# Patient Record
Sex: FEMALE | Race: BLACK OR AFRICAN AMERICAN | NOT HISPANIC OR LATINO
[De-identification: names, ages, dates, MRNs, and addresses within clinical notes are randomized per-mention and may not be internally consistent; named-entity substitution may affect disease eponyms.]

---

## 2016-09-01 RX ORDER — PANTOPRAZOLE SODIUM 20 MG/1
1 TABLET, DELAYED RELEASE ORAL
Qty: 0 | Refills: 0 | COMMUNITY
Start: 2016-09-01 | End: 2016-09-30

## 2017-01-27 ENCOUNTER — TRANSCRIPTION ENCOUNTER (OUTPATIENT)
Age: 22
End: 2017-01-27

## 2017-01-27 ENCOUNTER — INPATIENT (INPATIENT)
Facility: HOSPITAL | Age: 22
LOS: 3 days | Discharge: HOME | End: 2017-01-31
Attending: INTERNAL MEDICINE | Admitting: INTERNAL MEDICINE
Payer: COMMERCIAL

## 2017-01-27 VITALS
DIASTOLIC BLOOD PRESSURE: 89 MMHG | SYSTOLIC BLOOD PRESSURE: 137 MMHG | HEIGHT: 64 IN | RESPIRATION RATE: 17 BRPM | TEMPERATURE: 98 F | OXYGEN SATURATION: 100 % | WEIGHT: 110.01 LBS | HEART RATE: 60 BPM

## 2017-01-27 DIAGNOSIS — K29.70 GASTRITIS, UNSPECIFIED, WITHOUT BLEEDING: ICD-10-CM

## 2017-01-27 DIAGNOSIS — K29.00 ACUTE GASTRITIS WITHOUT BLEEDING: ICD-10-CM

## 2017-01-27 DIAGNOSIS — N39.0 URINARY TRACT INFECTION, SITE NOT SPECIFIED: ICD-10-CM

## 2017-01-27 LAB
ALBUMIN SERPL ELPH-MCNC: 4.3 G/DL — SIGNIFICANT CHANGE UP (ref 3.3–5)
ALP SERPL-CCNC: 70 U/L — SIGNIFICANT CHANGE UP (ref 40–120)
ALT FLD-CCNC: 19 U/L — SIGNIFICANT CHANGE UP (ref 4–33)
AMYLASE P1 CFR SERPL: 99 U/L — SIGNIFICANT CHANGE UP (ref 25–125)
APPEARANCE UR: CLEAR — SIGNIFICANT CHANGE UP
AST SERPL-CCNC: 38 U/L — HIGH (ref 4–32)
BACTERIA # UR AUTO: SIGNIFICANT CHANGE UP
BASE EXCESS BLDV CALC-SCNC: -1.8 MMOL/L — SIGNIFICANT CHANGE UP
BASOPHILS # BLD AUTO: 0.01 K/UL — SIGNIFICANT CHANGE UP (ref 0–0.2)
BASOPHILS NFR BLD AUTO: 0.1 % — SIGNIFICANT CHANGE UP (ref 0–2)
BILIRUB SERPL-MCNC: 1.4 MG/DL — HIGH (ref 0.2–1.2)
BILIRUB UR-MCNC: NEGATIVE — SIGNIFICANT CHANGE UP
BLOOD GAS VENOUS - CREATININE: SIGNIFICANT CHANGE UP MG/DL (ref 0.5–1.3)
BLOOD UR QL VISUAL: HIGH
BUN SERPL-MCNC: 14 MG/DL — SIGNIFICANT CHANGE UP (ref 7–23)
CALCIUM SERPL-MCNC: 9.4 MG/DL — SIGNIFICANT CHANGE UP (ref 8.4–10.5)
CHLORIDE BLDV-SCNC: 109 MMOL/L — HIGH (ref 96–108)
CHLORIDE SERPL-SCNC: 102 MMOL/L — SIGNIFICANT CHANGE UP (ref 98–107)
CO2 SERPL-SCNC: 20 MMOL/L — LOW (ref 22–31)
COLOR SPEC: YELLOW — SIGNIFICANT CHANGE UP
CREAT SERPL-MCNC: 0.91 MG/DL — SIGNIFICANT CHANGE UP (ref 0.5–1.3)
EOSINOPHIL # BLD AUTO: 0.01 K/UL — SIGNIFICANT CHANGE UP (ref 0–0.5)
EOSINOPHIL NFR BLD AUTO: 0.1 % — SIGNIFICANT CHANGE UP (ref 0–6)
GAS PNL BLDV: 139 MMOL/L — SIGNIFICANT CHANGE UP (ref 136–146)
GLUCOSE BLDV-MCNC: 154 — HIGH (ref 70–99)
GLUCOSE SERPL-MCNC: 154 MG/DL — HIGH (ref 70–99)
GLUCOSE UR-MCNC: NEGATIVE — SIGNIFICANT CHANGE UP
HCO3 BLDV-SCNC: 22 MMOL/L — SIGNIFICANT CHANGE UP (ref 20–27)
HCT VFR BLD CALC: 33.6 % — LOW (ref 34.5–45)
HCT VFR BLDV CALC: 31 % — LOW (ref 34.5–45)
HGB BLD-MCNC: 11.7 G/DL — SIGNIFICANT CHANGE UP (ref 11.5–15.5)
HGB BLDV-MCNC: 10 G/DL — LOW (ref 11.5–15.5)
IMM GRANULOCYTES NFR BLD AUTO: 0.2 % — SIGNIFICANT CHANGE UP (ref 0–1.5)
KETONES UR-MCNC: SIGNIFICANT CHANGE UP
LACTATE BLDV-MCNC: 3.3 MMOL/L — HIGH (ref 0.5–2)
LEUKOCYTE ESTERASE UR-ACNC: HIGH
LIDOCAIN IGE QN: 36.2 U/L — SIGNIFICANT CHANGE UP (ref 7–60)
LYMPHOCYTES # BLD AUTO: 1.69 K/UL — SIGNIFICANT CHANGE UP (ref 1–3.3)
LYMPHOCYTES # BLD AUTO: 13.5 % — SIGNIFICANT CHANGE UP (ref 13–44)
MCHC RBC-ENTMCNC: 27.9 PG — SIGNIFICANT CHANGE UP (ref 27–34)
MCHC RBC-ENTMCNC: 34.8 % — SIGNIFICANT CHANGE UP (ref 32–36)
MCV RBC AUTO: 80.2 FL — SIGNIFICANT CHANGE UP (ref 80–100)
MONOCYTES # BLD AUTO: 0.93 K/UL — HIGH (ref 0–0.9)
MONOCYTES NFR BLD AUTO: 7.4 % — SIGNIFICANT CHANGE UP (ref 2–14)
MUCOUS THREADS # UR AUTO: SIGNIFICANT CHANGE UP
NEUTROPHILS # BLD AUTO: 9.85 K/UL — HIGH (ref 1.8–7.4)
NEUTROPHILS NFR BLD AUTO: 78.7 % — HIGH (ref 43–77)
NITRITE UR-MCNC: NEGATIVE — SIGNIFICANT CHANGE UP
PCO2 BLDV: 35 MMHG — LOW (ref 41–51)
PH BLDV: 7.42 PH — SIGNIFICANT CHANGE UP (ref 7.32–7.43)
PH UR: 8.5 — HIGH (ref 4.6–8)
PLATELET # BLD AUTO: 220 K/UL — SIGNIFICANT CHANGE UP (ref 150–400)
PMV BLD: 9.7 FL — SIGNIFICANT CHANGE UP (ref 7–13)
PO2 BLDV: 28 MMHG — LOW (ref 35–40)
POTASSIUM BLDV-SCNC: 3.9 MMOL/L — SIGNIFICANT CHANGE UP (ref 3.4–4.5)
POTASSIUM SERPL-MCNC: 4.8 MMOL/L — SIGNIFICANT CHANGE UP (ref 3.5–5.3)
POTASSIUM SERPL-SCNC: 4.8 MMOL/L — SIGNIFICANT CHANGE UP (ref 3.5–5.3)
PROT SERPL-MCNC: 7.5 G/DL — SIGNIFICANT CHANGE UP (ref 6–8.3)
PROT UR-MCNC: 100 — HIGH
RBC # BLD: 4.19 M/UL — SIGNIFICANT CHANGE UP (ref 3.8–5.2)
RBC # FLD: 16.6 % — HIGH (ref 10.3–14.5)
RBC CASTS # UR COMP ASSIST: SIGNIFICANT CHANGE UP (ref 0–?)
SAO2 % BLDV: 48.7 % — LOW (ref 60–85)
SODIUM SERPL-SCNC: 141 MMOL/L — SIGNIFICANT CHANGE UP (ref 135–145)
SP GR SPEC: 1.04 — HIGH (ref 1–1.03)
SQUAMOUS # UR AUTO: SIGNIFICANT CHANGE UP
UROBILINOGEN FLD QL: NORMAL E.U. — SIGNIFICANT CHANGE UP (ref 0.1–0.2)
WBC # BLD: 12.52 K/UL — HIGH (ref 3.8–10.5)
WBC # FLD AUTO: 12.52 K/UL — HIGH (ref 3.8–10.5)
WBC UR QL: SIGNIFICANT CHANGE UP (ref 0–?)

## 2017-01-27 PROCEDURE — 74177 CT ABD & PELVIS W/CONTRAST: CPT | Mod: 26

## 2017-01-27 PROCEDURE — 99223 1ST HOSP IP/OBS HIGH 75: CPT

## 2017-01-27 RX ORDER — ONDANSETRON 8 MG/1
4 TABLET, FILM COATED ORAL ONCE
Qty: 0 | Refills: 0 | Status: COMPLETED | OUTPATIENT
Start: 2017-01-27 | End: 2017-01-27

## 2017-01-27 RX ORDER — ACETAMINOPHEN 500 MG
650 TABLET ORAL EVERY 6 HOURS
Qty: 0 | Refills: 0 | Status: DISCONTINUED | OUTPATIENT
Start: 2017-01-27 | End: 2017-01-31

## 2017-01-27 RX ORDER — MORPHINE SULFATE 50 MG/1
4 CAPSULE, EXTENDED RELEASE ORAL ONCE
Qty: 0 | Refills: 0 | Status: DISCONTINUED | OUTPATIENT
Start: 2017-01-27 | End: 2017-01-27

## 2017-01-27 RX ORDER — ONDANSETRON 8 MG/1
4 TABLET, FILM COATED ORAL EVERY 6 HOURS
Qty: 0 | Refills: 0 | Status: DISCONTINUED | OUTPATIENT
Start: 2017-01-27 | End: 2017-01-31

## 2017-01-27 RX ORDER — INFLUENZA VIRUS VACCINE 15; 15; 15; 15 UG/.5ML; UG/.5ML; UG/.5ML; UG/.5ML
0.5 SUSPENSION INTRAMUSCULAR ONCE
Qty: 0 | Refills: 0 | Status: COMPLETED | OUTPATIENT
Start: 2017-01-27 | End: 2017-01-27

## 2017-01-27 RX ORDER — FAMOTIDINE 10 MG/ML
20 INJECTION INTRAVENOUS ONCE
Qty: 0 | Refills: 0 | Status: COMPLETED | OUTPATIENT
Start: 2017-01-27 | End: 2017-01-27

## 2017-01-27 RX ORDER — MORPHINE SULFATE 50 MG/1
0.5 CAPSULE, EXTENDED RELEASE ORAL ONCE
Qty: 0 | Refills: 0 | Status: DISCONTINUED | OUTPATIENT
Start: 2017-01-27 | End: 2017-01-27

## 2017-01-27 RX ORDER — SODIUM CHLORIDE 9 MG/ML
1000 INJECTION INTRAMUSCULAR; INTRAVENOUS; SUBCUTANEOUS
Qty: 0 | Refills: 0 | Status: DISCONTINUED | OUTPATIENT
Start: 2017-01-27 | End: 2017-01-31

## 2017-01-27 RX ORDER — PANTOPRAZOLE SODIUM 20 MG/1
40 TABLET, DELAYED RELEASE ORAL
Qty: 0 | Refills: 0 | Status: DISCONTINUED | OUTPATIENT
Start: 2017-01-27 | End: 2017-01-29

## 2017-01-27 RX ORDER — CALCIUM CARBONATE 500(1250)
3 TABLET ORAL
Qty: 0 | Refills: 0 | COMMUNITY
Start: 2017-01-27

## 2017-01-27 RX ORDER — MORPHINE SULFATE 50 MG/1
0.5 CAPSULE, EXTENDED RELEASE ORAL EVERY 6 HOURS
Qty: 0 | Refills: 0 | Status: DISCONTINUED | OUTPATIENT
Start: 2017-01-27 | End: 2017-01-31

## 2017-01-27 RX ORDER — CALCIUM CARBONATE 500(1250)
3 TABLET ORAL EVERY 6 HOURS
Qty: 0 | Refills: 0 | Status: DISCONTINUED | OUTPATIENT
Start: 2017-01-27 | End: 2017-01-31

## 2017-01-27 RX ORDER — SODIUM CHLORIDE 9 MG/ML
1000 INJECTION INTRAMUSCULAR; INTRAVENOUS; SUBCUTANEOUS ONCE
Qty: 0 | Refills: 0 | Status: COMPLETED | OUTPATIENT
Start: 2017-01-27 | End: 2017-01-27

## 2017-01-27 RX ADMIN — MORPHINE SULFATE 4 MILLIGRAM(S): 50 CAPSULE, EXTENDED RELEASE ORAL at 05:55

## 2017-01-27 RX ADMIN — Medication 650 MILLIGRAM(S): at 17:50

## 2017-01-27 RX ADMIN — MORPHINE SULFATE 4 MILLIGRAM(S): 50 CAPSULE, EXTENDED RELEASE ORAL at 08:57

## 2017-01-27 RX ADMIN — ONDANSETRON 4 MILLIGRAM(S): 8 TABLET, FILM COATED ORAL at 08:57

## 2017-01-27 RX ADMIN — SODIUM CHLORIDE 75 MILLILITER(S): 9 INJECTION INTRAMUSCULAR; INTRAVENOUS; SUBCUTANEOUS at 18:56

## 2017-01-27 RX ADMIN — FAMOTIDINE 20 MILLIGRAM(S): 10 INJECTION INTRAVENOUS at 05:33

## 2017-01-27 RX ADMIN — Medication 650 MILLIGRAM(S): at 16:51

## 2017-01-27 RX ADMIN — MORPHINE SULFATE 0.5 MILLIGRAM(S): 50 CAPSULE, EXTENDED RELEASE ORAL at 18:55

## 2017-01-27 RX ADMIN — ONDANSETRON 4 MILLIGRAM(S): 8 TABLET, FILM COATED ORAL at 17:55

## 2017-01-27 RX ADMIN — MORPHINE SULFATE 4 MILLIGRAM(S): 50 CAPSULE, EXTENDED RELEASE ORAL at 07:00

## 2017-01-27 RX ADMIN — MORPHINE SULFATE 4 MILLIGRAM(S): 50 CAPSULE, EXTENDED RELEASE ORAL at 09:30

## 2017-01-27 RX ADMIN — MORPHINE SULFATE 0.5 MILLIGRAM(S): 50 CAPSULE, EXTENDED RELEASE ORAL at 23:14

## 2017-01-27 RX ADMIN — PANTOPRAZOLE SODIUM 40 MILLIGRAM(S): 20 TABLET, DELAYED RELEASE ORAL at 16:51

## 2017-01-27 RX ADMIN — ONDANSETRON 4 MILLIGRAM(S): 8 TABLET, FILM COATED ORAL at 05:33

## 2017-01-27 RX ADMIN — SODIUM CHLORIDE 1000 MILLILITER(S): 9 INJECTION INTRAMUSCULAR; INTRAVENOUS; SUBCUTANEOUS at 05:33

## 2017-01-27 RX ADMIN — SODIUM CHLORIDE 75 MILLILITER(S): 9 INJECTION INTRAMUSCULAR; INTRAVENOUS; SUBCUTANEOUS at 16:52

## 2017-01-27 RX ADMIN — Medication 30 MILLILITER(S): at 08:57

## 2017-01-27 RX ADMIN — MORPHINE SULFATE 0.5 MILLIGRAM(S): 50 CAPSULE, EXTENDED RELEASE ORAL at 23:30

## 2017-01-27 NOTE — DISCHARGE NOTE ADULT - MEDICATION SUMMARY - MEDICATIONS TO TAKE
I will START or STAY ON the medications listed below when I get home from the hospital:    calcium carbonate 500 mg (200 mg elemental calcium) oral tablet, chewable  -- 3 tab(s) by mouth every 6 hours, As needed, Dyspepsia  -- Indication: For Gastritis    pantoprazole 40 mg oral delayed release tablet  -- 1 tab(s) by mouth once a day  -- Indication: For Gastritis I will START or STAY ON the medications listed below when I get home from the hospital:    calcium carbonate 500 mg (200 mg elemental calcium) oral tablet, chewable  -- 3 tab(s) by mouth every 6 hours, As needed, Dyspepsia  -- Indication: For Gastritis    sucralfate 1 g/10 mL oral suspension  -- 10 milliliter(s) by mouth 4 times a day  -- Indication: For Gastritis    pantoprazole 40 mg oral delayed release tablet  -- 1 tab(s) by mouth 2 times a day  -- Indication: For Gastritis

## 2017-01-27 NOTE — DISCHARGE NOTE ADULT - CARE PROVIDERS DIRECT ADDRESSES
[email or web address]

## 2017-01-27 NOTE — ED ADULT TRIAGE NOTE - CHIEF COMPLAINT QUOTE
Pt arrives to ED c/o N/V/D with Abd pain starting last week but worsening yesterday into tonight.  Starting tonight, pt unable to "keep anything down."  Pt Mother states this has happened a few times in the past where bouts of N/V start.  Pt appears uncomfortable in triage.  Pt is currently on her menstrual period which may account for some of the cramping.  Pt has had endoscopy in past and follow-up with GI.  Pt unable to keep PO meds down.  Hot pack given to pt

## 2017-01-27 NOTE — H&P ADULT. - GASTROINTESTINAL DETAILS
nontender/no rebound tenderness/no guarding/soft/no distention/bowel sounds normal/no masses palpable

## 2017-01-27 NOTE — ED ADULT NURSE REASSESSMENT NOTE - NS ED NURSE REASSESS COMMENT FT1
pt resting, VSS, pt pain level decreased, pt states she does not want to eat. Will ctm, plan to admit

## 2017-01-27 NOTE — ED PROVIDER NOTE - OBJECTIVE STATEMENT
21F no PMH p/w NV x 1 day, diffuse abdominal pain, cramping in nature as well as watery diarrhea. Pt reports hx of multiple episodes of the same in the past, usually with a few days of abdominal soreness preceding NVD. Saw GI in October 2016, had endoscopy that showed gastritis without perforations or ulcerations. No fever/chills. No recent travel or family members with similar type symptoms. States unable to tolerate PO 2/2 vomiting.     LMP: now 21F no PMH p/w NV x 1 day, diffuse abdominal pain, cramping in nature as well as watery diarrhea. Pt reports hx of multiple episodes of the same in the past, usually with a few days of abdominal soreness preceding NVD. Saw GI in October 2016, had endoscopy that showed gastritis without perforations or ulcerations. No fever/chills. No recent travel or family members with similar type symptoms. States unable to tolerate PO 2/2 vomiting.   Klepfish: Diffuse abd pain, constant, cant describe quality, last episode 1yr ago. Multiple NBNB NV. 1-2 episodes of diarrhea. No f/c, SOB/CP, urinary complaints, vaginal discharge. Does not f/u w/ any GI.   LMP: now

## 2017-01-27 NOTE — ED ADULT NURSE NOTE - OBJECTIVE STATEMENT
Pt received into room 1 co ABD pain 10/10, nausea and vomiting over the last 24 hours. Pt states pain is generlaized in abdomen and has been getting worse over the last day and has had numerous episodes of vomiting. Pt A&Ox4, VS as noted. Pt arrives with 20 G IV inserted in L AC placed by BARBIE Avilez in triage, and labs sent. MD evaluated, medicated as ordered. Family at bedside, call bell within reach, will continue to monitor for safety and comfort

## 2017-01-27 NOTE — ED ADULT NURSE REASSESSMENT NOTE - NS ED NURSE REASSESS COMMENT FT1
Pt co diffuse ABD pain, pt denies pregnancy: states shes not sexually active. Medicated as ordered. Will continue to monitor for safety and comfort.

## 2017-01-27 NOTE — DISCHARGE NOTE ADULT - PATIENT PORTAL LINK FT
“You can access the [facility] Patient Portal, offered by [facility], by registering with the following website: [email or web address]”

## 2017-01-27 NOTE — DISCHARGE NOTE ADULT - HOSPITAL COURSE
Patient admitted for acute gastritis - unable to tolerate PO intake.  After IV Prilosec and Zofran for symptom relief, markedly improved.  Patient able to tolerate PO intake.  Advised patient to avoid spicy and fatty food.  Given Prilosec for 7 days.  Recommended to follow up with her PMD/GI doctor to see if Prilosec needs to be extended farther. Patient admitted for acute gastritis - unable to tolerate PO intake.  After IV Prilosec and Zofran for symptom relief, markedly improved.  Patient able to tolerate PO intake.  Advised patient to avoid spicy and fatty food.  GI said to give protonix 40 mg BID, follow up with GI in 1-2 weeks to see if protonix needs to be continued further. Patient medically stable for discharge home.

## 2017-01-27 NOTE — H&P ADULT. - HISTORY OF PRESENT ILLNESS
21F with hx of GERD, presents with 1 day history of acute onset diffuse abdominal pain (burning and crampy pain) 10/10 in severity, associated with nausea and non-bilious non-bloody vomiting, diarrhea (watery, non-bloody.)  Similar to episode last year which prompted endoscopy which diagnosed her with gastritis.  Has tried TUMS with no significant improvement and had been unable to tolerate any PO intake.  In ED, found to be dehydrated and diagnosed with non-infectious gastritis, but unable to go home due to significant difficulty with tolerating PO intakes.  Currently feels better and able to tolerate apple sauce after taking prilosec and zofran.

## 2017-01-27 NOTE — H&P ADULT. - PROBLEM SELECTOR PLAN 1
Continue PPI for 7 days.  Monitor to see if patient can tolerate PO intake.  Once able to tolerate PO intake, discharge to home.

## 2017-01-27 NOTE — DISCHARGE NOTE ADULT - CARE PROVIDER_API CALL
Belkis Crooks), Gastroenterology; Internal Medicine  [address] [place] [address]  Phone: [phone]  Fax: [phone]

## 2017-01-27 NOTE — ED PROVIDER NOTE - ATTENDING CONTRIBUTION TO CARE
21F hx gastritis p/w diffuse abd pain, multiple NV, scant diarrhea, no other systemic complaints, similar to prior, last episode 1yr ago. Vitals wnl, exam appears in pain, diffusely tender abd, not distractable.  ddx: Likely gastritis/gerd/PUD however given lower abd ttp and very tender possible appy vs. perf.  CBC, cmp, lipase. UA, ucg. Ct a/p. Symptom control, reassess.

## 2017-01-27 NOTE — DISCHARGE NOTE ADULT - PLAN OF CARE
Continue PPI for 7 days Avoid spicy, fatty foods.  Continue Protonix for 7 days.  Use Maalox/TUMS as needed for symptom control.  Follow up with GI doctor to see if Protonix needs to be extended. Avoid spicy, fatty foods.  Continue Protonix as prescribed. Use Maalox/TUMS as needed for symptom control.  Follow up with GI doctor Valeriy in 1-2 weeks to see if Protonix needs to be extended.

## 2017-01-27 NOTE — DISCHARGE NOTE ADULT - CARE PLAN
Principal Discharge DX:	Acute gastritis without hemorrhage, unspecified gastritis type  Goal:	Continue PPI for 7 days  Instructions for follow-up, activity and diet:	Avoid spicy, fatty foods.  Continue Protonix for 7 days.  Use Maalox/TUMS as needed for symptom control.  Follow up with GI doctor to see if Protonix needs to be extended. Principal Discharge DX:	Acute gastritis without hemorrhage, unspecified gastritis type  Goal:	Continue PPI for 7 days  Instructions for follow-up, activity and diet:	Avoid spicy, fatty foods.  Continue Protonix as prescribed. Use Maalox/TUMS as needed for symptom control.  Follow up with GI doctor Valeriy in 1-2 weeks to see if Protonix needs to be extended.

## 2017-01-27 NOTE — ED PROVIDER NOTE - PROGRESS NOTE DETAILS
Klepfish: Pain improved and now only b/l upper quadrant. Abd exam: soft, b/l upper quadrant ttp but improved, no lower abd ttp. WBC 12.5, lactate slightly elevated, ucg neg, ct a/p wnl. UA w/ leuk estersa and wc but pt has no urinary complaints. Likely not source of pt's symptoms, will send urine cx and no abx for now. Given additional morphine, zofran, pepcid. Reassess. If still unable to tolerate po or sever pain, likely admission for supportive care and further GI involvement for what is likely gastritis or similar. Patient still unable to tolerate PO despite morphine, pepcid, maalox, zofran. Will admit for PO intolerance and gastritis.

## 2017-01-28 LAB
ALBUMIN SERPL ELPH-MCNC: 4 G/DL — SIGNIFICANT CHANGE UP (ref 3.3–5)
ALP SERPL-CCNC: 60 U/L — SIGNIFICANT CHANGE UP (ref 40–120)
ALT FLD-CCNC: 17 U/L — SIGNIFICANT CHANGE UP (ref 4–33)
AST SERPL-CCNC: 21 U/L — SIGNIFICANT CHANGE UP (ref 4–32)
BASOPHILS # BLD AUTO: 0.01 K/UL — SIGNIFICANT CHANGE UP (ref 0–0.2)
BASOPHILS NFR BLD AUTO: 0.1 % — SIGNIFICANT CHANGE UP (ref 0–2)
BILIRUB SERPL-MCNC: 0.6 MG/DL — SIGNIFICANT CHANGE UP (ref 0.2–1.2)
BUN SERPL-MCNC: 14 MG/DL — SIGNIFICANT CHANGE UP (ref 7–23)
CALCIUM SERPL-MCNC: 8.7 MG/DL — SIGNIFICANT CHANGE UP (ref 8.4–10.5)
CHLORIDE SERPL-SCNC: 107 MMOL/L — SIGNIFICANT CHANGE UP (ref 98–107)
CO2 SERPL-SCNC: 19 MMOL/L — LOW (ref 22–31)
CREAT SERPL-MCNC: 0.79 MG/DL — SIGNIFICANT CHANGE UP (ref 0.5–1.3)
EOSINOPHIL # BLD AUTO: 0 K/UL — SIGNIFICANT CHANGE UP (ref 0–0.5)
EOSINOPHIL NFR BLD AUTO: 0 % — SIGNIFICANT CHANGE UP (ref 0–6)
GLUCOSE SERPL-MCNC: 115 MG/DL — HIGH (ref 70–99)
HCG SERPL-ACNC: < 5 MIU/ML — SIGNIFICANT CHANGE UP
HCT VFR BLD CALC: 28.7 % — LOW (ref 34.5–45)
HGB BLD-MCNC: 9.3 G/DL — LOW (ref 11.5–15.5)
IMM GRANULOCYTES NFR BLD AUTO: 0.4 % — SIGNIFICANT CHANGE UP (ref 0–1.5)
LYMPHOCYTES # BLD AUTO: 19.7 % — SIGNIFICANT CHANGE UP (ref 13–44)
LYMPHOCYTES # BLD AUTO: 2.18 K/UL — SIGNIFICANT CHANGE UP (ref 1–3.3)
MAGNESIUM SERPL-MCNC: 2 MG/DL — SIGNIFICANT CHANGE UP (ref 1.6–2.6)
MCHC RBC-ENTMCNC: 25.1 PG — LOW (ref 27–34)
MCHC RBC-ENTMCNC: 32.4 % — SIGNIFICANT CHANGE UP (ref 32–36)
MCV RBC AUTO: 77.6 FL — LOW (ref 80–100)
MONOCYTES # BLD AUTO: 1.06 K/UL — HIGH (ref 0–0.9)
MONOCYTES NFR BLD AUTO: 9.6 % — SIGNIFICANT CHANGE UP (ref 2–14)
NEUTROPHILS # BLD AUTO: 7.79 K/UL — HIGH (ref 1.8–7.4)
NEUTROPHILS NFR BLD AUTO: 70.2 % — SIGNIFICANT CHANGE UP (ref 43–77)
PHOSPHATE SERPL-MCNC: 3.2 MG/DL — SIGNIFICANT CHANGE UP (ref 2.5–4.5)
PLATELET # BLD AUTO: 222 K/UL — SIGNIFICANT CHANGE UP (ref 150–400)
PMV BLD: 9.5 FL — SIGNIFICANT CHANGE UP (ref 7–13)
POTASSIUM SERPL-MCNC: 3.4 MMOL/L — LOW (ref 3.5–5.3)
POTASSIUM SERPL-SCNC: 3.4 MMOL/L — LOW (ref 3.5–5.3)
PROT SERPL-MCNC: 6.5 G/DL — SIGNIFICANT CHANGE UP (ref 6–8.3)
RBC # BLD: 3.7 M/UL — LOW (ref 3.8–5.2)
RBC # FLD: 16.8 % — HIGH (ref 10.3–14.5)
SODIUM SERPL-SCNC: 142 MMOL/L — SIGNIFICANT CHANGE UP (ref 135–145)
WBC # BLD: 11.08 K/UL — HIGH (ref 3.8–10.5)
WBC # FLD AUTO: 11.08 K/UL — HIGH (ref 3.8–10.5)

## 2017-01-28 RX ORDER — POTASSIUM CHLORIDE 20 MEQ
10 PACKET (EA) ORAL
Qty: 0 | Refills: 0 | Status: COMPLETED | OUTPATIENT
Start: 2017-01-28 | End: 2017-01-28

## 2017-01-28 RX ORDER — CEFTRIAXONE 500 MG/1
1 INJECTION, POWDER, FOR SOLUTION INTRAMUSCULAR; INTRAVENOUS EVERY 24 HOURS
Qty: 0 | Refills: 0 | Status: DISCONTINUED | OUTPATIENT
Start: 2017-01-29 | End: 2017-01-31

## 2017-01-28 RX ORDER — POTASSIUM CHLORIDE 20 MEQ
20 PACKET (EA) ORAL
Qty: 0 | Refills: 0 | Status: COMPLETED | OUTPATIENT
Start: 2017-01-28 | End: 2017-01-28

## 2017-01-28 RX ORDER — SUCRALFATE 1 G
1 TABLET ORAL
Qty: 0 | Refills: 0 | Status: DISCONTINUED | OUTPATIENT
Start: 2017-01-28 | End: 2017-01-29

## 2017-01-28 RX ORDER — CEFTRIAXONE 500 MG/1
1 INJECTION, POWDER, FOR SOLUTION INTRAMUSCULAR; INTRAVENOUS ONCE
Qty: 0 | Refills: 0 | Status: COMPLETED | OUTPATIENT
Start: 2017-01-28 | End: 2017-01-28

## 2017-01-28 RX ORDER — CEFTRIAXONE 500 MG/1
INJECTION, POWDER, FOR SOLUTION INTRAMUSCULAR; INTRAVENOUS
Qty: 0 | Refills: 0 | Status: DISCONTINUED | OUTPATIENT
Start: 2017-01-28 | End: 2017-01-31

## 2017-01-28 RX ADMIN — MORPHINE SULFATE 0.5 MILLIGRAM(S): 50 CAPSULE, EXTENDED RELEASE ORAL at 02:52

## 2017-01-28 RX ADMIN — Medication 100 MILLIEQUIVALENT(S): at 19:49

## 2017-01-28 RX ADMIN — MORPHINE SULFATE 0.5 MILLIGRAM(S): 50 CAPSULE, EXTENDED RELEASE ORAL at 15:29

## 2017-01-28 RX ADMIN — MORPHINE SULFATE 0.5 MILLIGRAM(S): 50 CAPSULE, EXTENDED RELEASE ORAL at 08:37

## 2017-01-28 RX ADMIN — Medication 100 MILLIEQUIVALENT(S): at 18:03

## 2017-01-28 RX ADMIN — MORPHINE SULFATE 0.5 MILLIGRAM(S): 50 CAPSULE, EXTENDED RELEASE ORAL at 02:36

## 2017-01-28 RX ADMIN — MORPHINE SULFATE 0.5 MILLIGRAM(S): 50 CAPSULE, EXTENDED RELEASE ORAL at 08:53

## 2017-01-28 RX ADMIN — ONDANSETRON 4 MILLIGRAM(S): 8 TABLET, FILM COATED ORAL at 02:45

## 2017-01-28 RX ADMIN — ONDANSETRON 4 MILLIGRAM(S): 8 TABLET, FILM COATED ORAL at 10:23

## 2017-01-28 RX ADMIN — ONDANSETRON 4 MILLIGRAM(S): 8 TABLET, FILM COATED ORAL at 20:24

## 2017-01-28 RX ADMIN — CEFTRIAXONE 100 GRAM(S): 500 INJECTION, POWDER, FOR SOLUTION INTRAMUSCULAR; INTRAVENOUS at 19:49

## 2017-01-28 RX ADMIN — Medication 100 MILLIEQUIVALENT(S): at 16:43

## 2017-01-28 RX ADMIN — Medication 3 TABLET(S): at 19:42

## 2017-01-28 RX ADMIN — MORPHINE SULFATE 0.5 MILLIGRAM(S): 50 CAPSULE, EXTENDED RELEASE ORAL at 15:49

## 2017-01-29 LAB
AMPHET UR-MCNC: NEGATIVE — SIGNIFICANT CHANGE UP
AMPHET UR-MCNC: NEGATIVE — SIGNIFICANT CHANGE UP
BARBITURATES UR SCN-MCNC: NEGATIVE — SIGNIFICANT CHANGE UP
BARBITURATES UR SCN-MCNC: NEGATIVE — SIGNIFICANT CHANGE UP
BENZODIAZ UR-MCNC: NEGATIVE — SIGNIFICANT CHANGE UP
BENZODIAZ UR-MCNC: NEGATIVE — SIGNIFICANT CHANGE UP
CANNABINOIDS UR-MCNC: POSITIVE — SIGNIFICANT CHANGE UP
CANNABINOIDS UR-MCNC: POSITIVE — SIGNIFICANT CHANGE UP
COCAINE METAB.OTHER UR-MCNC: NEGATIVE — SIGNIFICANT CHANGE UP
COCAINE METAB.OTHER UR-MCNC: NEGATIVE — SIGNIFICANT CHANGE UP
METHADONE UR-MCNC: NEGATIVE — SIGNIFICANT CHANGE UP
METHADONE UR-MCNC: NEGATIVE — SIGNIFICANT CHANGE UP
OPIATES UR-MCNC: POSITIVE — SIGNIFICANT CHANGE UP
OPIATES UR-MCNC: POSITIVE — SIGNIFICANT CHANGE UP
OXYCODONE UR-MCNC: NEGATIVE — SIGNIFICANT CHANGE UP
OXYCODONE UR-MCNC: NEGATIVE — SIGNIFICANT CHANGE UP
PCP UR-MCNC: NEGATIVE — SIGNIFICANT CHANGE UP
PCP UR-MCNC: NEGATIVE — SIGNIFICANT CHANGE UP

## 2017-01-29 RX ORDER — METOCLOPRAMIDE HCL 10 MG
10 TABLET ORAL ONCE
Qty: 0 | Refills: 0 | Status: COMPLETED | OUTPATIENT
Start: 2017-01-29 | End: 2017-01-29

## 2017-01-29 RX ORDER — SUCRALFATE 1 G
1 TABLET ORAL
Qty: 0 | Refills: 0 | Status: DISCONTINUED | OUTPATIENT
Start: 2017-01-29 | End: 2017-01-31

## 2017-01-29 RX ORDER — PANTOPRAZOLE SODIUM 20 MG/1
40 TABLET, DELAYED RELEASE ORAL DAILY
Qty: 0 | Refills: 0 | Status: DISCONTINUED | OUTPATIENT
Start: 2017-01-29 | End: 2017-01-31

## 2017-01-29 RX ADMIN — MORPHINE SULFATE 0.5 MILLIGRAM(S): 50 CAPSULE, EXTENDED RELEASE ORAL at 01:08

## 2017-01-29 RX ADMIN — MORPHINE SULFATE 0.5 MILLIGRAM(S): 50 CAPSULE, EXTENDED RELEASE ORAL at 00:53

## 2017-01-29 RX ADMIN — CEFTRIAXONE 100 GRAM(S): 500 INJECTION, POWDER, FOR SOLUTION INTRAMUSCULAR; INTRAVENOUS at 17:41

## 2017-01-29 RX ADMIN — Medication 10 MILLIGRAM(S): at 08:53

## 2017-01-29 RX ADMIN — ONDANSETRON 4 MILLIGRAM(S): 8 TABLET, FILM COATED ORAL at 13:10

## 2017-01-29 RX ADMIN — ONDANSETRON 4 MILLIGRAM(S): 8 TABLET, FILM COATED ORAL at 20:06

## 2017-01-29 RX ADMIN — Medication 1 GRAM(S): at 17:41

## 2017-01-29 RX ADMIN — PANTOPRAZOLE SODIUM 40 MILLIGRAM(S): 20 TABLET, DELAYED RELEASE ORAL at 08:53

## 2017-01-29 RX ADMIN — Medication 3 TABLET(S): at 04:04

## 2017-01-29 RX ADMIN — ONDANSETRON 4 MILLIGRAM(S): 8 TABLET, FILM COATED ORAL at 04:58

## 2017-01-29 RX ADMIN — Medication 1 GRAM(S): at 23:06

## 2017-01-30 LAB — SPECIMEN SOURCE: SIGNIFICANT CHANGE UP

## 2017-01-30 RX ADMIN — CEFTRIAXONE 100 GRAM(S): 500 INJECTION, POWDER, FOR SOLUTION INTRAMUSCULAR; INTRAVENOUS at 18:01

## 2017-01-30 RX ADMIN — ONDANSETRON 4 MILLIGRAM(S): 8 TABLET, FILM COATED ORAL at 02:54

## 2017-01-30 RX ADMIN — SODIUM CHLORIDE 75 MILLILITER(S): 9 INJECTION INTRAMUSCULAR; INTRAVENOUS; SUBCUTANEOUS at 19:30

## 2017-01-30 RX ADMIN — PANTOPRAZOLE SODIUM 40 MILLIGRAM(S): 20 TABLET, DELAYED RELEASE ORAL at 11:08

## 2017-01-30 RX ADMIN — Medication 1 GRAM(S): at 11:08

## 2017-01-30 RX ADMIN — Medication 1 GRAM(S): at 05:53

## 2017-01-31 VITALS
SYSTOLIC BLOOD PRESSURE: 130 MMHG | HEART RATE: 70 BPM | DIASTOLIC BLOOD PRESSURE: 80 MMHG | OXYGEN SATURATION: 100 % | TEMPERATURE: 98 F | RESPIRATION RATE: 18 BRPM

## 2017-01-31 RX ORDER — SUCRALFATE 1 G
10 TABLET ORAL
Qty: 1200 | Refills: 0 | OUTPATIENT
Start: 2017-01-31 | End: 2017-03-02

## 2017-01-31 RX ORDER — PANTOPRAZOLE SODIUM 20 MG/1
1 TABLET, DELAYED RELEASE ORAL
Qty: 60 | Refills: 0 | OUTPATIENT
Start: 2017-01-31 | End: 2017-03-02

## 2017-01-31 RX ADMIN — SODIUM CHLORIDE 75 MILLILITER(S): 9 INJECTION INTRAMUSCULAR; INTRAVENOUS; SUBCUTANEOUS at 11:45

## 2017-01-31 RX ADMIN — Medication 3 TABLET(S): at 04:02

## 2017-01-31 RX ADMIN — PANTOPRAZOLE SODIUM 40 MILLIGRAM(S): 20 TABLET, DELAYED RELEASE ORAL at 11:44

## 2017-04-25 ENCOUNTER — RESULT REVIEW (OUTPATIENT)
Age: 22
End: 2017-04-25

## 2017-06-30 ENCOUNTER — INPATIENT (INPATIENT)
Facility: HOSPITAL | Age: 22
LOS: 4 days | Discharge: HOME | End: 2017-07-05
Attending: HOSPITALIST | Admitting: HOSPITALIST
Payer: COMMERCIAL

## 2017-06-30 VITALS
SYSTOLIC BLOOD PRESSURE: 121 MMHG | OXYGEN SATURATION: 100 % | DIASTOLIC BLOOD PRESSURE: 84 MMHG | HEART RATE: 102 BPM | TEMPERATURE: 99 F | RESPIRATION RATE: 18 BRPM

## 2017-06-30 LAB
ALBUMIN SERPL ELPH-MCNC: 4.4 G/DL — SIGNIFICANT CHANGE UP (ref 3.3–5)
ALP SERPL-CCNC: 66 U/L — SIGNIFICANT CHANGE UP (ref 40–120)
ALT FLD-CCNC: 14 U/L — SIGNIFICANT CHANGE UP (ref 4–33)
APPEARANCE UR: CLEAR — SIGNIFICANT CHANGE UP
APPEARANCE UR: SIGNIFICANT CHANGE UP
AST SERPL-CCNC: 20 U/L — SIGNIFICANT CHANGE UP (ref 4–32)
BACTERIA # UR AUTO: SIGNIFICANT CHANGE UP
BACTERIA # UR AUTO: SIGNIFICANT CHANGE UP
BASE EXCESS BLDV CALC-SCNC: -2.8 MMOL/L — SIGNIFICANT CHANGE UP
BASE EXCESS BLDV CALC-SCNC: -4 MMOL/L — SIGNIFICANT CHANGE UP
BASOPHILS # BLD AUTO: 0.02 K/UL — SIGNIFICANT CHANGE UP (ref 0–0.2)
BASOPHILS NFR BLD AUTO: 0.1 % — SIGNIFICANT CHANGE UP (ref 0–2)
BILIRUB SERPL-MCNC: 0.6 MG/DL — SIGNIFICANT CHANGE UP (ref 0.2–1.2)
BILIRUB UR-MCNC: NEGATIVE — SIGNIFICANT CHANGE UP
BILIRUB UR-MCNC: NEGATIVE — SIGNIFICANT CHANGE UP
BLOOD GAS VENOUS - CREATININE: 0.72 MG/DL — SIGNIFICANT CHANGE UP (ref 0.5–1.3)
BLOOD GAS VENOUS - CREATININE: 0.73 MG/DL — SIGNIFICANT CHANGE UP (ref 0.5–1.3)
BLOOD UR QL VISUAL: NEGATIVE — SIGNIFICANT CHANGE UP
BLOOD UR QL VISUAL: NEGATIVE — SIGNIFICANT CHANGE UP
BUN SERPL-MCNC: 13 MG/DL — SIGNIFICANT CHANGE UP (ref 7–23)
CALCIUM SERPL-MCNC: 9.7 MG/DL — SIGNIFICANT CHANGE UP (ref 8.4–10.5)
CHLORIDE BLDV-SCNC: 106 MMOL/L — SIGNIFICANT CHANGE UP (ref 96–108)
CHLORIDE BLDV-SCNC: 107 MMOL/L — SIGNIFICANT CHANGE UP (ref 96–108)
CHLORIDE SERPL-SCNC: 98 MMOL/L — SIGNIFICANT CHANGE UP (ref 98–107)
CO2 SERPL-SCNC: 19 MMOL/L — LOW (ref 22–31)
COLOR SPEC: YELLOW — SIGNIFICANT CHANGE UP
COLOR SPEC: YELLOW — SIGNIFICANT CHANGE UP
CREAT SERPL-MCNC: 0.81 MG/DL — SIGNIFICANT CHANGE UP (ref 0.5–1.3)
EOSINOPHIL # BLD AUTO: 0.02 K/UL — SIGNIFICANT CHANGE UP (ref 0–0.5)
EOSINOPHIL NFR BLD AUTO: 0.1 % — SIGNIFICANT CHANGE UP (ref 0–6)
GAS PNL BLDV: 138 MMOL/L — SIGNIFICANT CHANGE UP (ref 136–146)
GAS PNL BLDV: 138 MMOL/L — SIGNIFICANT CHANGE UP (ref 136–146)
GLUCOSE BLDV-MCNC: 128 — HIGH (ref 70–99)
GLUCOSE BLDV-MCNC: 133 — HIGH (ref 70–99)
GLUCOSE SERPL-MCNC: 127 MG/DL — HIGH (ref 70–99)
GLUCOSE UR-MCNC: 1000 — HIGH
GLUCOSE UR-MCNC: NEGATIVE — SIGNIFICANT CHANGE UP
HCG SERPL-ACNC: SIGNIFICANT CHANGE UP MIU/ML
HCO3 BLDV-SCNC: 21 MMOL/L — SIGNIFICANT CHANGE UP (ref 20–27)
HCO3 BLDV-SCNC: 21 MMOL/L — SIGNIFICANT CHANGE UP (ref 20–27)
HCT VFR BLD CALC: 33 % — LOW (ref 34.5–45)
HCT VFR BLDV CALC: 34.7 % — SIGNIFICANT CHANGE UP (ref 34.5–45)
HCT VFR BLDV CALC: 37.9 % — SIGNIFICANT CHANGE UP (ref 34.5–45)
HGB BLD-MCNC: 10.9 G/DL — LOW (ref 11.5–15.5)
HGB BLDV-MCNC: 11.3 G/DL — LOW (ref 11.5–15.5)
HGB BLDV-MCNC: 12.3 G/DL — SIGNIFICANT CHANGE UP (ref 11.5–15.5)
IMM GRANULOCYTES # BLD AUTO: 0.05 # — SIGNIFICANT CHANGE UP
IMM GRANULOCYTES NFR BLD AUTO: 0.4 % — SIGNIFICANT CHANGE UP (ref 0–1.5)
KETONES UR-MCNC: SIGNIFICANT CHANGE UP
KETONES UR-MCNC: SIGNIFICANT CHANGE UP
LACTATE BLDV-MCNC: 3.3 MMOL/L — HIGH (ref 0.5–2)
LACTATE BLDV-MCNC: 5.7 MMOL/L — CRITICAL HIGH (ref 0.5–2)
LEUKOCYTE ESTERASE UR-ACNC: HIGH
LEUKOCYTE ESTERASE UR-ACNC: NEGATIVE — SIGNIFICANT CHANGE UP
LIDOCAIN IGE QN: 30.8 U/L — SIGNIFICANT CHANGE UP (ref 7–60)
LYMPHOCYTES # BLD AUTO: 2.96 K/UL — SIGNIFICANT CHANGE UP (ref 1–3.3)
LYMPHOCYTES # BLD AUTO: 21.9 % — SIGNIFICANT CHANGE UP (ref 13–44)
MCHC RBC-ENTMCNC: 25.7 PG — LOW (ref 27–34)
MCHC RBC-ENTMCNC: 33 % — SIGNIFICANT CHANGE UP (ref 32–36)
MCV RBC AUTO: 77.8 FL — LOW (ref 80–100)
MONOCYTES # BLD AUTO: 1.12 K/UL — HIGH (ref 0–0.9)
MONOCYTES NFR BLD AUTO: 8.3 % — SIGNIFICANT CHANGE UP (ref 2–14)
MUCOUS THREADS # UR AUTO: SIGNIFICANT CHANGE UP
MUCOUS THREADS # UR AUTO: SIGNIFICANT CHANGE UP
NEUTROPHILS # BLD AUTO: 9.36 K/UL — HIGH (ref 1.8–7.4)
NEUTROPHILS NFR BLD AUTO: 69.2 % — SIGNIFICANT CHANGE UP (ref 43–77)
NITRITE UR-MCNC: NEGATIVE — SIGNIFICANT CHANGE UP
NITRITE UR-MCNC: NEGATIVE — SIGNIFICANT CHANGE UP
NON-SQ EPI CELLS # UR AUTO: <1 — SIGNIFICANT CHANGE UP
NRBC # FLD: 0 — SIGNIFICANT CHANGE UP
PCO2 BLDV: 26 MMHG — LOW (ref 41–51)
PCO2 BLDV: 39 MMHG — LOW (ref 41–51)
PH BLDV: 7.36 PH — SIGNIFICANT CHANGE UP (ref 7.32–7.43)
PH BLDV: 7.48 PH — HIGH (ref 7.32–7.43)
PH UR: 7 — SIGNIFICANT CHANGE UP (ref 4.6–8)
PH UR: 7.5 — SIGNIFICANT CHANGE UP (ref 4.6–8)
PLATELET # BLD AUTO: 315 K/UL — SIGNIFICANT CHANGE UP (ref 150–400)
PMV BLD: 9.9 FL — SIGNIFICANT CHANGE UP (ref 7–13)
PO2 BLDV: < 24 MMHG — LOW (ref 35–40)
PO2 BLDV: < 24 MMHG — LOW (ref 35–40)
POTASSIUM BLDV-SCNC: 3.5 MMOL/L — SIGNIFICANT CHANGE UP (ref 3.4–4.5)
POTASSIUM BLDV-SCNC: 4.8 MMOL/L — HIGH (ref 3.4–4.5)
POTASSIUM SERPL-MCNC: 3.7 MMOL/L — SIGNIFICANT CHANGE UP (ref 3.5–5.3)
POTASSIUM SERPL-SCNC: 3.7 MMOL/L — SIGNIFICANT CHANGE UP (ref 3.5–5.3)
PROT SERPL-MCNC: 7.5 G/DL — SIGNIFICANT CHANGE UP (ref 6–8.3)
PROT UR-MCNC: 100 — HIGH
PROT UR-MCNC: 30 — HIGH
RBC # BLD: 4.24 M/UL — SIGNIFICANT CHANGE UP (ref 3.8–5.2)
RBC # FLD: 16.1 % — HIGH (ref 10.3–14.5)
RBC CASTS # UR COMP ASSIST: SIGNIFICANT CHANGE UP (ref 0–?)
RBC CASTS # UR COMP ASSIST: SIGNIFICANT CHANGE UP (ref 0–?)
SAO2 % BLDV: 12.3 % — LOW (ref 60–85)
SAO2 % BLDV: 31.4 % — LOW (ref 60–85)
SODIUM SERPL-SCNC: 139 MMOL/L — SIGNIFICANT CHANGE UP (ref 135–145)
SP GR SPEC: 1.03 — HIGH (ref 1–1.03)
SP GR SPEC: 1.03 — SIGNIFICANT CHANGE UP (ref 1–1.03)
SQUAMOUS # UR AUTO: SIGNIFICANT CHANGE UP
SQUAMOUS # UR AUTO: SIGNIFICANT CHANGE UP
UROBILINOGEN FLD QL: NORMAL E.U. — SIGNIFICANT CHANGE UP (ref 0.1–0.2)
UROBILINOGEN FLD QL: NORMAL E.U. — SIGNIFICANT CHANGE UP (ref 0.1–0.2)
WBC # BLD: 13.53 K/UL — HIGH (ref 3.8–10.5)
WBC # FLD AUTO: 13.53 K/UL — HIGH (ref 3.8–10.5)
WBC UR QL: HIGH (ref 0–?)
WBC UR QL: SIGNIFICANT CHANGE UP (ref 0–?)

## 2017-06-30 PROCEDURE — 76801 OB US < 14 WKS SINGLE FETUS: CPT | Mod: 26

## 2017-06-30 PROCEDURE — 76705 ECHO EXAM OF ABDOMEN: CPT | Mod: 26

## 2017-06-30 RX ORDER — MORPHINE SULFATE 50 MG/1
4 CAPSULE, EXTENDED RELEASE ORAL ONCE
Qty: 0 | Refills: 0 | Status: DISCONTINUED | OUTPATIENT
Start: 2017-06-30 | End: 2017-06-30

## 2017-06-30 RX ORDER — SODIUM CHLORIDE 9 MG/ML
1000 INJECTION, SOLUTION INTRAVENOUS
Qty: 0 | Refills: 0 | Status: DISCONTINUED | OUTPATIENT
Start: 2017-06-30 | End: 2017-07-01

## 2017-06-30 RX ORDER — ONDANSETRON 8 MG/1
4 TABLET, FILM COATED ORAL ONCE
Qty: 0 | Refills: 0 | Status: COMPLETED | OUTPATIENT
Start: 2017-06-30 | End: 2017-06-30

## 2017-06-30 RX ORDER — MORPHINE SULFATE 50 MG/1
2 CAPSULE, EXTENDED RELEASE ORAL ONCE
Qty: 0 | Refills: 0 | Status: DISCONTINUED | OUTPATIENT
Start: 2017-06-30 | End: 2017-06-30

## 2017-06-30 RX ORDER — ONDANSETRON 8 MG/1
4 TABLET, FILM COATED ORAL EVERY 4 HOURS
Qty: 0 | Refills: 0 | Status: DISCONTINUED | OUTPATIENT
Start: 2017-06-30 | End: 2017-07-01

## 2017-06-30 RX ORDER — PANTOPRAZOLE SODIUM 20 MG/1
80 TABLET, DELAYED RELEASE ORAL ONCE
Qty: 0 | Refills: 0 | Status: DISCONTINUED | OUTPATIENT
Start: 2017-06-30 | End: 2017-06-30

## 2017-06-30 RX ORDER — PANTOPRAZOLE SODIUM 20 MG/1
40 TABLET, DELAYED RELEASE ORAL ONCE
Qty: 0 | Refills: 0 | Status: COMPLETED | OUTPATIENT
Start: 2017-06-30 | End: 2017-06-30

## 2017-06-30 RX ORDER — SODIUM CHLORIDE 9 MG/ML
1000 INJECTION INTRAMUSCULAR; INTRAVENOUS; SUBCUTANEOUS ONCE
Qty: 0 | Refills: 0 | Status: COMPLETED | OUTPATIENT
Start: 2017-06-30 | End: 2017-06-30

## 2017-06-30 RX ORDER — FAMOTIDINE 10 MG/ML
20 INJECTION INTRAVENOUS ONCE
Qty: 0 | Refills: 0 | Status: COMPLETED | OUTPATIENT
Start: 2017-06-30 | End: 2017-06-30

## 2017-06-30 RX ORDER — METOCLOPRAMIDE HCL 10 MG
10 TABLET ORAL ONCE
Qty: 0 | Refills: 0 | Status: COMPLETED | OUTPATIENT
Start: 2017-06-30 | End: 2017-06-30

## 2017-06-30 RX ADMIN — FAMOTIDINE 20 MILLIGRAM(S): 10 INJECTION INTRAVENOUS at 08:14

## 2017-06-30 RX ADMIN — MORPHINE SULFATE 4 MILLIGRAM(S): 50 CAPSULE, EXTENDED RELEASE ORAL at 11:41

## 2017-06-30 RX ADMIN — SODIUM CHLORIDE 125 MILLILITER(S): 9 INJECTION, SOLUTION INTRAVENOUS at 13:20

## 2017-06-30 RX ADMIN — MORPHINE SULFATE 4 MILLIGRAM(S): 50 CAPSULE, EXTENDED RELEASE ORAL at 12:35

## 2017-06-30 RX ADMIN — MORPHINE SULFATE 4 MILLIGRAM(S): 50 CAPSULE, EXTENDED RELEASE ORAL at 12:17

## 2017-06-30 RX ADMIN — MORPHINE SULFATE 4 MILLIGRAM(S): 50 CAPSULE, EXTENDED RELEASE ORAL at 11:40

## 2017-06-30 RX ADMIN — ONDANSETRON 4 MILLIGRAM(S): 8 TABLET, FILM COATED ORAL at 11:41

## 2017-06-30 RX ADMIN — SODIUM CHLORIDE 1000 MILLILITER(S): 9 INJECTION INTRAMUSCULAR; INTRAVENOUS; SUBCUTANEOUS at 08:14

## 2017-06-30 RX ADMIN — PANTOPRAZOLE SODIUM 40 MILLIGRAM(S): 20 TABLET, DELAYED RELEASE ORAL at 14:45

## 2017-06-30 RX ADMIN — ONDANSETRON 4 MILLIGRAM(S): 8 TABLET, FILM COATED ORAL at 17:57

## 2017-06-30 RX ADMIN — Medication 10 MILLIGRAM(S): at 12:17

## 2017-06-30 RX ADMIN — MORPHINE SULFATE 2 MILLIGRAM(S): 50 CAPSULE, EXTENDED RELEASE ORAL at 18:40

## 2017-06-30 RX ADMIN — ONDANSETRON 4 MILLIGRAM(S): 8 TABLET, FILM COATED ORAL at 21:32

## 2017-06-30 RX ADMIN — MORPHINE SULFATE 2 MILLIGRAM(S): 50 CAPSULE, EXTENDED RELEASE ORAL at 18:55

## 2017-06-30 RX ADMIN — MORPHINE SULFATE 4 MILLIGRAM(S): 50 CAPSULE, EXTENDED RELEASE ORAL at 12:00

## 2017-06-30 RX ADMIN — ONDANSETRON 4 MILLIGRAM(S): 8 TABLET, FILM COATED ORAL at 08:14

## 2017-06-30 RX ADMIN — MORPHINE SULFATE 4 MILLIGRAM(S): 50 CAPSULE, EXTENDED RELEASE ORAL at 08:14

## 2017-06-30 NOTE — ED ADULT NURSE NOTE - CHIEF COMPLAINT QUOTE
abd pain    pt mid abd pain for 2 days with nausea, vomited 4-5 times since this am. appears uncomfortable...actively vomiting in triage

## 2017-06-30 NOTE — ED CDU PROVIDER NOTE - OBJECTIVE STATEMENT
21 year old female  with a PMHx of gastritis, six weeks pregnant pw diffuse abdominal pain and nausea, nb/nb vomiting for 2 days. The patient endorses that she has had similar pain before that she was diagnosed with gastritis and better with protonix. Pt states that she vomited numerous times between yesterday and today. Denies f/c, CP, SOB, dysuria, hematuria, diarrhea, constipation, abdominal surgery. 21 year old female  with a PMHx of gastritis, six weeks pregnant (pt unaware of pregnancy) pw diffuse abdominal pain and nausea, nb/nb vomiting for 2 days. The patient endorses that she has had similar pain before that she was diagnosed with gastritis and better with protonix. Pt states that she vomited numerous times between yesterday and today. Denies f/c, CP, SOB, dysuria, hematuria, diarrhea, constipation, abdominal surgery.  In ED: given IVF, lactate 5.7 ->3.3, confirmed IUP 21 year old female  with a PMHx of gastritis, six weeks pregnant (pt unaware of pregnancy) pw diffuse abdominal pain and nausea, nb/nb vomiting for 2 days. The patient endorses that she has had similar pain before that she was diagnosed with gastritis and better with protonix. Pt states that she vomited numerous times between yesterday and today. Denies f/c, CP, SOB, dysuria, hematuria, diarrhea, constipation, abdominal surgery, vaginal discharge/odor/bleeding.  In ED: given IVF, lactate 5.7 ->3.3, confirmed IUP 21 year old female  with a PMHx of gastritis, six weeks pregnant (pt unaware of pregnancy) pw diffuse abdominal pain and nausea, nb/nb vomiting for 2 days. The patient endorses that she has had similar pain before that she was diagnosed with gastritis and better with protonix. Pt states that she vomited numerous times between yesterday and today. Admits that she was treated for an STD in May - unsure of what STD or what medications were given. Denies f/c, CP, SOB, dysuria, hematuria, diarrhea, constipation, abdominal surgery, vaginal discharge/odor/bleeding.  In ED: given IVF, lactate 5.7 ->3.3, confirmed IUP

## 2017-06-30 NOTE — ED PROVIDER NOTE - PROGRESS NOTE DETAILS
UCG pos, TVUS ordered repeat lactate 3.3 and patient still with nausea, will send to CDU for further hydration for hyperemesis

## 2017-06-30 NOTE — ED PROVIDER NOTE - MEDICAL DECISION MAKING DETAILS
21 F with chronic gastritis, p/w epigastric pain and multiple episodes of vomiting x 2 days with tenderness on exam. Will get basic labs, pain control and hydration, and RUQ sono

## 2017-06-30 NOTE — ED ADULT NURSE NOTE - OBJECTIVE STATEMENT
22 yo female received in spot 3.  Pt co 1 day of acute onset diffuse abdominal pain with nausea and non-bilious non-bloody vomiting, diarrhea.  Pt has a hx of gastritis.  unable to tolerate any PO intake.  20 L ac.

## 2017-06-30 NOTE — ED PROVIDER NOTE - OBJECTIVE STATEMENT
21 F h/o gastritis (EGD 2 years ago), takes protonix intermittently when feels pain, p/w diffuse abd pain worse in epigastric area, x 2 days a/w multiple episodes of emesis, and nausea. Has started taking protonix with no relief. No fevers/chills. No urinary symptoms. No back pain. No HA. Does not have GI doc. not able to tolerate anything PO. Says this feels like prior episodes of gastritis.

## 2017-06-30 NOTE — ED CDU PROVIDER NOTE - PROGRESS NOTE DETAILS
JENAE Collisn: Paged OBGYN - awaiting call back JENAE ALLEN - pt c/o nausea and abdominal pain, zofran given,. tylenol offered but pt declined - states she only wanted morphine since it helped her earlier. CDU JENAE Clifton Addendum------  This patient was signed out to me by CDU JENAE Dwyer and ED attending Dr. Henriquez on 06/30/17 at 1900 hrs; test results reviewed.  In interim, pt has been resting comfortably, with intermittent nausea, alleviated with Zofran.  Pt. is refusing any further antacids at present; states morphine helps her pain, states pain is generalized to abdomen; same as prior; no hx/o worsening pain.  On exam, abdominal exam is objectively benign and objectively NTTP.  CDU plan discussed with pt who verbalizes agreement with plan.  Pt stable at present; will continue to monitor / reassess. CDU PA Etienne Addendum----  Pt. resting comfortably in interim; no issues to date apart from infrequent nausea / isolated episode of vomiting, alleviated with Zofran or Reglan.  Pt. agreed to dose of Maalox this am and has been resting comfortably since.  Pt. has not tolerated PO thus far apart from ice chips and tiny sips of water.  Pt. stable at present. will continue to monitor / reassess.  Pt. will be signed out to CDU day PA and attending at 0700 hrs. Dr. Henriquez: I performed a face to face bedside interview with patient regarding history of present illness, review of symptoms and past medical history. I completed an independent physical exam.  I have discussed patient's plan of care with PA.   I agree with note as stated above, having amended the EMR as needed to reflect my findings.   This includes HISTORY OF PRESENT ILLNESS, HIV, PAST MEDICAL/SURGICAL/FAMILY/SOCIAL HISTORY, ALLERGIES AND HOME MEDICATIONS, REVIEW OF SYSTEMS, PHYSICAL EXAM, and any PROGRESS NOTES during the time I functioned as the attending physician for this patient.  Dr. Henriquez: Pt had 2 emesis of emesis overnight and unable to tolerate po. Abdo soft, nt, nd. Lactate improved. Will continue IVF, reassess, obgyn consult. Dr. Henriquez: Pt continues to be symptomatic and unable to tolerate po. Will admit to obgyn service, seen by obgyn

## 2017-06-30 NOTE — ED CDU PROVIDER NOTE - ATTENDING CONTRIBUTION TO CARE
Dr. Henriquez: I performed a face to face bedside interview with patient regarding history of present illness, review of symptoms and past medical history. I completed an independent physical exam.  I have discussed patient's plan of care with PA.   I agree with note as stated above, having amended the EMR as needed to reflect my findings.   This includes HISTORY OF PRESENT ILLNESS, HIV, PAST MEDICAL/SURGICAL/FAMILY/SOCIAL HISTORY, ALLERGIES AND HOME MEDICATIONS, REVIEW OF SYSTEMS, PHYSICAL EXAM, and any PROGRESS NOTES during the time I functioned as the attending physician for this patient.  21F  with h/o gastritis, no abdo surgeries, found to be pregnant in the ED today, p/w diffuse abdo pain and multiple episodes of NBNB emesis x 2 days. Normal BMs. No hematuria, dysuria, fevers or chills. No vag bleeding or discharge.   In the ED pt had a lactate of 5.7, down to 3.3 after IVF.  On exam pt appears in mild discomfort, dry mm, rrr, ctab, abdo soft with diffuse ttp.   Plan - IV hydration, repeat labs, reassess

## 2017-06-30 NOTE — ED PROVIDER NOTE - ATTENDING CONTRIBUTION TO CARE
Dr. Gutierrez:  I have personally performed a face to face bedside history and physical examination of this patient. I have discussed the history, examination, review of systems, assessment and plan of management with the resident. I have reviewed the electronic medical record and amended it to reflect my history, review of systems, physical exam, assessment and plan.    21F Dr. Gutierrez:  I have personally performed a face to face bedside history and physical examination of this patient. I have discussed the history, examination, review of systems, assessment and plan of management with the resident. I have reviewed the electronic medical record and amended it to reflect my history, review of systems, physical exam, assessment and plan.    21F h/o gastritis (EGD 2yrs prior), intermittently on Protonix, presents with diffuse abdominal pain, most severe in epigastric area, with N/V x 2 days.  Unable to tolerate PO.  Denies fever/chills, cp, sob, urinary symptoms.    Exam:  - nad  - mildly tachy  - ctab  - abd soft, diffusely uncomfortable to palpation  - pelvic exam as per resident    A/P  - abdominal pain with N/V, possibly gastritis exacerbated by pregnancy (UCG+ in ED), r/o ectopic  - cbc, cmp, hcg, vbg, lipase  - US  - ua, urine culture  - IVF, zofran, reassess

## 2017-06-30 NOTE — ED ADULT TRIAGE NOTE - CHIEF COMPLAINT QUOTE
abd pain    pt mid abd pain for 2 days with nausea, vomited 4-5 times since this am. appears very uncomfortable abd pain    pt mid abd pain for 2 days with nausea, vomited 4-5 times since this am. appears uncomfortable...actively vomiting in triage

## 2017-06-30 NOTE — ED CDU PROVIDER NOTE - MEDICAL DECISION MAKING DETAILS
21 year old female  with a PMHx of gastritis, six weeks pregnant pw diffuse abdominal pain and nausea, nb/nb vomiting for 2 days.  Plan: IVF, rpt labs

## 2017-06-30 NOTE — ED CDU PROVIDER NOTE - ABDOMINAL TENDER
left upper quadrant/epigastric/right upper quadrant/periumbilical/left lower quadrant/right lower quadrant/suprapubic/umbilical

## 2017-07-01 DIAGNOSIS — O21.0 MILD HYPEREMESIS GRAVIDARUM: ICD-10-CM

## 2017-07-01 LAB
ALBUMIN SERPL ELPH-MCNC: 4 G/DL — SIGNIFICANT CHANGE UP (ref 3.3–5)
ALP SERPL-CCNC: 60 U/L — SIGNIFICANT CHANGE UP (ref 40–120)
ALT FLD-CCNC: 12 U/L — SIGNIFICANT CHANGE UP (ref 4–33)
AST SERPL-CCNC: 19 U/L — SIGNIFICANT CHANGE UP (ref 4–32)
BASE EXCESS BLDV CALC-SCNC: -4 MMOL/L — SIGNIFICANT CHANGE UP
BASOPHILS # BLD AUTO: 0.02 K/UL — SIGNIFICANT CHANGE UP (ref 0–0.2)
BASOPHILS NFR BLD AUTO: 0.1 % — SIGNIFICANT CHANGE UP (ref 0–2)
BILIRUB SERPL-MCNC: 0.4 MG/DL — SIGNIFICANT CHANGE UP (ref 0.2–1.2)
BLD GP AB SCN SERPL QL: NEGATIVE — SIGNIFICANT CHANGE UP
BLOOD GAS VENOUS - CREATININE: 0.6 MG/DL — SIGNIFICANT CHANGE UP (ref 0.5–1.3)
BUN SERPL-MCNC: 8 MG/DL — SIGNIFICANT CHANGE UP (ref 7–23)
CALCIUM SERPL-MCNC: 8.6 MG/DL — SIGNIFICANT CHANGE UP (ref 8.4–10.5)
CHLORIDE BLDV-SCNC: 107 MMOL/L — SIGNIFICANT CHANGE UP (ref 96–108)
CHLORIDE SERPL-SCNC: 100 MMOL/L — SIGNIFICANT CHANGE UP (ref 98–107)
CO2 SERPL-SCNC: 18 MMOL/L — LOW (ref 22–31)
CREAT SERPL-MCNC: 0.69 MG/DL — SIGNIFICANT CHANGE UP (ref 0.5–1.3)
EOSINOPHIL # BLD AUTO: 0 K/UL — SIGNIFICANT CHANGE UP (ref 0–0.5)
EOSINOPHIL NFR BLD AUTO: 0 % — SIGNIFICANT CHANGE UP (ref 0–6)
GAS PNL BLDV: 132 MMOL/L — LOW (ref 136–146)
GLUCOSE BLDV-MCNC: 112 — HIGH (ref 70–99)
GLUCOSE SERPL-MCNC: 114 MG/DL — HIGH (ref 70–99)
HBA1C BLD-MCNC: 5.5 % — SIGNIFICANT CHANGE UP (ref 4–5.6)
HBV SURFACE AG SER-ACNC: NEGATIVE — SIGNIFICANT CHANGE UP
HCO3 BLDV-SCNC: 21 MMOL/L — SIGNIFICANT CHANGE UP (ref 20–27)
HCT VFR BLD CALC: 31 % — LOW (ref 34.5–45)
HCT VFR BLDV CALC: 33 % — LOW (ref 34.5–45)
HGB BLD-MCNC: 10.1 G/DL — LOW (ref 11.5–15.5)
HGB BLDV-MCNC: 10.7 G/DL — LOW (ref 11.5–15.5)
HIV1 AG SER QL: SIGNIFICANT CHANGE UP
HIV1+2 AB SPEC QL: SIGNIFICANT CHANGE UP
IMM GRANULOCYTES # BLD AUTO: 0.07 # — SIGNIFICANT CHANGE UP
IMM GRANULOCYTES NFR BLD AUTO: 0.5 % — SIGNIFICANT CHANGE UP (ref 0–1.5)
LACTATE BLDV-MCNC: 2.6 MMOL/L — HIGH (ref 0.5–2)
LYMPHOCYTES # BLD AUTO: 1.87 K/UL — SIGNIFICANT CHANGE UP (ref 1–3.3)
LYMPHOCYTES # BLD AUTO: 13.1 % — SIGNIFICANT CHANGE UP (ref 13–44)
MCHC RBC-ENTMCNC: 25.6 PG — LOW (ref 27–34)
MCHC RBC-ENTMCNC: 32.6 % — SIGNIFICANT CHANGE UP (ref 32–36)
MCV RBC AUTO: 78.5 FL — LOW (ref 80–100)
MONOCYTES # BLD AUTO: 1.33 K/UL — HIGH (ref 0–0.9)
MONOCYTES NFR BLD AUTO: 9.3 % — SIGNIFICANT CHANGE UP (ref 2–14)
NEUTROPHILS # BLD AUTO: 11 K/UL — HIGH (ref 1.8–7.4)
NEUTROPHILS NFR BLD AUTO: 77 % — SIGNIFICANT CHANGE UP (ref 43–77)
NRBC # FLD: 0 — SIGNIFICANT CHANGE UP
PCO2 BLDV: 29 MMHG — LOW (ref 41–51)
PH BLDV: 7.44 PH — HIGH (ref 7.32–7.43)
PLATELET # BLD AUTO: 272 K/UL — SIGNIFICANT CHANGE UP (ref 150–400)
PMV BLD: 9.9 FL — SIGNIFICANT CHANGE UP (ref 7–13)
PO2 BLDV: 25 MMHG — LOW (ref 35–40)
POTASSIUM BLDV-SCNC: 3.3 MMOL/L — LOW (ref 3.4–4.5)
POTASSIUM SERPL-MCNC: 3.4 MMOL/L — LOW (ref 3.5–5.3)
POTASSIUM SERPL-SCNC: 3.4 MMOL/L — LOW (ref 3.5–5.3)
PROT SERPL-MCNC: 7 G/DL — SIGNIFICANT CHANGE UP (ref 6–8.3)
RBC # BLD: 3.95 M/UL — SIGNIFICANT CHANGE UP (ref 3.8–5.2)
RBC # FLD: 16.1 % — HIGH (ref 10.3–14.5)
RH IG SCN BLD-IMP: POSITIVE — SIGNIFICANT CHANGE UP
SAO2 % BLDV: 40.9 % — LOW (ref 60–85)
SODIUM SERPL-SCNC: 135 MMOL/L — SIGNIFICANT CHANGE UP (ref 135–145)
T4 AB SER-ACNC: 10.61 UG/DL — SIGNIFICANT CHANGE UP (ref 5.1–13)
TSH SERPL-MCNC: 0.1 UIU/ML — LOW (ref 0.27–4.2)
WBC # BLD: 14.29 K/UL — HIGH (ref 3.8–10.5)
WBC # FLD AUTO: 14.29 K/UL — HIGH (ref 3.8–10.5)

## 2017-07-01 RX ORDER — ACETAMINOPHEN 500 MG
1000 TABLET ORAL ONCE
Qty: 0 | Refills: 0 | Status: COMPLETED | OUTPATIENT
Start: 2017-07-01 | End: 2017-07-02

## 2017-07-01 RX ORDER — METOCLOPRAMIDE HCL 10 MG
10 TABLET ORAL
Qty: 0 | Refills: 0 | Status: DISCONTINUED | OUTPATIENT
Start: 2017-07-01 | End: 2017-07-01

## 2017-07-01 RX ORDER — DIPHENHYDRAMINE HCL 50 MG
25 CAPSULE ORAL ONCE
Qty: 0 | Refills: 0 | Status: COMPLETED | OUTPATIENT
Start: 2017-07-01 | End: 2017-07-01

## 2017-07-01 RX ORDER — METOCLOPRAMIDE HCL 10 MG
10 TABLET ORAL EVERY 8 HOURS
Qty: 0 | Refills: 0 | Status: DISCONTINUED | OUTPATIENT
Start: 2017-07-01 | End: 2017-07-01

## 2017-07-01 RX ORDER — CITRIC ACID/SODIUM CITRATE 300-500 MG
30 SOLUTION, ORAL ORAL EVERY 12 HOURS
Qty: 0 | Refills: 0 | Status: DISCONTINUED | OUTPATIENT
Start: 2017-07-01 | End: 2017-07-01

## 2017-07-01 RX ORDER — PYRIDOXINE HCL (VITAMIN B6) 100 MG
25 TABLET ORAL EVERY 8 HOURS
Qty: 0 | Refills: 0 | Status: DISCONTINUED | OUTPATIENT
Start: 2017-07-01 | End: 2017-07-05

## 2017-07-01 RX ORDER — POTASSIUM CHLORIDE 20 MEQ
40 PACKET (EA) ORAL ONCE
Qty: 0 | Refills: 0 | Status: COMPLETED | OUTPATIENT
Start: 2017-07-01 | End: 2017-07-01

## 2017-07-01 RX ORDER — PANTOPRAZOLE SODIUM 20 MG/1
40 TABLET, DELAYED RELEASE ORAL DAILY
Qty: 0 | Refills: 0 | Status: DISCONTINUED | OUTPATIENT
Start: 2017-07-01 | End: 2017-07-05

## 2017-07-01 RX ORDER — ONDANSETRON 8 MG/1
4 TABLET, FILM COATED ORAL EVERY 6 HOURS
Qty: 0 | Refills: 0 | Status: DISCONTINUED | OUTPATIENT
Start: 2017-07-01 | End: 2017-07-01

## 2017-07-01 RX ORDER — POTASSIUM CHLORIDE 20 MEQ
10 PACKET (EA) ORAL ONCE
Qty: 0 | Refills: 0 | Status: COMPLETED | OUTPATIENT
Start: 2017-07-01 | End: 2017-07-01

## 2017-07-01 RX ORDER — FAMOTIDINE 10 MG/ML
20 INJECTION INTRAVENOUS EVERY 12 HOURS
Qty: 0 | Refills: 0 | Status: DISCONTINUED | OUTPATIENT
Start: 2017-07-01 | End: 2017-07-05

## 2017-07-01 RX ORDER — PYRIDOXINE HCL (VITAMIN B6) 100 MG
25 TABLET ORAL EVERY 8 HOURS
Qty: 0 | Refills: 0 | Status: DISCONTINUED | OUTPATIENT
Start: 2017-07-01 | End: 2017-07-01

## 2017-07-01 RX ORDER — SODIUM CHLORIDE 9 MG/ML
1000 INJECTION, SOLUTION INTRAVENOUS
Qty: 0 | Refills: 0 | Status: DISCONTINUED | OUTPATIENT
Start: 2017-07-01 | End: 2017-07-05

## 2017-07-01 RX ORDER — CITRIC ACID/SODIUM CITRATE 300-500 MG
30 SOLUTION, ORAL ORAL EVERY 12 HOURS
Qty: 0 | Refills: 0 | Status: DISCONTINUED | OUTPATIENT
Start: 2017-07-01 | End: 2017-07-05

## 2017-07-01 RX ORDER — METOCLOPRAMIDE HCL 10 MG
10 TABLET ORAL EVERY 8 HOURS
Qty: 0 | Refills: 0 | Status: DISCONTINUED | OUTPATIENT
Start: 2017-07-01 | End: 2017-07-05

## 2017-07-01 RX ORDER — PANTOPRAZOLE SODIUM 20 MG/1
40 TABLET, DELAYED RELEASE ORAL DAILY
Qty: 0 | Refills: 0 | Status: DISCONTINUED | OUTPATIENT
Start: 2017-07-01 | End: 2017-07-01

## 2017-07-01 RX ORDER — DEXTROSE MONOHYDRATE, SODIUM CHLORIDE, AND POTASSIUM CHLORIDE 50; .745; 4.5 G/1000ML; G/1000ML; G/1000ML
1000 INJECTION, SOLUTION INTRAVENOUS
Qty: 0 | Refills: 0 | Status: DISCONTINUED | OUTPATIENT
Start: 2017-07-01 | End: 2017-07-02

## 2017-07-01 RX ORDER — METOCLOPRAMIDE HCL 10 MG
10 TABLET ORAL ONCE
Qty: 0 | Refills: 0 | Status: COMPLETED | OUTPATIENT
Start: 2017-07-01 | End: 2017-07-01

## 2017-07-01 RX ORDER — MORPHINE SULFATE 50 MG/1
2 CAPSULE, EXTENDED RELEASE ORAL ONCE
Qty: 0 | Refills: 0 | Status: DISCONTINUED | OUTPATIENT
Start: 2017-07-01 | End: 2017-07-01

## 2017-07-01 RX ORDER — ONDANSETRON 8 MG/1
4 TABLET, FILM COATED ORAL EVERY 4 HOURS
Qty: 0 | Refills: 0 | Status: COMPLETED | OUTPATIENT
Start: 2017-07-01 | End: 2017-07-01

## 2017-07-01 RX ORDER — ACETAMINOPHEN 500 MG
1000 TABLET ORAL ONCE
Qty: 0 | Refills: 0 | Status: COMPLETED | OUTPATIENT
Start: 2017-07-01 | End: 2017-07-01

## 2017-07-01 RX ORDER — PYRIDOXINE HCL (VITAMIN B6) 100 MG
50 TABLET ORAL ONCE
Qty: 0 | Refills: 0 | Status: COMPLETED | OUTPATIENT
Start: 2017-07-01 | End: 2017-07-01

## 2017-07-01 RX ADMIN — FAMOTIDINE 20 MILLIGRAM(S): 10 INJECTION INTRAVENOUS at 22:35

## 2017-07-01 RX ADMIN — PANTOPRAZOLE SODIUM 40 MILLIGRAM(S): 20 TABLET, DELAYED RELEASE ORAL at 08:46

## 2017-07-01 RX ADMIN — Medication 204 MILLIGRAM(S): at 06:33

## 2017-07-01 RX ADMIN — Medication 204 MILLIGRAM(S): at 10:45

## 2017-07-01 RX ADMIN — ONDANSETRON 4 MILLIGRAM(S): 8 TABLET, FILM COATED ORAL at 00:28

## 2017-07-01 RX ADMIN — MORPHINE SULFATE 2 MILLIGRAM(S): 50 CAPSULE, EXTENDED RELEASE ORAL at 20:10

## 2017-07-01 RX ADMIN — Medication 10 MILLIGRAM(S): at 23:31

## 2017-07-01 RX ADMIN — MORPHINE SULFATE 2 MILLIGRAM(S): 50 CAPSULE, EXTENDED RELEASE ORAL at 20:25

## 2017-07-01 RX ADMIN — Medication 400 MILLIGRAM(S): at 11:15

## 2017-07-01 RX ADMIN — DEXTROSE MONOHYDRATE, SODIUM CHLORIDE, AND POTASSIUM CHLORIDE 150 MILLILITER(S): 50; .745; 4.5 INJECTION, SOLUTION INTRAVENOUS at 17:02

## 2017-07-01 RX ADMIN — Medication 25 MILLIGRAM(S): at 11:15

## 2017-07-01 RX ADMIN — Medication 1000 MILLIGRAM(S): at 11:45

## 2017-07-01 RX ADMIN — Medication 30 MILLILITER(S): at 02:54

## 2017-07-01 RX ADMIN — Medication 100 MILLIEQUIVALENT(S): at 12:51

## 2017-07-01 RX ADMIN — SODIUM CHLORIDE 200 MILLILITER(S): 9 INJECTION, SOLUTION INTRAVENOUS at 09:39

## 2017-07-01 RX ADMIN — DEXTROSE MONOHYDRATE, SODIUM CHLORIDE, AND POTASSIUM CHLORIDE 150 MILLILITER(S): 50; .745; 4.5 INJECTION, SOLUTION INTRAVENOUS at 15:34

## 2017-07-01 RX ADMIN — MORPHINE SULFATE 2 MILLIGRAM(S): 50 CAPSULE, EXTENDED RELEASE ORAL at 00:57

## 2017-07-01 RX ADMIN — MORPHINE SULFATE 2 MILLIGRAM(S): 50 CAPSULE, EXTENDED RELEASE ORAL at 00:42

## 2017-07-01 RX ADMIN — Medication 10 MILLIGRAM(S): at 04:01

## 2017-07-01 RX ADMIN — ONDANSETRON 4 MILLIGRAM(S): 8 TABLET, FILM COATED ORAL at 17:01

## 2017-07-02 LAB
HCT VFR BLD CALC: 28.7 % — LOW (ref 34.5–45)
HGB BLD-MCNC: 9.4 G/DL — LOW (ref 11.5–15.5)
MCHC RBC-ENTMCNC: 26 PG — LOW (ref 27–34)
MCHC RBC-ENTMCNC: 32.8 % — SIGNIFICANT CHANGE UP (ref 32–36)
MCV RBC AUTO: 79.5 FL — LOW (ref 80–100)
NRBC # FLD: 0 — SIGNIFICANT CHANGE UP
PLATELET # BLD AUTO: 232 K/UL — SIGNIFICANT CHANGE UP (ref 150–400)
PMV BLD: 9.9 FL — SIGNIFICANT CHANGE UP (ref 7–13)
RBC # BLD: 3.61 M/UL — LOW (ref 3.8–5.2)
RBC # FLD: 15.9 % — HIGH (ref 10.3–14.5)
T PALLIDUM AB TITR SER: NEGATIVE — SIGNIFICANT CHANGE UP
T3FREE SERPL-MCNC: 2.71 PG/ML — SIGNIFICANT CHANGE UP (ref 1.8–4.6)
WBC # BLD: 11.11 K/UL — HIGH (ref 3.8–10.5)
WBC # FLD AUTO: 11.11 K/UL — HIGH (ref 3.8–10.5)

## 2017-07-02 PROCEDURE — 99222 1ST HOSP IP/OBS MODERATE 55: CPT | Mod: GC

## 2017-07-02 RX ORDER — MORPHINE SULFATE 50 MG/1
2 CAPSULE, EXTENDED RELEASE ORAL ONCE
Qty: 0 | Refills: 0 | Status: DISCONTINUED | OUTPATIENT
Start: 2017-07-02 | End: 2017-07-02

## 2017-07-02 RX ORDER — SUCRALFATE 1 G
1 TABLET ORAL DAILY
Qty: 0 | Refills: 0 | Status: DISCONTINUED | OUTPATIENT
Start: 2017-07-02 | End: 2017-07-05

## 2017-07-02 RX ORDER — SODIUM CHLORIDE 9 MG/ML
1000 INJECTION, SOLUTION INTRAVENOUS
Qty: 0 | Refills: 0 | Status: DISCONTINUED | OUTPATIENT
Start: 2017-07-02 | End: 2017-07-03

## 2017-07-02 RX ADMIN — MORPHINE SULFATE 2 MILLIGRAM(S): 50 CAPSULE, EXTENDED RELEASE ORAL at 03:11

## 2017-07-02 RX ADMIN — MORPHINE SULFATE 2 MILLIGRAM(S): 50 CAPSULE, EXTENDED RELEASE ORAL at 02:56

## 2017-07-02 RX ADMIN — MORPHINE SULFATE 2 MILLIGRAM(S): 50 CAPSULE, EXTENDED RELEASE ORAL at 11:10

## 2017-07-02 RX ADMIN — Medication 10 MILLIGRAM(S): at 23:06

## 2017-07-02 RX ADMIN — SODIUM CHLORIDE 200 MILLILITER(S): 9 INJECTION, SOLUTION INTRAVENOUS at 10:48

## 2017-07-02 RX ADMIN — Medication 10 MILLIGRAM(S): at 10:42

## 2017-07-02 RX ADMIN — SODIUM CHLORIDE 200 MILLILITER(S): 9 INJECTION, SOLUTION INTRAVENOUS at 06:42

## 2017-07-02 RX ADMIN — MORPHINE SULFATE 2 MILLIGRAM(S): 50 CAPSULE, EXTENDED RELEASE ORAL at 10:42

## 2017-07-02 RX ADMIN — PANTOPRAZOLE SODIUM 40 MILLIGRAM(S): 20 TABLET, DELAYED RELEASE ORAL at 14:30

## 2017-07-02 RX ADMIN — Medication 400 MILLIGRAM(S): at 23:07

## 2017-07-02 RX ADMIN — DEXTROSE MONOHYDRATE, SODIUM CHLORIDE, AND POTASSIUM CHLORIDE 150 MILLILITER(S): 50; .745; 4.5 INJECTION, SOLUTION INTRAVENOUS at 14:15

## 2017-07-02 RX ADMIN — FAMOTIDINE 20 MILLIGRAM(S): 10 INJECTION INTRAVENOUS at 10:42

## 2017-07-02 RX ADMIN — Medication 1 GRAM(S): at 14:51

## 2017-07-02 RX ADMIN — FAMOTIDINE 20 MILLIGRAM(S): 10 INJECTION INTRAVENOUS at 21:32

## 2017-07-02 RX ADMIN — SODIUM CHLORIDE 150 MILLILITER(S): 9 INJECTION, SOLUTION INTRAVENOUS at 16:07

## 2017-07-02 NOTE — CONSULT NOTE ADULT - ASSESSMENT
Impression:  1) Nausea/vomiting/abdominal pain; given recurrent attacks of sterotypical symptoms (nausea/vomiting and abdominal pain) that occur 1-2x/year with healthy intervals between, suspect cyclic vomiting syndrome. Previous EGD without findings to explain these symptoms. RUQ ultrasound unrevealing.     Plan:   [] supportive care per GYN - anti-emetics, IVF  [] diet as tolerated  [] no role for repeat EGD  [] numerous medications can be used for prophylaxis, namely amitriptyline however given pregnancy would not recommend starting this at this time. If safe during pregnancy, can give trial of coenzyme Q10 and l-carnitine Impression:  1) Nausea/vomiting/abdominal pain; given recurrent attacks of sterotypical symptoms (nausea/vomiting and abdominal pain) that occur 1-2x/year with healthy intervals between, suspect cyclic vomiting syndrome. Previous EGD without findings to explain these symptoms. RUQ ultrasound unrevealing.     Plan:   [] supportive care per GYN - anti-emetics, IVF  [] diet as tolerated  [] no role for repeat EGD  [] numerous medications can be used for prophylaxis, namely amitriptyline however given pregnancy would not recommend starting this at this time. If safe during pregnancy, can give trial of coenzyme Q10 and l-carnitine (see attending note below for further clarification, particularly if termination contemplated. )

## 2017-07-02 NOTE — CONSULT NOTE ADULT - ATTENDING COMMENTS
Pt seen and examined. Agree with fellow's note. Plan discussed with patient and primary team. Data reviewed at 4pm.    Does use marijuana. Suggested marijuana cessation as trial to see if it affects her symptoms. He is considering termination of pregnancy. In that case her symptoms may resolve if there is a pregnancy associated component to this. Patient can follow up with us as inpatient or outpatient after termination for further discussion of medications if symptomatic.   Check ekg. Will sign off. Thank you.  She has our card. [phone]. Outpatient follow-up in one month Pt seen and examined. Agree with fellow's note. Plan discussed with patient and primary team. Data reviewed at 4pm.    Suggested marijuana cessation as trial to see if it affects her symptoms.   Radha is considering termination of pregnancy. In that case her symptoms may resolve if there is a pregnancy associated component to this.   Patient can follow up with us as inpatient or outpatient after termination for further discussion of medications for [facility] if symptomatic.   Check ekg. Will sign off. Thank you.  She has our card. [phone]. Outpatient follow-up in one month

## 2017-07-03 DIAGNOSIS — O21.0 MILD HYPEREMESIS GRAVIDARUM: ICD-10-CM

## 2017-07-03 DIAGNOSIS — Z29.9 ENCOUNTER FOR PROPHYLACTIC MEASURES, UNSPECIFIED: ICD-10-CM

## 2017-07-03 DIAGNOSIS — K21.9 GASTRO-ESOPHAGEAL REFLUX DISEASE WITHOUT ESOPHAGITIS: ICD-10-CM

## 2017-07-03 LAB
ALBUMIN SERPL ELPH-MCNC: 3.9 G/DL — SIGNIFICANT CHANGE UP (ref 3.3–5)
ALP SERPL-CCNC: 57 U/L — SIGNIFICANT CHANGE UP (ref 40–120)
ALT FLD-CCNC: 22 U/L — SIGNIFICANT CHANGE UP (ref 4–33)
AST SERPL-CCNC: 21 U/L — SIGNIFICANT CHANGE UP (ref 4–32)
BASOPHILS # BLD AUTO: 0.02 K/UL — SIGNIFICANT CHANGE UP (ref 0–0.2)
BASOPHILS NFR BLD AUTO: 0.2 % — SIGNIFICANT CHANGE UP (ref 0–2)
BILIRUB SERPL-MCNC: 0.5 MG/DL — SIGNIFICANT CHANGE UP (ref 0.2–1.2)
BUN SERPL-MCNC: 4 MG/DL — LOW (ref 7–23)
BUN SERPL-MCNC: 4 MG/DL — LOW (ref 7–23)
C TRACH RRNA SPEC QL NAA+PROBE: SIGNIFICANT CHANGE UP
CALCIUM SERPL-MCNC: 8 MG/DL — LOW (ref 8.4–10.5)
CALCIUM SERPL-MCNC: 8.1 MG/DL — LOW (ref 8.4–10.5)
CHLORIDE SERPL-SCNC: 100 MMOL/L — SIGNIFICANT CHANGE UP (ref 98–107)
CHLORIDE SERPL-SCNC: 103 MMOL/L — SIGNIFICANT CHANGE UP (ref 98–107)
CO2 SERPL-SCNC: 19 MMOL/L — LOW (ref 22–31)
CO2 SERPL-SCNC: 20 MMOL/L — LOW (ref 22–31)
CREAT SERPL-MCNC: 0.56 MG/DL — SIGNIFICANT CHANGE UP (ref 0.5–1.3)
CREAT SERPL-MCNC: 0.64 MG/DL — SIGNIFICANT CHANGE UP (ref 0.5–1.3)
EOSINOPHIL # BLD AUTO: 0.02 K/UL — SIGNIFICANT CHANGE UP (ref 0–0.5)
EOSINOPHIL NFR BLD AUTO: 0.2 % — SIGNIFICANT CHANGE UP (ref 0–6)
GLUCOSE SERPL-MCNC: 117 MG/DL — HIGH (ref 70–99)
GLUCOSE SERPL-MCNC: 85 MG/DL — SIGNIFICANT CHANGE UP (ref 70–99)
HCT VFR BLD CALC: 29.2 % — LOW (ref 34.5–45)
HGB BLD-MCNC: 9.8 G/DL — LOW (ref 11.5–15.5)
IMM GRANULOCYTES # BLD AUTO: 0.05 # — SIGNIFICANT CHANGE UP
IMM GRANULOCYTES NFR BLD AUTO: 0.4 % — SIGNIFICANT CHANGE UP (ref 0–1.5)
LACTATE SERPL-SCNC: 0.9 MMOL/L — SIGNIFICANT CHANGE UP (ref 0.5–2)
LYMPHOCYTES # BLD AUTO: 2.71 K/UL — SIGNIFICANT CHANGE UP (ref 1–3.3)
LYMPHOCYTES # BLD AUTO: 23.2 % — SIGNIFICANT CHANGE UP (ref 13–44)
MAGNESIUM SERPL-MCNC: 1.9 MG/DL — SIGNIFICANT CHANGE UP (ref 1.6–2.6)
MCHC RBC-ENTMCNC: 25.5 PG — LOW (ref 27–34)
MCHC RBC-ENTMCNC: 33.6 % — SIGNIFICANT CHANGE UP (ref 32–36)
MCV RBC AUTO: 76 FL — LOW (ref 80–100)
MONOCYTES # BLD AUTO: 1.04 K/UL — HIGH (ref 0–0.9)
MONOCYTES NFR BLD AUTO: 8.9 % — SIGNIFICANT CHANGE UP (ref 2–14)
N GONORRHOEA RRNA SPEC QL NAA+PROBE: SIGNIFICANT CHANGE UP
NEUTROPHILS # BLD AUTO: 7.82 K/UL — HIGH (ref 1.8–7.4)
NEUTROPHILS NFR BLD AUTO: 67.1 % — SIGNIFICANT CHANGE UP (ref 43–77)
NRBC # FLD: 0 — SIGNIFICANT CHANGE UP
PHOSPHATE SERPL-MCNC: 2.7 MG/DL — SIGNIFICANT CHANGE UP (ref 2.5–4.5)
PLATELET # BLD AUTO: 232 K/UL — SIGNIFICANT CHANGE UP (ref 150–400)
PMV BLD: 9.2 FL — SIGNIFICANT CHANGE UP (ref 7–13)
POTASSIUM SERPL-MCNC: 2.8 MMOL/L — CRITICAL LOW (ref 3.5–5.3)
POTASSIUM SERPL-MCNC: 3.2 MMOL/L — LOW (ref 3.5–5.3)
POTASSIUM SERPL-SCNC: 2.8 MMOL/L — CRITICAL LOW (ref 3.5–5.3)
POTASSIUM SERPL-SCNC: 3.2 MMOL/L — LOW (ref 3.5–5.3)
PROT SERPL-MCNC: 6.3 G/DL — SIGNIFICANT CHANGE UP (ref 6–8.3)
RBC # BLD: 3.84 M/UL — SIGNIFICANT CHANGE UP (ref 3.8–5.2)
RBC # FLD: 15.6 % — HIGH (ref 10.3–14.5)
SODIUM SERPL-SCNC: 136 MMOL/L — SIGNIFICANT CHANGE UP (ref 135–145)
SODIUM SERPL-SCNC: 137 MMOL/L — SIGNIFICANT CHANGE UP (ref 135–145)
SPECIMEN SOURCE: SIGNIFICANT CHANGE UP
WBC # BLD: 11.66 K/UL — HIGH (ref 3.8–10.5)
WBC # FLD AUTO: 11.66 K/UL — HIGH (ref 3.8–10.5)

## 2017-07-03 PROCEDURE — 99223 1ST HOSP IP/OBS HIGH 75: CPT | Mod: GC

## 2017-07-03 PROCEDURE — 93010 ELECTROCARDIOGRAM REPORT: CPT

## 2017-07-03 RX ORDER — ACETAMINOPHEN 500 MG
1000 TABLET ORAL ONCE
Qty: 0 | Refills: 0 | Status: COMPLETED | OUTPATIENT
Start: 2017-07-03 | End: 2017-07-03

## 2017-07-03 RX ORDER — ONDANSETRON 8 MG/1
4 TABLET, FILM COATED ORAL ONCE
Qty: 0 | Refills: 0 | Status: COMPLETED | OUTPATIENT
Start: 2017-07-03 | End: 2017-07-03

## 2017-07-03 RX ORDER — MAGNESIUM SULFATE 500 MG/ML
1 VIAL (ML) INJECTION ONCE
Qty: 0 | Refills: 0 | Status: COMPLETED | OUTPATIENT
Start: 2017-07-03 | End: 2017-07-03

## 2017-07-03 RX ORDER — ONDANSETRON 8 MG/1
4 TABLET, FILM COATED ORAL EVERY 4 HOURS
Qty: 0 | Refills: 0 | Status: COMPLETED | OUTPATIENT
Start: 2017-07-03 | End: 2017-07-03

## 2017-07-03 RX ORDER — SODIUM CHLORIDE 9 MG/ML
1000 INJECTION, SOLUTION INTRAVENOUS
Qty: 0 | Refills: 0 | Status: DISCONTINUED | OUTPATIENT
Start: 2017-07-03 | End: 2017-07-05

## 2017-07-03 RX ORDER — POTASSIUM CHLORIDE 20 MEQ
10 PACKET (EA) ORAL
Qty: 0 | Refills: 0 | Status: COMPLETED | OUTPATIENT
Start: 2017-07-03 | End: 2017-07-03

## 2017-07-03 RX ADMIN — Medication 400 MILLIGRAM(S): at 08:17

## 2017-07-03 RX ADMIN — PANTOPRAZOLE SODIUM 40 MILLIGRAM(S): 20 TABLET, DELAYED RELEASE ORAL at 12:16

## 2017-07-03 RX ADMIN — Medication 204 MILLIGRAM(S): at 12:32

## 2017-07-03 RX ADMIN — Medication 100 GRAM(S): at 20:07

## 2017-07-03 RX ADMIN — Medication 100 MILLIEQUIVALENT(S): at 15:21

## 2017-07-03 RX ADMIN — Medication 100 MILLIEQUIVALENT(S): at 16:34

## 2017-07-03 RX ADMIN — ONDANSETRON 4 MILLIGRAM(S): 8 TABLET, FILM COATED ORAL at 14:35

## 2017-07-03 RX ADMIN — Medication 204 MILLIGRAM(S): at 17:29

## 2017-07-03 RX ADMIN — FAMOTIDINE 20 MILLIGRAM(S): 10 INJECTION INTRAVENOUS at 09:30

## 2017-07-03 RX ADMIN — FAMOTIDINE 20 MILLIGRAM(S): 10 INJECTION INTRAVENOUS at 22:11

## 2017-07-03 RX ADMIN — SODIUM CHLORIDE 150 MILLILITER(S): 9 INJECTION, SOLUTION INTRAVENOUS at 01:53

## 2017-07-03 RX ADMIN — SODIUM CHLORIDE 200 MILLILITER(S): 9 INJECTION, SOLUTION INTRAVENOUS at 09:27

## 2017-07-03 RX ADMIN — Medication 1000 MILLIGRAM(S): at 00:00

## 2017-07-03 RX ADMIN — Medication 100 MILLIEQUIVALENT(S): at 17:31

## 2017-07-03 RX ADMIN — ONDANSETRON 4 MILLIGRAM(S): 8 TABLET, FILM COATED ORAL at 06:42

## 2017-07-03 NOTE — PROVIDER CONTACT NOTE (OTHER) - ASSESSMENT
pt vss, pt vomited clear fluid with mucus aprox 50ml.  pt c/o pain to upper middle quadrant of abdomen. pt requesting pain medication specifically morphine

## 2017-07-03 NOTE — CONSULT NOTE ADULT - SUBJECTIVE AND OBJECTIVE BOX
GASTROENTEROLOGY INITIAL CONSULT NOTE    Chief Complaint:  Patient is a 21y old  Female who presents with a chief complaint of     HPI: 21y Female currently 6 weeks pregnant presenting with nausea/vomiting. Patient reports that for the last 4-5 days, she has experienced constant nausea with frequent vomiting. Emesis is yellow-green liquid. She is unable to tolerate PO diet and vomits shortly after eating or even drinking water. Nausea and vomiting is assocated with sharp epigastric abdominal pain. She has not had diarrhea, constipation, melena, or hematochezia. Patient states that she has been having episodes similar to this (with nausea, vomiting, abdominal pain) since age 5. The episodes occur about once to twice every year. She cannot pinpoint any inciting event that brings on the episodes. They tend to resolve on their own after about one week. She reports occasional marijuana use but does not notice any connection between marijuana use and these symptoms. She previously had an EGD in July, 2015 during an episode that found esophagitis, gastritis, and duodenitis.     Allergies:  No Known Allergies      Hospital Medications:  pantoprazole  Injectable 40 milliGRAM(s) IV Push daily  multivitamin/thiamine/folic acid in sodium chloride 0.9% 1000 milliLiter(s) IV Continuous <Continuous>  dextrose 5% + sodium chloride 0.9% with potassium chloride 20 mEq/L 1000 milliLiter(s) IV Continuous <Continuous>  promethazine Suppository 25 milliGRAM(s) Rectal every 6 hours  pyridoxine 25 milliGRAM(s) Oral every 8 hours  acetaminophen  IVPB. 1000 milliGRAM(s) IV Intermittent once  famotidine Injectable 20 milliGRAM(s) IV Push every 12 hours  citric acid/sodium citrate Solution 30 milliLiter(s) Oral every 12 hours  metoclopramide Injectable 10 milliGRAM(s) IV Push every 8 hours PRN      PMHX/PSHX:  GERD (gastroesophageal reflux disease)  Ulcer of gastric cardia  No pertinent past medical history  No significant past surgical history      Family history:  Family history of hypertension  No pertinent family history in first degree relatives      Social History:     ROS:     General:  No wt loss, fevers, chills, night sweats, fatigue,   Eyes:  Good vision, no reported pain  ENT:  No sore throat, pain, runny nose, dysphagia  CV:  No pain, palpitations, hypo/hypertension  Resp:  No dyspnea, cough, tachypnea, wheezing  GI:  see HPI  :  No pain, bleeding, incontinence, nocturia  Muscle:  No pain, weakness  Neuro:  No weakness, tingling, memory problems  Psych:  No fatigue, insomnia, mood problems, depression  Endocrine:  No polyuria, polydipsia, cold/heat intolerance  Heme:  No petechiae, ecchymosis, easy bruisability  Skin:  No rash, tattoos, scars, edema      PHYSICAL EXAM:   Vital Signs:  Vital Signs Last 24 Hrs  T(C): 36.8 (02 Jul 2017 09:57), Max: 37.4 (01 Jul 2017 16:36)  T(F): 98.2 (02 Jul 2017 09:57), Max: 99.4 (01 Jul 2017 16:36)  HR: 73 (02 Jul 2017 09:57) (69 - 83)  BP: 129/83 (02 Jul 2017 09:57) (128/75 - 144/89)  BP(mean): --  RR: 20 (02 Jul 2017 09:57) (18 - 20)  SpO2: 99% (02 Jul 2017 09:57) (99% - 100%)  Daily     Daily     GENERAL:  no acute distress  HEENT:  -icterus   CHEST:  clear bilaterally, no wheezes or rales  HEART:  RRR, S1S2  ABDOMEN:  soft, epigastric TTP, non-distended, normoactive bowel sounds,  no hepato-splenomegaly  EXTEREMITIES:  no  edema  SKIN:  No rash/erythema/ecchymoses/petechiae/wounds/abscess/warm/dry  NEURO:  alert, oriented, conversant     LABS:                        9.4    11.11 )-----------( 232      ( 02 Jul 2017 05:10 )             28.7     07-01    135  |  100  |  8   ----------------------------<  114<H>  3.4<L>   |  18<L>  |  0.69    Ca    8.6      01 Jul 2017 06:45    TPro  7.0  /  Alb  4.0  /  TBili  0.4  /  DBili  x   /  AST  19  /  ALT  12  /  AlkPhos  60  07-01    LIVER FUNCTIONS - ( 01 Jul 2017 06:45 )  Alb: 4.0 g/dL / Pro: 7.0 g/dL / ALK PHOS: 60 u/L / ALT: 12 u/L / AST: 19 u/L / GGT: x                 Imaging:    < from: US Abdomen Limited (06.30.17 @ 09:17) >  Liver: Within normal limits.  Bile ducts: Normal caliber.   Gallbladder: Within normal limits.      Pancreas: Visualized portions are within normal limits.  Right kidney: 10.3 cm. No hydronephrosis.  Ascites: None.  IVC: Visualized portions are within normal limits.    No focal site of pain was elicited during the examination.    < end of copied text >        < from: Upper Endoscopy (07.10.15 @ 14:24) >  Findings:       The Z-line was irregular and was found 36 cm from the incisors.       Esophagogastric landmarks were identified: the Z-line was found at 36        cm, the gastroesophageal junction was found at 36 cm, the site of hiatal        narrowing was found at 38 cm and the upper extent of the gastric folds        wasfound at 36 cm from the incisors.       A 2 cm hiatus hernia was present.       LA Grade A (one or more mucosal breaks less than 5 mm, not extending        between tops of 2 mucosal folds) esophagitis with no bleeding was found        (distal linear esophageal erythematous erosion noted and erosion noted        at the Z line).       There is no endoscopic evidence of Munguia's esophagus or stricture in        the entire esophagus.       A few non-bleeding dispersed erosions were found in the gastric antrum.        There were no stigmata of recent bleeding. Biopsies were taken with a        cold forceps for histology. Estimated blood loss was minimal.       There is no endoscopic evidence of bleeding, ulceration or mass in the        entire examined stomach.       Localized mildly erythematous mucosa without active bleeding and with no        stigmata of bleeding was found in the duodenal bulb.       The 2nd part of the duodenum was normal. Biopsies were taken with a cold        forceps for evaluation of celiac disease. Estimated blood loss was        minimal.       There is no endoscopic evidence of ulceration, deformity or areas of        scalloped mucosa in the entire examined duodenum.                                     Impression:          - Z-line irregular, 36 cm from the incisors.                       - Esophagogastric landmarks identified.                       - Hiatus hernia.                       - LA Grade A reflux esophagitis.                       - Erosive gastropathy. Biopsied.                       - Erythematous duodenopathy.                       - Normal 2nd part of the duodenum. Biopsied.    < end of copied text >
HPI: 21y F with PMH of GERD, currently 6 weeks pregnant presenting with nausea/vomiting admitted on 7/1 to ob/gyn. Patient reports that for the last 7 days she has had repeated N/V, ever 30min-1hr. Emesis is yellow-green liquid, no hematemesis. She is unable to tolerate PO diet and vomits shortly after eating or even drinking water. Vomits without food as well. Associated with diffuse abdominal pain. She has not had diarrhea, constipation, melena, or hematochezia. Last BM 3 days ago. Patient states that she has had  episodes of N/V since age 5. The episodes occur about once to twice every year. No specific triggers. Usually self limiting. Now reports N/V is different then usual episodes in that now more frequent and associated with the abdomibal pain. She reports occasional marijuana use but does not notice any connection between marijuana use and these symptoms. Uses once every 3-4 weeks. No smoking, drinking. She previously had an EGD in July, 2015 during an episode that found esophagitis, gastritis, and duodenitis.         PAST MEDICAL & SURGICAL HISTORY:  GERD (gastroesophageal reflux disease)      Review of Systems:   CONSTITUTIONAL: No fever, weight loss, or fatigue  EYES: No eye pain, visual disturbances, or discharge  RESPIRATORY: No cough, wheezing, chills or hemoptysis; No shortness of breath  CARDIOVASCULAR: No chest pain, palpitations, dizziness, or leg swelling  GASTROINTESTINAL: Diffuse abdominal pain. N/V. No D/C.  GENITOURINARY: No dysuria, frequency, hematuria, or incontinence  NEUROLOGICAL: No headaches, memory loss, loss of strength, numbness, or tremors  SKIN: No itching, burning, rashes, or lesions   LYMPH NODES: No enlarged glands  MUSCULOSKELETAL: No joint pain or swelling; No muscle, back, or extremity pain    Allergies    No Known Allergies    Social History: No smoking cigarettes, no alcohol. occasional marijuana    FAMILY HISTORY:  Family history of hypertension      MEDICATIONS  (STANDING):  pantoprazole  Injectable 40 milliGRAM(s) IV Push daily  multivitamin/thiamine/folic acid in sodium chloride 0.9% 1000 milliLiter(s) (200 mL/Hr) IV Continuous <Continuous>  promethazine Suppository 25 milliGRAM(s) Rectal every 6 hours  pyridoxine 25 milliGRAM(s) Oral every 8 hours  famotidine Injectable 20 milliGRAM(s) IV Push every 12 hours  citric acid/sodium citrate Solution 30 milliLiter(s) Oral every 12 hours  sucralfate suspension 1 Gram(s) Oral daily  dextrose 10% + sodium chloride 0.45%. 1000 milliLiter(s) (150 mL/Hr) IV Continuous <Continuous>    MEDICATIONS  (PRN):  metoclopramide Injectable 10 milliGRAM(s) IV Push every 8 hours PRN nausea      Vital Signs Last 24 Hrs  T(C): 37.3 (07-03-17 @ 10:14), Max: 98.7 (07-03-17 @ 04:36)  HR: 69 (07-03-17 @ 10:14) (68 - 80)  BP: 124/68 (07-03-17 @ 10:14) (118/70 - 159/93)  RR: 17 (07-03-17 @ 10:14) (17 - 20)  SpO2: 100% (07-03-17 @ 10:14) (98% - 100%)  CAPILLARY BLOOD GLUCOSE        I&O's Summary    02 Jul 2017 07:01  -  03 Jul 2017 07:00  --------------------------------------------------------  IN: 1800 mL / OUT: 3225 mL / NET: -1425 mL        PHYSICAL EXAM:  GENERAL: NAD, well-developed  HEAD:  Atraumatic, Normocephalic  EYES: EOMI, PERRLA, conjunctiva and sclera clear  NECK: Supple, No JVD  CHEST/LUNG: Clear to auscultation bilaterally; No wheeze  HEART: Regular rate and rhythm; No murmurs, rubs, or gallops  ABDOMEN: BS+, diffuse tenderness on palpation. No rebound/gaurding.   EXTREMITIES:  2+ Peripheral Pulses, No clubbing, cyanosis, or edema  PSYCH: AAOx3  NEUROLOGY: non-focal  SKIN: No rashes or lesions, moist mucus membranes    LABS:                        9.4    11.11 )-----------( 232      ( 02 Jul 2017 05:10 )             28.7       RADIOLOGY & ADDITIONAL TESTS:    Imaging Personally Reviewed:    Consultant(s) Notes Reviewed:  GI

## 2017-07-03 NOTE — PROVIDER CONTACT NOTE (OTHER) - RECOMMENDATIONS
As per Dr. Montero, no morphine ordered at this time.  pt may have tylenol 1000mg IVPB.  pt due for pepcid ivp to be given

## 2017-07-03 NOTE — PROVIDER CONTACT NOTE (OTHER) - SITUATION
called to pt room and pt c/o pain to her upper abdomen.  pt states pain is the same as it has been throughout this admission and in the past

## 2017-07-03 NOTE — CONSULT NOTE ADULT - PROBLEM SELECTOR RECOMMENDATION 9
-Pt currently with continued N/V, diffuse abdominal pain, and decreased ability to tolerate PO  -likely hyperemesis gravidarum that may be superimposed on component of pt's hx of cyclical vomiting.  -pt clinically does not appear dehydrated but would check orthostatics to further assess and re-eval IVF  -would recheck labs- CBC, CMP. VBG w/ lactate to ensure normalization of previously elevated lactate.  -pt currently on reglan only for nausea. Would add zofran and consider interchange with promethazine.  -consider de-escalate diet to clear liquid for now  -will continue to follow -Pt currently with continued N/V, diffuse abdominal pain, and decreased ability to tolerate PO  -likely hyperemesis gravidarum that may be superimposed on component of pt's hx of cyclical vomiting.  -pt clinically does not appear dehydrated but would check orthostatics to further assess and re-eval IVF  -would recheck labs- CBC, CMP. VBG w/ lactate to ensure normalization of previously elevated lactate.  -pt currently on reglan only for nausea. Would add zofran and consider interchange with promethazine.  -consider de-escalate diet to clear liquid for now and if tolerating trial off IVF.   -Try to avoid opiods for pain control  -will continue to follow

## 2017-07-03 NOTE — CONSULT NOTE ADULT - ASSESSMENT
21y F with PMH of GERD, currently 6 weeks pregnant presenting with nausea/vomiting admitted on 7/1 to ob/gyn. Now with continued N/V, abdominal pain, decreased ability to tolerate PO. May be a combination of pt's history of cyclical vomiting with superimposed hyperemesis gravidarum.

## 2017-07-03 NOTE — CONSULT NOTE ADULT - ATTENDING COMMENTS
-pt seen and examined by me  -22 yo f with 7 weeks pregnant with with nausea, vomiting, diffuse abd pain for last 3 days. Pt also with possible h/o cyclic vomiting syndrome. Current presentation thus  likely multifactorial, although pt notes that diffuse nature of abd pain currently  does not resemble year round bouts of abd pain she experiences when nauseous  and vomiting (more epigastric). No recent BM, but pt with markedly limited po intake.  Pt currently  with no rebound or guarding , normal bowel sounds  -continue supportive care with IVF, anti-emetics and pain meds.   -correct hypokalemia with K/Mg supplements. EKG NSR, Qtc not  prolonged. Follow BMP, and lactate. Pt reports intention  to terminate pregnancy, should need for CT abd be deemed advisable   -will accept transfer to medicine effective tomorrow morning 8am July 4th  -d/w MAR

## 2017-07-04 ENCOUNTER — TRANSCRIPTION ENCOUNTER (OUTPATIENT)
Age: 22
End: 2017-07-04

## 2017-07-04 DIAGNOSIS — Z3A.01 LESS THAN 8 WEEKS GESTATION OF PREGNANCY: ICD-10-CM

## 2017-07-04 DIAGNOSIS — E87.6 HYPOKALEMIA: ICD-10-CM

## 2017-07-04 LAB
ALBUMIN SERPL ELPH-MCNC: 3.7 G/DL — SIGNIFICANT CHANGE UP (ref 3.3–5)
ALP SERPL-CCNC: 57 U/L — SIGNIFICANT CHANGE UP (ref 40–120)
ALT FLD-CCNC: 33 U/L — SIGNIFICANT CHANGE UP (ref 4–33)
AST SERPL-CCNC: 25 U/L — SIGNIFICANT CHANGE UP (ref 4–32)
BASOPHILS # BLD AUTO: 0.02 K/UL — SIGNIFICANT CHANGE UP (ref 0–0.2)
BASOPHILS NFR BLD AUTO: 0.2 % — SIGNIFICANT CHANGE UP (ref 0–2)
BILIRUB SERPL-MCNC: 0.5 MG/DL — SIGNIFICANT CHANGE UP (ref 0.2–1.2)
BUN SERPL-MCNC: 4 MG/DL — LOW (ref 7–23)
CALCIUM SERPL-MCNC: 8.2 MG/DL — LOW (ref 8.4–10.5)
CHLORIDE SERPL-SCNC: 103 MMOL/L — SIGNIFICANT CHANGE UP (ref 98–107)
CO2 SERPL-SCNC: 20 MMOL/L — LOW (ref 22–31)
CREAT SERPL-MCNC: 0.64 MG/DL — SIGNIFICANT CHANGE UP (ref 0.5–1.3)
EOSINOPHIL # BLD AUTO: 0.04 K/UL — SIGNIFICANT CHANGE UP (ref 0–0.5)
EOSINOPHIL NFR BLD AUTO: 0.4 % — SIGNIFICANT CHANGE UP (ref 0–6)
GLUCOSE SERPL-MCNC: 82 MG/DL — SIGNIFICANT CHANGE UP (ref 70–99)
HCT VFR BLD CALC: 30.9 % — LOW (ref 34.5–45)
HGB BLD-MCNC: 10.1 G/DL — LOW (ref 11.5–15.5)
IMM GRANULOCYTES # BLD AUTO: 0.05 # — SIGNIFICANT CHANGE UP
IMM GRANULOCYTES NFR BLD AUTO: 0.5 % — SIGNIFICANT CHANGE UP (ref 0–1.5)
LYMPHOCYTES # BLD AUTO: 2.55 K/UL — SIGNIFICANT CHANGE UP (ref 1–3.3)
LYMPHOCYTES # BLD AUTO: 23.7 % — SIGNIFICANT CHANGE UP (ref 13–44)
MAGNESIUM SERPL-MCNC: 2.3 MG/DL — SIGNIFICANT CHANGE UP (ref 1.6–2.6)
MCHC RBC-ENTMCNC: 25.4 PG — LOW (ref 27–34)
MCHC RBC-ENTMCNC: 32.7 % — SIGNIFICANT CHANGE UP (ref 32–36)
MCV RBC AUTO: 77.6 FL — LOW (ref 80–100)
MONOCYTES # BLD AUTO: 1.14 K/UL — HIGH (ref 0–0.9)
MONOCYTES NFR BLD AUTO: 10.6 % — SIGNIFICANT CHANGE UP (ref 2–14)
NEUTROPHILS # BLD AUTO: 6.98 K/UL — SIGNIFICANT CHANGE UP (ref 1.8–7.4)
NEUTROPHILS NFR BLD AUTO: 64.6 % — SIGNIFICANT CHANGE UP (ref 43–77)
NRBC # FLD: 0 — SIGNIFICANT CHANGE UP
PHOSPHATE SERPL-MCNC: 4.7 MG/DL — HIGH (ref 2.5–4.5)
PLATELET # BLD AUTO: 260 K/UL — SIGNIFICANT CHANGE UP (ref 150–400)
PMV BLD: 9.9 FL — SIGNIFICANT CHANGE UP (ref 7–13)
POTASSIUM SERPL-MCNC: 3 MMOL/L — LOW (ref 3.5–5.3)
POTASSIUM SERPL-SCNC: 3 MMOL/L — LOW (ref 3.5–5.3)
PROT SERPL-MCNC: 6.4 G/DL — SIGNIFICANT CHANGE UP (ref 6–8.3)
RBC # BLD: 3.98 M/UL — SIGNIFICANT CHANGE UP (ref 3.8–5.2)
RBC # FLD: 15.8 % — HIGH (ref 10.3–14.5)
SODIUM SERPL-SCNC: 137 MMOL/L — SIGNIFICANT CHANGE UP (ref 135–145)
WBC # BLD: 10.78 K/UL — HIGH (ref 3.8–10.5)
WBC # FLD AUTO: 10.78 K/UL — HIGH (ref 3.8–10.5)

## 2017-07-04 PROCEDURE — 99233 SBSQ HOSP IP/OBS HIGH 50: CPT

## 2017-07-04 RX ORDER — CALCIUM CARBONATE 500(1250)
1 TABLET ORAL
Qty: 0 | Refills: 0 | Status: DISCONTINUED | OUTPATIENT
Start: 2017-07-04 | End: 2017-07-05

## 2017-07-04 RX ORDER — POTASSIUM CHLORIDE 20 MEQ
10 PACKET (EA) ORAL
Qty: 0 | Refills: 0 | Status: COMPLETED | OUTPATIENT
Start: 2017-07-04 | End: 2017-07-04

## 2017-07-04 RX ADMIN — Medication 1 TABLET(S): at 11:15

## 2017-07-04 RX ADMIN — Medication 204 MILLIGRAM(S): at 00:00

## 2017-07-04 RX ADMIN — SODIUM CHLORIDE 125 MILLILITER(S): 9 INJECTION, SOLUTION INTRAVENOUS at 18:27

## 2017-07-04 RX ADMIN — SODIUM CHLORIDE 200 MILLILITER(S): 9 INJECTION, SOLUTION INTRAVENOUS at 05:43

## 2017-07-04 RX ADMIN — PANTOPRAZOLE SODIUM 40 MILLIGRAM(S): 20 TABLET, DELAYED RELEASE ORAL at 11:20

## 2017-07-04 RX ADMIN — Medication 100 MILLIEQUIVALENT(S): at 14:40

## 2017-07-04 RX ADMIN — Medication 204 MILLIGRAM(S): at 05:54

## 2017-07-04 RX ADMIN — Medication 100 MILLIEQUIVALENT(S): at 12:24

## 2017-07-04 RX ADMIN — Medication 204 MILLIGRAM(S): at 17:10

## 2017-07-04 RX ADMIN — Medication 1 TABLET(S): at 20:58

## 2017-07-04 RX ADMIN — Medication 204 MILLIGRAM(S): at 23:35

## 2017-07-04 RX ADMIN — Medication 1 TABLET(S): at 16:37

## 2017-07-04 RX ADMIN — Medication 204 MILLIGRAM(S): at 11:16

## 2017-07-04 RX ADMIN — FAMOTIDINE 20 MILLIGRAM(S): 10 INJECTION INTRAVENOUS at 21:36

## 2017-07-04 RX ADMIN — FAMOTIDINE 20 MILLIGRAM(S): 10 INJECTION INTRAVENOUS at 09:54

## 2017-07-04 RX ADMIN — SODIUM CHLORIDE 125 MILLILITER(S): 9 INJECTION, SOLUTION INTRAVENOUS at 11:09

## 2017-07-04 RX ADMIN — Medication 100 MILLIEQUIVALENT(S): at 13:33

## 2017-07-04 NOTE — DISCHARGE NOTE ADULT - MEDICATION SUMMARY - MEDICATIONS TO CHANGE
I will SWITCH the dose or number of times a day I take the medications listed below when I get home from the hospital:    pantoprazole 40 mg oral delayed release tablet  -- 1 tab(s) by mouth 2 times a day

## 2017-07-04 NOTE — DISCHARGE NOTE ADULT - HOSPITAL COURSE
21y F with PMH of GERD, Cyclic vomiting syndrome, 7 weeks pregnant on arrival, presented with intractable  nausea/vomiting. Admitted for hydration and electrolyte repletion. Symptoms likely 2/2 exacerbation of cyclic vomiting due to pregnancy.   Followed by GYN. Treated with IVF, antiemetics, electrolyte repletion.   Diet advanced as tolerated.  Continued on PPI, H2 blocker and Carafate for GERD 21y F with PMH of GERD, Cyclic vomiting syndrome, 7 weeks pregnant on arrival, presented with intractable  nausea/vomiting. Admitted for hydration and electrolyte repletion. Symptoms likely 2/2 exacerbation of cyclic vomiting due to pregnancy.   Followed by GYN. Treated with IVF, antiemetics, electrolyte repletion.   Diet advanced as tolerated.  Continued on PPI, H2 blocker and Carafate for GERD  Vomiting resolved. Discharged in stable condition.

## 2017-07-04 NOTE — DISCHARGE NOTE ADULT - PROVIDER TOKENS
FREE:[LAST:[Follow up with medical doctor within one week],PHONE:[(   )    -],FAX:[(   )    -]] FREE:[LAST:[[facility] Medical and gastroenterology clinic],PHONE:[phone]],FAX:[(   )    -]] FREE:[LAST:[[facility] Medical and gastroenterology clinic],PHONE:[phone]],FAX:[(   )    -]],TOKEN:'[email or web address]'

## 2017-07-04 NOTE — DISCHARGE NOTE ADULT - CARE PROVIDER_API CALL
Follow up with medical doctor within one week,   Phone: (   )    -  Fax: (   )    - [facility] Medical and gastroenterology clinic,   Phone: [phone]  Fax: (   )    - [facility] Medical and gastroenterology clinic,   Phone: [phone]  Fax: (   )    -    Dhruv Robles), Gastroenterology; Internal Medicine  [address] [address] [place] [id number]  Phone: [phone]  Fax: [phone]

## 2017-07-04 NOTE — DISCHARGE NOTE ADULT - PATIENT PORTAL LINK FT
“You can access the [employer] Patient Portal, offered by [facility], by registering with the following website: [email or web address]”

## 2017-07-04 NOTE — DISCHARGE NOTE ADULT - ADDITIONAL INSTRUCTIONS
You can make an appointment with Gastroenterology clinic, medical clinic, and obstetric and gynecology clinic at  You can make an appointment with medical clinic, and obstetric and gynecology clinic at

## 2017-07-04 NOTE — PROGRESS NOTE ADULT - SUBJECTIVE AND OBJECTIVE BOX
Patient is a 21y old  Female who presents with a chief complaint of     SUBJECTIVE / OVERNIGHT EVENTS: patient seen and examined by bedside, pt c/o abdominal pain and persistent nausea and vomiting         MEDICATIONS  (STANDING):  pantoprazole  Injectable 40 milliGRAM(s) IV Push daily  multivitamin/thiamine/folic acid in sodium chloride 0.9% 1000 milliLiter(s) (200 mL/Hr) IV Continuous <Continuous>  pyridoxine 25 milliGRAM(s) Oral every 8 hours  famotidine Injectable 20 milliGRAM(s) IV Push every 12 hours  citric acid/sodium citrate Solution 30 milliLiter(s) Oral every 12 hours  sucralfate suspension 1 Gram(s) Oral daily  promethazine IVPB 25 milliGRAM(s) IV Intermittent every 6 hours  dextrose 10% + sodium chloride 0.9% 1000 milliLiter(s) (125 mL/Hr) IV Continuous <Continuous>    MEDICATIONS  (PRN):  metoclopramide Injectable 10 milliGRAM(s) IV Push every 8 hours PRN nausea  calcium carbonate 500 mG (Tums) Chewable 1 Tablet(s) Chew four times a day PRN Heartburn      Vital Signs Last 24 Hrs  T(C): 37.1 (07-04-17 @ 14:15), Max: 37.2 (07-03-17 @ 22:17)  HR: 78 (07-04-17 @ 14:15) (72 - 93)  BP: 118/63 (07-04-17 @ 14:15) (118/63 - 137/85)  RR: 18 (07-04-17 @ 14:15) (17 - 18)  SpO2: 98% (07-04-17 @ 14:15) (98% - 100%)  CAPILLARY BLOOD GLUCOSE        I&O's Summary    03 Jul 2017 07:01  -  04 Jul 2017 07:00  --------------------------------------------------------  IN: 700 mL / OUT: 0 mL / NET: 700 mL    04 Jul 2017 07:01  -  04 Jul 2017 16:33  --------------------------------------------------------  IN: 1850 mL / OUT: 0 mL / NET: 1850 mL        PHYSICAL EXAM:  GENERAL: NAD, well-developed  HEAD:  Atraumatic, Normocephalic  EYES: EOMI, PERRLA, conjunctiva and sclera clear  NECK: Supple, No JVD  CHEST/LUNG: Clear to auscultation bilaterally; No wheeze  HEART: Regular rate and rhythm; No murmurs, rubs, or gallops  ABDOMEN: Soft ;diffuse tenderness on palpation. No rebound/gaurding.  Bowel sounds present  EXTREMITIES:  2+ Peripheral Pulses, No clubbing, cyanosis, or edema  PSYCH: AAOx3  NEUROLOGY: non-focal  SKIN: No rashes or lesions    LABS:                        10.1   10.78 )-----------( 260      ( 04 Jul 2017 07:33 )             30.9     07-04    137  |  103  |  4<L>  ----------------------------<  82  3.0<L>   |  20<L>  |  0.64    Ca    8.2<L>      04 Jul 2017 07:33  Phos  4.7     07-04  Mg     2.3     07-04    TPro  6.4  /  Alb  3.7  /  TBili  0.5  /  DBili  x   /  AST  25  /  ALT  33  /  AlkPhos  57  07-04              RADIOLOGY & ADDITIONAL TESTS:    Imaging Personally Reviewed:    Consultant(s) Notes Reviewed:      Care Discussed with Consultants/Other Providers:

## 2017-07-04 NOTE — DISCHARGE NOTE ADULT - PLAN OF CARE
ability to tolerate diet normal electrolytes Comfort Eat foods that agree with you  Return to your medical doctor or emergency room if you are unable to keep food down  Schedule an appointment with your gynecologist within one week. Resolved Continue to use protonix and carafate and follow up with your medical doctor Continue to use protonix and carafate and follow up with Dr. Robles within 1-2 weeks. Call to make an appointment at the number listed below

## 2017-07-04 NOTE — DISCHARGE NOTE ADULT - MEDICATION SUMMARY - MEDICATIONS TO TAKE
I will START or STAY ON the medications listed below when I get home from the hospital:    calcium carbonate 500 mg (200 mg elemental calcium) oral tablet, chewable  -- 3 tab(s) by mouth every 6 hours, As needed, Dyspepsia  -- Indication: For indigestion    sucralfate 1 g/10 mL oral suspension  -- 10 milliliter(s) by mouth 4 times a day  -- Indication: For Gastritis    pantoprazole 40 mg oral delayed release tablet  -- 1 tab(s) by mouth once a day  -- Indication: For acid reflux

## 2017-07-04 NOTE — DISCHARGE NOTE ADULT - CARE PROVIDERS DIRECT ADDRESSES
,DirectAddress_Unknown ,DirectAddress_Unknown,[email or web address]

## 2017-07-04 NOTE — DISCHARGE NOTE ADULT - CARE PLAN
Principal Discharge DX:	Hyperemesis gravidarum  Goal:	ability to tolerate diet  Secondary Diagnosis:	Hypokalemia  Goal:	normal electrolytes  Secondary Diagnosis:	GERD (gastroesophageal reflux disease)  Goal:	Comfort Principal Discharge DX:	Hyperemesis gravidarum  Goal:	ability to tolerate diet  Instructions for follow-up, activity and diet:	Eat foods that agree with you  Return to your medical doctor or emergency room if you are unable to keep food down  Schedule an appointment with your gynecologist within one week.  Secondary Diagnosis:	Hypokalemia  Goal:	normal electrolytes  Instructions for follow-up, activity and diet:	Resolved  Secondary Diagnosis:	GERD (gastroesophageal reflux disease)  Goal:	Comfort  Instructions for follow-up, activity and diet:	Continue to use protonix and carafate and follow up with your medical doctor Principal Discharge DX:	Hyperemesis gravidarum  Goal:	ability to tolerate diet  Instructions for follow-up, activity and diet:	Eat foods that agree with you  Return to your medical doctor or emergency room if you are unable to keep food down  Schedule an appointment with your gynecologist within one week.  Secondary Diagnosis:	Hypokalemia  Goal:	normal electrolytes  Instructions for follow-up, activity and diet:	Resolved  Secondary Diagnosis:	GERD (gastroesophageal reflux disease)  Goal:	Comfort  Instructions for follow-up, activity and diet:	Continue to use protonix and carafate and follow up with Dr. Robles within 1-2 weeks. Call to make an appointment at the number listed below

## 2017-07-05 VITALS
SYSTOLIC BLOOD PRESSURE: 126 MMHG | OXYGEN SATURATION: 100 % | TEMPERATURE: 99 F | RESPIRATION RATE: 18 BRPM | HEART RATE: 87 BPM | DIASTOLIC BLOOD PRESSURE: 58 MMHG

## 2017-07-05 LAB
ALBUMIN SERPL ELPH-MCNC: 3.6 G/DL — SIGNIFICANT CHANGE UP (ref 3.3–5)
ALP SERPL-CCNC: 56 U/L — SIGNIFICANT CHANGE UP (ref 40–120)
ALT FLD-CCNC: 27 U/L — SIGNIFICANT CHANGE UP (ref 4–33)
AST SERPL-CCNC: 17 U/L — SIGNIFICANT CHANGE UP (ref 4–32)
BILIRUB SERPL-MCNC: 0.6 MG/DL — SIGNIFICANT CHANGE UP (ref 0.2–1.2)
BUN SERPL-MCNC: 2 MG/DL — LOW (ref 7–23)
CALCIUM SERPL-MCNC: 8.2 MG/DL — LOW (ref 8.4–10.5)
CHLORIDE SERPL-SCNC: 101 MMOL/L — SIGNIFICANT CHANGE UP (ref 98–107)
CO2 SERPL-SCNC: 19 MMOL/L — LOW (ref 22–31)
CREAT SERPL-MCNC: 0.63 MG/DL — SIGNIFICANT CHANGE UP (ref 0.5–1.3)
GLUCOSE SERPL-MCNC: 92 MG/DL — SIGNIFICANT CHANGE UP (ref 70–99)
HCT VFR BLD CALC: 30.2 % — LOW (ref 34.5–45)
HGB BLD-MCNC: 10.2 G/DL — LOW (ref 11.5–15.5)
MAGNESIUM SERPL-MCNC: 1.9 MG/DL — SIGNIFICANT CHANGE UP (ref 1.6–2.6)
MCHC RBC-ENTMCNC: 26.2 PG — LOW (ref 27–34)
MCHC RBC-ENTMCNC: 33.8 % — SIGNIFICANT CHANGE UP (ref 32–36)
MCV RBC AUTO: 77.6 FL — LOW (ref 80–100)
NRBC # FLD: 0 — SIGNIFICANT CHANGE UP
PHOSPHATE SERPL-MCNC: 3.3 MG/DL — SIGNIFICANT CHANGE UP (ref 2.5–4.5)
PLATELET # BLD AUTO: 253 K/UL — SIGNIFICANT CHANGE UP (ref 150–400)
PMV BLD: 10 FL — SIGNIFICANT CHANGE UP (ref 7–13)
POTASSIUM SERPL-MCNC: 3.2 MMOL/L — LOW (ref 3.5–5.3)
POTASSIUM SERPL-SCNC: 3.2 MMOL/L — LOW (ref 3.5–5.3)
PROT SERPL-MCNC: 6.1 G/DL — SIGNIFICANT CHANGE UP (ref 6–8.3)
RBC # BLD: 3.89 M/UL — SIGNIFICANT CHANGE UP (ref 3.8–5.2)
RBC # FLD: 15.9 % — HIGH (ref 10.3–14.5)
SODIUM SERPL-SCNC: 137 MMOL/L — SIGNIFICANT CHANGE UP (ref 135–145)
WBC # BLD: 11.21 K/UL — HIGH (ref 3.8–10.5)
WBC # FLD AUTO: 11.21 K/UL — HIGH (ref 3.8–10.5)

## 2017-07-05 PROCEDURE — 99239 HOSP IP/OBS DSCHRG MGMT >30: CPT

## 2017-07-05 RX ORDER — PANTOPRAZOLE SODIUM 20 MG/1
1 TABLET, DELAYED RELEASE ORAL
Qty: 30 | Refills: 0 | OUTPATIENT
Start: 2017-07-05 | End: 2017-08-04

## 2017-07-05 RX ORDER — POTASSIUM CHLORIDE 20 MEQ
10 PACKET (EA) ORAL
Qty: 0 | Refills: 0 | Status: COMPLETED | OUTPATIENT
Start: 2017-07-05 | End: 2017-07-05

## 2017-07-05 RX ADMIN — PANTOPRAZOLE SODIUM 40 MILLIGRAM(S): 20 TABLET, DELAYED RELEASE ORAL at 11:58

## 2017-07-05 RX ADMIN — Medication 100 MILLIEQUIVALENT(S): at 10:25

## 2017-07-05 RX ADMIN — Medication 25 MILLIGRAM(S): at 06:16

## 2017-07-05 RX ADMIN — Medication 204 MILLIGRAM(S): at 11:57

## 2017-07-05 RX ADMIN — SODIUM CHLORIDE 125 MILLILITER(S): 9 INJECTION, SOLUTION INTRAVENOUS at 11:58

## 2017-07-05 RX ADMIN — Medication 100 MILLIEQUIVALENT(S): at 08:16

## 2017-07-05 RX ADMIN — SODIUM CHLORIDE 200 MILLILITER(S): 9 INJECTION, SOLUTION INTRAVENOUS at 06:16

## 2017-07-05 RX ADMIN — FAMOTIDINE 20 MILLIGRAM(S): 10 INJECTION INTRAVENOUS at 10:51

## 2017-07-05 RX ADMIN — Medication 204 MILLIGRAM(S): at 17:03

## 2017-07-05 RX ADMIN — Medication 100 MILLIEQUIVALENT(S): at 09:27

## 2017-07-05 RX ADMIN — Medication 204 MILLIGRAM(S): at 06:16

## 2017-07-05 NOTE — PROGRESS NOTE ADULT - ATTENDING COMMENTS
I have seen and evaluated the patient with the GI Fellow and GI Team.  I agree with the findings, formulation and plan of care as documented in the resident / fellows note, except as noted.
Gi eval noted, pt with no diarrhea at this time , will recommend f/u with GI clinic, Pt  advised to refrain from Marijuana use

## 2017-07-05 NOTE — PROGRESS NOTE ADULT - SUBJECTIVE AND OBJECTIVE BOX
Chief Complaint:  Patient is a 21y old  Female who presents with a chief complaint of nausea/vomiting.    Interval Events: Reconsulted for nausea/vomiting. Patient is eating chicken as I entered the room. She reports complete resolution of her nausea/vomiting since yesterday. She has not had any abdominal pain. She confirms that she has had multiple episodes 1-2x per year of nausea/vomiting that comes and goes without any warning or inciting factors. She recently had an EGD with Dr. Robles that did not show any reason for her nausea/vomiting. She reports taking marijuana but has recently quit. She reports diarrhea that is new since she has been admitted, 2x yesterday.    Allergies:  No Known Allergies      Hospital Medications:  pantoprazole  Injectable 40 milliGRAM(s) IV Push daily  multivitamin/thiamine/folic acid in sodium chloride 0.9% 1000 milliLiter(s) IV Continuous <Continuous>  pyridoxine 25 milliGRAM(s) Oral every 8 hours  famotidine Injectable 20 milliGRAM(s) IV Push every 12 hours  citric acid/sodium citrate Solution 30 milliLiter(s) Oral every 12 hours  metoclopramide Injectable 10 milliGRAM(s) IV Push every 8 hours PRN  sucralfate suspension 1 Gram(s) Oral daily  promethazine IVPB 25 milliGRAM(s) IV Intermittent every 6 hours  dextrose 10% + sodium chloride 0.9% 1000 milliLiter(s) IV Continuous <Continuous>  calcium carbonate 500 mG (Tums) Chewable 1 Tablet(s) Chew four times a day PRN      PMHX/PSHX:  GERD (gastroesophageal reflux disease)  Ulcer of gastric cardia  No pertinent past medical history  No significant past surgical history      Family history:  Family history of hypertension  No pertinent family history in first degree relatives      ROS:     General:  No wt loss, fevers, chills, night sweats, fatigue,   Eyes:  Good vision, no reported pain  ENT:  No sore throat, pain, runny nose, dysphagia  CV:  No pain, palpitations, hypo/hypertension  Resp:  No dyspnea, cough, tachypnea, wheezing  GI:  See HPI  :  No pain, bleeding, incontinence, nocturia  Muscle:  No pain, weakness  Neuro:  No weakness, tingling, memory problems  Psych:  No fatigue, insomnia, mood problems, depression        PHYSICAL EXAM:   Vital Signs:  Vital Signs Last 24 Hrs  T(C): 37.3 (05 Jul 2017 09:52), Max: 37.3 (05 Jul 2017 09:52)  T(F): 99.2 (05 Jul 2017 09:52), Max: 99.2 (05 Jul 2017 09:52)  HR: 76 (05 Jul 2017 09:52) (72 - 84)  BP: 121/78 (05 Jul 2017 09:52) (118/63 - 135/92)  BP(mean): --  RR: 17 (05 Jul 2017 09:52) (17 - 19)  SpO2: 100% (05 Jul 2017 09:52) (98% - 100%)  Daily     Daily     GENERAL:  Appears stated age, well-groomed, well-nourished, no distress, eating chicken in bed  HEENT:  NC/AT,  conjunctivae clear, sclera -anicteric  CHEST:  Full & symmetric excursion, no increased effort, breath sounds clear  HEART:  Regular rhythm, S1, S2, no murmur/rub/S3/S4,  no edema  ABDOMEN:  Soft, non-tender, non-distended, mildly gravid  EXTREMITIES:  no cyanosis,clubbing or edema  SKIN:  No rash/erythema/ecchymoses/petechiae/wounds/abscess/warm/dry  NEURO:  Alert, oriented    LABS:                        10.2   11.21 )-----------( 253      ( 05 Jul 2017 07:03 )             30.2     07-05    137  |  101  |  2<L>  ----------------------------<  92  3.2<L>   |  19<L>  |  0.63    Ca    8.2<L>      05 Jul 2017 07:03  Phos  3.3     07-05  Mg     1.9     07-05    TPro  6.1  /  Alb  3.6  /  TBili  0.6  /  DBili  x   /  AST  17  /  ALT  27  /  AlkPhos  56  07-05    LIVER FUNCTIONS - ( 05 Jul 2017 07:03 )  Alb: 3.6 g/dL / Pro: 6.1 g/dL / ALK PHOS: 56 u/L / ALT: 27 u/L / AST: 17 u/L / GGT: x

## 2017-07-05 NOTE — PROGRESS NOTE ADULT - PROBLEM SELECTOR PLAN 4
Pt intend to terminate pregnancy  continue MVI and folate
Pt intend to terminate pregnancy  continue MVI and folate

## 2017-07-05 NOTE — PROGRESS NOTE ADULT - SUBJECTIVE AND OBJECTIVE BOX
Patient is a 21y old  Female who presents with a chief complaint of nausea and vomiting (04 Jul 2017 16:59)      SUBJECTIVE / OVERNIGHT EVENTS: patient seen and examined by bedside, nausea and vomiting improved , abdominal pain also better, pt had diarrhea yesterday, but no Bm today. Pt overall feels better, wants to see if tolerating PO, will go home today       MEDICATIONS  (STANDING):  pantoprazole  Injectable 40 milliGRAM(s) IV Push daily  multivitamin/thiamine/folic acid in sodium chloride 0.9% 1000 milliLiter(s) (200 mL/Hr) IV Continuous <Continuous>  pyridoxine 25 milliGRAM(s) Oral every 8 hours  famotidine Injectable 20 milliGRAM(s) IV Push every 12 hours  citric acid/sodium citrate Solution 30 milliLiter(s) Oral every 12 hours  sucralfate suspension 1 Gram(s) Oral daily  promethazine IVPB 25 milliGRAM(s) IV Intermittent every 6 hours  dextrose 10% + sodium chloride 0.9% 1000 milliLiter(s) (125 mL/Hr) IV Continuous <Continuous>    MEDICATIONS  (PRN):  metoclopramide Injectable 10 milliGRAM(s) IV Push every 8 hours PRN nausea  calcium carbonate 500 mG (Tums) Chewable 1 Tablet(s) Chew four times a day PRN Heartburn      Vital Signs Last 24 Hrs  T(C): 37.2 (07-05-17 @ 14:10), Max: 37.3 (07-05-17 @ 09:52)  HR: 87 (07-05-17 @ 14:10) (76 - 87)  BP: 126/58 (07-05-17 @ 14:10) (121/78 - 130/85)  RR: 18 (07-05-17 @ 14:10) (17 - 18)  SpO2: 100% (07-05-17 @ 14:10) (100% - 100%)  CAPILLARY BLOOD GLUCOSE        I&O's Summary    04 Jul 2017 07:01  -  05 Jul 2017 07:00  --------------------------------------------------------  IN: 2200 mL / OUT: 100 mL / NET: 2100 mL    05 Jul 2017 07:01  -  05 Jul 2017 18:29  --------------------------------------------------------  IN: 1750 mL / OUT: 1250 mL / NET: 500 mL        PHYSICAL EXAM:  GENERAL: NAD, well-developed  HEAD:  Atraumatic, Normocephalic  EYES: EOMI, PERRLA, conjunctiva and sclera clear  NECK: Supple, No JVD  CHEST/LUNG: Clear to auscultation bilaterally; No wheeze  HEART: Regular rate and rhythm; No murmurs, rubs, or gallops  ABDOMEN: Soft, Nontender, Nondistended; Bowel sounds present  EXTREMITIES:  2+ Peripheral Pulses, No clubbing, cyanosis, or edema  PSYCH: AAOx3  NEUROLOGY: non-focal  SKIN: No rashes or lesions    LABS:                        10.2   11.21 )-----------( 253      ( 05 Jul 2017 07:03 )             30.2     07-05    137  |  101  |  2<L>  ----------------------------<  92  3.2<L>   |  19<L>  |  0.63    Ca    8.2<L>      05 Jul 2017 07:03  Phos  3.3     07-05  Mg     1.9     07-05    TPro  6.1  /  Alb  3.6  /  TBili  0.6  /  DBili  x   /  AST  17  /  ALT  27  /  AlkPhos  56  07-05              RADIOLOGY & ADDITIONAL TESTS:    Imaging Personally Reviewed:    Consultant(s) Notes Reviewed:  Gi    Care Discussed with Consultants/Other Providers:

## 2017-07-05 NOTE — PROGRESS NOTE ADULT - ASSESSMENT
Impression:  1. Nausea/vomiting - given the history of intermittent recurrence, likely cyclic vomiting syndrome. Differential also includes hyperemesis cannabis, gastroparesis, less likely pregnancy related given she is so early in her pregnancy  2. Diarrhea - new per patient - r/o C diff as patient is inpatient, bacterial / parasitic infections    Recommend:  -Patient's nausea/vomiting is completely resolved  -Diet as tolerated  -Would check C diff, stool culture, O&P x 3 given new diarrhea  -Outpatient follow-up with Dr. Robles upon discharge Impression:  1. Nausea/vomiting - given the history of intermittent recurrence, likely cyclic vomiting syndrome. Differential also includes hyperemesis cannabis, gastroparesis, less likely pregnancy related given she is so early in her pregnancy  2. Diarrhea - new per patient - r/o C diff as patient is inpatient, bacterial / parasitic infections    Recommend:  -Patient's nausea/vomiting is completely resolved, would strongly recommend that patient refrain from marijuana use (she claims that she has)  -Diet as tolerated  -Would check C diff, stool culture, O&P x 3 given new diarrhea  -Outpatient follow-up with Dr. Robles upon discharge

## 2017-07-05 NOTE — PROGRESS NOTE ADULT - PROBLEM SELECTOR PLAN 1
likely hyperemesis gravidarum that may be superimposed on component of pt's hx of cyclical vomiting.  pt with improved  N/V  c/w promethazine and PRN reglan  c/w Pepcid, Tums and sucralfate   continue IVF  monitor electrolytes  if tolerating  diet will dc home
likely hyperemesis gravidarum that may be superimposed on component of pt's hx of cyclical vomiting.  pt with persistent N/V  c/w promethazine and PRN reglan  c/w Pepcid, Tums and sucralfate   continue IVF  monitor electrolytes

## 2017-07-12 ENCOUNTER — INPATIENT (INPATIENT)
Facility: HOSPITAL | Age: 22
LOS: 3 days | Discharge: HOME | End: 2017-07-16
Attending: HOSPITALIST | Admitting: HOSPITALIST
Payer: COMMERCIAL

## 2017-07-12 VITALS
TEMPERATURE: 98 F | SYSTOLIC BLOOD PRESSURE: 137 MMHG | DIASTOLIC BLOOD PRESSURE: 86 MMHG | RESPIRATION RATE: 18 BRPM | HEART RATE: 86 BPM | OXYGEN SATURATION: 100 %

## 2017-07-12 LAB
ALBUMIN SERPL ELPH-MCNC: 4.3 G/DL — SIGNIFICANT CHANGE UP (ref 3.3–5)
ALP SERPL-CCNC: 64 U/L — SIGNIFICANT CHANGE UP (ref 40–120)
ALT FLD-CCNC: 19 U/L — SIGNIFICANT CHANGE UP (ref 4–33)
AMORPH CRY # UR COMP ASSIST: SIGNIFICANT CHANGE UP (ref 0–0)
APPEARANCE UR: SIGNIFICANT CHANGE UP
AST SERPL-CCNC: 18 U/L — SIGNIFICANT CHANGE UP (ref 4–32)
BACTERIA # UR AUTO: SIGNIFICANT CHANGE UP
BASOPHILS # BLD AUTO: 0.02 K/UL — SIGNIFICANT CHANGE UP (ref 0–0.2)
BASOPHILS NFR BLD AUTO: 0.1 % — SIGNIFICANT CHANGE UP (ref 0–2)
BILIRUB SERPL-MCNC: 0.3 MG/DL — SIGNIFICANT CHANGE UP (ref 0.2–1.2)
BILIRUB UR-MCNC: NEGATIVE — SIGNIFICANT CHANGE UP
BLOOD UR QL VISUAL: HIGH
BUN SERPL-MCNC: 9 MG/DL — SIGNIFICANT CHANGE UP (ref 7–23)
CALCIUM SERPL-MCNC: 9.1 MG/DL — SIGNIFICANT CHANGE UP (ref 8.4–10.5)
CHLORIDE SERPL-SCNC: 100 MMOL/L — SIGNIFICANT CHANGE UP (ref 98–107)
CO2 SERPL-SCNC: 15 MMOL/L — LOW (ref 22–31)
COLOR SPEC: YELLOW — SIGNIFICANT CHANGE UP
CREAT SERPL-MCNC: 0.62 MG/DL — SIGNIFICANT CHANGE UP (ref 0.5–1.3)
EOSINOPHIL # BLD AUTO: 0 K/UL — SIGNIFICANT CHANGE UP (ref 0–0.5)
EOSINOPHIL NFR BLD AUTO: 0 % — SIGNIFICANT CHANGE UP (ref 0–6)
GLUCOSE SERPL-MCNC: 132 MG/DL — HIGH (ref 70–99)
GLUCOSE UR-MCNC: 50 — SIGNIFICANT CHANGE UP
HCG SERPL-ACNC: SIGNIFICANT CHANGE UP MIU/ML
HCT VFR BLD CALC: 31.9 % — LOW (ref 34.5–45)
HGB BLD-MCNC: 10.3 G/DL — LOW (ref 11.5–15.5)
IMM GRANULOCYTES # BLD AUTO: 0.07 # — SIGNIFICANT CHANGE UP
IMM GRANULOCYTES NFR BLD AUTO: 0.5 % — SIGNIFICANT CHANGE UP (ref 0–1.5)
KETONES UR-MCNC: SIGNIFICANT CHANGE UP
LEUKOCYTE ESTERASE UR-ACNC: NEGATIVE — SIGNIFICANT CHANGE UP
LYMPHOCYTES # BLD AUTO: 1.07 K/UL — SIGNIFICANT CHANGE UP (ref 1–3.3)
LYMPHOCYTES # BLD AUTO: 7.9 % — LOW (ref 13–44)
MCHC RBC-ENTMCNC: 26 PG — LOW (ref 27–34)
MCHC RBC-ENTMCNC: 32.3 % — SIGNIFICANT CHANGE UP (ref 32–36)
MCV RBC AUTO: 80.6 FL — SIGNIFICANT CHANGE UP (ref 80–100)
MONOCYTES # BLD AUTO: 0.28 K/UL — SIGNIFICANT CHANGE UP (ref 0–0.9)
MONOCYTES NFR BLD AUTO: 2.1 % — SIGNIFICANT CHANGE UP (ref 2–14)
MUCOUS THREADS # UR AUTO: SIGNIFICANT CHANGE UP
NEUTROPHILS # BLD AUTO: 12.13 K/UL — HIGH (ref 1.8–7.4)
NEUTROPHILS NFR BLD AUTO: 89.4 % — HIGH (ref 43–77)
NITRITE UR-MCNC: NEGATIVE — SIGNIFICANT CHANGE UP
NON-SQ EPI CELLS # UR AUTO: 1 — SIGNIFICANT CHANGE UP
NRBC # FLD: 0 — SIGNIFICANT CHANGE UP
PH UR: 8 — SIGNIFICANT CHANGE UP (ref 4.6–8)
PLATELET # BLD AUTO: 280 K/UL — SIGNIFICANT CHANGE UP (ref 150–400)
PMV BLD: 9.8 FL — SIGNIFICANT CHANGE UP (ref 7–13)
POTASSIUM SERPL-MCNC: 4.1 MMOL/L — SIGNIFICANT CHANGE UP (ref 3.5–5.3)
POTASSIUM SERPL-SCNC: 4.1 MMOL/L — SIGNIFICANT CHANGE UP (ref 3.5–5.3)
PROT SERPL-MCNC: 7.1 G/DL — SIGNIFICANT CHANGE UP (ref 6–8.3)
PROT UR-MCNC: 100 — HIGH
RBC # BLD: 3.96 M/UL — SIGNIFICANT CHANGE UP (ref 3.8–5.2)
RBC # FLD: 16.3 % — HIGH (ref 10.3–14.5)
RBC CASTS # UR COMP ASSIST: SIGNIFICANT CHANGE UP (ref 0–?)
SODIUM SERPL-SCNC: 137 MMOL/L — SIGNIFICANT CHANGE UP (ref 135–145)
SP GR SPEC: 1.02 — SIGNIFICANT CHANGE UP (ref 1–1.03)
SQUAMOUS # UR AUTO: SIGNIFICANT CHANGE UP
UROBILINOGEN FLD QL: NORMAL E.U. — SIGNIFICANT CHANGE UP (ref 0.1–0.2)
WBC # BLD: 13.57 K/UL — HIGH (ref 3.8–10.5)
WBC # FLD AUTO: 13.57 K/UL — HIGH (ref 3.8–10.5)
WBC UR QL: HIGH (ref 0–?)

## 2017-07-12 RX ORDER — MORPHINE SULFATE 50 MG/1
4 CAPSULE, EXTENDED RELEASE ORAL ONCE
Qty: 0 | Refills: 0 | Status: DISCONTINUED | OUTPATIENT
Start: 2017-07-12 | End: 2017-07-12

## 2017-07-12 RX ORDER — SODIUM CHLORIDE 9 MG/ML
1000 INJECTION, SOLUTION INTRAVENOUS
Qty: 0 | Refills: 0 | Status: DISCONTINUED | OUTPATIENT
Start: 2017-07-12 | End: 2017-07-13

## 2017-07-12 RX ORDER — ACETAMINOPHEN 500 MG
1000 TABLET ORAL ONCE
Qty: 0 | Refills: 0 | Status: COMPLETED | OUTPATIENT
Start: 2017-07-12 | End: 2017-07-12

## 2017-07-12 RX ORDER — ONDANSETRON 8 MG/1
8 TABLET, FILM COATED ORAL ONCE
Qty: 0 | Refills: 0 | Status: COMPLETED | OUTPATIENT
Start: 2017-07-12 | End: 2017-07-12

## 2017-07-12 RX ORDER — SODIUM CHLORIDE 9 MG/ML
1000 INJECTION INTRAMUSCULAR; INTRAVENOUS; SUBCUTANEOUS ONCE
Qty: 0 | Refills: 0 | Status: COMPLETED | OUTPATIENT
Start: 2017-07-12 | End: 2017-07-12

## 2017-07-12 RX ORDER — FAMOTIDINE 10 MG/ML
20 INJECTION INTRAVENOUS ONCE
Qty: 0 | Refills: 0 | Status: COMPLETED | OUTPATIENT
Start: 2017-07-12 | End: 2017-07-12

## 2017-07-12 RX ADMIN — FAMOTIDINE 20 MILLIGRAM(S): 10 INJECTION INTRAVENOUS at 20:56

## 2017-07-12 RX ADMIN — SODIUM CHLORIDE 4000 MILLILITER(S): 9 INJECTION INTRAMUSCULAR; INTRAVENOUS; SUBCUTANEOUS at 20:56

## 2017-07-12 RX ADMIN — SODIUM CHLORIDE 150 MILLILITER(S): 9 INJECTION, SOLUTION INTRAVENOUS at 22:50

## 2017-07-12 RX ADMIN — MORPHINE SULFATE 4 MILLIGRAM(S): 50 CAPSULE, EXTENDED RELEASE ORAL at 21:25

## 2017-07-12 RX ADMIN — Medication 400 MILLIGRAM(S): at 20:56

## 2017-07-12 RX ADMIN — MORPHINE SULFATE 4 MILLIGRAM(S): 50 CAPSULE, EXTENDED RELEASE ORAL at 21:40

## 2017-07-12 RX ADMIN — ONDANSETRON 8 MILLIGRAM(S): 8 TABLET, FILM COATED ORAL at 20:56

## 2017-07-12 RX ADMIN — Medication 1000 MILLIGRAM(S): at 21:11

## 2017-07-12 NOTE — ED PROVIDER NOTE - OBJECTIVE STATEMENT
22 yo female  approx 7 weeks gestation PMHx GERD presents to the ED c/o nausea, vomiting, diffuse abdominal pain. pt was recent hospitalized (admitted to OBGYN for hyperemesis in pregnancy), because she found out she was pregnant. Pt was discharge after 5 days, but returning to the ED with the same symptoms. Pt has had these intermittent symptoms for years, has seen GI specialists and was told there was nothing wrong. Pt admits to being a former Marijuana smoker, denies using after being released from the hospital. 20 yo female  approx 7 weeks gestation PMHx GERD presents to the ED c/o nausea, vomiting, diffuse abdominal pain. pt was recent hospitalized (admitted to OBGYN for hyperemesis in pregnancy), because she found out she was pregnant. Pt was discharge after 5 days, but returning to the ED with the same symptoms. Pt has had these intermittent symptoms for years, has seen GI specialists and was told there was nothing wrong. Pt admits to being a former Marijuana smoker, denies using after being released from the hospital. Denies cp ,sob vaginal discharge, vaginal bleeding, urinary sx.

## 2017-07-12 NOTE — ED ADULT NURSE NOTE - OBJECTIVE STATEMENT
21 years old pregnant female presents with complaints of abdominal pain, nausea, vomiting that started about two days ago. Patient was previously here one week ago with same symptoms. On arrival, patient appears uncomfortable, alert and oriented x4, abdomen soft and nontender. Family is at bedside. Will follow up and monitor.

## 2017-07-12 NOTE — ED PROVIDER NOTE - PHYSICAL EXAMINATION
diffuse abdominal tenderness, patient is guarding diffuse abdominal tenderness, patient is guarding   bpm

## 2017-07-12 NOTE — ED PROVIDER NOTE - ATTENDING CONTRIBUTION TO CARE
Dr. Gutierrez:  I performed a face to face bedside interview with patient regarding history of present illness, review of symptoms and past medical history. I completed an independent physical exam.  I have discussed patient's plan of care with PA.   I agree with note as stated above, having amended the EMR as needed to reflect my findings.   This includes HISTORY OF PRESENT ILLNESS, HIV, PAST MEDICAL/SURGICAL/FAMILY/SOCIAL HISTORY, ALLERGIES AND HOME MEDICATIONS, REVIEW OF SYSTEMS, PHYSICAL EXAM, and any PROGRESS NOTES during the time I functioned as the attending physician for this patient.    21F with marijuana use  at 7wks presents with abdominal pain, N/V, unable to tolerate PO.  Had recent admission to OB service for similar symptoms, discharged 17.  Denies further marijuana use since hospital discharge.    Exam:  - vomiting  - rrr  - ctab  - patient not allowing for abdominal exam currently    A/P  - Dr. Gutierrez:  I performed a face to face bedside interview with patient regarding history of present illness, review of symptoms and past medical history. I completed an independent physical exam.  I have discussed patient's plan of care with PA.   I agree with note as stated above, having amended the EMR as needed to reflect my findings.   This includes HISTORY OF PRESENT ILLNESS, HIV, PAST MEDICAL/SURGICAL/FAMILY/SOCIAL HISTORY, ALLERGIES AND HOME MEDICATIONS, REVIEW OF SYSTEMS, PHYSICAL EXAM, and any PROGRESS NOTES during the time I functioned as the attending physician for this patient.    21F with marijuana use  at 7wks presents with abdominal pain, N/V, unable to tolerate PO.  Had recent admission to OB service for similar symptoms, discharged 17.  Denies further marijuana use since hospital discharge.    Exam:  - appears uncomfortable  - rrr  - ctab  - abd soft, diffusely uncomfortable to palpation    A/P  - likely hyperemesis vs exacerbation of gastritis  - cbc, cmp, hcg, ua, urine culture  - IVF, zofran, reassess

## 2017-07-12 NOTE — CONSULT NOTE ADULT - SUBJECTIVE AND OBJECTIVE BOX
HPI: 21y @8wks4d, LMP 17 presents with n/v x 1 day duration. Pt notes several episodes of vomiting today, one episode on arrival to ED. Pt has longstanding h/o gastritis s/p GI work-up that revealed to obvious pathology. Pt placed on "bland food" diet but does not adhere to it. Pt denies any SOB, CP, fever/chills. She noted +abd pain in epigastric area, non-radiating, worse with palpation.    OB/GYN HISTORY:   Last Menstrual Period 17    Name of GYN Physician: Dr. Tavares     OBGYN:G1, denies other history  PMH:gastritis w/o obvious pathology  PSH:denies  Meds:denies  Allergies:denies  Social:former marijuana smoker (quit since found out she was pregnant)  FHx:denies    ROS wnl, except as per HPI    Vital Signs Last 24 Hrs  T(C): 36.7 (2017 19:22), Max: 36.7 (2017 19:22)  T(F): 98.1 (2017 19:22), Max: 98.1 (2017 19:22)  HR: 86 (2017 19:22) (86 - 86)  BP: 137/86 (2017 19:22) (137/86 - 137/86)  BP(mean): --  RR: 18 (2017 19:22) (18 - 18)  SpO2: 100% (2017 19:22) (100% - 100%)    Physical Exam:  Gen:AAOx3, NAD  CV: RRR  Pulm:CTAB/L  Abd: soft, NT, ND  Gyn:  Ext: NTB/L    LABS:                        10.3   13.57 )-----------( 280      ( 2017 20:56 )             31.9     07-12    137  |  100  |  9   ----------------------------<  132<H>  4.1   |  15<L>  |  0.62    Ca    9.1      2017 21:08    TPro  7.1  /  Alb  4.3  /  TBili  0.3  /  DBili  x   /  AST  18  /  ALT  19  /  AlkPhos  64  07-12      Urinalysis Basic - ( 2017 20:56 )    Color: YELLOW / Appearance: TURBID / S.022 / pH: 8.0  Gluc: 50 / Ketone: TRACE  / Bili: NEGATIVE / Urobili: NORMAL E.U.   Blood: SMALL / Protein: 100 / Nitrite: NEGATIVE   Leuk Esterase: NEGATIVE / RBC: 2-5 / WBC 5-10   Sq Epi: FEW / Non Sq Epi: x / Bacteria: MOD      RADIOLOGY & ADDITIONAL STUDIES:    HPI: 21y G_P_ presents with     OB/GYN HISTORY:   Last Menstrual Period    Name of GYN Physician:     OBGYN:  PMH:  PSH:  Meds:  Allergies:  Social:  FHx:    ROS wnl, except as per HPI    Vital Signs Last 24 Hrs  T(C): 36.7 (2017 19:22), Max: 36.7 (2017 19:22)  T(F): 98.1 (2017 19:22), Max: 98.1 (2017 19:22)  HR: 86 (2017 19:22) (86 - 86)  BP: 137/86 (2017 19:22) (137/86 - 137/86)  BP(mean): --  RR: 18 (2017 19:22) (18 - 18)  SpO2: 100% (2017 19:22) (100% - 100%)    Physical Exam:  Gen:AAOx3, NAD  CV: RRR  Pulm:CTAB/L  Abd: soft, NT, ND  Gyn:  Ext: NTB/L    LABS:                        10.3   13.57 )-----------( 280      ( 2017 20:56 )             31.9     07-12    137  |  100  |  9   ----------------------------<  132<H>  4.1   |  15<L>  |  0.62    Ca    9.1      2017 21:08    TPro  7.1  /  Alb  4.3  /  TBili  0.3  /  DBili  x   /  AST  18  /  ALT  19  /  AlkPhos  64  07-12      Urinalysis Basic - ( 2017 20:56 )    Color: YELLOW / Appearance: TURBID / S.022 / pH: 8.0  Gluc: 50 / Ketone: TRACE  / Bili: NEGATIVE / Urobili: NORMAL E.U.   Blood: SMALL / Protein: 100 / Nitrite: NEGATIVE   Leuk Esterase: NEGATIVE / RBC: 2-5 / WBC 5-10   Sq Epi: FEW / Non Sq Epi: x / Bacteria: MOD        RADIOLOGY & ADDITIONAL STUDIES:      A/P: 22 y/o G1@8.4wks, w/h/o gastritis, presents with n/v x 1 day duration.  -IVF hydration  -amylase/lipase to r/o pancreatic pathology  -PO challenge    d/w Dr. Breezy Muro, pgy2        A/P: HPI: 21y @8wks4d, LMP 17 presents with n/v x 1 day duration. Pt notes several episodes of vomiting today, one episode on arrival to ED. Pt has longstanding h/o gastritis s/p GI work-up that revealed to obvious pathology. Pt placed on "bland food" diet but does not adhere to it. Pt denies any SOB, CP, fever/chills. She noted +abd pain in epigastric area, non-radiating, worse with palpation.    OB/GYN HISTORY:   Last Menstrual Period 17    Name of GYN Physician: Dr. Tavares     OBGYN:G1, denies other history  PMH:gastritis w/o obvious pathology  PSH:denies  Meds:denies  Allergies:denies  Social:former marijuana smoker (quit since found out she was pregnant)  FHx:denies    ROS wnl, except as per HPI    Vital Signs Last 24 Hrs  T(C): 36.7 (2017 19:22), Max: 36.7 (2017 19:22)  T(F): 98.1 (2017 19:22), Max: 98.1 (2017 19:22)  HR: 86 (2017 19:22) (86 - 86)  BP: 137/86 (2017 19:22) (137/86 - 137/86)  BP(mean): --  RR: 18 (2017 19:22) (18 - 18)  SpO2: 100% (2017 19:22) (100% - 100%)    Physical Exam:  Gen:AAOx3, NAD  CV: RRR  Pulm:CTAB/L  Abd: soft, NT, ND  Gyn:  Ext: NTB/L    LABS:                        10.3   13.57 )-----------( 280      ( 2017 20:56 )             31.9     07-12    137  |  100  |  9   ----------------------------<  132<H>  4.1   |  15<L>  |  0.62    Ca    9.1      2017 21:08    TPro  7.1  /  Alb  4.3  /  TBili  0.3  /  DBili  x   /  AST  18  /  ALT  19  /  AlkPhos  64  07-12      Urinalysis Basic - ( 2017 20:56 )    Color: YELLOW / Appearance: TURBID / S.022 / pH: 8.0  Gluc: 50 / Ketone: TRACE  / Bili: NEGATIVE / Urobili: NORMAL E.U.   Blood: SMALL / Protein: 100 / Nitrite: NEGATIVE   Leuk Esterase: NEGATIVE / RBC: 2-5 / WBC 5-10   Sq Epi: FEW / Non Sq Epi: x / Bacteria: MOD      RADIOLOGY & ADDITIONAL STUDIES:    HPI: 21y G_P_ presents with     OB/GYN HISTORY:   Last Menstrual Period    Name of GYN Physician:     OBGYN:  PMH:  PSH:  Meds:  Allergies:  Social:  FHx:    ROS wnl, except as per HPI    Vital Signs Last 24 Hrs  T(C): 36.7 (2017 19:22), Max: 36.7 (2017 19:22)  T(F): 98.1 (2017 19:22), Max: 98.1 (2017 19:22)  HR: 86 (2017 19:22) (86 - 86)  BP: 137/86 (2017 19:22) (137/86 - 137/86)  BP(mean): --  RR: 18 (2017 19:22) (18 - 18)  SpO2: 100% (2017 19:22) (100% - 100%)    Physical Exam:  Gen:AAOx3, NAD  CV: RRR  Pulm:CTAB/L  Abd: soft, NT, ND  Gyn:  Ext: NTB/L    LABS:                        10.3   13.57 )-----------( 280      ( 2017 20:56 )             31.9     07-12    137  |  100  |  9   ----------------------------<  132<H>  4.1   |  15<L>  |  0.62    Ca    9.1      2017 21:08    TPro  7.1  /  Alb  4.3  /  TBili  0.3  /  DBili  x   /  AST  18  /  ALT  19  /  AlkPhos  64  07-12      Urinalysis Basic - ( 2017 20:56 )    Color: YELLOW / Appearance: TURBID / S.022 / pH: 8.0  Gluc: 50 / Ketone: TRACE  / Bili: NEGATIVE / Urobili: NORMAL E.U.   Blood: SMALL / Protein: 100 / Nitrite: NEGATIVE   Leuk Esterase: NEGATIVE / RBC: 2-5 / WBC 5-10   Sq Epi: FEW / Non Sq Epi: x / Bacteria: MOD        RADIOLOGY & ADDITIONAL STUDIES:      A/P: 22 y/o G1@8.4wks, w/h/o gastritis, presents with n/v x 1 day duration.  -IVF hydration  -amylase/lipase to r/o pancreatic pathology  -PO challenge    d/w Dr. Breezy Muro, pgy2 HPI: 21y @8wks4d, LMP 17 presents with n/v x 1 day duration. Pt notes several episodes of vomiting today, one episode on arrival to ED. Pt has longstanding h/o gastritis s/p GI work-up that revealed to obvious pathology. Pt placed on "bland food" diet but does not adhere to it. Pt denies any SOB, CP, fever/chills. She noted +abd pain in epigastric area, non-radiating, worse with palpation.    OB/GYN HISTORY:   Last Menstrual Period 17    Name of GYN Physician: Dr. Tavares     OBGYN:G1, denies other history  PMH:gastritis w/o obvious pathology  PSH:denies  Meds:denies  Allergies:denies  Social:former marijuana smoker (quit since found out she was pregnant)  FHx:denies    ROS wnl, except as per HPI    Vital Signs Last 24 Hrs  T(C): 36.7 (2017 19:22), Max: 36.7 (2017 19:22)  T(F): 98.1 (2017 19:22), Max: 98.1 (2017 19:22)  HR: 86 (2017 19:22) (86 - 86)  BP: 137/86 (2017 19:22) (137/86 - 137/86)  BP(mean): --  RR: 18 (2017 19:22) (18 - 18)  SpO2: 100% (2017 19:22) (100% - 100%)    Physical Exam:  Gen:AAOx3, NAD  CV: RRR  Pulm:CTAB/L  Abd: soft, NT, ND  Gyn:  Ext: NTB/L    LABS:                        10.3   13.57 )-----------( 280      ( 2017 20:56 )             31.9     07-12    137  |  100  |  9   ----------------------------<  132<H>  4.1   |  15<L>  |  0.62    Ca    9.1      2017 21:08    TPro  7.1  /  Alb  4.3  /  TBili  0.3  /  DBili  x   /  AST  18  /  ALT  19  /  AlkPhos  64  07-12      Urinalysis Basic - ( 2017 20:56 )    Color: YELLOW / Appearance: TURBID / S.022 / pH: 8.0  Gluc: 50 / Ketone: TRACE  / Bili: NEGATIVE / Urobili: NORMAL E.U.   Blood: SMALL / Protein: 100 / Nitrite: NEGATIVE   Leuk Esterase: NEGATIVE / RBC: 2-5 / WBC 5-10   Sq Epi: FEW / Non Sq Epi: x / Bacteria: MOD      RADIOLOGY & ADDITIONAL STUDIES:    ROS wnl, except as per HPI    Vital Signs Last 24 Hrs  T(C): 36.7 (2017 19:22), Max: 36.7 (2017 19:22)  T(F): 98.1 (2017 19:22), Max: 98.1 (2017 19:22)  HR: 86 (2017 19:22) (86 - 86)  BP: 137/86 (2017 19:22) (137/86 - 137/86)  BP(mean): --  RR: 18 (2017 19:22) (18 - 18)  SpO2: 100% (2017 19:22) (100% - 100%)    Physical Exam:  Gen:AAOx3, NAD  CV: RRR  Pulm:CTAB/L  Abd: soft, NT, ND  Gyn:  Ext: NTB/L    LABS:                        10.3   13.57 )-----------( 280      ( 2017 20:56 )             31.9     07-12    137  |  100  |  9   ----------------------------<  132<H>  4.1   |  15<L>  |  0.62    Ca    9.1      2017 21:08    TPro  7.1  /  Alb  4.3  /  TBili  0.3  /  DBili  x   /  AST  18  /  ALT  19  /  AlkPhos  64  07-12      Urinalysis Basic - ( 2017 20:56 )    Color: YELLOW / Appearance: TURBID / S.022 / pH: 8.0  Gluc: 50 / Ketone: TRACE  / Bili: NEGATIVE / Urobili: NORMAL E.U.   Blood: SMALL / Protein: 100 / Nitrite: NEGATIVE   Leuk Esterase: NEGATIVE / RBC: 2-5 / WBC 5-10   Sq Epi: FEW / Non Sq Epi: x / Bacteria: MOD      RADIOLOGY & ADDITIONAL STUDIES:      A/P: 22 y/o G1@8.4wks, w/h/o gastritis, presents with n/v x 1 day duration.  -IVF hydration  -amylase/lipase to r/o pancreatic pathology  -PO challenge    d/w Dr. Breezy Muro, pgy2

## 2017-07-12 NOTE — ED PROVIDER NOTE - PROGRESS NOTE DETAILS
Bedside sono shows  JENAE James: Pt unable to tolerate PO, will admit to medicine Dr. Villa. text paged MAR.

## 2017-07-12 NOTE — ED ADULT NURSE NOTE - CHPI ED SYMPTOMS NEG
no chills/no blood in stool/no hematuria/no abdominal distension/no diarrhea/no fever/no dysuria/no burning urination

## 2017-07-12 NOTE — ED PROVIDER NOTE - MEDICAL DECISION MAKING DETAILS
20 yo female 7 weeks gestation just previous discharge from OBGYN 1 weeks ago, admitted for same symptoms. n/v/abdominal pain.   plan: labs, pain control, antiemetics, obgyn

## 2017-07-12 NOTE — CONSULT NOTE ADULT - ATTENDING COMMENTS
Consult note reviewed. Agree with residents evaluation as above.    Full attending note to follow    A/P: 22 yo  at 8+4/7 weeks IUP, Hyperemesis, Abdominal pain    IV hydration   Check amylase/lipase  Consider Pepcid / Nexium  Consider GI consult

## 2017-07-12 NOTE — ED ADULT TRIAGE NOTE - CHIEF COMPLAINT QUOTE
Pt 7 weeks preg c/o epigastric pain, N/V x mult episodes today and chills. Denies fever, vag bleeding, discharge, diarrhea. As per mother, this pain has been intermittent for years.

## 2017-07-13 DIAGNOSIS — R11.2 NAUSEA WITH VOMITING, UNSPECIFIED: ICD-10-CM

## 2017-07-13 DIAGNOSIS — R11.10 VOMITING, UNSPECIFIED: ICD-10-CM

## 2017-07-13 DIAGNOSIS — Z33.1 PREGNANT STATE, INCIDENTAL: ICD-10-CM

## 2017-07-13 DIAGNOSIS — Z29.9 ENCOUNTER FOR PROPHYLACTIC MEASURES, UNSPECIFIED: ICD-10-CM

## 2017-07-13 LAB
AMYLASE P1 CFR SERPL: 128 U/L — HIGH (ref 25–125)
LIDOCAIN IGE QN: 28.9 U/L — SIGNIFICANT CHANGE UP (ref 7–60)

## 2017-07-13 PROCEDURE — 99223 1ST HOSP IP/OBS HIGH 75: CPT | Mod: GC

## 2017-07-13 RX ORDER — MORPHINE SULFATE 50 MG/1
4 CAPSULE, EXTENDED RELEASE ORAL ONCE
Qty: 0 | Refills: 0 | Status: DISCONTINUED | OUTPATIENT
Start: 2017-07-13 | End: 2017-07-13

## 2017-07-13 RX ORDER — MORPHINE SULFATE 50 MG/1
4 CAPSULE, EXTENDED RELEASE ORAL EVERY 6 HOURS
Qty: 0 | Refills: 0 | Status: DISCONTINUED | OUTPATIENT
Start: 2017-07-13 | End: 2017-07-13

## 2017-07-13 RX ORDER — MORPHINE SULFATE 50 MG/1
4 CAPSULE, EXTENDED RELEASE ORAL EVERY 6 HOURS
Qty: 0 | Refills: 0 | Status: DISCONTINUED | OUTPATIENT
Start: 2017-07-13 | End: 2017-07-14

## 2017-07-13 RX ORDER — FAMOTIDINE 10 MG/ML
20 INJECTION INTRAVENOUS ONCE
Qty: 0 | Refills: 0 | Status: COMPLETED | OUTPATIENT
Start: 2017-07-13 | End: 2017-07-13

## 2017-07-13 RX ORDER — ONDANSETRON 8 MG/1
4 TABLET, FILM COATED ORAL EVERY 6 HOURS
Qty: 0 | Refills: 0 | Status: DISCONTINUED | OUTPATIENT
Start: 2017-07-13 | End: 2017-07-15

## 2017-07-13 RX ORDER — ONDANSETRON 8 MG/1
4 TABLET, FILM COATED ORAL ONCE
Qty: 0 | Refills: 0 | Status: COMPLETED | OUTPATIENT
Start: 2017-07-13 | End: 2017-07-13

## 2017-07-13 RX ORDER — CEFTRIAXONE 500 MG/1
1 INJECTION, POWDER, FOR SOLUTION INTRAMUSCULAR; INTRAVENOUS ONCE
Qty: 0 | Refills: 0 | Status: COMPLETED | OUTPATIENT
Start: 2017-07-13 | End: 2017-07-13

## 2017-07-13 RX ORDER — SODIUM CHLORIDE 9 MG/ML
1000 INJECTION, SOLUTION INTRAVENOUS
Qty: 0 | Refills: 0 | Status: DISCONTINUED | OUTPATIENT
Start: 2017-07-13 | End: 2017-07-13

## 2017-07-13 RX ORDER — METOCLOPRAMIDE HCL 10 MG
10 TABLET ORAL ONCE
Qty: 0 | Refills: 0 | Status: COMPLETED | OUTPATIENT
Start: 2017-07-13 | End: 2017-07-13

## 2017-07-13 RX ORDER — PANTOPRAZOLE SODIUM 20 MG/1
40 TABLET, DELAYED RELEASE ORAL DAILY
Qty: 0 | Refills: 0 | Status: DISCONTINUED | OUTPATIENT
Start: 2017-07-14 | End: 2017-07-15

## 2017-07-13 RX ORDER — SODIUM CHLORIDE 9 MG/ML
1000 INJECTION INTRAMUSCULAR; INTRAVENOUS; SUBCUTANEOUS
Qty: 0 | Refills: 0 | Status: DISCONTINUED | OUTPATIENT
Start: 2017-07-13 | End: 2017-07-16

## 2017-07-13 RX ORDER — MORPHINE SULFATE 50 MG/1
2 CAPSULE, EXTENDED RELEASE ORAL EVERY 6 HOURS
Qty: 0 | Refills: 0 | Status: DISCONTINUED | OUTPATIENT
Start: 2017-07-13 | End: 2017-07-14

## 2017-07-13 RX ORDER — ACETAMINOPHEN 500 MG
650 TABLET ORAL EVERY 6 HOURS
Qty: 0 | Refills: 0 | Status: DISCONTINUED | OUTPATIENT
Start: 2017-07-13 | End: 2017-07-15

## 2017-07-13 RX ADMIN — ONDANSETRON 4 MILLIGRAM(S): 8 TABLET, FILM COATED ORAL at 20:25

## 2017-07-13 RX ADMIN — SODIUM CHLORIDE 100 MILLILITER(S): 9 INJECTION INTRAMUSCULAR; INTRAVENOUS; SUBCUTANEOUS at 16:27

## 2017-07-13 RX ADMIN — CEFTRIAXONE 100 GRAM(S): 500 INJECTION, POWDER, FOR SOLUTION INTRAMUSCULAR; INTRAVENOUS at 02:40

## 2017-07-13 RX ADMIN — MORPHINE SULFATE 4 MILLIGRAM(S): 50 CAPSULE, EXTENDED RELEASE ORAL at 20:40

## 2017-07-13 RX ADMIN — MORPHINE SULFATE 4 MILLIGRAM(S): 50 CAPSULE, EXTENDED RELEASE ORAL at 20:25

## 2017-07-13 RX ADMIN — Medication 10 MILLIGRAM(S): at 00:46

## 2017-07-13 RX ADMIN — ONDANSETRON 4 MILLIGRAM(S): 8 TABLET, FILM COATED ORAL at 07:05

## 2017-07-13 RX ADMIN — SODIUM CHLORIDE 125 MILLILITER(S): 9 INJECTION, SOLUTION INTRAVENOUS at 11:18

## 2017-07-13 RX ADMIN — SODIUM CHLORIDE 125 MILLILITER(S): 9 INJECTION, SOLUTION INTRAVENOUS at 04:45

## 2017-07-13 RX ADMIN — ONDANSETRON 4 MILLIGRAM(S): 8 TABLET, FILM COATED ORAL at 11:35

## 2017-07-13 RX ADMIN — SODIUM CHLORIDE 100 MILLILITER(S): 9 INJECTION INTRAMUSCULAR; INTRAVENOUS; SUBCUTANEOUS at 20:25

## 2017-07-13 RX ADMIN — MORPHINE SULFATE 4 MILLIGRAM(S): 50 CAPSULE, EXTENDED RELEASE ORAL at 16:29

## 2017-07-13 RX ADMIN — MORPHINE SULFATE 4 MILLIGRAM(S): 50 CAPSULE, EXTENDED RELEASE ORAL at 15:37

## 2017-07-13 NOTE — H&P ADULT - NSHPPHYSICALEXAM_GEN_ALL_CORE
Gen: NAD, seen lying in bed Gen: NAD, seen lying in bed  HEENT: EOMI, PERRL  Neck: Supple  CV: rrr, no m/g/r  Resp: CTAB  Abd: +BS, soft, mildly tender to palpation over R and L LQ, guarding over epigastric area with mild tenderness to palpation  Ext: 2+ radial and DP pulses, no LE edema

## 2017-07-13 NOTE — H&P ADULT - PROBLEM SELECTOR PLAN 2
Pt told she was pregnant last week on last visit to hospital. Unwanted pregnancy, supposed to be terminated today but unable to make her appointment. Pregnancy likely contributing to underlying N/V syndrome.  - Consult gyn re possibility of inpatient termination to alleviate symptoms

## 2017-07-13 NOTE — H&P ADULT - ASSESSMENT
20 yo F  8+1 weeks w/ PMH GERD and possibly cyclical vomiting syndrome p/w N/V x2 days. Pt has had multiple episodes of this in the past, the first at 6 yo. Pt's pregnancy may be contributing to symptoms.

## 2017-07-13 NOTE — H&P ADULT - HISTORY OF PRESENT ILLNESS
20 yo F w/ no PMH, p/w nausea and vomiting x2 days. Pt was recently in the hospital last week for similar presentation and has a h/o recurrent episodes of N/V with admission to the hospital since she was 6 yo. Pt states that it started with abdominal pain then she began to feel nauseous and vomiting resulted. She vomited about 6 times/day and was unable to take anything PO because of this N/V. She states that she even feels 22 yo F +1 with multiple hospitalizations for severe intermittent N/V w/ possible past dx of cyclical vomiting syndrome and GERD p/w N/V x2 days duration. Pt has longstanding (since 4 yo) h/o gastritis s/p outpatient GI work-up with EGD that revealed no obvious path. Pt's most recent episode was last Wednesday with similar sxs which tam her to the ED. she was also found to be pregnant on that visit. Pt's sxs started as usual this time with abdominal pain followed by nausea and then vomiting. Multiple episodes of emesis per day. She denied any alleviating factors but stated that anything PO makes her vomit. Currently not vomiting. Pt states that when she comes to the hospital, they usually give her pepcid, zofran and morphine which helps with the symptoms. At home, she does not usually take anything for the sxs though sometimes takes pepcid which occasionally helps. Pt has no regular follow-up outpatient with GI or PCP.    ED:  VS - T 98.1, HR 86, /86, RR 18, spO2 100% on RA    In the ED pt was given morphine, zofran and pepcid which alleviated some of her symptoms.

## 2017-07-13 NOTE — H&P ADULT - PROBLEM SELECTOR PLAN 1
Pt has extensive h/o N/V episodes bringing her to the hospital. Early pregnancy may be contributing. Still unable to take anything PO without N/V.  - C/w morphine 2mg IV moderate pain and 4mg for severe pain  - C/w zofran and protonix  - C/w IVF NS 100cc/hour until can tolerate PO  - Diet: full liquid diet until can advance to solids  - Will consider GI consult if still unable to tolerate PO tomorrow

## 2017-07-13 NOTE — H&P ADULT - NSHPREVIEWOFSYSTEMS_GEN_ALL_CORE
REVIEW OF SYSTEMS  General: Denies fever, chills, dizziness  ENMT: +rhinorea; denies cough  Respiratory and Thorax: Denies SOB  Cardiovascular: Denies palpitations, CP  Gastrointestinal: +N/V, abd pain; denies C/D, hematochezia  Genitourinary: Denies dysuria, increased frequency  Musculoskeletal: Denies joint pain or swelling  Neurological: Denies REVIEW OF SYSTEMS  General: Denies fever, chills, dizziness  ENMT: +rhinorea; denies cough  Respiratory and Thorax: Denies SOB  Cardiovascular: Denies palpitations, CP  Gastrointestinal: +N/V, abd pain; denies C/D, hematochezia  Genitourinary: Denies dysuria, increased frequency  Musculoskeletal: Denies joint pain or swelling  Neurological: Denies any numbness

## 2017-07-13 NOTE — H&P ADULT - NSHPLABSRESULTS_GEN_ALL_CORE
Complete Blood Count + Automated Diff (07.12.17 @ 20:56)    Nucleated RBC #: 0    WBC Count: 13.57 K/uL    RBC Count: 3.96 M/uL    Hemoglobin: 10.3 g/dL    Hematocrit: 31.9 %    Mean Cell Volume: 80.6 fL    Mean Cell Hemoglobin: 26.0 pg    Mean Cell Hemoglobin Conc: 32.3 %    Red Cell Distrib Width: 16.3 %    Platelet Count - Automated: 280 K/uL    MPV: 9.8 fl    Auto Neutrophil #: 12.13 K/uL    Auto Lymphocyte #: 1.07 K/uL    Auto Monocyte #: 0.28 K/uL    Auto Eosinophil #: 0.00 K/uL    Auto Basophil #: 0.02 K/uL    Auto Immature Granulocyte #: 0.07: (Includes meta, myelo and promyelocytes) #    Auto Neutrophil %: 89.4 %    Auto Lymphocyte %: 7.9 %    Auto Monocyte %: 2.1 %    Auto Eosinophil %: 0.0 %    Auto Basophil %: 0.1 %    Auto Immature Granulocyte %: 0.5: (Includes meta, myelo and promyelocytes) %    Comprehensive Metabolic Panel (07.12.17 @ 21:08)    Sodium, Serum: 137 mmol/L    Potassium, Serum: 4.1 mmol/L    Chloride, Serum: 100 mmol/L    Carbon Dioxide, Serum: 15 mmol/L    Blood Urea Nitrogen, Serum: 9 mg/dL    Creatinine, Serum: 0.62 mg/dL    Glucose, Serum: 132 mg/dL    Calcium, Total Serum: 9.1 mg/dL    Protein Total, Serum: 7.1 g/dL    Albumin, Serum: 4.3 g/dL    Bilirubin Total, Serum: 0.3 mg/dL    Alkaline Phosphatase, Serum: 64: Please note new reference ranges are adjusted for age and  gender. u/L    Aspartate Aminotransferase (AST/SGOT): 18 u/L    Alanine Aminotransferase (ALT/SGPT): 19 u/L     HCG Quantitative, Serum (07.12.17 @ 21:08)    HCG Quantitative, Serum: [id number]: REFERENCE RANGE  GROUP                   HCG-QUANT mIU/mL  ------------------      ----------------  Male:                   Less than 5  Non-pregnant females:   Less than 5  Indeterminant females:            5 - 25    Weeks of pregnancy:           [id number]: U/mL  ------------------            -------  3                     6 -      71  4                    10 -     750  5                   220 -   7,100  6                   160 -  32,000  7                 3,700 - 164,000  8                32,000 - [id number]: 00  9                64,000 - 151,000  10                47,000 - 187,000  12                28,000 - 211,000  14                14,000 -  63,000  15                12,000 -  71,000  16 - 18            8,000 -  58,000 mIU/mL    Amylase/Lipase (07.12.17 @ 21:08)    Lipase, Serum: 28.9 U/L    Amylase, Serum Total: 128 u/L

## 2017-07-13 NOTE — H&P ADULT - NSHPSOCIALHISTORY_GEN_ALL_CORE
Lives with mother. Occasional marijuana use, most recently in May. Lives with mother and brother.  Occasional ETOH use, social drinker.  Denies smoking.  Occasional marijuana use, most recently in May.

## 2017-07-13 NOTE — H&P ADULT - ATTENDING COMMENTS
20 yo F with h/o cyclic vomiting syndrome since the age of 5.  now presents for the second time in 3 days with an exacerbation- in the setting of a new pregnancy (~8weeks).  while some degree of n/v is normal and expected at this stage of pregnancy, this may be exacerbating her underlying CVS.    will treat with analgesia, antiemetics, ivf's and see if we can assist in termination of pregnancy while here.  if abd pain worsens will need to consider ectopic.

## 2017-07-14 ENCOUNTER — TRANSCRIPTION ENCOUNTER (OUTPATIENT)
Age: 22
End: 2017-07-14

## 2017-07-14 DIAGNOSIS — K21.9 GASTRO-ESOPHAGEAL REFLUX DISEASE WITHOUT ESOPHAGITIS: ICD-10-CM

## 2017-07-14 DIAGNOSIS — Z3A.09 9 WEEKS GESTATION OF PREGNANCY: ICD-10-CM

## 2017-07-14 DIAGNOSIS — R11.2 NAUSEA WITH VOMITING, UNSPECIFIED: ICD-10-CM

## 2017-07-14 LAB
AMPHET UR-MCNC: NEGATIVE — SIGNIFICANT CHANGE UP
BACTERIA UR CULT: SIGNIFICANT CHANGE UP
BARBITURATES UR SCN-MCNC: NEGATIVE — SIGNIFICANT CHANGE UP
BENZODIAZ UR-MCNC: NEGATIVE — SIGNIFICANT CHANGE UP
BUN SERPL-MCNC: 5 MG/DL — LOW (ref 7–23)
CALCIUM SERPL-MCNC: 7.8 MG/DL — LOW (ref 8.4–10.5)
CANNABINOIDS UR-MCNC: POSITIVE — SIGNIFICANT CHANGE UP
CHLORIDE SERPL-SCNC: 106 MMOL/L — SIGNIFICANT CHANGE UP (ref 98–107)
CO2 SERPL-SCNC: 21 MMOL/L — LOW (ref 22–31)
COCAINE METAB.OTHER UR-MCNC: NEGATIVE — SIGNIFICANT CHANGE UP
CREAT SERPL-MCNC: 0.53 MG/DL — SIGNIFICANT CHANGE UP (ref 0.5–1.3)
GLUCOSE SERPL-MCNC: 78 MG/DL — SIGNIFICANT CHANGE UP (ref 70–99)
HCT VFR BLD CALC: 27 % — LOW (ref 34.5–45)
HGB BLD-MCNC: 8.7 G/DL — LOW (ref 11.5–15.5)
MCHC RBC-ENTMCNC: 25.2 PG — LOW (ref 27–34)
MCHC RBC-ENTMCNC: 32.2 % — SIGNIFICANT CHANGE UP (ref 32–36)
MCV RBC AUTO: 78.3 FL — LOW (ref 80–100)
METHADONE UR-MCNC: NEGATIVE — SIGNIFICANT CHANGE UP
NRBC # FLD: 0 — SIGNIFICANT CHANGE UP
OPIATES UR-MCNC: POSITIVE — SIGNIFICANT CHANGE UP
OXYCODONE UR-MCNC: NEGATIVE — SIGNIFICANT CHANGE UP
PCP UR-MCNC: NEGATIVE — SIGNIFICANT CHANGE UP
PLATELET # BLD AUTO: 231 K/UL — SIGNIFICANT CHANGE UP (ref 150–400)
PMV BLD: 9.4 FL — SIGNIFICANT CHANGE UP (ref 7–13)
POTASSIUM SERPL-MCNC: 3.5 MMOL/L — SIGNIFICANT CHANGE UP (ref 3.5–5.3)
POTASSIUM SERPL-SCNC: 3.5 MMOL/L — SIGNIFICANT CHANGE UP (ref 3.5–5.3)
RBC # BLD: 3.45 M/UL — LOW (ref 3.8–5.2)
RBC # FLD: 16.4 % — HIGH (ref 10.3–14.5)
SODIUM SERPL-SCNC: 139 MMOL/L — SIGNIFICANT CHANGE UP (ref 135–145)
SPECIMEN SOURCE: SIGNIFICANT CHANGE UP
WBC # BLD: 11.52 K/UL — HIGH (ref 3.8–10.5)
WBC # FLD AUTO: 11.52 K/UL — HIGH (ref 3.8–10.5)

## 2017-07-14 PROCEDURE — 99232 SBSQ HOSP IP/OBS MODERATE 35: CPT | Mod: GC

## 2017-07-14 RX ORDER — MORPHINE SULFATE 50 MG/1
1 CAPSULE, EXTENDED RELEASE ORAL EVERY 6 HOURS
Qty: 0 | Refills: 0 | Status: DISCONTINUED | OUTPATIENT
Start: 2017-07-14 | End: 2017-07-15

## 2017-07-14 RX ORDER — MORPHINE SULFATE 50 MG/1
2 CAPSULE, EXTENDED RELEASE ORAL EVERY 6 HOURS
Qty: 0 | Refills: 0 | Status: DISCONTINUED | OUTPATIENT
Start: 2017-07-14 | End: 2017-07-15

## 2017-07-14 RX ADMIN — ONDANSETRON 4 MILLIGRAM(S): 8 TABLET, FILM COATED ORAL at 09:22

## 2017-07-14 RX ADMIN — SODIUM CHLORIDE 100 MILLILITER(S): 9 INJECTION INTRAMUSCULAR; INTRAVENOUS; SUBCUTANEOUS at 21:20

## 2017-07-14 RX ADMIN — PANTOPRAZOLE SODIUM 40 MILLIGRAM(S): 20 TABLET, DELAYED RELEASE ORAL at 12:30

## 2017-07-14 RX ADMIN — MORPHINE SULFATE 4 MILLIGRAM(S): 50 CAPSULE, EXTENDED RELEASE ORAL at 02:48

## 2017-07-14 RX ADMIN — MORPHINE SULFATE 2 MILLIGRAM(S): 50 CAPSULE, EXTENDED RELEASE ORAL at 16:00

## 2017-07-14 RX ADMIN — MORPHINE SULFATE 4 MILLIGRAM(S): 50 CAPSULE, EXTENDED RELEASE ORAL at 10:30

## 2017-07-14 RX ADMIN — MORPHINE SULFATE 4 MILLIGRAM(S): 50 CAPSULE, EXTENDED RELEASE ORAL at 03:03

## 2017-07-14 RX ADMIN — Medication 650 MILLIGRAM(S): at 22:00

## 2017-07-14 RX ADMIN — ONDANSETRON 4 MILLIGRAM(S): 8 TABLET, FILM COATED ORAL at 02:48

## 2017-07-14 RX ADMIN — SODIUM CHLORIDE 100 MILLILITER(S): 9 INJECTION INTRAMUSCULAR; INTRAVENOUS; SUBCUTANEOUS at 09:21

## 2017-07-14 RX ADMIN — Medication 650 MILLIGRAM(S): at 21:20

## 2017-07-14 RX ADMIN — FAMOTIDINE 20 MILLIGRAM(S): 10 INJECTION INTRAVENOUS at 07:04

## 2017-07-14 RX ADMIN — MORPHINE SULFATE 2 MILLIGRAM(S): 50 CAPSULE, EXTENDED RELEASE ORAL at 15:55

## 2017-07-14 RX ADMIN — MORPHINE SULFATE 4 MILLIGRAM(S): 50 CAPSULE, EXTENDED RELEASE ORAL at 09:16

## 2017-07-14 NOTE — DISCHARGE NOTE ADULT - HOSPITAL COURSE
History of Present Illness: 	  20 yo F +1 with multiple hospitalizations for severe intermittent N/V w/ possible past dx of cyclical vomiting syndrome and GERD p/w N/V x2 days duration. Pt has longstanding (since 4 yo) h/o gastritis s/p outpatient GI work-up with EGD that revealed no obvious path. Pt's most recent episode was last Wednesday with similar sxs which tam her to the ED. she was also found to be pregnant on that visit. Pt's sxs started as usual this time with abdominal pain followed by nausea and then vomiting. Multiple episodes of emesis per day. She denied any alleviating factors but stated that anything PO makes her vomit. Currently not vomiting. Pt states that when she comes to the hospital, they usually give her pepcid, zofran and morphine which helps with the symptoms. At home, she does not usually take anything for the sxs though sometimes takes pepcid which occasionally helps. Pt has no regular follow-up outpatient with GI or PCP.    ED:  VS - T 98.1, HR 86, /86, RR 18, spO2 100% on RA  Pt was given morphine, zofran and pepcid which alleviated some of her symptoms.    Hospital Course:  Pt was started on IVF, IV zofran, morphine and protonix. Symptoms began to improve and patient was able to start tolerating some PO intake. Urine tox positive for cannabinoids. Pt stated that she wanted to terminate and had missed her appointment because of hospitalization. As pregnancy likely contributing to N/V syndrome and readmission to the hospital, gyn was consulted. They stated that they would not be able to terminate pregnancy as inpatient. On day 3 of hospitalization, patient was converted to PO zofran, protonix and acetaminophen for pain. __________________ History of Present Illness: 	  20 yo F +1 with multiple hospitalizations for severe intermittent N/V w/ possible past dx of cyclical vomiting syndrome and GERD p/w N/V x2 days duration. Pt has longstanding (since 4 yo) h/o gastritis s/p outpatient GI work-up with EGD that revealed no obvious path. Pt's most recent episode was last Wednesday with similar sxs which tam her to the ED. she was also found to be pregnant on that visit. Pt's sxs started as usual this time with abdominal pain followed by nausea and then vomiting. Multiple episodes of emesis per day. She denied any alleviating factors but stated that anything PO makes her vomit. Currently not vomiting. Pt states that when she comes to the hospital, they usually give her pepcid, zofran and morphine which helps with the symptoms. At home, she does not usually take anything for the sxs though sometimes takes pepcid which occasionally helps. Pt has no regular follow-up outpatient with GI or PCP.    ED:  VS - T 98.1, HR 86, /86, RR 18, spO2 100% on RA  Pt was given morphine, zofran and pepcid which alleviated some of her symptoms.    Hospital Course:  Pt was started on IVF, IV zofran, morphine and protonix. Symptoms began to improve and patient was able to start tolerating some PO intake. Urine tox positive for cannabinoids. Pt stated that she wanted to terminate and had missed her appointment because of hospitalization. As pregnancy likely contributing to N/V syndrome and readmission to the hospital, gyn was consulted. They stated that they would not be able to terminate pregnancy as inpatient. On day 3 of hospitalization, patient was converted to PO zofran, protonix and acetaminophen for pain. Pt was able to tolerate her diet without significant nausea, vomiting or abdominal pain. She will be following up with Dr. Gale as an outpatient for ETOP because she states that [facility] only does ETOPs on Thursday-Saturday and she does not want to wait that long. Prescriptions for Protonix and Zofran were sent to her [facility] pharmacy. History of Present Illness: 	  22 yo F +1 with multiple hospitalizations for severe intermittent N/V w/ possible past dx of cyclical vomiting syndrome and GERD p/w N/V x2 days duration. Pt has longstanding (since 6 yo) h/o gastritis s/p outpatient GI work-up with EGD that revealed no obvious path. Pt's most recent episode was last Wednesday with similar sxs which tam her to the ED. she was also found to be pregnant on that visit. Pt's sxs started as usual this time with abdominal pain followed by nausea and then vomiting. Multiple episodes of emesis per day. She denied any alleviating factors but stated that anything PO makes her vomit. Currently not vomiting. Pt states that when she comes to the hospital, they usually give her pepcid, zofran and morphine which helps with the symptoms. At home, she does not usually take anything for the sxs though sometimes takes pepcid which occasionally helps. Pt has no regular follow-up outpatient with GI or PCP.    ED:  VS - T 98.1, HR 86, /86, RR 18, spO2 100% on RA  Pt was given morphine, zofran and pepcid which alleviated some of her symptoms.    Hospital Course:  Pt was started on IVF, IV zofran, morphine and protonix. Symptoms began to improve and patient was able to start tolerating some PO intake. Urine tox positive for cannabinoids. Pt stated that she wanted to terminate and had missed her appointment because of hospitalization. As pregnancy likely contributing to N/V syndrome and readmission to the hospital, gyn was consulted. They stated that they would not be able to terminate pregnancy as inpatient. On day 3 of hospitalization, patient was converted to PO zofran, protonix and acetaminophen for pain. Pt was able to tolerate her diet without significant nausea, vomiting or abdominal pain. She will be following up with Dr. Gale as an outpatient for ETOP because she states that [facility] only does ETOPs on Thursday-Saturday and she does not want to wait that long. Prescriptions for Protonix and Zofran were sent to her [facility] pharmacy. Pt's symptoms improved, stable for d/c home, d/c planning time spent in coordination 40 mts, History of Present Illness: 	  22 yo F +1 with multiple hospitalizations for severe intermittent N/V w/ possible past dx of cyclical vomiting syndrome and GERD p/w N/V x2 days duration. Pt has longstanding (since 4 yo) h/o gastritis s/p outpatient GI work-up with EGD that revealed no obvious path. Pt's most recent episode was last Wednesday with similar sxs which tam her to the ED. she was also found to be pregnant on that visit. Pt's sxs started as usual this time with abdominal pain followed by nausea and then vomiting. Multiple episodes of emesis per day. She denied any alleviating factors but stated that anything PO makes her vomit. Currently not vomiting. Pt states that when she comes to the hospital, they usually give her pepcid, zofran and morphine which helps with the symptoms. At home, she does not usually take anything for the sxs though sometimes takes pepcid which occasionally helps. Pt has no regular follow-up outpatient with GI or PCP.    ED:  VS - T 98.1, HR 86, /86, RR 18, spO2 100% on RA  Pt was given morphine, zofran and pepcid which alleviated some of her symptoms.    Hospital Course:  Pt was started on IVF, IV zofran, morphine and protonix. Symptoms began to improve and patient was able to start tolerating some PO intake. Urine tox positive for cannabinoids. Pt stated that she wanted to terminate and had missed her appointment because of hospitalization. As pregnancy likely contributing to N/V syndrome and readmission to the hospital, gyn was consulted. They stated that they would not be able to terminate pregnancy as inpatient. On day 3 of hospitalization, patient was converted to PO zofran, protonix and acetaminophen for pain. Pt was able to tolerate her diet without significant nausea, vomiting or abdominal pain. She will be following up with Dr. Gale as an outpatient for ETOP because she states that [facility] only does ETOPs on Thursday-Saturday and she does not want to wait that long. Prescriptions for Protonix and Zofran were sent to her [facility] pharmacy. Pt's symptoms improved, stable for d/c home, d/c planning time spent in coordination [facility],    medicine attending addendum- this young women has a h/o cyclic vomiting syndrome since age 5. she experienced an exacerbation due to an unwanted pregnancy. she required parenteral antiemetics, analgesics and hydration. finally improved enough to be dc'd. referred for termination of pregnancy as we could not complete it here.

## 2017-07-14 NOTE — DISCHARGE NOTE ADULT - CARE PROVIDER_API CALL
Amalia Gale), [address] [address]  Phone: [phone]  Fax: [phone] Amalia Gale), OBSN  [address] [address]  Phone: [phone]  Fax: [phone]    Sri Tavares), Obstetrics and Gynecology  [address] [place] [address]  Phone: [phone]  Fax: [phone]

## 2017-07-14 NOTE — CONSULT NOTE ADULT - SUBJECTIVE AND OBJECTIVE BOX
20 yo  at 8+6/7 weeks by LMP and 9+5/7 weeks by U/S done on 17 admitted in medicine unit for Hyperemesis gravidarum and abdominal pain.  This is an unwanted pregnancy. This is her second admission since 17. She was not able to tolerate any diet. Given IV hydration and was put on Zofran and IV Protonix due to abdominal pain and suspected GERD.   Pt. was seen at bedside. Pleasant, not in acute distress. Tolerating regular diet today. N/V is much better on Zofran. She did not vomit since admission.  Still has mild abdominal pain around her umbilicus/upper abdomen. Positive flatus, denies any Fever/chills, or any F/U/D.     PMSHx: GERD  GynHx: Regular menses, denies STDs  OBHx:   Allergy: NKDA  Meds:  Zofran, Protonix, Morphine, Reglan, NS IVF, Tylenol    PE: VS 98.4, 78, 115/78, 18, 100%  Gen: NAD, AAOx3  CVS: Positive S1S2, RRR  Lungs: CTA B/L  Abdomen: Soft, mild upper abdominal tenderness, ND, No HSM  Ext: No C/C/E  Pelvic : Deferred    Labs: CBC: 11.52, 8.7/27.0, 231, bHCG 120,174  Normal CMP and amylase lipase    U/S: from 17: Positive Single IUP at 7+5/7 weeks, Pos FH, No ectopic, normal adnexa

## 2017-07-14 NOTE — DISCHARGE NOTE ADULT - MEDICATION SUMMARY - MEDICATIONS TO TAKE
I will START or STAY ON the medications listed below when I get home from the hospital:    ondansetron 8 mg oral tablet  -- 1 tab(s) by mouth every 8 hours, As needed, Nausea and/or Vomiting  -- Indication: For Nausea & vomiting    pantoprazole 40 mg oral delayed release tablet  -- 1 tab(s) by mouth once a day (before a meal)  -- Indication: For Gastroesophageal reflux disease, esophagitis presence not specified I will START or STAY ON the medications listed below when I get home from the hospital:    ondansetron 4 mg oral tablet  -- 1 tab(s) by mouth every 6 hours, As Needed -for nausea  -- Indication: For Nausea & vomiting    pantoprazole 40 mg oral delayed release tablet  -- 1 tab(s) by mouth once a day before breakfast  -- Indication: For Gastroesophageal reflux disease, esophagitis presence not specified

## 2017-07-14 NOTE — PROGRESS NOTE ADULT - ATTENDING COMMENTS
still requiring parenteral analgesia and antiemetics.  abd exam is  albeit less than yesterday.    imp- exacerbation of CVS due to pregnancy. ddx includes hyperemesis gravidarum (doubt) and ectopic (doubt)  will ask gyn if this pregnancy can be terminated as inpatient per pt request

## 2017-07-14 NOTE — PROGRESS NOTE ADULT - PROBLEM SELECTOR PLAN 1
Pt has extensive h/o N/V episodes bringing her to the hospital. Early pregnancy may be contributing. Pt's symptoms improved on meds this AM. Denies N/V currently.  - PO trial today  - C/w morphine 2mg IV moderate pain and 4mg for severe pain  - C/w zofran and protonix  - C/w IVF NS 100cc/hour until can tolerate PO  - Will consider GI consult if still unable to tolerate PO tomorrow

## 2017-07-14 NOTE — DISCHARGE NOTE ADULT - ADDITIONAL INSTRUCTIONS
Follow-up with your primary care physician within one week. Follow-up with your primary care physician within one week. Follow-up with an [facility]N at [facility] or go see Dr. Amalia Gale at [facility] [phone]. Follow-up with your primary care physician within one week. Follow-up with an [facility]N at [facility] or go see Dr. Amalia Gale at [facility] [phone].    Follow up with Dr. Tavares 2 weeks after termination of your pregnancy. You will need weekly blood tests to ensure that your beta-hCG levels are decreasing.

## 2017-07-14 NOTE — DISCHARGE NOTE ADULT - MEDICATION SUMMARY - MEDICATIONS TO STOP TAKING
I will STOP taking the medications listed below when I get home from the hospital:  None I will STOP taking the medications listed below when I get home from the hospital:    ondansetron 8 mg oral tablet  -- 1 tab(s) by mouth every 8 hours, As needed, Nausea and/or Vomiting

## 2017-07-14 NOTE — DISCHARGE NOTE ADULT - CARE PROVIDERS DIRECT ADDRESSES
,[email or web address] [email or web address]

## 2017-07-14 NOTE — PROGRESS NOTE ADULT - ASSESSMENT
22 yo F  8+1 weeks w/ PMH GERD and possibly cyclical vomiting syndrome p/w N/V x2 days. Pt has had multiple episodes of this in the past, the first at 4 yo. Pt's pregnancy may be contributing to symptoms.

## 2017-07-14 NOTE — DISCHARGE NOTE ADULT - MEDICATION SUMMARY - MEDICATIONS TO CHANGE
I will SWITCH the dose or number of times a day I take the medications listed below when I get home from the hospital:  None I will SWITCH the dose or number of times a day I take the medications listed below when I get home from the hospital:    ondansetron 8 mg oral tablet  -- 1 tab(s) by mouth every 8 hours, As needed, Nausea and/or Vomiting

## 2017-07-14 NOTE — CONSULT NOTE ADULT - PROBLEM SELECTOR RECOMMENDATION 9
Pt. desires ETOP  Pt. had appointment at [facility] on 07/13/17, but she was in the hospital  Advised patient that, ETOP can not be done while she is in the hospital and our team or the service gyn do not do ETOP in house  Pt. to see Dr. Amalia Gale ([phone]) if she wants ETOP by [facility] as out patient or she can go to [facility] at [place] where Dr. Gale performs ETOP  Verbalized understanding  Patient to F/U with Dr. Bai after 2 weeks S/P ETOP  She needs bHCG QW until Neg (<5.0)  Repeat bHCG and U/S today  Thank you for the consult, and Gyn signed out

## 2017-07-14 NOTE — DISCHARGE NOTE ADULT - PATIENT PORTAL LINK FT
“You can access the [facility] Patient Portal, offered by [facility], by registering with the following website: [email or web address]”

## 2017-07-14 NOTE — DISCHARGE NOTE ADULT - CONDITIONS AT DISCHARGE
patient alert and oriented. stable to discharge home. zofran given for nausea; tolerating diet. no complains of pain while in my care. stool softener given for constipation. Iv removed, discharge instruction provided with copy.

## 2017-07-14 NOTE — CONSULT NOTE ADULT - ASSESSMENT
1. IUP at 9+6/7 weeks  2. Hyperemesis Gravidarum  3. Abdominal pain
A/P: 20 yo  at 8+4/7 weeks IUP, Hyperemesis, Abdominal pain

## 2017-07-14 NOTE — DISCHARGE NOTE ADULT - PLAN OF CARE
Decrease symptoms Continue to take your protonix daily and zofran as needed for nausea and vomiting. Please follow-up outpatient with an OB-GYN. If you are still considering termination, please reschedule your appointment at [facility] within the next week. If you would prefer, you may see one of [facility]'s outpatient gynecologists, Dr. Amaila Gale ([phone]). Continue to take your protonix daily. Please take zofran as needed for nausea and vomiting. You should follow-up with your primary care doctor within 1-2 weeks. Resolution Please follow-up outpatient with an OB-GYN. If you are still considering termination, please reschedule your appointment at [facility] within the next week. If you would prefer, you may see one of [facility]'s outpatient gynecologists, Dr. Amalia Gale ([phone]). You will need to follow up with Dr. Tavares 2 weeks after termination of your pregnancy. You will also need weekly blood tests to ensure that your b-HCG levels are going down. Continue to take your protonix daily. Please take zofran as needed for nausea and vomiting. You should follow-up with your primary care doctor within 1-2 weeks. Your prescriptions were sent to your [facility] pharmacy.

## 2017-07-14 NOTE — PROGRESS NOTE ADULT - SUBJECTIVE AND OBJECTIVE BOX
Patient is a 21y old  Female who presents with a chief complaint of Nausea/Vomiting (13 Jul 2017 13:27)     INTERVAL HPI/OVERNIGHT EVENTS:  No acute events overnight. Pt denies N/V this AM. Says hasn't had vomiting since yesterday afternoon.    MEDICATIONS  (STANDING):  sodium chloride 0.9%. 1000 milliLiter(s) (100 mL/Hr) IV Continuous <Continuous>  pantoprazole  Injectable 40 milliGRAM(s) IV Push daily    MEDICATIONS  (PRN):  ondansetron Injectable 4 milliGRAM(s) IV Push every 6 hours PRN Nausea and/or Vomiting  acetaminophen   Tablet. 650 milliGRAM(s) Oral every 6 hours PRN Mild Pain (1 - 3)  morphine  - Injectable 4 milliGRAM(s) IV Push every 6 hours PRN Severe Pain (7 - 10)  morphine  - Injectable 2 milliGRAM(s) IV Push every 6 hours PRN Moderate Pain (4 - 6)      Allergies:  No Known Allergies    REVIEW OF SYSTEMS:  Constitutional: Denies fever, fatigue  Resp: Denies SOB, cough  CV: Denies chest pain, palpitations, dizziness  GI: +abdominal pain; denies N/V/D/C  : Denies dysuria  Neuro: Denies HA, weakness  MSK: Denies joint pain, swelling    Vital Signs Last 24 Hrs  T(C): 36.7 (14 Jul 2017 07:04), Max: 37 (13 Jul 2017 14:51)  T(F): 98.1 (14 Jul 2017 07:04), Max: 98.6 (13 Jul 2017 14:51)  HR: 72 (14 Jul 2017 07:04) (72 - 91)  BP: 110/70 (14 Jul 2017 07:04) (107/72 - 149/82)  BP(mean): --  RR: 18 (14 Jul 2017 07:04) (16 - 18)  SpO2: 100% (14 Jul 2017 07:04) (100% - 100%)    PHYSICAL EXAM:  General: NAD, well-groomed, well-developed  Nervous System: AAOX3  Psych: Appropriate affect and mood  Chest: Clear to auscultation bilaterally; no rales, rhonchi, or wheezing  Heart: Regular rate and rhythm; no murmurs, rubs, or gallops  Abd: +BS, soft, moderately tender to deep palpation over the suprapubic and center of the abdomen, nondistended  Ext:  no LE edema    LABS:                      8.7    11.52 )-----------( 231      ( 14 Jul 2017 05:57 )             27.0     14 Jul 2017 05:57    139    |  106    |  5      ----------------------------<  78     3.5     |  21     |  0.53     Ca    7.8        14 Jul 2017 05:57 Patient is a 21y old  Female who presents with a chief complaint of Nausea/Vomiting (13 Jul 2017 13:27)     INTERVAL HPI/OVERNIGHT EVENTS:  No acute events overnight. Pt denies N/V this AM. Says hasn't had vomiting since yesterday afternoon. Pt continues to complain of abdominal pain, 10/10 in middle of abdomen but in NAD on exam.    MEDICATIONS  (STANDING):  sodium chloride 0.9%. 1000 milliLiter(s) (100 mL/Hr) IV Continuous <Continuous>  pantoprazole  Injectable 40 milliGRAM(s) IV Push daily    MEDICATIONS  (PRN):  ondansetron Injectable 4 milliGRAM(s) IV Push every 6 hours PRN Nausea and/or Vomiting  acetaminophen   Tablet. 650 milliGRAM(s) Oral every 6 hours PRN Mild Pain (1 - 3)  morphine  - Injectable 4 milliGRAM(s) IV Push every 6 hours PRN Severe Pain (7 - 10)  morphine  - Injectable 2 milliGRAM(s) IV Push every 6 hours PRN Moderate Pain (4 - 6)      Allergies:  No Known Allergies    REVIEW OF SYSTEMS:  Constitutional: Denies fever, fatigue  Resp: Denies SOB, cough  CV: Denies chest pain, palpitations, dizziness  GI: +abdominal pain; denies N/V/D/C  : Denies dysuria  Neuro: Denies HA, weakness  MSK: Denies joint pain, swelling    Vital Signs Last 24 Hrs  T(C): 36.7 (14 Jul 2017 07:04), Max: 37 (13 Jul 2017 14:51)  T(F): 98.1 (14 Jul 2017 07:04), Max: 98.6 (13 Jul 2017 14:51)  HR: 72 (14 Jul 2017 07:04) (72 - 91)  BP: 110/70 (14 Jul 2017 07:04) (107/72 - 149/82)  BP(mean): --  RR: 18 (14 Jul 2017 07:04) (16 - 18)  SpO2: 100% (14 Jul 2017 07:04) (100% - 100%)    PHYSICAL EXAM:  General: NAD, well-groomed, well-developed  Nervous System: AAOX3  Psych: Appropriate affect and mood  Chest: Clear to auscultation bilaterally; no rales, rhonchi, or wheezing  Heart: Regular rate and rhythm; no murmurs, rubs, or gallops  Abd: +BS, soft, moderately tender to deep palpation over the suprapubic and center of the abdomen, nondistended  Ext:  no LE edema    LABS:                      8.7    11.52 )-----------( 231      ( 14 Jul 2017 05:57 )             27.0     14 Jul 2017 05:57    139    |  106    |  5      ----------------------------<  78     3.5     |  21     |  0.53     Ca    7.8        14 Jul 2017 05:57

## 2017-07-14 NOTE — DISCHARGE NOTE ADULT - CARE PLAN
Principal Discharge DX:	Nausea & vomiting  Goal:	Decrease symptoms  Instructions for follow-up, activity and diet:	Continue to take your protonix daily and zofran as needed for nausea and vomiting. Principal Discharge DX:	Nausea & vomiting  Goal:	Decrease symptoms  Instructions for follow-up, activity and diet:	Continue to take your protonix daily. Please take zofran as needed for nausea and vomiting. You should follow-up with your primary care doctor within 1-2 weeks.  Secondary Diagnosis:	Pregnancy  Instructions for follow-up, activity and diet:	Please follow-up outpatient with an OB-GYN. If you are still considering termination, please reschedule your appointment at [facility] within the next week. If you would prefer, you may see one of [facility]'s outpatient gynecologists, Dr. Amalia Gale ([phone]). Principal Discharge DX:	Nausea & vomiting  Goal:	Resolution  Instructions for follow-up, activity and diet:	Continue to take your protonix daily. Please take zofran as needed for nausea and vomiting. You should follow-up with your primary care doctor within 1-2 weeks. [facility]  Secondary Diagnosis:	Pregnancy  Instructions for follow-up, activity and diet:	Please follow-up outpatient with an OB-GYN. If you are still considering termination, please reschedule your appointment at [facility] within the next week. If you would prefer, you may see one of [facility]'s outpatient gynecologists, Dr. Amalia Gale ([phone]). You will need to follow up with Dr. Tavares 2 weeks after termination of your pregnancy. You will also need weekly blood tests to ensure that your b-HCG levels are going down. Principal Discharge DX:	Nausea & vomiting  Goal:	Resolution  Instructions for follow-up, activity and diet:	Continue to take your protonix daily. Please take zofran as needed for nausea and vomiting. You should follow-up with your primary care doctor within 1-2 weeks. [facility]  Secondary Diagnosis:	Pregnancy  Instructions for follow-up, activity and diet:	Please follow-up outpatient with an OB-GYN. If you are still considering termination, please reschedule your appointment at [facility] within the next week. If you would prefer, you may see one of [facility]'s outpatient gynecologists, Dr. Amalia Gale ([phone]). You will need to follow up with Dr. Tavares 2 weeks after termination of your pregnancy. You will also need weekly blood tests to ensure that your b-HCG levels are going down. Principal Discharge DX:	Nausea & vomiting  Goal:	Resolution  Instructions for follow-up, activity and diet:	Continue to take your protonix daily. Please take zofran as needed for nausea and vomiting. You should follow-up with your primary care doctor within 1-2 weeks. [facility]  Secondary Diagnosis:	Pregnancy  Instructions for follow-up, activity and diet:	Please follow-up outpatient with an OB-GYN. If you are still considering termination, please reschedule your appointment at [facility] within the next week. If you would prefer, you may see one of [facility]'s outpatient gynecologists, Dr. Amalia Gale ([phone]). You will need to follow up with Dr. Tavares 2 weeks after termination of your pregnancy. You will also need weekly blood tests to ensure that your b-HCG levels are going down. Principal Discharge DX:	Nausea & vomiting  Goal:	Resolution  Instructions for follow-up, activity and diet:	Continue to take your protonix daily. Please take zofran as needed for nausea and vomiting. You should follow-up with your primary care doctor within 1-2 weeks. [facility]  Secondary Diagnosis:	Pregnancy  Instructions for follow-up, activity and diet:	Please follow-up outpatient with an OB-GYN. If you are still considering termination, please reschedule your appointment at [facility] within the next week. If you would prefer, you may see one of [facility]'s outpatient gynecologists, Dr. Amalia Gale ([phone]). You will need to follow up with Dr. Tavares 2 weeks after termination of your pregnancy. You will also need weekly blood tests to ensure that your b-HCG levels are going down. Principal Discharge DX:	Nausea & vomiting  Goal:	Resolution  Instructions for follow-up, activity and diet:	Continue to take your protonix daily. Please take zofran as needed for nausea and vomiting. You should follow-up with your primary care doctor within 1-2 weeks. [facility]  Secondary Diagnosis:	Pregnancy  Instructions for follow-up, activity and diet:	Please follow-up outpatient with an OB-GYN. If you are still considering termination, please reschedule your appointment at [facility] within the next week. If you would prefer, you may see one of [facility]'s outpatient gynecologists, Dr. Amalia Gale ([phone]). You will need to follow up with Dr. Tavares 2 weeks after termination of your pregnancy. You will also need weekly blood tests to ensure that your b-HCG levels are going down.

## 2017-07-15 LAB
HCG SERPL-ACNC: SIGNIFICANT CHANGE UP MIU/ML
HCT VFR BLD CALC: 29.3 % — LOW (ref 34.5–45)
HGB BLD-MCNC: 9.7 G/DL — LOW (ref 11.5–15.5)
MCHC RBC-ENTMCNC: 26.3 PG — LOW (ref 27–34)
MCHC RBC-ENTMCNC: 33.1 % — SIGNIFICANT CHANGE UP (ref 32–36)
MCV RBC AUTO: 79.4 FL — LOW (ref 80–100)
NRBC # FLD: 0 — SIGNIFICANT CHANGE UP
PLATELET # BLD AUTO: 223 K/UL — SIGNIFICANT CHANGE UP (ref 150–400)
PMV BLD: 9.3 FL — SIGNIFICANT CHANGE UP (ref 7–13)
RBC # BLD: 3.69 M/UL — LOW (ref 3.8–5.2)
RBC # FLD: 16.1 % — HIGH (ref 10.3–14.5)
WBC # BLD: 9.42 K/UL — SIGNIFICANT CHANGE UP (ref 3.8–10.5)
WBC # FLD AUTO: 9.42 K/UL — SIGNIFICANT CHANGE UP (ref 3.8–10.5)

## 2017-07-15 PROCEDURE — 99233 SBSQ HOSP IP/OBS HIGH 50: CPT

## 2017-07-15 RX ORDER — PANTOPRAZOLE SODIUM 20 MG/1
40 TABLET, DELAYED RELEASE ORAL
Qty: 0 | Refills: 0 | Status: DISCONTINUED | OUTPATIENT
Start: 2017-07-15 | End: 2017-07-16

## 2017-07-15 RX ORDER — ACETAMINOPHEN 500 MG
2 TABLET ORAL
Qty: 0 | Refills: 0 | COMMUNITY
Start: 2017-07-15

## 2017-07-15 RX ORDER — ONDANSETRON 8 MG/1
1 TABLET, FILM COATED ORAL
Qty: 0 | Refills: 0 | COMMUNITY
Start: 2017-07-15

## 2017-07-15 RX ORDER — ONDANSETRON 8 MG/1
8 TABLET, FILM COATED ORAL EVERY 8 HOURS
Qty: 0 | Refills: 0 | Status: DISCONTINUED | OUTPATIENT
Start: 2017-07-15 | End: 2017-07-16

## 2017-07-15 RX ORDER — ACETAMINOPHEN 500 MG
650 TABLET ORAL EVERY 6 HOURS
Qty: 0 | Refills: 0 | Status: DISCONTINUED | OUTPATIENT
Start: 2017-07-15 | End: 2017-07-16

## 2017-07-15 RX ORDER — PANTOPRAZOLE SODIUM 20 MG/1
1 TABLET, DELAYED RELEASE ORAL
Qty: 0 | Refills: 0 | COMMUNITY
Start: 2017-07-15

## 2017-07-15 RX ADMIN — MORPHINE SULFATE 2 MILLIGRAM(S): 50 CAPSULE, EXTENDED RELEASE ORAL at 09:47

## 2017-07-15 RX ADMIN — MORPHINE SULFATE 2 MILLIGRAM(S): 50 CAPSULE, EXTENDED RELEASE ORAL at 02:10

## 2017-07-15 RX ADMIN — MORPHINE SULFATE 1 MILLIGRAM(S): 50 CAPSULE, EXTENDED RELEASE ORAL at 15:10

## 2017-07-15 RX ADMIN — Medication 650 MILLIGRAM(S): at 19:33

## 2017-07-15 RX ADMIN — Medication 650 MILLIGRAM(S): at 20:33

## 2017-07-15 RX ADMIN — PANTOPRAZOLE SODIUM 40 MILLIGRAM(S): 20 TABLET, DELAYED RELEASE ORAL at 12:04

## 2017-07-15 RX ADMIN — SODIUM CHLORIDE 100 MILLILITER(S): 9 INJECTION INTRAMUSCULAR; INTRAVENOUS; SUBCUTANEOUS at 06:22

## 2017-07-15 RX ADMIN — MORPHINE SULFATE 1 MILLIGRAM(S): 50 CAPSULE, EXTENDED RELEASE ORAL at 06:23

## 2017-07-15 RX ADMIN — ONDANSETRON 4 MILLIGRAM(S): 8 TABLET, FILM COATED ORAL at 09:29

## 2017-07-15 RX ADMIN — ONDANSETRON 4 MILLIGRAM(S): 8 TABLET, FILM COATED ORAL at 01:54

## 2017-07-15 RX ADMIN — MORPHINE SULFATE 1 MILLIGRAM(S): 50 CAPSULE, EXTENDED RELEASE ORAL at 14:52

## 2017-07-15 RX ADMIN — MORPHINE SULFATE 2 MILLIGRAM(S): 50 CAPSULE, EXTENDED RELEASE ORAL at 01:54

## 2017-07-15 RX ADMIN — SODIUM CHLORIDE 100 MILLILITER(S): 9 INJECTION INTRAMUSCULAR; INTRAVENOUS; SUBCUTANEOUS at 09:17

## 2017-07-15 RX ADMIN — MORPHINE SULFATE 1 MILLIGRAM(S): 50 CAPSULE, EXTENDED RELEASE ORAL at 06:40

## 2017-07-15 RX ADMIN — MORPHINE SULFATE 2 MILLIGRAM(S): 50 CAPSULE, EXTENDED RELEASE ORAL at 09:29

## 2017-07-15 NOTE — PROGRESS NOTE ADULT - ATTENDING COMMENTS
exacerbation of CVS due to pregnancy. ddx includes hyperemesis gravidarum (doubt) and ectopic (doubt)  will ask gyn if this pregnancy can be terminated as inpatient per pt request Pt seen and examined at bedside, agree with HS's note, edited as appropriate, this is a 22yo pregnant female a/w exacerbation of CVS due to pregnancy. ddx includes hyperemesis gravidarum, pt now improved, d/c morphine, tylenol as needed for pain, pt opts for ETOP, advised to f/u with gyn for termination as outpt, d/c planning for tomorrow if remains stable, plan discussed with HS.

## 2017-07-15 NOTE — PROGRESS NOTE ADULT - PROBLEM SELECTOR PLAN 2
Pt told she was pregnant last week on last visit to hospital. Unwanted pregnancy, supposed to be terminated outpatient but unable to make her appointment 2/2 hospitalization. Pregnancy likely contributing to underlying N/V syndrome. Gyn consulted but unable to do inpatient elective termination.

## 2017-07-15 NOTE — PROGRESS NOTE ADULT - ASSESSMENT
20 yo F  9 weeks pregnant w/ PMH GERD and possibly cyclical vomiting syndrome p/w N/V x2 days. Pt has had multiple episodes of this in the past, the first at 6 yo. Pt's pregnancy may be contributing to symptoms.

## 2017-07-15 NOTE — PROGRESS NOTE ADULT - SUBJECTIVE AND OBJECTIVE BOX
Patient is a 21y old  Female who presents with a chief complaint of Nausea/Vomiting (14 Jul 2017 17:25)     INTERVAL HPI/OVERNIGHT EVENTS:  Pt denies any N/V overnight but continues to complain of abdominal pain.    MEDICATIONS  (STANDING):  sodium chloride 0.9%. 1000 milliLiter(s) (100 mL/Hr) IV Continuous <Continuous>  pantoprazole  Injectable 40 milliGRAM(s) IV Push daily    MEDICATIONS  (PRN):  ondansetron Injectable 4 milliGRAM(s) IV Push every 6 hours PRN Nausea and/or Vomiting  acetaminophen   Tablet. 650 milliGRAM(s) Oral every 6 hours PRN Mild Pain (1 - 3)  morphine  - Injectable 2 milliGRAM(s) IV Push every 6 hours PRN Severe Pain (7 - 10)  morphine  - Injectable 1 milliGRAM(s) IV Push every 6 hours PRN Moderate Pain (4 - 6)    Allergies:  No Known Allergies    REVIEW OF SYSTEMS:  Constitutional: Denies fever, fatigue  Resp: Denies SOB, cough  CV: Denies chest pain, palpitations, dizziness  GI: +abdominal pain; denies N/V/D/C  : Denies dysuria  Neuro: Denies HA, weakness  MSK: Denies joint pain, swelling    Vital Signs Last 24 Hrs  T(C): 36.8 (15 Jul 2017 06:18), Max: 37.2 (14 Jul 2017 22:06)  T(F): 98.2 (15 Jul 2017 06:18), Max: 99 (14 Jul 2017 22:06)  HR: 67 (15 Jul 2017 06:18) (67 - 85)  BP: 114/78 (15 Jul 2017 06:18) (113/73 - 122/81)  BP(mean): --  RR: 17 (15 Jul 2017 06:18) (17 - 18)  SpO2: 100% (15 Jul 2017 06:18) (100% - 100%)    PHYSICAL EXAM:  General: NAD, well-groomed, well-developed  Nervous System: AAOX3  Psych: Appropriate affect and mood  Chest: Clear to auscultation bilaterally; no rales, rhonchi, or wheezing  Heart: Regular rate and rhythm; no murmurs, rubs, or gallops  Abd: +BS, soft, moderately tender to deep palpation over the suprapubic middle upper abdomen, nondistended  Ext:  no LE edema    LABS:                        9.7    9.42  )-----------( 223      ( 15 Jul 2017 06:00 )             29.3   Ca    7.8        14 Jul 2017 05:57 Patient is a 21y old  Female who presents with a chief complaint of Nausea/Vomiting (14 Jul 2017 17:25)     INTERVAL HPI/OVERNIGHT EVENTS:  Pt denies any N/V overnight but continues to complain of abdominal pain. pt now states that her abdominal pain is much better, tolerating diet, no more nausea or vomiting    MEDICATIONS  (STANDING):  sodium chloride 0.9%. 1000 milliLiter(s) (100 mL/Hr) IV Continuous <Continuous>  pantoprazole  Injectable 40 milliGRAM(s) IV Push daily    MEDICATIONS  (PRN):  ondansetron Injectable 4 milliGRAM(s) IV Push every 6 hours PRN Nausea and/or Vomiting  acetaminophen   Tablet. 650 milliGRAM(s) Oral every 6 hours PRN Mild Pain (1 - 3)  morphine  - Injectable 2 milliGRAM(s) IV Push every 6 hours PRN Severe Pain (7 - 10)  morphine  - Injectable 1 milliGRAM(s) IV Push every 6 hours PRN Moderate Pain (4 - 6)    Allergies:  No Known Allergies    REVIEW OF SYSTEMS:  Constitutional: Denies fever, fatigue  Resp: Denies SOB, cough  CV: Denies chest pain, palpitations, dizziness  GI: +abdominal pain; denies N/V/D/C  : Denies dysuria  Neuro: Denies HA, weakness  MSK: Denies joint pain, swelling    Vital Signs Last 24 Hrs  T(C): 36.8 (15 Jul 2017 06:18), Max: 37.2 (14 Jul 2017 22:06)  T(F): 98.2 (15 Jul 2017 06:18), Max: 99 (14 Jul 2017 22:06)  HR: 67 (15 Jul 2017 06:18) (67 - 85)  BP: 114/78 (15 Jul 2017 06:18) (113/73 - 122/81)  BP(mean): --  RR: 17 (15 Jul 2017 06:18) (17 - 18)  SpO2: 100% (15 Jul 2017 06:18) (100% - 100%)    PHYSICAL EXAM:  General: NAD, well-groomed, well-developed  Nervous System: AAOX3  Psych: Appropriate affect and mood  Chest: Clear to auscultation bilaterally; no rales, rhonchi, or wheezing  Heart: Regular rate and rhythm; no murmurs, rubs, or gallops  Abd: +BS, soft, nontender, nondistended  Ext:  no LE edema    LABS:                        9.7    9.42  )-----------( 223      ( 15 Jul 2017 06:00 )             29.3   Ca    7.8        14 Jul 2017 05:57

## 2017-07-15 NOTE — PROGRESS NOTE ADULT - PROBLEM SELECTOR PLAN 1
Pt has extensive h/o N/V episodes bringing her to the hospital. Early pregnancy may be contributing. Pt's symptoms improved on meds this AM. Denies N/V currently. Able to tolerate food yesterday but in pain this AM.  - PO trial today  - Decrease morphine to 1mg IV moderate pain and 2mg for severe pain  - C/w zofran and protonix

## 2017-07-16 VITALS
DIASTOLIC BLOOD PRESSURE: 77 MMHG | HEART RATE: 69 BPM | OXYGEN SATURATION: 100 % | SYSTOLIC BLOOD PRESSURE: 121 MMHG | RESPIRATION RATE: 18 BRPM | TEMPERATURE: 99 F

## 2017-07-16 LAB
BUN SERPL-MCNC: 5 MG/DL — LOW (ref 7–23)
CALCIUM SERPL-MCNC: 8.5 MG/DL — SIGNIFICANT CHANGE UP (ref 8.4–10.5)
CHLORIDE SERPL-SCNC: 104 MMOL/L — SIGNIFICANT CHANGE UP (ref 98–107)
CO2 SERPL-SCNC: 20 MMOL/L — LOW (ref 22–31)
CREAT SERPL-MCNC: 0.54 MG/DL — SIGNIFICANT CHANGE UP (ref 0.5–1.3)
GLUCOSE SERPL-MCNC: 76 MG/DL — SIGNIFICANT CHANGE UP (ref 70–99)
HCT VFR BLD CALC: 29.6 % — LOW (ref 34.5–45)
HGB BLD-MCNC: 9.8 G/DL — LOW (ref 11.5–15.5)
MCHC RBC-ENTMCNC: 26.3 PG — LOW (ref 27–34)
MCHC RBC-ENTMCNC: 33.1 % — SIGNIFICANT CHANGE UP (ref 32–36)
MCV RBC AUTO: 79.4 FL — LOW (ref 80–100)
NRBC # FLD: 0 — SIGNIFICANT CHANGE UP
PLATELET # BLD AUTO: 246 K/UL — SIGNIFICANT CHANGE UP (ref 150–400)
PMV BLD: 9.7 FL — SIGNIFICANT CHANGE UP (ref 7–13)
POTASSIUM SERPL-MCNC: 3.6 MMOL/L — SIGNIFICANT CHANGE UP (ref 3.5–5.3)
POTASSIUM SERPL-SCNC: 3.6 MMOL/L — SIGNIFICANT CHANGE UP (ref 3.5–5.3)
RBC # BLD: 3.73 M/UL — LOW (ref 3.8–5.2)
RBC # FLD: 15.9 % — HIGH (ref 10.3–14.5)
SODIUM SERPL-SCNC: 137 MMOL/L — SIGNIFICANT CHANGE UP (ref 135–145)
WBC # BLD: 8.47 K/UL — SIGNIFICANT CHANGE UP (ref 3.8–10.5)
WBC # FLD AUTO: 8.47 K/UL — SIGNIFICANT CHANGE UP (ref 3.8–10.5)

## 2017-07-16 PROCEDURE — 99239 HOSP IP/OBS DSCHRG MGMT >30: CPT

## 2017-07-16 RX ORDER — PANTOPRAZOLE SODIUM 20 MG/1
1 TABLET, DELAYED RELEASE ORAL
Qty: 30 | Refills: 0 | OUTPATIENT
Start: 2017-07-16 | End: 2017-08-15

## 2017-07-16 RX ORDER — ONDANSETRON 8 MG/1
1 TABLET, FILM COATED ORAL
Qty: 28 | Refills: 0 | OUTPATIENT
Start: 2017-07-16 | End: 2017-07-23

## 2017-07-16 RX ORDER — POLYETHYLENE GLYCOL 3350 17 G/17G
17 POWDER, FOR SOLUTION ORAL DAILY
Qty: 0 | Refills: 0 | Status: DISCONTINUED | OUTPATIENT
Start: 2017-07-16 | End: 2017-07-16

## 2017-07-16 RX ORDER — DOCUSATE SODIUM 100 MG
100 CAPSULE ORAL THREE TIMES A DAY
Qty: 0 | Refills: 0 | Status: DISCONTINUED | OUTPATIENT
Start: 2017-07-16 | End: 2017-07-16

## 2017-07-16 RX ADMIN — Medication 100 MILLIGRAM(S): at 09:23

## 2017-07-16 RX ADMIN — ONDANSETRON 8 MILLIGRAM(S): 8 TABLET, FILM COATED ORAL at 09:23

## 2017-07-16 RX ADMIN — POLYETHYLENE GLYCOL 3350 17 GRAM(S): 17 POWDER, FOR SOLUTION ORAL at 09:24

## 2017-07-16 RX ADMIN — Medication 650 MILLIGRAM(S): at 00:47

## 2017-07-16 RX ADMIN — SODIUM CHLORIDE 100 MILLILITER(S): 9 INJECTION INTRAMUSCULAR; INTRAVENOUS; SUBCUTANEOUS at 09:23

## 2017-07-16 RX ADMIN — Medication 650 MILLIGRAM(S): at 01:15

## 2017-07-16 RX ADMIN — PANTOPRAZOLE SODIUM 40 MILLIGRAM(S): 20 TABLET, DELAYED RELEASE ORAL at 06:42

## 2017-07-16 NOTE — PROGRESS NOTE ADULT - PROBLEM SELECTOR PLAN 2
Pt told she was pregnant last week on last visit to hospital. Unwanted pregnancy, supposed to be terminated outpatient but unable to make her appointment 2/2 hospitalization. Pregnancy likely contributing to underlying N/V syndrome. Gyn consulted but unable to do inpatient elective termination.  - Pt still would like to proceed with ETOP, will f/u with either Dr. Gale as outpatient or [facility]

## 2017-07-16 NOTE — PROGRESS NOTE ADULT - ASSESSMENT
20 yo F  9 weeks pregnant w/ PMH GERD p/w nausea, vomiting and abdominal pain likely 2/2 hyperemesis gravidum.

## 2017-07-16 NOTE — PROGRESS NOTE ADULT - SUBJECTIVE AND OBJECTIVE BOX
Patient is a 21y old  Female who presents with a chief complaint of Nausea/Vomiting (14 Jul 2017 17:25)      INTERVAL HPI/OVERNIGHT EVENTS: Pt feeling well with no nausea or vomiting but mild lower abdominal pain.    MEDICATIONS (STANDING):  sodium chloride 0.9%. 1000 milliLiter(s) IV Continuous <Continuous>  pantoprazole    Tablet 40 milliGRAM(s) Oral before breakfast  polyethylene glycol 3350 17 Gram(s) Oral daily  docusate sodium 100 milliGRAM(s) Oral three times a day    MEDICATIONS  (PRN):  acetaminophen   Tablet. 650 milliGRAM(s) Oral every 6 hours PRN  ondansetron    Tablet 8 milliGRAM(s) Oral every 8 hours PRN      REVIEW OF SYSTEMS:  CONSTITUTIONAL: No fever  RESPIRATORY: No cough, No shortness of breath  CARDIOVASCULAR: No chest pain  GASTROINTESTINAL: Lower abdominal pain, no nausea or vomiting    T(F): 99 (07-16-17 @ 05:55), Max: 99.1 (07-15-17 @ 22:13)  HR: 69 (07-16-17 @ 05:55) (69 - 84)  BP: 121/77 (07-16-17 @ 05:55) (107/63 - 121/77)  RR: 18 (07-16-17 @ 05:55) (17 - 18)  SpO2: 100% (07-16-17 @ 05:55) (100% - 100%)  Wt(kg): --  CAPILLARY BLOOD GLUCOSE        I&O's Summary    15 Jul 2017 07:01  -  16 Jul 2017 07:00  --------------------------------------------------------  IN: 1200 mL / OUT: 0 mL / NET: 1200 mL        PHYSICAL EXAM:  GENERAL: NAD, well-groomed, well-developed  HEAD:  Atraumatic, Normocephalic  EYES: EOMI, PERRLA, conjunctiva and sclera clear  ENMT: Moist mucous membranes, Good dentition  NECK: Supple, No JVD  NERVOUS SYSTEM:  Alert & Oriented X3, Good concentration; Motor Strength 5/5 B/L upper and lower extremities  CHEST/LUNG: Clear to percussion bilaterally; No rales, rhonchi, wheezing, or rubs  HEART: Regular rate and rhythm; No murmurs, rubs, or gallops  ABDOMEN: Soft, Nontender, Nondistended; Bowel sounds present  EXTREMITIES:  2+ Peripheral Pulses, No clubbing, cyanosis, or edema  SKIN: No rashes or lesions    LABS:                        9.8    8.47  )-----------( 246      ( 16 Jul 2017 06:30 )             29.6     07-16    137  |  104  |  5<L>  ----------------------------<  76  3.6   |  20<L>  |  0.54    Ca    8.5      16 Jul 2017 06:30              RADIOLOGY & ADDITIONAL TESTS:    XRay:    CTScan:    MRI:     Imaging Personally Reviewed:  [ ] YES  [ ] NO    Consultant(s) Notes Reviewed:  [X] YES  [ ] NO    Care Discussed with Consultants/Other Providers [ ] YES  [ ] NO Patient is a 21y old  Female who presents with a chief complaint of Nausea/Vomiting (14 Jul 2017 17:25)      INTERVAL HPI/OVERNIGHT EVENTS: No overnight events, Pt feeling well with no nausea or vomiting or abdominal pain.     MEDICATIONS (STANDING):  sodium chloride 0.9%. 1000 milliLiter(s) IV Continuous <Continuous>  pantoprazole    Tablet 40 milliGRAM(s) Oral before breakfast  polyethylene glycol 3350 17 Gram(s) Oral daily  docusate sodium 100 milliGRAM(s) Oral three times a day    MEDICATIONS  (PRN):  acetaminophen   Tablet. 650 milliGRAM(s) Oral every 6 hours PRN  ondansetron    Tablet 8 milliGRAM(s) Oral every 8 hours PRN      REVIEW OF SYSTEMS:  CONSTITUTIONAL: No fever  RESPIRATORY: No cough, No shortness of breath  CARDIOVASCULAR: No chest pain  GASTROINTESTINAL: Lower abdominal pain, no nausea or vomiting    T(F): 99 (07-16-17 @ 05:55), Max: 99.1 (07-15-17 @ 22:13)  HR: 69 (07-16-17 @ 05:55) (69 - 84)  BP: 121/77 (07-16-17 @ 05:55) (107/63 - 121/77)  RR: 18 (07-16-17 @ 05:55) (17 - 18)  SpO2: 100% (07-16-17 @ 05:55) (100% - 100%)  Wt(kg): --  CAPILLARY BLOOD GLUCOSE        I&O's Summary    15 Jul 2017 07:01  -  16 Jul 2017 07:00  --------------------------------------------------------  IN: 1200 mL / OUT: 0 mL / NET: 1200 mL        PHYSICAL EXAM:  GENERAL: NAD, well-groomed, well-developed  HEAD:  Atraumatic, Normocephalic  EYES: EOMI, PERRLA, conjunctiva and sclera clear  ENMT: Moist mucous membranes, Good dentition  NECK: Supple, No JVD  NERVOUS SYSTEM:  Alert & Oriented X3, Good concentration; Motor Strength 5/5 B/L upper and lower extremities  CHEST/LUNG: Clear to percussion bilaterally; No rales, rhonchi, wheezing, or rubs  HEART: Regular rate and rhythm; No murmurs, rubs, or gallops  ABDOMEN: Soft, Nontender, Nondistended; Bowel sounds present  EXTREMITIES:  2+ Peripheral Pulses, No clubbing, cyanosis, or edema  SKIN: No rashes or lesions    LABS:                        9.8    8.47  )-----------( 246      ( 16 Jul 2017 06:30 )             29.6     07-16    137  |  104  |  5<L>  ----------------------------<  76  3.6   |  20<L>  |  0.54    Ca    8.5      16 Jul 2017 06:30              RADIOLOGY & ADDITIONAL TESTS:    XRay:    CTScan:    MRI:     Imaging Personally Reviewed:  [ ] YES  [ ] NO    Consultant(s) Notes Reviewed:  [X] YES  [ ] NO    Care Discussed with Consultants/Other Providers [ ] YES  [ ] NO

## 2017-07-16 NOTE — PROGRESS NOTE ADULT - PROBLEM SELECTOR PLAN 1
Pt has extensive h/o N/V episodes bringing her to the hospital. Early pregnancy may be contributing. Pt's symptoms improved. Denies N/V currently. Able to tolerate diet.  - Has not required any breakthrough pain medications  - C/w zofran and protonix

## 2017-07-16 NOTE — PROGRESS NOTE ADULT - ATTENDING COMMENTS
Pt seen and examined at bedside, agree with HS's note, edited as appropriate, this is a 22yo pregnant female a/w exacerbation of CVS due to pregnancy. ddx includes hyperemesis gravidarum, pt now improved, pt opts for ETOP, advised to f/u with gyn for termination as outpt, d/c today, d/c planning time spent in coordination 40mts, plan discussed with HS.

## 2017-07-21 ENCOUNTER — APPOINTMENT (OUTPATIENT)
Dept: OBGYN | Facility: CLINIC | Age: 22
End: 2017-07-21

## 2017-07-25 ENCOUNTER — APPOINTMENT (OUTPATIENT)
Dept: OBGYN | Facility: CLINIC | Age: 22
End: 2017-07-25

## 2017-09-30 ENCOUNTER — INPATIENT (INPATIENT)
Facility: HOSPITAL | Age: 22
LOS: 4 days | Discharge: HOME | End: 2017-10-05
Attending: HOSPITALIST | Admitting: HOSPITALIST
Payer: COMMERCIAL

## 2017-09-30 VITALS
HEART RATE: 97 BPM | TEMPERATURE: 98 F | OXYGEN SATURATION: 100 % | SYSTOLIC BLOOD PRESSURE: 137 MMHG | DIASTOLIC BLOOD PRESSURE: 102 MMHG | RESPIRATION RATE: 18 BRPM

## 2017-09-30 DIAGNOSIS — G43.A0 CYCLICAL VOMITING, IN MIGRAINE, NOT INTRACTABLE: ICD-10-CM

## 2017-09-30 DIAGNOSIS — K21.9 GASTRO-ESOPHAGEAL REFLUX DISEASE WITHOUT ESOPHAGITIS: ICD-10-CM

## 2017-09-30 LAB
ALBUMIN SERPL ELPH-MCNC: 4.7 G/DL — SIGNIFICANT CHANGE UP (ref 3.3–5)
ALP SERPL-CCNC: 64 U/L — SIGNIFICANT CHANGE UP (ref 40–120)
ALT FLD-CCNC: 19 U/L — SIGNIFICANT CHANGE UP (ref 4–33)
AMPHET UR-MCNC: NEGATIVE — SIGNIFICANT CHANGE UP
APPEARANCE UR: SIGNIFICANT CHANGE UP
AST SERPL-CCNC: 26 U/L — SIGNIFICANT CHANGE UP (ref 4–32)
BARBITURATES UR SCN-MCNC: NEGATIVE — SIGNIFICANT CHANGE UP
BASOPHILS # BLD AUTO: 0.02 K/UL — SIGNIFICANT CHANGE UP (ref 0–0.2)
BASOPHILS NFR BLD AUTO: 0.2 % — SIGNIFICANT CHANGE UP (ref 0–2)
BENZODIAZ UR-MCNC: NEGATIVE — SIGNIFICANT CHANGE UP
BILIRUB SERPL-MCNC: 0.6 MG/DL — SIGNIFICANT CHANGE UP (ref 0.2–1.2)
BILIRUB UR-MCNC: NEGATIVE — SIGNIFICANT CHANGE UP
BLOOD UR QL VISUAL: HIGH
BUN SERPL-MCNC: 19 MG/DL — SIGNIFICANT CHANGE UP (ref 7–23)
CALCIUM SERPL-MCNC: 10.1 MG/DL — SIGNIFICANT CHANGE UP (ref 8.4–10.5)
CANNABINOIDS UR-MCNC: POSITIVE — SIGNIFICANT CHANGE UP
CHLORIDE SERPL-SCNC: 105 MMOL/L — SIGNIFICANT CHANGE UP (ref 98–107)
CO2 SERPL-SCNC: 18 MMOL/L — LOW (ref 22–31)
COCAINE METAB.OTHER UR-MCNC: NEGATIVE — SIGNIFICANT CHANGE UP
COLOR SPEC: YELLOW — SIGNIFICANT CHANGE UP
CREAT SERPL-MCNC: 1.09 MG/DL — SIGNIFICANT CHANGE UP (ref 0.5–1.3)
EOSINOPHIL # BLD AUTO: 0.06 K/UL — SIGNIFICANT CHANGE UP (ref 0–0.5)
EOSINOPHIL NFR BLD AUTO: 0.5 % — SIGNIFICANT CHANGE UP (ref 0–6)
GLUCOSE SERPL-MCNC: 125 MG/DL — HIGH (ref 70–99)
GLUCOSE UR-MCNC: NEGATIVE — SIGNIFICANT CHANGE UP
HCG SERPL-ACNC: < 5 MIU/ML — SIGNIFICANT CHANGE UP
HCG UR-SCNC: NEGATIVE — SIGNIFICANT CHANGE UP
HCT VFR BLD CALC: 34.3 % — LOW (ref 34.5–45)
HGB BLD-MCNC: 11 G/DL — LOW (ref 11.5–15.5)
IMM GRANULOCYTES # BLD AUTO: 0.06 # — SIGNIFICANT CHANGE UP
IMM GRANULOCYTES NFR BLD AUTO: 0.5 % — SIGNIFICANT CHANGE UP (ref 0–1.5)
KETONES UR-MCNC: SIGNIFICANT CHANGE UP
LEUKOCYTE ESTERASE UR-ACNC: SIGNIFICANT CHANGE UP
LIDOCAIN IGE QN: 40.5 U/L — SIGNIFICANT CHANGE UP (ref 7–60)
LYMPHOCYTES # BLD AUTO: 2.57 K/UL — SIGNIFICANT CHANGE UP (ref 1–3.3)
LYMPHOCYTES # BLD AUTO: 20.2 % — SIGNIFICANT CHANGE UP (ref 13–44)
MCHC RBC-ENTMCNC: 25.8 PG — LOW (ref 27–34)
MCHC RBC-ENTMCNC: 32.1 % — SIGNIFICANT CHANGE UP (ref 32–36)
MCV RBC AUTO: 80.5 FL — SIGNIFICANT CHANGE UP (ref 80–100)
METHADONE UR-MCNC: NEGATIVE — SIGNIFICANT CHANGE UP
MONOCYTES # BLD AUTO: 0.83 K/UL — SIGNIFICANT CHANGE UP (ref 0–0.9)
MONOCYTES NFR BLD AUTO: 6.5 % — SIGNIFICANT CHANGE UP (ref 2–14)
MUCOUS THREADS # UR AUTO: SIGNIFICANT CHANGE UP
NEUTROPHILS # BLD AUTO: 9.16 K/UL — HIGH (ref 1.8–7.4)
NEUTROPHILS NFR BLD AUTO: 72.1 % — SIGNIFICANT CHANGE UP (ref 43–77)
NITRITE UR-MCNC: NEGATIVE — SIGNIFICANT CHANGE UP
NRBC # FLD: 0 — SIGNIFICANT CHANGE UP
OPIATES UR-MCNC: POSITIVE — SIGNIFICANT CHANGE UP
OXYCODONE UR-MCNC: NEGATIVE — SIGNIFICANT CHANGE UP
PCP UR-MCNC: NEGATIVE — SIGNIFICANT CHANGE UP
PH UR: 7 — SIGNIFICANT CHANGE UP (ref 4.6–8)
PLATELET # BLD AUTO: 279 K/UL — SIGNIFICANT CHANGE UP (ref 150–400)
PMV BLD: 10.3 FL — SIGNIFICANT CHANGE UP (ref 7–13)
POTASSIUM SERPL-MCNC: 4.1 MMOL/L — SIGNIFICANT CHANGE UP (ref 3.5–5.3)
POTASSIUM SERPL-SCNC: 4.1 MMOL/L — SIGNIFICANT CHANGE UP (ref 3.5–5.3)
PROT SERPL-MCNC: 7.7 G/DL — SIGNIFICANT CHANGE UP (ref 6–8.3)
PROT UR-MCNC: 30 — HIGH
RBC # BLD: 4.26 M/UL — SIGNIFICANT CHANGE UP (ref 3.8–5.2)
RBC # FLD: 16 % — HIGH (ref 10.3–14.5)
RBC CASTS # UR COMP ASSIST: SIGNIFICANT CHANGE UP (ref 0–?)
SODIUM SERPL-SCNC: 144 MMOL/L — SIGNIFICANT CHANGE UP (ref 135–145)
SP GR SPEC: 1.03 — SIGNIFICANT CHANGE UP (ref 1–1.03)
SP GR UR: 1.02 — SIGNIFICANT CHANGE UP (ref 1–1.03)
SQUAMOUS # UR AUTO: SIGNIFICANT CHANGE UP
UROBILINOGEN FLD QL: NORMAL E.U. — SIGNIFICANT CHANGE UP (ref 0.1–0.2)
WBC # BLD: 12.7 K/UL — HIGH (ref 3.8–10.5)
WBC # FLD AUTO: 12.7 K/UL — HIGH (ref 3.8–10.5)
WBC UR QL: HIGH (ref 0–?)

## 2017-09-30 PROCEDURE — 99223 1ST HOSP IP/OBS HIGH 75: CPT

## 2017-09-30 PROCEDURE — 76705 ECHO EXAM OF ABDOMEN: CPT | Mod: 26

## 2017-09-30 RX ORDER — METOCLOPRAMIDE HCL 10 MG
10 TABLET ORAL ONCE
Qty: 0 | Refills: 0 | Status: COMPLETED | OUTPATIENT
Start: 2017-09-30 | End: 2017-09-30

## 2017-09-30 RX ORDER — INFLUENZA VIRUS VACCINE 15; 15; 15; 15 UG/.5ML; UG/.5ML; UG/.5ML; UG/.5ML
0.5 SUSPENSION INTRAMUSCULAR ONCE
Qty: 0 | Refills: 0 | Status: DISCONTINUED | OUTPATIENT
Start: 2017-09-30 | End: 2017-10-05

## 2017-09-30 RX ORDER — MORPHINE SULFATE 50 MG/1
4 CAPSULE, EXTENDED RELEASE ORAL ONCE
Qty: 0 | Refills: 0 | Status: DISCONTINUED | OUTPATIENT
Start: 2017-09-30 | End: 2017-09-30

## 2017-09-30 RX ORDER — ONDANSETRON 8 MG/1
8 TABLET, FILM COATED ORAL EVERY 8 HOURS
Qty: 0 | Refills: 0 | Status: DISCONTINUED | OUTPATIENT
Start: 2017-09-30 | End: 2017-10-02

## 2017-09-30 RX ORDER — SODIUM CHLORIDE 9 MG/ML
1000 INJECTION, SOLUTION INTRAVENOUS
Qty: 0 | Refills: 0 | Status: DISCONTINUED | OUTPATIENT
Start: 2017-09-30 | End: 2017-10-05

## 2017-09-30 RX ORDER — PANTOPRAZOLE SODIUM 20 MG/1
40 TABLET, DELAYED RELEASE ORAL DAILY
Qty: 0 | Refills: 0 | Status: DISCONTINUED | OUTPATIENT
Start: 2017-09-30 | End: 2017-10-04

## 2017-09-30 RX ORDER — SODIUM CHLORIDE 9 MG/ML
1000 INJECTION INTRAMUSCULAR; INTRAVENOUS; SUBCUTANEOUS ONCE
Qty: 0 | Refills: 0 | Status: COMPLETED | OUTPATIENT
Start: 2017-09-30 | End: 2017-09-30

## 2017-09-30 RX ORDER — HALOPERIDOL DECANOATE 100 MG/ML
2.5 INJECTION INTRAMUSCULAR ONCE
Qty: 0 | Refills: 0 | Status: COMPLETED | OUTPATIENT
Start: 2017-09-30 | End: 2017-09-30

## 2017-09-30 RX ORDER — MORPHINE SULFATE 50 MG/1
4 CAPSULE, EXTENDED RELEASE ORAL EVERY 6 HOURS
Qty: 0 | Refills: 0 | Status: DISCONTINUED | OUTPATIENT
Start: 2017-09-30 | End: 2017-10-02

## 2017-09-30 RX ORDER — FAMOTIDINE 10 MG/ML
20 INJECTION INTRAVENOUS ONCE
Qty: 0 | Refills: 0 | Status: COMPLETED | OUTPATIENT
Start: 2017-09-30 | End: 2017-09-30

## 2017-09-30 RX ORDER — ONDANSETRON 8 MG/1
4 TABLET, FILM COATED ORAL ONCE
Qty: 0 | Refills: 0 | Status: COMPLETED | OUTPATIENT
Start: 2017-09-30 | End: 2017-09-30

## 2017-09-30 RX ORDER — METOCLOPRAMIDE HCL 10 MG
10 TABLET ORAL ONCE
Qty: 0 | Refills: 0 | Status: DISCONTINUED | OUTPATIENT
Start: 2017-09-30 | End: 2017-09-30

## 2017-09-30 RX ADMIN — MORPHINE SULFATE 4 MILLIGRAM(S): 50 CAPSULE, EXTENDED RELEASE ORAL at 19:19

## 2017-09-30 RX ADMIN — Medication 10 MILLIGRAM(S): at 14:12

## 2017-09-30 RX ADMIN — FAMOTIDINE 20 MILLIGRAM(S): 10 INJECTION INTRAVENOUS at 05:14

## 2017-09-30 RX ADMIN — SODIUM CHLORIDE 1000 MILLILITER(S): 9 INJECTION INTRAMUSCULAR; INTRAVENOUS; SUBCUTANEOUS at 05:14

## 2017-09-30 RX ADMIN — ONDANSETRON 8 MILLIGRAM(S): 8 TABLET, FILM COATED ORAL at 16:05

## 2017-09-30 RX ADMIN — MORPHINE SULFATE 4 MILLIGRAM(S): 50 CAPSULE, EXTENDED RELEASE ORAL at 05:14

## 2017-09-30 RX ADMIN — MORPHINE SULFATE 4 MILLIGRAM(S): 50 CAPSULE, EXTENDED RELEASE ORAL at 06:45

## 2017-09-30 RX ADMIN — ONDANSETRON 4 MILLIGRAM(S): 8 TABLET, FILM COATED ORAL at 06:31

## 2017-09-30 RX ADMIN — MORPHINE SULFATE 4 MILLIGRAM(S): 50 CAPSULE, EXTENDED RELEASE ORAL at 14:22

## 2017-09-30 RX ADMIN — PANTOPRAZOLE SODIUM 40 MILLIGRAM(S): 20 TABLET, DELAYED RELEASE ORAL at 17:03

## 2017-09-30 RX ADMIN — SODIUM CHLORIDE 1000 MILLILITER(S): 9 INJECTION, SOLUTION INTRAVENOUS at 14:13

## 2017-09-30 RX ADMIN — MORPHINE SULFATE 4 MILLIGRAM(S): 50 CAPSULE, EXTENDED RELEASE ORAL at 05:35

## 2017-09-30 RX ADMIN — MORPHINE SULFATE 4 MILLIGRAM(S): 50 CAPSULE, EXTENDED RELEASE ORAL at 06:30

## 2017-09-30 RX ADMIN — HALOPERIDOL DECANOATE 2.5 MILLIGRAM(S): 100 INJECTION INTRAMUSCULAR at 09:11

## 2017-09-30 RX ADMIN — MORPHINE SULFATE 4 MILLIGRAM(S): 50 CAPSULE, EXTENDED RELEASE ORAL at 14:12

## 2017-09-30 RX ADMIN — SODIUM CHLORIDE 1000 MILLILITER(S): 9 INJECTION, SOLUTION INTRAVENOUS at 06:31

## 2017-09-30 RX ADMIN — Medication 10 MILLIGRAM(S): at 05:14

## 2017-09-30 RX ADMIN — MORPHINE SULFATE 4 MILLIGRAM(S): 50 CAPSULE, EXTENDED RELEASE ORAL at 19:30

## 2017-09-30 NOTE — H&P ADULT - PSYCHIATRIC COMMENTS
pt was lying comfortably when I went to the room but started to complain of nausea, vomiting, retching and severe abdominal pain and repeatedly asking for morphine

## 2017-09-30 NOTE — ED PROVIDER NOTE - MEDICAL DECISION MAKING DETAILS
22F with inability to tolerate PO and severe abdominal pain for the past 4 days. diffuse abdominal pain. low concern for appy. concern for gallstones. plan for cbc, cmp, pain control, zofran, reglan. will reassess, serial abdominal exams.

## 2017-09-30 NOTE — H&P ADULT - ASSESSMENT
22yof with h/o cyclic vomiting syndrome since age 5, Gerd, hospitalization in July for nausea, vomiting and abdominal pain found to be pregnant about 8-9 weeks at that time, underwent elective termination of pregnancy, ( unwanted pregnancy) came in with nausea, vomiting, abdominal pain and vaginal bleeding, to r/o pregnancy vs cyclic vomiting syndrome

## 2017-09-30 NOTE — H&P ADULT - PROBLEM SELECTOR PLAN 1
cont antiemetics, iv hydration, pain control, gyn consult, beta hcg, tox screen, monitor abdominal exam

## 2017-09-30 NOTE — CONSULT NOTE ADULT - PROBLEM SELECTOR RECOMMENDATION 9
- irregular vaginal bleeding is a normal side effect from Depo Provera  - Patient is s/p TOP 2 months ago [facility], unlikely to be related to current GI complaints  - F/U TVUS  - no further GYN interventions at this time    D/W Gyn attending Dr. Williams Alatorre PGY-2  [id number] - irregular vaginal bleeding is a normal side effect of Depo Provera  - Patient is s/p TOP 2 months ago with [facility], unlikely to be related to current GI complaints  - F/U TVUS  - U tox for r/u marijuana use as contributing to cyclic vomiting, positive U tox for marijuana and opiates in July  - no further GYN interventions at this time    D/W Gyn attending Dr. Williams Alatorre PGY-2  [id number] - irregular vaginal bleeding is a normal side effect of Depo Provera  - Patient is s/p TOP 2 months ago [facility], unlikely to be related to current GI complaints  - F/U TVUS  - U tox to r/o marijuana use as contributing to cyclic vomiting, positive U tox for marijuana and opiates in July  - no further GYN interventions at this time    D/W Gyn attending Dr. Williams Alatorre PGY-2  [id number]

## 2017-09-30 NOTE — CONSULT NOTE ADULT - SUBJECTIVE AND OBJECTIVE BOX
HPI:    22y , LMP unknown s/p top in early august and Depo post- admitted with nausea, vomiting, abdominal pain, and intermittent vaginal bleeding. History obtained from boyfriend and mother. Patient has a long history of vomiting and abdominal pain since the age of 10 that has been worked up by GI in the past with no definitive diagnosis. Patient was last admitted for nausea and vomiting in July when she was found to be pregnant and diagnosed with hyperemesis gravidarum. Patient was referred to [facility] and had a TOP at the beginning of august. Patient was given Depo at postabortion visit. Patient's mother reports that she had no bleeding for three weeks and then had intermittent vaginal bleeding since then. Patient had abdominal pain and intractable vomiting which prompted her to come in.      OBHx: topx1   GynHx: regular menses before top, s/p depo with intermittent vaginal bleeding.   PMH: cyclic vomiting  PSH: D&C  All:none  Meds: probiotics, zofran  SocialHx: positive cannabinoids and opiates on U tox in July    Vital Signs Last 24 Hrs  T(C): 36.2 (30 Sep 2017 13:05), Max: 37 (30 Sep 2017 10:57)  T(F): 97.2 (30 Sep 2017 13:05), Max: 98.6 (30 Sep 2017 10:57)  HR: 73 (30 Sep 2017 14:21) (64 - 97)  BP: 124/73 (30 Sep 2017 14:21) (101/64 - 147/91)  BP(mean): --  RR: 18 (30 Sep 2017 13:05) (17 - 18)  SpO2: 100% (30 Sep 2017 13:05) (100% - 100%)    PE:  Gen: Uncomfortable, mild distress. Patient started dry heaving at time of consult  Pelvic: patient refused pelvic exam.    LABS:                        11.0   12.70 )-----------( 279      ( 30 Sep 2017 05:10 )             34.3     -    144  |  105  |  19  ----------------------------<  125<H>  4.1   |  18<L>  |  1.09    Ca    10.1      30 Sep 2017 05:10    TPro  7.7  /  Alb  4.7  /  TBili  0.6  /  DBili  x   /  AST  26  /  ALT  19  /  AlkPhos  64  -      Urinalysis Basic - ( 30 Sep 2017 05:23 )    Color: YELLOW / Appearance: HAZY / S.026 / pH: 7.0  Gluc: NEGATIVE / Ketone: TRACE  / Bili: NEGATIVE / Urobili: NORMAL E.U.   Blood: MODERATE / Protein: 30 / Nitrite: NEGATIVE   Leuk Esterase: TRACE / RBC: 10-25 / WBC 5-10   Sq Epi: FEW / Non Sq Epi: x / Bacteria: x        RADIOLOGY & ADDITIONAL STUDIES:    EXAM:  US ABDOMEN LIMITED        PROCEDURE DATE:  Sep 30 2017         INTERPRETATION:  CLINICAL INFORMATION: Right upper quadrant pain.    COMPARISON: CT abdomen/pelvis 2017    TECHNIQUE: Sonography of the right upper quadrant.     FINDINGS:    Liver: Within normal limits.  Bile ducts: Normal caliber. Common hepatic duct measures 3 mm.   Gallbladder: Within normal limits. Sonographic Moreno's sign could not be   assessed secondary to recent administration of pain medication.     Pancreas: Visualized portions are within normal limits.  Right kidney: 10.2 cm. No hydronephrosis.  Ascites: None.  IVC: Visualized portions are within normal limits.    IMPRESSION:     Normal right upper quadrant abdominal ultrasound.      TVUS: pending

## 2017-09-30 NOTE — CHART NOTE - NSCHARTNOTEFT_GEN_A_CORE
Pt was hospitalized for similar symptoms of abd pain, N, V in 7/2017 & found to be pregnant. Per GYN consult note, estimated gestation was about 8-9 weeks.  Pt reports that she had termination in mid-late 7/2017 at [facility] (unable to recall exact date).  She is unable to specify LMP, denies sexual activity since termiation & reports persistent vaginal bleeding since beginning of September. She has not followed up w/ her outpt OB/GYN.  Will obtain urine/serum hCG for now & defer GYN eval to primary admitting team. [facility], Dr. Gordon notified.       DAVID C[place] Pt was hospitalized for similar symptoms of abd pain, N, V in 7/2017 & found to be pregnant. Per GYN consult note, estimated gestation was about 8-9 weeks.  Pt reports that she had termination in mid-late 7/2017 at [facility] (unable to recall exact date).  She is unable to specify LMP, denies sexual activity since termiation & reports persistent vaginal bleeding since beginning of September. She has not followed up w/ her outpt OB/GYN.  Will obtain urine/serum hCG for now & defer GYN eval to primary admitting team. [facility], Dr. Gordon notified.       [place]  [id number]

## 2017-09-30 NOTE — ED ADULT NURSE REASSESSMENT NOTE - NS ED NURSE REASSESS COMMENT FT1
pt. asleep but arousable, appears in NAD, states pain is better. awaits US. will continue to monitor

## 2017-09-30 NOTE — ED PROVIDER NOTE - ATTENDING CONTRIBUTION TO CARE
22F p/w intractable vomiting and abdominal pain x 4 days, unable to ruthie PO today.  Pt having difficulty speaking or providing hx due to pain and constant vomiting, hx obtained from MO, who reports she's had this since age 5, most recent attack coincided with a recent pregnancy which was terminated due to ?ectopic.  VS:  unremarkable    GEN - mod distress abd pain and vomiting loudly; A+O x3   HEAD - NC/AT     ENT - PEERL, EOMI, mucous membranes dry , no discharge      NECK: Neck supple, non-tender without lymphadenopathy, no masses, no JVD  PULM - CTA b/l,  symmetric breath sounds  COR -  normal heart sounds    ABD - , ND, mild diffuse ttp, soft, no guarding, no rebound, no masses    BACK - no CVA tenderness, nontender spine     EXTREMS - no edema, no deformity, warm and well perfused    SKIN - no rash or bruising      NEUROLOGIC - alert, CN 2-12 intact, sensation nl, motor 5/5 RUE/LUE/RLE/LLE.      IMP:  22F p/w cyclic vomiting.  Rx pain meds and nausea meds, fluids.  Check labs, UCG, would recheck RUQ US eval for kristy.  Reassess - likely admission.

## 2017-09-30 NOTE — ED ADULT NURSE NOTE - OBJECTIVE STATEMENT
pt. a&ox3, came in c/o diffuse abd'l pain and n/v x 2 days but has gotten worse today. pt. with hx of Gastritis. pt. denies any blood in vomit, denies any constipation nor diarrhea nor fever. pt. appears uncomfortable, meds given as ordered. pt. seen by MD. labs sent. awaits further eval.

## 2017-09-30 NOTE — H&P ADULT - HISTORY OF PRESENT ILLNESS
22yof with h/o cyclic vomiting syndrome since age 5, Gerd, hospitalization in July for nausea, vomiting and abdominal pain found to be pregnant about 8-9 weeks at that time, underwent elective termination of pregnancy, ( unwanted pregnancy) although she reports to me "that she was told to terminate because the baby was outside the uterus" at the end of July in [place] [facility] comes in with nausea, vomiting and abdominal pain since Thursday, she reports that she vomits multiple times, unable to tolerate any po, and the abdominal pain is intermittent and diffuse, 8/10 on a pain scale without any aggravating or relieving factors, only took probiotics and zofran for it which did not [place] her symptoms and hence came here.  She is unable to specify LMP, reports that she did not have menstrual period after the termination in August however started to have persistent vaginal bleeding since beginning of September, did not seek medical attention for it, denies sexual activity since termination. She has not followed up w/ her outpt OB/GYN. Pt denies any fever, chills, sob, cough, dysuria or any other complaints. During the interview pt repeatedly asks for morphine.    In the ED received multiple doses of morphine for pain and also received  reglan, zofran, haldol, pepcid, had abdominal sono which was neg, now admitted to medicine for further mangement.

## 2017-09-30 NOTE — ED PROVIDER NOTE - PHYSICAL EXAMINATION
General: uncomfortable appearing female  Respiratory: normal work of breathing, lungs clear to auscultation bilaterally   Cardiac: regular rate and rhythm   Abdomen: diffuse tenderness to mild palpation, voluntary guarding, no rebound   MSK: no swelling or tenderness of lower extremities   Neuro: A&Ox3

## 2017-09-30 NOTE — H&P ADULT - NSHPSOCIALHISTORY_GEN_ALL_CORE
single works as a , lives with mom, denies any tobacco or drug use however in the recent H&P done in July pt reported occasional marijuana use, drinks whiskey occasionally.

## 2017-09-30 NOTE — ED PROVIDER NOTE - CARE PLAN
Principal Discharge DX:	Cyclic vomiting syndrome, nausea presence unspecified, unspecified intactability

## 2017-09-30 NOTE — ED ADULT TRIAGE NOTE - CHIEF COMPLAINT QUOTE
Pt. c/o abdominal pain, nausea and vomiting x 2hrs. As per mom pain started on Thursday but got worst tonight.

## 2017-09-30 NOTE — ED PROVIDER NOTE - OBJECTIVE STATEMENT
22F, PMH of gerd presenting with increasing abdominal pain, nausea and vomiting for the past 4 days. Patient has been unable to tolerate PO since this afternoon. Reports diffuse abdominal pain, no chest pain, difficulty breathing, fever, dysuria.

## 2017-10-01 DIAGNOSIS — Z29.9 ENCOUNTER FOR PROPHYLACTIC MEASURES, UNSPECIFIED: ICD-10-CM

## 2017-10-01 LAB
BACTERIA UR CULT: SIGNIFICANT CHANGE UP
BUN SERPL-MCNC: 12 MG/DL — SIGNIFICANT CHANGE UP (ref 7–23)
CALCIUM SERPL-MCNC: 8.6 MG/DL — SIGNIFICANT CHANGE UP (ref 8.4–10.5)
CHLORIDE SERPL-SCNC: 107 MMOL/L — SIGNIFICANT CHANGE UP (ref 98–107)
CO2 SERPL-SCNC: 22 MMOL/L — SIGNIFICANT CHANGE UP (ref 22–31)
CREAT SERPL-MCNC: 0.89 MG/DL — SIGNIFICANT CHANGE UP (ref 0.5–1.3)
GLUCOSE SERPL-MCNC: 125 MG/DL — HIGH (ref 70–99)
HCT VFR BLD CALC: 33.6 % — LOW (ref 34.5–45)
HGB BLD-MCNC: 10.8 G/DL — LOW (ref 11.5–15.5)
MCHC RBC-ENTMCNC: 25.8 PG — LOW (ref 27–34)
MCHC RBC-ENTMCNC: 32.1 % — SIGNIFICANT CHANGE UP (ref 32–36)
MCV RBC AUTO: 80.2 FL — SIGNIFICANT CHANGE UP (ref 80–100)
NRBC # FLD: 0 — SIGNIFICANT CHANGE UP
PLATELET # BLD AUTO: 276 K/UL — SIGNIFICANT CHANGE UP (ref 150–400)
PMV BLD: 10.1 FL — SIGNIFICANT CHANGE UP (ref 7–13)
POTASSIUM SERPL-MCNC: 4.1 MMOL/L — SIGNIFICANT CHANGE UP (ref 3.5–5.3)
POTASSIUM SERPL-SCNC: 4.1 MMOL/L — SIGNIFICANT CHANGE UP (ref 3.5–5.3)
RBC # BLD: 4.19 M/UL — SIGNIFICANT CHANGE UP (ref 3.8–5.2)
RBC # FLD: 15.9 % — HIGH (ref 10.3–14.5)
SODIUM SERPL-SCNC: 144 MMOL/L — SIGNIFICANT CHANGE UP (ref 135–145)
SPECIMEN SOURCE: SIGNIFICANT CHANGE UP
WBC # BLD: 8.79 K/UL — SIGNIFICANT CHANGE UP (ref 3.8–10.5)
WBC # FLD AUTO: 8.79 K/UL — SIGNIFICANT CHANGE UP (ref 3.8–10.5)

## 2017-10-01 PROCEDURE — 99233 SBSQ HOSP IP/OBS HIGH 50: CPT

## 2017-10-01 RX ORDER — METOCLOPRAMIDE HCL 10 MG
5 TABLET ORAL ONCE
Qty: 0 | Refills: 0 | Status: COMPLETED | OUTPATIENT
Start: 2017-10-01 | End: 2017-10-01

## 2017-10-01 RX ORDER — MORPHINE SULFATE 50 MG/1
2 CAPSULE, EXTENDED RELEASE ORAL ONCE
Qty: 0 | Refills: 0 | Status: DISCONTINUED | OUTPATIENT
Start: 2017-10-01 | End: 2017-10-01

## 2017-10-01 RX ADMIN — MORPHINE SULFATE 4 MILLIGRAM(S): 50 CAPSULE, EXTENDED RELEASE ORAL at 16:22

## 2017-10-01 RX ADMIN — ONDANSETRON 8 MILLIGRAM(S): 8 TABLET, FILM COATED ORAL at 04:27

## 2017-10-01 RX ADMIN — Medication 5 MILLIGRAM(S): at 06:05

## 2017-10-01 RX ADMIN — PANTOPRAZOLE SODIUM 40 MILLIGRAM(S): 20 TABLET, DELAYED RELEASE ORAL at 13:51

## 2017-10-01 RX ADMIN — MORPHINE SULFATE 2 MILLIGRAM(S): 50 CAPSULE, EXTENDED RELEASE ORAL at 06:06

## 2017-10-01 RX ADMIN — MORPHINE SULFATE 4 MILLIGRAM(S): 50 CAPSULE, EXTENDED RELEASE ORAL at 01:36

## 2017-10-01 RX ADMIN — MORPHINE SULFATE 4 MILLIGRAM(S): 50 CAPSULE, EXTENDED RELEASE ORAL at 01:20

## 2017-10-01 RX ADMIN — ONDANSETRON 8 MILLIGRAM(S): 8 TABLET, FILM COATED ORAL at 22:17

## 2017-10-01 RX ADMIN — MORPHINE SULFATE 4 MILLIGRAM(S): 50 CAPSULE, EXTENDED RELEASE ORAL at 22:37

## 2017-10-01 RX ADMIN — ONDANSETRON 8 MILLIGRAM(S): 8 TABLET, FILM COATED ORAL at 13:51

## 2017-10-01 RX ADMIN — MORPHINE SULFATE 2 MILLIGRAM(S): 50 CAPSULE, EXTENDED RELEASE ORAL at 06:21

## 2017-10-01 RX ADMIN — MORPHINE SULFATE 4 MILLIGRAM(S): 50 CAPSULE, EXTENDED RELEASE ORAL at 22:22

## 2017-10-01 NOTE — PROGRESS NOTE ADULT - PROBLEM SELECTOR PLAN 1
- c/w IV antiemetics and IV analgesia for now  - urine pregnancy negative, appreciate gyn evaluation, recent TOP unlikely to be related to current complaints, TVUS pending, no further gyn interventions  - advance diet as tolerated  - Utox positive for cannabinoids (patient denies marijuana use...), discussed how marijuana use could contribute to symptoms of nausea/vomiting

## 2017-10-02 LAB
BUN SERPL-MCNC: 11 MG/DL — SIGNIFICANT CHANGE UP (ref 7–23)
CALCIUM SERPL-MCNC: 8.4 MG/DL — SIGNIFICANT CHANGE UP (ref 8.4–10.5)
CHLORIDE SERPL-SCNC: 108 MMOL/L — HIGH (ref 98–107)
CO2 SERPL-SCNC: 22 MMOL/L — SIGNIFICANT CHANGE UP (ref 22–31)
CREAT SERPL-MCNC: 0.83 MG/DL — SIGNIFICANT CHANGE UP (ref 0.5–1.3)
GLUCOSE SERPL-MCNC: 122 MG/DL — HIGH (ref 70–99)
HCT VFR BLD CALC: 33 % — LOW (ref 34.5–45)
HGB BLD-MCNC: 10.5 G/DL — LOW (ref 11.5–15.5)
MCHC RBC-ENTMCNC: 25.5 PG — LOW (ref 27–34)
MCHC RBC-ENTMCNC: 31.8 % — LOW (ref 32–36)
MCV RBC AUTO: 80.1 FL — SIGNIFICANT CHANGE UP (ref 80–100)
NRBC # FLD: 0 — SIGNIFICANT CHANGE UP
PLATELET # BLD AUTO: 252 K/UL — SIGNIFICANT CHANGE UP (ref 150–400)
PMV BLD: 9.6 FL — SIGNIFICANT CHANGE UP (ref 7–13)
POTASSIUM SERPL-MCNC: 3.7 MMOL/L — SIGNIFICANT CHANGE UP (ref 3.5–5.3)
POTASSIUM SERPL-SCNC: 3.7 MMOL/L — SIGNIFICANT CHANGE UP (ref 3.5–5.3)
RBC # BLD: 4.12 M/UL — SIGNIFICANT CHANGE UP (ref 3.8–5.2)
RBC # FLD: 15.7 % — HIGH (ref 10.3–14.5)
SODIUM SERPL-SCNC: 143 MMOL/L — SIGNIFICANT CHANGE UP (ref 135–145)
WBC # BLD: 7.06 K/UL — SIGNIFICANT CHANGE UP (ref 3.8–10.5)
WBC # FLD AUTO: 7.06 K/UL — SIGNIFICANT CHANGE UP (ref 3.8–10.5)

## 2017-10-02 PROCEDURE — 99233 SBSQ HOSP IP/OBS HIGH 50: CPT

## 2017-10-02 RX ORDER — MORPHINE SULFATE 50 MG/1
4 CAPSULE, EXTENDED RELEASE ORAL EVERY 4 HOURS
Qty: 0 | Refills: 0 | Status: DISCONTINUED | OUTPATIENT
Start: 2017-10-02 | End: 2017-10-03

## 2017-10-02 RX ORDER — MORPHINE SULFATE 50 MG/1
2 CAPSULE, EXTENDED RELEASE ORAL ONCE
Qty: 0 | Refills: 0 | Status: DISCONTINUED | OUTPATIENT
Start: 2017-10-02 | End: 2017-10-02

## 2017-10-02 RX ORDER — CALCIUM CARBONATE 500(1250)
1 TABLET ORAL EVERY 6 HOURS
Qty: 0 | Refills: 0 | Status: DISCONTINUED | OUTPATIENT
Start: 2017-10-02 | End: 2017-10-02

## 2017-10-02 RX ORDER — CALCIUM CARBONATE 500(1250)
1 TABLET ORAL THREE TIMES A DAY
Qty: 0 | Refills: 0 | Status: DISCONTINUED | OUTPATIENT
Start: 2017-10-02 | End: 2017-10-05

## 2017-10-02 RX ORDER — ONDANSETRON 8 MG/1
4 TABLET, FILM COATED ORAL EVERY 6 HOURS
Qty: 0 | Refills: 0 | Status: DISCONTINUED | OUTPATIENT
Start: 2017-10-02 | End: 2017-10-05

## 2017-10-02 RX ADMIN — MORPHINE SULFATE 4 MILLIGRAM(S): 50 CAPSULE, EXTENDED RELEASE ORAL at 21:31

## 2017-10-02 RX ADMIN — Medication 1 TABLET(S): at 00:46

## 2017-10-02 RX ADMIN — SODIUM CHLORIDE 75 MILLILITER(S): 9 INJECTION, SOLUTION INTRAVENOUS at 11:24

## 2017-10-02 RX ADMIN — Medication 1 TABLET(S): at 15:20

## 2017-10-02 RX ADMIN — MORPHINE SULFATE 4 MILLIGRAM(S): 50 CAPSULE, EXTENDED RELEASE ORAL at 17:50

## 2017-10-02 RX ADMIN — MORPHINE SULFATE 2 MILLIGRAM(S): 50 CAPSULE, EXTENDED RELEASE ORAL at 09:55

## 2017-10-02 RX ADMIN — MORPHINE SULFATE 4 MILLIGRAM(S): 50 CAPSULE, EXTENDED RELEASE ORAL at 11:23

## 2017-10-02 RX ADMIN — MORPHINE SULFATE 4 MILLIGRAM(S): 50 CAPSULE, EXTENDED RELEASE ORAL at 11:40

## 2017-10-02 RX ADMIN — MORPHINE SULFATE 2 MILLIGRAM(S): 50 CAPSULE, EXTENDED RELEASE ORAL at 09:51

## 2017-10-02 RX ADMIN — PANTOPRAZOLE SODIUM 40 MILLIGRAM(S): 20 TABLET, DELAYED RELEASE ORAL at 11:24

## 2017-10-02 RX ADMIN — MORPHINE SULFATE 4 MILLIGRAM(S): 50 CAPSULE, EXTENDED RELEASE ORAL at 21:16

## 2017-10-02 RX ADMIN — MORPHINE SULFATE 4 MILLIGRAM(S): 50 CAPSULE, EXTENDED RELEASE ORAL at 17:21

## 2017-10-02 RX ADMIN — MORPHINE SULFATE 4 MILLIGRAM(S): 50 CAPSULE, EXTENDED RELEASE ORAL at 04:26

## 2017-10-02 RX ADMIN — Medication 1 TABLET(S): at 21:02

## 2017-10-02 RX ADMIN — ONDANSETRON 4 MILLIGRAM(S): 8 TABLET, FILM COATED ORAL at 17:21

## 2017-10-02 RX ADMIN — ONDANSETRON 4 MILLIGRAM(S): 8 TABLET, FILM COATED ORAL at 23:33

## 2017-10-02 NOTE — PROGRESS NOTE ADULT - PROBLEM SELECTOR PLAN 1
- c/w IV antiemetics and IV analgesia for now- pt insisting on morphine IV- will switch to PO in a day or so.    - urine pregnancy negative, appreciate gyn evaluation, recent TOP unlikely to be related to current complaints, TVUS pending, no further gyn interventions  - advance diet as tolerated  - Utox positive for cannabinoids (patient denies marijuana use...), discussed how marijuana use could contribute to symptoms of nausea/vomiting  GI called

## 2017-10-02 NOTE — MEDICAL STUDENT ADULT H&P (EDUCATION) - NS MD HP STUD ASPLAN PLAN FT
(1) Vomiting, nausea  - zofran 8mg IV push q8; patient sates nausea is not well controlled  - consider addition of TCA for vomiting   - add high dose glucose to help with vomiting  - monitor abdominal exam.   - advance diet as tolerated  - dextrose 5% + NaCl 0.9% 1000mL infuse at 75 mL/hr  - concern about PUD because of reddish-brown vomit and epigastric pain, consider EGD to r/o and urea breath test/biopsy, NPO after midnight   - check TSH to r/o thyroid dysfunction as cause        (2) Pain control  - morphine 4 mg IV Push q6 hours PRN for moderate and severe pain  - advise patient of need to wean off the morphine because morphine can increase nausea  - patient not well controlled on morphine - consider additional pain control medication    (3) GERD (gastroesophageal reflux disease)  - continue pantoprazole (Protonix) 40 mg IV push daily  - calcium carbonate (tums) 500 mg as needed  - discharge with PO PPI after discharge    (4) Anemia  - likely secondary to iron deficiency from hematemesis  - order iron studies to confirm    (5) DVT ppx   - Patient is low risk and ambulating, so no need for DVT ppx (1) Vomiting, nausea  - zofran standing; patient sates nausea is not well controlled  - consider addition of TCA for vomiting   - add high dose glucose to help with vomiting  - monitor abdominal exam.   - advance diet as tolerated  - dextrose 5% + NaCl 0.9% 1000mL infuse at 75 mL/hr  - given chronicity of symptoms and past negative imaging (CTAP 2017) and EGD (2015), doubt other etiologies of symptoms and do not feel strongly that these procedures need to be repeated at this time.   - check TSH to r/o thyroid dysfunction as cause        (2) Pain control  - advise patient of need to wean off the morphine because morphine can increase nausea    (3) GERD (gastroesophageal reflux disease)  - continue pantoprazole (Protonix) 40 mg IV push daily  - calcium carbonate (tums) 500 mg as needed  - discharge with PO PPI after discharge    (4) Anemia  - order iron studies to confirm  - if iron deficient, consider outpt EGD/colo    (5) DVT ppx   - Patient is low risk and ambulating, so no need for DVT ppx

## 2017-10-02 NOTE — MEDICAL STUDENT ADULT H&P (EDUCATION) - NS MD HP STUD SOCIAL HX FT
No alcohol or cigarette use  Reports last use of marijuana in May  Has not been sexually active for 2 months  Alternates between living with boyfriend and parents  Works as a  in a [employer]

## 2017-10-02 NOTE — MEDICAL STUDENT ADULT H&P (EDUCATION) - NS MD HP STUD ASPLAN ASSES FT
This is a 21 yo women with pmhx of chronic vomiting since age 5 and GERD, presenting here for nausea, vomiting, abdominal pain since Saturday, likely secondary to acute gastroenteritis vs cyclic vomiting syndrome vs Cannabinoid hyperemesis syndrome vs PUD. This is a 21 yo women with pmhx of chronic vomiting since age 5 and GERD, presenting here for nausea, vomiting, abdominal pain since Saturday, likely secondary to acute gastroenteritis vs cyclic vomiting syndrome vs Cannabinoid hyperemesis syndrome vs PUD. EKG 2017 with normal QTC

## 2017-10-03 LAB
BASOPHILS # BLD AUTO: 0.01 K/UL — SIGNIFICANT CHANGE UP (ref 0–0.2)
BASOPHILS NFR BLD AUTO: 0.1 % — SIGNIFICANT CHANGE UP (ref 0–2)
BUN SERPL-MCNC: 7 MG/DL — SIGNIFICANT CHANGE UP (ref 7–23)
CALCIUM SERPL-MCNC: 8.6 MG/DL — SIGNIFICANT CHANGE UP (ref 8.4–10.5)
CHLORIDE SERPL-SCNC: 101 MMOL/L — SIGNIFICANT CHANGE UP (ref 98–107)
CO2 SERPL-SCNC: 23 MMOL/L — SIGNIFICANT CHANGE UP (ref 22–31)
CREAT SERPL-MCNC: 0.95 MG/DL — SIGNIFICANT CHANGE UP (ref 0.5–1.3)
EOSINOPHIL # BLD AUTO: 0.01 K/UL — SIGNIFICANT CHANGE UP (ref 0–0.5)
EOSINOPHIL NFR BLD AUTO: 0.1 % — SIGNIFICANT CHANGE UP (ref 0–6)
GLUCOSE SERPL-MCNC: 110 MG/DL — HIGH (ref 70–99)
HCT VFR BLD CALC: 35.7 % — SIGNIFICANT CHANGE UP (ref 34.5–45)
HGB BLD-MCNC: 11.5 G/DL — SIGNIFICANT CHANGE UP (ref 11.5–15.5)
IMM GRANULOCYTES # BLD AUTO: 0.02 # — SIGNIFICANT CHANGE UP
IMM GRANULOCYTES NFR BLD AUTO: 0.3 % — SIGNIFICANT CHANGE UP (ref 0–1.5)
LYMPHOCYTES # BLD AUTO: 2.07 K/UL — SIGNIFICANT CHANGE UP (ref 1–3.3)
LYMPHOCYTES # BLD AUTO: 30.4 % — SIGNIFICANT CHANGE UP (ref 13–44)
MAGNESIUM SERPL-MCNC: 2 MG/DL — SIGNIFICANT CHANGE UP (ref 1.6–2.6)
MCHC RBC-ENTMCNC: 25.8 PG — LOW (ref 27–34)
MCHC RBC-ENTMCNC: 32.2 % — SIGNIFICANT CHANGE UP (ref 32–36)
MCV RBC AUTO: 80 FL — SIGNIFICANT CHANGE UP (ref 80–100)
MONOCYTES # BLD AUTO: 0.67 K/UL — SIGNIFICANT CHANGE UP (ref 0–0.9)
MONOCYTES NFR BLD AUTO: 9.9 % — SIGNIFICANT CHANGE UP (ref 2–14)
NEUTROPHILS # BLD AUTO: 4.02 K/UL — SIGNIFICANT CHANGE UP (ref 1.8–7.4)
NEUTROPHILS NFR BLD AUTO: 59.2 % — SIGNIFICANT CHANGE UP (ref 43–77)
NRBC # FLD: 0 — SIGNIFICANT CHANGE UP
PHOSPHATE SERPL-MCNC: 2.7 MG/DL — SIGNIFICANT CHANGE UP (ref 2.5–4.5)
PLATELET # BLD AUTO: 297 K/UL — SIGNIFICANT CHANGE UP (ref 150–400)
PMV BLD: 10.1 FL — SIGNIFICANT CHANGE UP (ref 7–13)
POTASSIUM SERPL-MCNC: 3.5 MMOL/L — SIGNIFICANT CHANGE UP (ref 3.5–5.3)
POTASSIUM SERPL-SCNC: 3.5 MMOL/L — SIGNIFICANT CHANGE UP (ref 3.5–5.3)
RBC # BLD: 4.46 M/UL — SIGNIFICANT CHANGE UP (ref 3.8–5.2)
RBC # FLD: 15.1 % — HIGH (ref 10.3–14.5)
SODIUM SERPL-SCNC: 139 MMOL/L — SIGNIFICANT CHANGE UP (ref 135–145)
WBC # BLD: 6.8 K/UL — SIGNIFICANT CHANGE UP (ref 3.8–10.5)
WBC # FLD AUTO: 6.8 K/UL — SIGNIFICANT CHANGE UP (ref 3.8–10.5)

## 2017-10-03 PROCEDURE — 99233 SBSQ HOSP IP/OBS HIGH 50: CPT

## 2017-10-03 PROCEDURE — 76830 TRANSVAGINAL US NON-OB: CPT | Mod: 26

## 2017-10-03 RX ORDER — OXYCODONE AND ACETAMINOPHEN 5; 325 MG/1; MG/1
2 TABLET ORAL EVERY 4 HOURS
Qty: 0 | Refills: 0 | Status: DISCONTINUED | OUTPATIENT
Start: 2017-10-03 | End: 2017-10-04

## 2017-10-03 RX ORDER — MORPHINE SULFATE 50 MG/1
4 CAPSULE, EXTENDED RELEASE ORAL ONCE
Qty: 0 | Refills: 0 | Status: DISCONTINUED | OUTPATIENT
Start: 2017-10-03 | End: 2017-10-03

## 2017-10-03 RX ORDER — AMITRIPTYLINE HCL 25 MG
25 TABLET ORAL AT BEDTIME
Qty: 0 | Refills: 0 | Status: DISCONTINUED | OUTPATIENT
Start: 2017-10-03 | End: 2017-10-05

## 2017-10-03 RX ADMIN — SODIUM CHLORIDE 75 MILLILITER(S): 9 INJECTION, SOLUTION INTRAVENOUS at 22:01

## 2017-10-03 RX ADMIN — ONDANSETRON 4 MILLIGRAM(S): 8 TABLET, FILM COATED ORAL at 17:44

## 2017-10-03 RX ADMIN — MORPHINE SULFATE 4 MILLIGRAM(S): 50 CAPSULE, EXTENDED RELEASE ORAL at 02:45

## 2017-10-03 RX ADMIN — ONDANSETRON 4 MILLIGRAM(S): 8 TABLET, FILM COATED ORAL at 22:00

## 2017-10-03 RX ADMIN — MORPHINE SULFATE 4 MILLIGRAM(S): 50 CAPSULE, EXTENDED RELEASE ORAL at 02:22

## 2017-10-03 RX ADMIN — MORPHINE SULFATE 4 MILLIGRAM(S): 50 CAPSULE, EXTENDED RELEASE ORAL at 10:01

## 2017-10-03 RX ADMIN — MORPHINE SULFATE 4 MILLIGRAM(S): 50 CAPSULE, EXTENDED RELEASE ORAL at 11:00

## 2017-10-03 RX ADMIN — MORPHINE SULFATE 4 MILLIGRAM(S): 50 CAPSULE, EXTENDED RELEASE ORAL at 13:52

## 2017-10-03 RX ADMIN — ONDANSETRON 4 MILLIGRAM(S): 8 TABLET, FILM COATED ORAL at 10:02

## 2017-10-03 RX ADMIN — Medication 25 MILLIGRAM(S): at 22:00

## 2017-10-03 RX ADMIN — PANTOPRAZOLE SODIUM 40 MILLIGRAM(S): 20 TABLET, DELAYED RELEASE ORAL at 13:51

## 2017-10-03 RX ADMIN — MORPHINE SULFATE 4 MILLIGRAM(S): 50 CAPSULE, EXTENDED RELEASE ORAL at 14:45

## 2017-10-03 RX ADMIN — OXYCODONE AND ACETAMINOPHEN 2 TABLET(S): 5; 325 TABLET ORAL at 18:09

## 2017-10-03 RX ADMIN — Medication 30 MILLILITER(S): at 02:22

## 2017-10-03 RX ADMIN — Medication 1 TABLET(S): at 10:01

## 2017-10-03 RX ADMIN — SODIUM CHLORIDE 75 MILLILITER(S): 9 INJECTION, SOLUTION INTRAVENOUS at 15:27

## 2017-10-03 RX ADMIN — OXYCODONE AND ACETAMINOPHEN 2 TABLET(S): 5; 325 TABLET ORAL at 19:09

## 2017-10-03 NOTE — MEDICAL STUDENT ADULT H&P (EDUCATION) - NS MD HP STUD ASPLAN ASSES FT
this is a 21 yo woman with pmhx of chronic vomiting since age 5 and GERD, presenting with 4 days of abdominal pain, nausea, vomiting likely secondary to gastroenteritis vs cyclic vomiting syndrome, with little progress since yesterday except for prior red-brown vomitus now clear.

## 2017-10-03 NOTE — MEDICAL STUDENT ADULT H&P (EDUCATION) - NS MD HP STUD ASPLAN PLAN FT
(1) Vomiting, nausea  - zofran 8mg IV push q8; patient sates nausea is not well controlled  - consider addition of TCA for vomiting   - add high dose glucose to help with vomiting  - monitor abdominal exam.   - advance diet as tolerated  - dextrose 5% + NaCl 0.9% 1000mL infuse at 75 mL/hr  - concern about PUD because of reddish-brown vomit and epigastric pain, consider EGD to r/o and urea breath test/biopsy, NPO after midnight   - check TSH to r/o thyroid dysfunction as cause    (2) Pain control  - morphine 4 mg IV Push q6 hours PRN for moderate and severe pain  - advise patient of need to wean off the morphine because morphine can increase nausea  - patient not well controlled on morphine - consider additional pain control medication    (3) GERD (gastroesophageal reflux disease)  - continue pantoprazole (Protonix) 40 mg IV push daily  - calcium carbonate (tums) 500 mg as needed  - discharge with PO PPI after discharge    (4) Anemia  - likely secondary to iron deficiency from hematemesis  - order iron studies to confirm    (5) DVT ppx   - Patient is low risk and ambulating, so no need for DVT ppx (1) Vomiting, nausea  - zofran 8mg IV push q8; patient sates nausea is not well controlled  - consider addition of TCA for vomiting   - add high dose glucose to help with vomiting  - monitor abdominal exam.   - advance diet as tolerated  - dextrose 5% + NaCl 0.9% 1000mL infuse at 75 mL/hr  - concern about PUD because of reddish-brown vomit and epigastric pain, consider EGD to r/o and urea breath test/biopsy, NPO after midnight   - check TSH to r/o thyroid dysfunction as cause  - if no clinical improvement, will consider endoscopic evaluation     (2) Pain control  - morphine 4 mg IV Push q6 hours PRN for moderate and severe pain  - advise patient of need to wean off the morphine because morphine can increase nausea  - patient not well controlled on morphine - consider additional pain control medication    (3) GERD (gastroesophageal reflux disease)  - continue pantoprazole (Protonix) 40 mg IV push daily  - calcium carbonate (tums) 500 mg as needed  - discharge with PO PPI after discharge    (4) Anemia  - likely secondary to iron deficiency from hematemesis  - order iron studies to confirm    (5) DVT ppx   - Patient is low risk and ambulating, so no need for DVT ppx (1) Vomiting, nausea  - zofran standing; patient sates nausea is not well controlled  - consider addition of TCA for vomiting   - add high dose glucose to help with vomiting  - monitor abdominal exam.   - advance diet as tolerated  - dextrose 5% + NaCl 0.9% 1000mL infuse at 75 mL/hr  - given chronicity of symptoms and past negative imaging (CTAP 2017) and EGD (2015), doubt other etiologies of symptoms and do not feel strongly that these procedures need to be repeated at this time.   - check TSH to r/o thyroid dysfunction as cause  -avoid marijuana      (2) Pain control  - advise patient of need to wean off the morphine because morphine can increase nausea    (3) GERD (gastroesophageal reflux disease)  - continue pantoprazole (Protonix) 40 mg IV push daily  - calcium carbonate (tums) 500 mg as needed  - discharge with PO PPI after discharge    (4) Anemia  - order iron studies to confirm  - if iron deficient, consider outpt EGD/colo    (5) DVT ppx   - Patient is low risk and ambulating, so no need for DVT ppx

## 2017-10-03 NOTE — PROGRESS NOTE ADULT - PROBLEM SELECTOR PLAN 1
- c/w IV antiemetics   morphine IV X1 now  then change to po percocet  wants to try food  - urine pregnancy negative, appreciate gyn evaluation, recent TOP unlikely to be related to current complaints, TVUS normal- no further gyn interventions  - advance diet as tolerated  - Utox positive for cannabinoids (patient denies marijuana use...), discussed how marijuana use could contribute to symptoms of nausea/vomiting  GI rec amitriptyline

## 2017-10-04 DIAGNOSIS — E87.6 HYPOKALEMIA: ICD-10-CM

## 2017-10-04 LAB
BUN SERPL-MCNC: 10 MG/DL — SIGNIFICANT CHANGE UP (ref 7–23)
CALCIUM SERPL-MCNC: 8.8 MG/DL — SIGNIFICANT CHANGE UP (ref 8.4–10.5)
CHLORIDE SERPL-SCNC: 100 MMOL/L — SIGNIFICANT CHANGE UP (ref 98–107)
CO2 SERPL-SCNC: 21 MMOL/L — LOW (ref 22–31)
CREAT SERPL-MCNC: 0.89 MG/DL — SIGNIFICANT CHANGE UP (ref 0.5–1.3)
GLUCOSE SERPL-MCNC: 100 MG/DL — HIGH (ref 70–99)
HCT VFR BLD CALC: 38.5 % — SIGNIFICANT CHANGE UP (ref 34.5–45)
HGB BLD-MCNC: 12.5 G/DL — SIGNIFICANT CHANGE UP (ref 11.5–15.5)
MCHC RBC-ENTMCNC: 24.9 PG — LOW (ref 27–34)
MCHC RBC-ENTMCNC: 32.5 % — SIGNIFICANT CHANGE UP (ref 32–36)
MCV RBC AUTO: 76.5 FL — LOW (ref 80–100)
NRBC # FLD: 0 — SIGNIFICANT CHANGE UP
PLATELET # BLD AUTO: 305 K/UL — SIGNIFICANT CHANGE UP (ref 150–400)
PMV BLD: 10.3 FL — SIGNIFICANT CHANGE UP (ref 7–13)
POTASSIUM SERPL-MCNC: 3.3 MMOL/L — LOW (ref 3.5–5.3)
POTASSIUM SERPL-SCNC: 3.3 MMOL/L — LOW (ref 3.5–5.3)
RBC # BLD: 5.03 M/UL — SIGNIFICANT CHANGE UP (ref 3.8–5.2)
RBC # FLD: 14.6 % — HIGH (ref 10.3–14.5)
SODIUM SERPL-SCNC: 139 MMOL/L — SIGNIFICANT CHANGE UP (ref 135–145)
TSH SERPL-MCNC: 1.6 UIU/ML — SIGNIFICANT CHANGE UP (ref 0.27–4.2)
WBC # BLD: 7.76 K/UL — SIGNIFICANT CHANGE UP (ref 3.8–10.5)
WBC # FLD AUTO: 7.76 K/UL — SIGNIFICANT CHANGE UP (ref 3.8–10.5)

## 2017-10-04 PROCEDURE — 99233 SBSQ HOSP IP/OBS HIGH 50: CPT

## 2017-10-04 RX ORDER — MORPHINE SULFATE 50 MG/1
2 CAPSULE, EXTENDED RELEASE ORAL ONCE
Qty: 0 | Refills: 0 | Status: DISCONTINUED | OUTPATIENT
Start: 2017-10-04 | End: 2017-10-04

## 2017-10-04 RX ORDER — MORPHINE SULFATE 50 MG/1
4 CAPSULE, EXTENDED RELEASE ORAL EVERY 4 HOURS
Qty: 0 | Refills: 0 | Status: DISCONTINUED | OUTPATIENT
Start: 2017-10-04 | End: 2017-10-05

## 2017-10-04 RX ORDER — PANTOPRAZOLE SODIUM 20 MG/1
40 TABLET, DELAYED RELEASE ORAL
Qty: 0 | Refills: 0 | Status: DISCONTINUED | OUTPATIENT
Start: 2017-10-04 | End: 2017-10-05

## 2017-10-04 RX ORDER — SUCRALFATE 1 G
1 TABLET ORAL THREE TIMES A DAY
Qty: 0 | Refills: 0 | Status: DISCONTINUED | OUTPATIENT
Start: 2017-10-04 | End: 2017-10-05

## 2017-10-04 RX ORDER — POTASSIUM CHLORIDE 20 MEQ
10 PACKET (EA) ORAL
Qty: 0 | Refills: 0 | Status: COMPLETED | OUTPATIENT
Start: 2017-10-04 | End: 2017-10-04

## 2017-10-04 RX ORDER — POTASSIUM CHLORIDE 20 MEQ
40 PACKET (EA) ORAL EVERY 4 HOURS
Qty: 0 | Refills: 0 | Status: DISCONTINUED | OUTPATIENT
Start: 2017-10-04 | End: 2017-10-04

## 2017-10-04 RX ADMIN — MORPHINE SULFATE 2 MILLIGRAM(S): 50 CAPSULE, EXTENDED RELEASE ORAL at 08:30

## 2017-10-04 RX ADMIN — Medication 25 MILLIGRAM(S): at 21:47

## 2017-10-04 RX ADMIN — MORPHINE SULFATE 4 MILLIGRAM(S): 50 CAPSULE, EXTENDED RELEASE ORAL at 21:48

## 2017-10-04 RX ADMIN — MORPHINE SULFATE 2 MILLIGRAM(S): 50 CAPSULE, EXTENDED RELEASE ORAL at 08:07

## 2017-10-04 RX ADMIN — SODIUM CHLORIDE 75 MILLILITER(S): 9 INJECTION, SOLUTION INTRAVENOUS at 17:04

## 2017-10-04 RX ADMIN — MORPHINE SULFATE 4 MILLIGRAM(S): 50 CAPSULE, EXTENDED RELEASE ORAL at 22:03

## 2017-10-04 RX ADMIN — ONDANSETRON 4 MILLIGRAM(S): 8 TABLET, FILM COATED ORAL at 12:13

## 2017-10-04 RX ADMIN — Medication 100 MILLIEQUIVALENT(S): at 15:17

## 2017-10-04 RX ADMIN — MORPHINE SULFATE 4 MILLIGRAM(S): 50 CAPSULE, EXTENDED RELEASE ORAL at 16:57

## 2017-10-04 RX ADMIN — MORPHINE SULFATE 4 MILLIGRAM(S): 50 CAPSULE, EXTENDED RELEASE ORAL at 13:00

## 2017-10-04 RX ADMIN — Medication 1 GRAM(S): at 16:57

## 2017-10-04 RX ADMIN — MORPHINE SULFATE 4 MILLIGRAM(S): 50 CAPSULE, EXTENDED RELEASE ORAL at 17:30

## 2017-10-04 RX ADMIN — PANTOPRAZOLE SODIUM 40 MILLIGRAM(S): 20 TABLET, DELAYED RELEASE ORAL at 17:04

## 2017-10-04 RX ADMIN — MORPHINE SULFATE 4 MILLIGRAM(S): 50 CAPSULE, EXTENDED RELEASE ORAL at 12:12

## 2017-10-04 RX ADMIN — Medication 1 GRAM(S): at 21:47

## 2017-10-04 RX ADMIN — ONDANSETRON 4 MILLIGRAM(S): 8 TABLET, FILM COATED ORAL at 21:47

## 2017-10-04 RX ADMIN — SODIUM CHLORIDE 75 MILLILITER(S): 9 INJECTION, SOLUTION INTRAVENOUS at 21:48

## 2017-10-04 RX ADMIN — Medication 100 MILLIEQUIVALENT(S): at 12:13

## 2017-10-04 RX ADMIN — ONDANSETRON 4 MILLIGRAM(S): 8 TABLET, FILM COATED ORAL at 07:43

## 2017-10-04 NOTE — MEDICAL STUDENT ADULT H&P (EDUCATION) - NS MD HP STUD ASPLAN PLAN FT
(1) Vomiting, nausea  - nausea improving so c/w ondansetron 4mg q6 IV Push .   - advance diet as tolerated  - dextrose 5% + NaCl 0.9% 1000mL infuse at 75 mL/hr  - given chronicity of symptoms and past negative imaging (CTAP 2017) and EGD (2015), doubt other etiologies of symptoms and do not feel strongly that these procedures need to be repeated at this time.   - normal TSH so thyroid dysfunction unlikely    (2) Pain control  - advise patient of need to wean off the morphine because morphine can increase nausea  - c/w amitriptyline 25mg PO started yesterday to see if pain improves  - c/w aluminum hydroxide/magnesium hydroxide/simethicone Suspension 30ml q4     (3) GERD (gastroesophageal reflux disease)  - continue pantoprazole (Protonix) 40 mg IV push daily  - calcium carbonate (tums) 500 mg as needed  - discharge with PO PPI after discharge    (4) Anemia  - order iron studies to confirm    (5) DVT ppx   - Patient is low risk and ambulating, so no need for DVT ppx (1) Vomiting, nausea  - nausea improving on standing zofran .   - advance diet as tolerated  - dextrose 5% + NaCl 0.9% 1000mL infuse at 75 mL/hr  - given chronicity of symptoms and past negative imaging (CTAP 2017) and EGD (2015), doubt other etiologies of symptoms and do not feel strongly that these procedures need to be repeated at this time.   - normal TSH so thyroid dysfunction unlikely    (2) Pain control  - advise patient of need to wean off the morphine because morphine can increase nausea  - c/w amitriptyline 25mg PO started yesterday to see if pain improves  - c/w aluminum hydroxide/magnesium hydroxide/simethicone Suspension 30ml q4     (3) GERD (gastroesophageal reflux disease)  - continue pantoprazole (Protonix) 40 mg IV push daily  - calcium carbonate (tums) 500 mg as needed  - discharge with PO PPI after discharge    (4) Anemia  - order iron studies to confirm

## 2017-10-04 NOTE — MEDICAL STUDENT ADULT H&P (EDUCATION) - NS MD HP STUD RESULTS RAD FT
10/3  TVUS - normal pelvic anatomy
9/30/17  US RUQ - Normal right upper quadrant abdominal ultrasound.    1/27/2017  CT - No  CT  evidence  of  bowel  obstruction,  active  inflammatory  process  or intra-abdominal  source  for  infection. Small  to  moderate  amount  of  simple  free  fluid  dependently  in  the  pelvis.

## 2017-10-04 NOTE — PROGRESS NOTE ADULT - PROBLEM SELECTOR PROBLEM 1
Cyclic vomiting syndrome, nausea presence unspecified, unspecified intactability

## 2017-10-04 NOTE — MEDICAL STUDENT ADULT H&P (EDUCATION) - NS MD HP STUD PE ENMT FT
Oropharynx non-erythematous
Oropharynx non-erythematous, moist mucous membranes
Nares patent and normal  No tenderness to sinus palpation  Throat non-erythematous  Lips moist

## 2017-10-04 NOTE — MEDICAL STUDENT ADULT H&P (EDUCATION) - NS MD HP STUD ASPLAN ASSES FT
This is a 21 yo women with pmhx of chronic vomiting since age 5 and GERD, presenting here for nausea, vomiting, abdominal pain since Saturday, likely secondary to acute gastroenteritis vs cyclic vomiting syndrome vs Cannabinoid hyperemesis syndrome.

## 2017-10-04 NOTE — MEDICAL STUDENT ADULT H&P (EDUCATION) - NS MD HP STUD PE EYES FT
EOMI
Most mucous membranes  EOMI  PERRLA
Moist mucous membranes, extraocular movements intact, PERRLA

## 2017-10-04 NOTE — PROGRESS NOTE ADULT - PROBLEM SELECTOR PLAN 1
- c/w IV antiemetics   had to change back to morphine IV as that is the only thing that helps her pain  did not want toradol for pain  agrees to trial of carafate to see if it will help the heartburn.  TVUS normal- no further gyn interventions  told pt that once she tolerates food- she will be discharged- she is in agreement   GI rec amitriptyline-not sure if she "kept it down" - c/w IV antiemetics   had to change back to morphine IV as that is the only thing that helps her pain  did not want toradol for pain  agrees to trial of carafate to see if it will help the heartburn.  TVUS normal- no further gyn interventions  told pt that once she tolerates food- she will be discharged- she is in agreement   GI rec amitriptyline-not sure if she "kept it down"  BP uncontrolled likely d/t pain  pt said she will speak to her mother

## 2017-10-04 NOTE — PROGRESS NOTE ADULT - ASSESSMENT
22F with long standing cyclic vomiting syndrome, multiple hospitalizations for similar, recent elective termination of pregnancy, p/w persistent nausea/vomiting/abdominal pain requiring use of IV antiemetics and analgesia.
IMP:    Nausea/vomiting: suspect cyclic vomiting syndrome vs gastroparesis. less likely acute viral illness given chronicity of symptoms.    PLAN:  - trial of amitriptyline 25mg qhs  - continue zofran standing, not prn   - continue with PPI (take 30 minutes prior to breakfast)  - wean off opiates   - check TSH  - diet as tolerated  - can followup as outpatient if pt able to tolerate PO
22F with long standing cyclic vomiting syndrome since she was 4yo, multiple hospitalizations for similar, recent elective termination of pregnancy, p/w persistent nausea/vomiting/abdominal pain requiring use of IV antiemetics and analgesia. pt still denies cannabis use

## 2017-10-04 NOTE — CHART NOTE - NSCHARTNOTEFT_GEN_A_CORE
Called by primary nurse. Patient is c/o vomiting and abdominal pain. Pain is more pressing concern. Patient is refusing percocet and requesting morphine. Morphine 2mg IV x 1 given.

## 2017-10-04 NOTE — PROGRESS NOTE ADULT - PROBLEM SELECTOR PLAN 2
- c/w PPI
- c/w PPI- pt asking for TUMS for heartburn
- c/w PPI- pt asking for TUMS for heartburn
- tums  - ppi bid now

## 2017-10-04 NOTE — PROGRESS NOTE ADULT - SUBJECTIVE AND OBJECTIVE BOX
Patient is a 22y old  Female who presents with a chief complaint of "Cannot keep anything in" (30 Sep 2017 15:48)    INTERVAL HPI/OVERNIGHT EVENTS:  Patient seen and examined, no acute events overnight. Reports continued nausea/vomiting, states 4 episodes since this morning, unable to keep anything down. Denies dysuria. Has been having persistent symptoms for a long time (since age 5), multiple hospitalizations for similar. Was pregnant this summer, underwent elective termination. States that zofran occasionally helps but that "morphine works the best". Denies use of drugs including marijuana, and alcohol. Per chart, with h/o gastritis, otpt EGD without obvious pathology.      MEDICATIONS  (STANDING):  dextrose 5% + sodium chloride 0.9%. 1000 milliLiter(s) (75 mL/Hr) IV Continuous <Continuous>  influenza   Vaccine 0.5 milliLiter(s) IntraMuscular once  pantoprazole  Injectable 40 milliGRAM(s) IV Push daily    MEDICATIONS  (PRN):  ondansetron Injectable 8 milliGRAM(s) IV Push every 8 hours PRN Nausea and/or Vomiting  morphine  - Injectable 4 milliGRAM(s) IV Push every 6 hours PRN moderate and severe pain    Allergies  No Known Allergies    Intolerances    REVIEW OF SYSTEMS:  Please see interval HPI:    Vital Signs Last 24 Hrs  T(C): 37.2 (01 Oct 2017 05:33), Max: 37.2 (01 Oct 2017 05:33)  T(F): 99 (01 Oct 2017 05:33), Max: 99 (01 Oct 2017 05:33)  HR: 68 (01 Oct 2017 14:55) (57 - 76)  BP: 126/74 (01 Oct 2017 14:55) (118/79 - 152/57)  BP(mean): --  RR: 18 (01 Oct 2017 14:55) (16 - 18)  SpO2: 99% (01 Oct 2017 14:55) (98% - 100%)  I&O's Detail    30 Sep 2017 07:01  -  01 Oct 2017 07:00  --------------------------------------------------------  IN:    dextrose 5% + sodium chloride 0.9%.: 1200 mL  Total IN: 1200 mL    OUT:    Voided: 200 mL  Total OUT: 200 mL    Total NET: 1000 mL    PHYSICAL EXAM:  GENERAL: Lying in bed in no apparent distress, begins to complain of n/v/abdominal pain when provider in room, requesting morphine  HEAD: Normocephalic/atraumatic   EYES: EOMI   ENMT: tongue piercing  NECK: supple  NERVOUS SYSTEM:  AOx3, non-focal  CHEST/LUNG: No respiratory distress, CTAB  HEART: S1S2 RRR  ABDOMEN: soft, diffusely tender to palpation +BS  EXTREMITIES: no c/c/e    LABS:                        10.8   8.79  )-----------( 276      ( 01 Oct 2017 07:10 )             33.6     01 Oct 2017 07:10    144    |  107    |  12     ----------------------------<  125    4.1     |  22     |  0.89     Ca    8.6        01 Oct 2017 07:10    Toxicology Screen, Drugs of Abuse, Urine (09.30.17 @ 19:25)    Phencyclidine Level, Urine: NEGATIVE    Amphetamine, Urine: NEGATIVE    Barbiturates Screen, Urine: NEGATIVE    Benzodiazepine, Urine: NEGATIVE    Cannabinoids, Urine: POSITIVE    Cocaine Metabolite, Urine: NEGATIVE    Methadone, Urine: NEGATIVE    Opiate, Urine: POSITIVE    Oxycodone, Urine: NEGATIVE:     Pregnancy negative    CAPILLARY BLOOD GLUCOSE    BLOOD CULTURE    RADIOLOGY & ADDITIONAL TESTS:    Imaging Personally Reviewed:  [ x ] YES   Normal RUQ ultrasound    Consultant(s) Notes Reviewed:      Care Discussed with Consultants/Other Providers:
Chief Complaint:  Nausea/vomiting       Interval Events: no acute overnight events. had 4 episodes of clear emesis yesterday. still with epigastric pain. minimal PO intake.       Allergies:  No Known Allergies      Home Medications:    Hospital Medications:  aluminum hydroxide/magnesium hydroxide/simethicone Suspension 30 milliLiter(s) Oral every 4 hours PRN  calcium carbonate 500 mG (Tums) Chewable 1 Tablet(s) Chew three times a day  dextrose 5% + sodium chloride 0.9%. 1000 milliLiter(s) IV Continuous <Continuous>  influenza   Vaccine 0.5 milliLiter(s) IntraMuscular once  morphine  - Injectable 4 milliGRAM(s) IV Push every 4 hours PRN  ondansetron Injectable 4 milliGRAM(s) IV Push every 6 hours  pantoprazole  Injectable 40 milliGRAM(s) IV Push daily      PMHX/PSHX:  Cyclic vomiting syndrome  GERD (gastroesophageal reflux disease)  Ulcer of gastric cardia  No pertinent past medical history  No significant past surgical history      Family history:  No pertinent family history in first degree relatives  Family history of hypertension  No pertinent family history in first degree relatives      ROS: as per HPI       PHYSICAL EXAM:   Vital Signs:  Vital Signs Last 24 Hrs  T(C): 37.1 (03 Oct 2017 10:05), Max: 37.2 (03 Oct 2017 02:26)  T(F): 98.7 (03 Oct 2017 10:05), Max: 98.9 (03 Oct 2017 02:26)  HR: 62 (03 Oct 2017 10:05) (51 - 74)  BP: 138/87 (03 Oct 2017 10:05) (115/74 - 154/106)  BP(mean): --  RR: 17 (03 Oct 2017 10:05) (15 - 18)  SpO2: 100% (03 Oct 2017 10:05) (93% - 100%)  Daily     Daily     GENERAL:  NAD  HEENT:  sclera -anicteric  CHEST:  breath sounds clear  HEART:  Regular rhythm  ABDOMEN:  Soft, mild discomfort to palpation epigastrium, non-distended, normoactive bowel sounds  SKIN:  No rash or jaundice   NEURO:  Alert, oriented    LABS:                        11.5   6.80  )-----------( 297      ( 03 Oct 2017 06:21 )             35.7     10-03    139  |  101  |  7   ----------------------------<  110<H>  3.5   |  23  |  0.95    Ca    8.6      03 Oct 2017 06:27  Phos  2.7     10-03  Mg     2.0     10-03                Imaging:
Patient is a 22y old  Female who presents with a chief complaint of "Cannot keep anything in" (30 Sep 2017 15:48)      SUBJECTIVE / OVERNIGHT EVENTS: seen at 1245p w/ NP- c/o severe heartburn and abd pain- no further vomiting today- gets the wave of nausea "out of nowhere"  denies cannabis use.      MEDICATIONS  (STANDING):  dextrose 5% + sodium chloride 0.9%. 1000 milliLiter(s) (75 mL/Hr) IV Continuous <Continuous>  influenza   Vaccine 0.5 milliLiter(s) IntraMuscular once  pantoprazole  Injectable 40 milliGRAM(s) IV Push daily  calcium carbonate 500 mG (Tums) Chewable 1 Tablet(s) Chew three times a day  ondansetron Injectable 4 milliGRAM(s) IV Push every 6 hours    MEDICATIONS  (PRN):  morphine  - Injectable 4 milliGRAM(s) IV Push every 4 hours PRN moderate to severe pain      Meds ordered within last 24hours  calcium carbonate 500 mG (Tums) Chewable: [Ordered as TUMS]  1 Tablet(s), Chew, every 6 hours, PRN for Heartburn  Administration Instructions: Calcium Carbonate 500 mG = elemental calcium 200 mG  Provider's Contact #: [phone] (10-02 @ 00:42)  morphine  - Injectable:   2 milliGRAM(s), IV Push, once, Stop After 1 Doses  Special Instructions: for breakthrough pain  Administration Instructions: This is a Look-alike/Sound-alike Medication  Provider's Contact #: [phone] (10-02 @ 08:50)  calcium carbonate 500 mG (Tums) Chewable: [Ordered as TUMS]  1 Tablet(s), Chew, three times a day  Special Instructions: with meals; pt may refuse  Administration Instructions: Calcium Carbonate 500 mG = elemental calcium 200 mG  Provider's Contact #: [phone] (10-02 @ 15:52)  morphine  - Injectable:   4 milliGRAM(s), IV Push, every 4 hours, PRN for moderate to severe pain  Administration Instructions: This is a Look-alike/Sound-alike Medication  Provider's Contact #: [phone] (10-02 @ 15:53)  ondansetron Injectable: [Ordered as ZOFRAN Injectable]  4 milliGRAM(s), IV Push, every 6 hours  Administration Instructions: Max IV Push dose 8 milliGRAM(s)  IF IV PUSH, ADMINISTER OVER 2-5 MIN  Provider's Contact #: [phone] (10-02 @ 15:54)      T(C): 36.9 (10-02-17 @ 14:45), Max: 37.4 (10-01-17 @ 21:53)  HR: 74 (10-02-17 @ 14:45) (64 - 86)  BP: 153/106 (10-02-17 @ 14:45) (126/86 - 153/106)  RR: 16 (10-02-17 @ 14:45) (16 - 18)  SpO2: 98% (10-02-17 @ 14:45) (98% - 100%)    CAPILLARY BLOOD GLUCOSE        I&O's Summary    01 Oct 2017 07:01  -  02 Oct 2017 07:00  --------------------------------------------------------  IN: 1650 mL / OUT: 0 mL / NET: 1650 mL        PHYSICAL EXAM:  GENERAL: NAD  CHEST/LUNG: Clear to auscultation bilaterally; No wheeze  HEART: Regular rate and rhythm; No murmurs, rubs, or gallops  ABDOMEN: Soft, ND, grimaces to palp diffusely, Bowel sounds present  EXTREMITIES:  No clubbing, cyanosis, or edema      LABS:                        10.5   7.06  )-----------( 252      ( 02 Oct 2017 06:25 )             33.0     10-02    143  |  108<H>  |  11  ----------------------------<  122<H>  3.7   |  22  |  0.83    Ca    8.4      02 Oct 2017 06:25                RADIOLOGY & ADDITIONAL TESTS:    Imaging Personally Reviewed:    Consultant(s) Notes Reviewed:      Care Discussed with Consultants/Other Providers:
Patient is a 22y old  Female who presents with a chief complaint of "Cannot keep anything in" (30 Sep 2017 15:48)      SUBJECTIVE / OVERNIGHT EVENTS: still vomiting and c/o abd pain- agrees to change to po percocet;     MEDICATIONS  (STANDING):  amitriptyline 25 milliGRAM(s) Oral at bedtime  calcium carbonate 500 mG (Tums) Chewable 1 Tablet(s) Chew three times a day  dextrose 5% + sodium chloride 0.9%. 1000 milliLiter(s) (75 mL/Hr) IV Continuous <Continuous>  influenza   Vaccine 0.5 milliLiter(s) IntraMuscular once  ondansetron Injectable 4 milliGRAM(s) IV Push every 6 hours  pantoprazole  Injectable 40 milliGRAM(s) IV Push daily    MEDICATIONS  (PRN):  aluminum hydroxide/magnesium hydroxide/simethicone Suspension 30 milliLiter(s) Oral every 4 hours PRN Dyspepsia  morphine  - Injectable 4 milliGRAM(s) IV Push every 4 hours PRN moderate to severe pain      Meds ordered within last 24hours  calcium carbonate 500 mG (Tums) Chewable: [Ordered as TUMS]  1 Tablet(s), Chew, three times a day  Special Instructions: with meals; pt may refuse  Administration Instructions: Calcium Carbonate 500 mG = elemental calcium 200 mG  Provider's Contact #: [phone] (10-02 @ 15:52)  morphine  - Injectable:   4 milliGRAM(s), IV Push, every 4 hours, PRN for moderate to severe pain  Administration Instructions: This is a Look-alike/Sound-alike Medication  Provider's Contact #: [phone] (10-02 @ 15:53)  ondansetron Injectable: [Ordered as ZOFRAN Injectable]  4 milliGRAM(s), IV Push, every 6 hours  Administration Instructions: Max IV Push dose 8 milliGRAM(s)  IF IV PUSH, ADMINISTER OVER 2-5 MIN  Provider's Contact #: [phone] (10-02 @ 15:54)  aluminum hydroxide/magnesium hydroxide/simethicone Suspension: [Ordered as MAALOX]  30 milliLiter(s), Oral, every 4 hours, PRN for Dyspepsia  Administration Instructions: shake well  Provider's Contact #: [phone] (10-03 @ 02:07)  amitriptyline: [Ordered as ELAVIL]  25 milliGRAM(s), Oral, at bedtime  Provider's Contact #: [phone] (10-03 @ 13:20)      T(C): 37.1 (10-03-17 @ 10:05), Max: 37.2 (10-03-17 @ 02:26)  HR: 62 (10-03-17 @ 10:05) (51 - 74)  BP: 138/87 (10-03-17 @ 10:05) (115/74 - 154/106)  RR: 17 (10-03-17 @ 10:05) (15 - 18)  SpO2: 100% (10-03-17 @ 10:05) (93% - 100%)    CAPILLARY BLOOD GLUCOSE        I&O's Summary    02 Oct 2017 07:01  -  03 Oct 2017 07:00  --------------------------------------------------------  IN: 1700 mL / OUT: 750 mL / NET: 950 mL        PHYSICAL EXAM:  GENERAL: NAD  CHEST/LUNG: Clear to auscultation bilaterally; No wheeze  HEART: Regular rate and rhythm; No murmurs, rubs, or gallops  ABDOMEN: Soft, Nontender, Nondistended; Bowel sounds present  EXTREMITIES:  No clubbing, cyanosis, or edema      LABS:                        11.5   6.80  )-----------( 297      ( 03 Oct 2017 06:21 )             35.7     10-03    139  |  101  |  7   ----------------------------<  110<H>  3.5   |  23  |  0.95    Ca    8.6      03 Oct 2017 06:27  Phos  2.7     10-03  Mg     2.0     10-03                RADIOLOGY & ADDITIONAL TESTS:    Imaging Personally Reviewed:    Consultant(s) Notes Reviewed:      Care Discussed with Consultants/Other Providers:
R4 GYN Progress Note    TVUS images reviewed with Dr. Hurley.      Uterus measures 6.4 x 2.8 x 4.2 cm. Within normal limits.  Endometrium: 2 mm. Within normal limits.  Right ovary: 3.0 x 2.0 x 2.3 cm. Within normal limits.  Left ovary: 3.0 x 1.9 x 1.5 cm. Within normal limits.      No acute GYN intervention indicated at this time.   Recommend outpatient follow-up with OBGYN.  Patient may follow up at the [facility] for routine GYN care --  [phone].      d/w Dr. Mei Kiran, PGY4
Patient is a 22y old  Female who presents with a chief complaint of "Cannot keep anything in" (30 Sep 2017 15:48)      SUBJECTIVE / OVERNIGHT EVENTS: says she feels worse today- like she did when she came in- asking for IV morphine- didn't keep percocet down; says heartburn is bad; didn't allow for abdominal exam; able to keep ensure down.  no food yet; declined holistic RN visit.      MEDICATIONS  (STANDING):  amitriptyline 25 milliGRAM(s) Oral at bedtime  calcium carbonate 500 mG (Tums) Chewable 1 Tablet(s) Chew three times a day  dextrose 5% + sodium chloride 0.9%. 1000 milliLiter(s) (75 mL/Hr) IV Continuous <Continuous>  influenza   Vaccine 0.5 milliLiter(s) IntraMuscular once  ondansetron Injectable 4 milliGRAM(s) IV Push every 6 hours  pantoprazole  Injectable 40 milliGRAM(s) IV Push two times a day  potassium chloride  10 mEq/100 mL IVPB 10 milliEquivalent(s) IV Intermittent every 1 hour  sucralfate suspension 1 Gram(s) Oral three times a day    MEDICATIONS  (PRN):  aluminum hydroxide/magnesium hydroxide/simethicone Suspension 30 milliLiter(s) Oral every 4 hours PRN Dyspepsia  morphine  - Injectable 4 milliGRAM(s) IV Push every 4 hours PRN moderate to severe pain      Meds ordered within last 24hours  amitriptyline: [Ordered as ELAVIL]  25 milliGRAM(s), Oral, at bedtime  Provider's Contact #: [phone] (10-03 @ 13:20)  morphine  - Injectable:   4 milliGRAM(s), IV Push, once, Stop After 1 Doses  Administration Instructions: This is a Look-alike/Sound-alike Medication  Provider's Contact #: [phone] (10-03 @ 13:24)  oxyCODONE    5 mG/acetaminophen 325 mG: [Ordered as PERCOCET]  2 Tablet(s), Oral, every 4 hours, PRN for moderate to severe pain  Administration Instructions: ACETAMINOPHEN MAX DAILY DOSE:  ADULT = 4,000 mG/Day  This is a Look-alike/Sound-alike Medication  Provider's Contact #: [phone] (10-03 @ 13:24)  morphine  - Injectable:   2 milliGRAM(s), IV Push, once, Stop After 1 Doses  Administration Instructions: This is a Look-alike/Sound-alike Medication  Provider's Contact #: [phone] (10-04 @ 07:51)  potassium chloride    Tablet ER:   40 milliEquivalent(s), Oral, every 4 hours, Stop After 2 Doses  Administration Instructions: swallow whole * don't crush/chew  Provider's Contact #: [phone] (10-04 @ 08:51)  morphine  - Injectable:   4 milliGRAM(s), IV Push, every 4 hours, PRN for moderate to severe pain  Administration Instructions: This is a Look-alike/Sound-alike Medication  Provider's Contact #: [phone] (10-04 @ 10:49)  potassium chloride  10 mEq/100 mL IVPB:   10 milliEquivalent(s) in IV Solution 100 milliLiter(s), IV Intermittent, every 1 hour, infuse over 60 Minute(s), Stop After 2 Doses  Provider's Contact #: [phone] (10-04 @ 10:50)  pantoprazole  Injectable: [Ordered as PROTONIX  Injectable]  40 milliGRAM(s), IV Push, two times a day  Special Instructions: before meals- 30 min before breakfast and dinner  Provider's Contact #: [phone] (10-04 @ 10:53)  sucralfate suspension: [Ordered as CARAFATE Suspension]  1 Gram(s), Oral, three times a day  Administration Instructions: shake well  Provider's Contact #: [phone] (10-04 @ 10:53)      T(C): 36.8 (10-04-17 @ 10:00), Max: 37.2 (10-04-17 @ 05:41)  HR: 78 (10-04-17 @ 10:00) (63 - 78)  BP: 146/100 (10-04-17 @ 10:00) (112/79 - 152/106)  RR: 16 (10-04-17 @ 10:00) (16 - 18)  SpO2: 100% (10-04-17 @ 10:00) (100% - 100%)    CAPILLARY BLOOD GLUCOSE        I&O's Summary    03 Oct 2017 07:01  -  04 Oct 2017 07:00  --------------------------------------------------------  IN: 1635 mL / OUT: 0 mL / NET: 1635 mL        PHYSICAL EXAM:  GENERAL: NAD  CHEST/LUNG: Clear to auscultation bilaterally; No wheeze  HEART: Regular rate and rhythm; No murmurs, rubs, or gallops  ABDOMEN: Soft, Nontender, Nondistended; Bowel sounds present  EXTREMITIES:  No clubbing, cyanosis, or edema      LABS:                        12.5   7.76  )-----------( 305      ( 04 Oct 2017 07:20 )             38.5     10-04    139  |  100  |  10  ----------------------------<  100<H>  3.3<L>   |  21<L>  |  0.89    Ca    8.8      04 Oct 2017 07:20  Phos  2.7     10-03  Mg     2.0     10-03                RADIOLOGY & ADDITIONAL TESTS:    Imaging Personally Reviewed:    Consultant(s) Notes Reviewed:      Care Discussed with Consultants/Other Providers:

## 2017-10-04 NOTE — PROGRESS NOTE ADULT - PROBLEM SELECTOR PLAN 3
- low risk, no need for DVT ppx  dc home when taking adequate PO
- low risk, no need for DVT ppx
- low risk, no need for DVT ppx
- low risk, no need for DVT ppx  dc home when taking adequate PO

## 2017-10-05 ENCOUNTER — TRANSCRIPTION ENCOUNTER (OUTPATIENT)
Age: 22
End: 2017-10-05

## 2017-10-05 VITALS
SYSTOLIC BLOOD PRESSURE: 105 MMHG | RESPIRATION RATE: 17 BRPM | TEMPERATURE: 99 F | OXYGEN SATURATION: 100 % | DIASTOLIC BLOOD PRESSURE: 69 MMHG | HEART RATE: 68 BPM

## 2017-10-05 LAB
BUN SERPL-MCNC: 11 MG/DL — SIGNIFICANT CHANGE UP (ref 7–23)
CALCIUM SERPL-MCNC: 8.3 MG/DL — LOW (ref 8.4–10.5)
CHLORIDE SERPL-SCNC: 107 MMOL/L — SIGNIFICANT CHANGE UP (ref 98–107)
CO2 SERPL-SCNC: 20 MMOL/L — LOW (ref 22–31)
CREAT SERPL-MCNC: 0.96 MG/DL — SIGNIFICANT CHANGE UP (ref 0.5–1.3)
GLUCOSE SERPL-MCNC: 84 MG/DL — SIGNIFICANT CHANGE UP (ref 70–99)
POTASSIUM SERPL-MCNC: 3.7 MMOL/L — SIGNIFICANT CHANGE UP (ref 3.5–5.3)
POTASSIUM SERPL-SCNC: 3.7 MMOL/L — SIGNIFICANT CHANGE UP (ref 3.5–5.3)
SODIUM SERPL-SCNC: 144 MMOL/L — SIGNIFICANT CHANGE UP (ref 135–145)

## 2017-10-05 PROCEDURE — 99239 HOSP IP/OBS DSCHRG MGMT >30: CPT

## 2017-10-05 RX ORDER — PANTOPRAZOLE SODIUM 20 MG/1
1 TABLET, DELAYED RELEASE ORAL
Qty: 30 | Refills: 0 | OUTPATIENT
Start: 2017-10-05 | End: 2017-11-04

## 2017-10-05 RX ORDER — AMITRIPTYLINE HCL 25 MG
1 TABLET ORAL
Qty: 30 | Refills: 0 | OUTPATIENT
Start: 2017-10-05 | End: 2017-11-04

## 2017-10-05 RX ORDER — SUCRALFATE 1 G
1 TABLET ORAL
Qty: 100 | Refills: 0 | OUTPATIENT
Start: 2017-10-05 | End: 2017-11-04

## 2017-10-05 RX ADMIN — SODIUM CHLORIDE 75 MILLILITER(S): 9 INJECTION, SOLUTION INTRAVENOUS at 10:10

## 2017-10-05 RX ADMIN — Medication 1 GRAM(S): at 05:28

## 2017-10-05 RX ADMIN — ONDANSETRON 4 MILLIGRAM(S): 8 TABLET, FILM COATED ORAL at 16:10

## 2017-10-05 RX ADMIN — ONDANSETRON 4 MILLIGRAM(S): 8 TABLET, FILM COATED ORAL at 10:10

## 2017-10-05 RX ADMIN — Medication 1 GRAM(S): at 14:07

## 2017-10-05 RX ADMIN — ONDANSETRON 4 MILLIGRAM(S): 8 TABLET, FILM COATED ORAL at 01:21

## 2017-10-05 RX ADMIN — Medication 1 TABLET(S): at 14:08

## 2017-10-05 RX ADMIN — PANTOPRAZOLE SODIUM 40 MILLIGRAM(S): 20 TABLET, DELAYED RELEASE ORAL at 05:28

## 2017-10-05 NOTE — MEDICAL STUDENT ADULT H&P (EDUCATION) - NS MD HP STUD PE VITALS FT
T 36.8 - 37.2  HR 62-78  -152/  RR 16-18  SpO2 100%
T 36.9 - 37.2  HR 51-74  -154/  RR16-18  SpO2 %
T 36.7 - 37.2  HR 67-83  -146/  RR 16-18  SpO2 100%
T: 37 - 37.4C  HR: 65-86  BP: 126-150/74-96  RR: 16-18  SpO2: %

## 2017-10-05 NOTE — MEDICAL STUDENT ADULT H&P (EDUCATION) - NS MD HP STUD ASPLAN ASSES FT
This is a 23 yo women with pmhx of chronic vomiting since age 5 and GERD, presenting here for nausea, vomiting, abdominal pain since Saturday, likely secondary cyclic vomiting syndrome.

## 2017-10-05 NOTE — DISCHARGE NOTE ADULT - INSTRUCTIONS
regular diet Please NOTIFY MD for any of the following s/s: S/S infection (Fever >100.4, chills, increased redness, increased bleeding), uncontrolled pain not relieved by pain medications, persistent nausea/vomiting or inability to tolerate diet. No heavy lifting; No driving while taking narcotic pain medications. Please drink 6-8 glasses of water daily to stay hydrated.

## 2017-10-05 NOTE — DISCHARGE NOTE ADULT - HOSPITAL COURSE
Cyclic Vomiting   - abd pain  - seeking pain, only give Morphine  - Abd US---normal   - PPI  -GI recs:  trial of amitriptyline 25mg qhs  - continue zofran standing, not prn   - continue with PPI (take 30 minutes prior to breakfast)  - wean off opiates   - check TSH  - diet as tolerated  - can followup as outpatient if pt able to tolerate PO    Vaginal bleeding  - s/p termination in July, Hcg <5  - f/u TVUS  - positive U tox for marijuana and opiates use  - no further GYN interventions at this time   - GYN consult- likely from depo given during termination, abd pain not related to gyn; pt refused exam. gyn to follow after TVUS  -TVUS- normal Cyclic Vomiting   - abd pain  - seeking pain, only give Morphine  - Abd US---normal   - PPI  -GI recs:  trial of amitriptyline 25mg qhs  - continue zofran standing, not prn   - continue with PPI (take 30 minutes prior to breakfast)  - wean off opiates   - check TSH  - diet as tolerated  - can followup as outpatient if pt able to tolerate PO    Vaginal bleeding  - s/p termination in July, Hcg <5  - f/u TVUS  - positive U tox for marijuana and opiates use  - no further GYN interventions at this time   - GYN consult- likely from depo given during termination, abd pain not related to gyn; pt refused exam. gyn to follow after TVUS  -TVUS- normal  Attending addendum  pt w/ flare of cyclic vomiting syndrome- pain and n/v gradually improved.  bp high d/t pain- needed morphine PRN. Carafate helped pt as well.  to go home on TCA, carafate, PPI- outpt f/u w GI.  this condition will flare up in the future- thus at risk for rehospitalization

## 2017-10-05 NOTE — DISCHARGE NOTE ADULT - CARE PROVIDERS DIRECT ADDRESSES
[email or web address]

## 2017-10-05 NOTE — DISCHARGE NOTE ADULT - PROVIDER TOKENS
TOKEN:'[id number]',FREE:[LAST:[KHALIF [facility]],PHONE:[phone]],FAX:[(   )    -]]

## 2017-10-05 NOTE — MEDICAL STUDENT ADULT H&P (EDUCATION) - NS MD HP STUD MEDICATIONS FT
MEDICATIONS  (STANDING):  amitriptyline 25 milliGRAM(s) Oral at bedtime  calcium carbonate 500 mG (Tums) Chewable 1 Tablet(s) Chew three times a day  dextrose 5% + sodium chloride 0.9%. 1000 milliLiter(s) (75 mL/Hr) IV Continuous <Continuous>  influenza   Vaccine 0.5 milliLiter(s) IntraMuscular once  ondansetron Injectable 4 milliGRAM(s) IV Push every 6 hours  pantoprazole  Injectable 40 milliGRAM(s) IV Push daily    MEDICATIONS  (PRN):  aluminum hydroxide/magnesium hydroxide/simethicone Suspension 30 milliLiter(s) Oral every 4 hours PRN Dyspepsia  morphine  - Injectable 4 milliGRAM(s) IV Push every 4 hours PRN moderate to severe pain
MEDICATIONS  (STANDING):  amitriptyline 25 milliGRAM(s) Oral at bedtime  calcium carbonate 500 mG (Tums) Chewable 1 Tablet(s) Chew three times a day  dextrose 5% + sodium chloride 0.9%. 1000 milliLiter(s) (75 mL/Hr) IV Continuous <Continuous>  influenza   Vaccine 0.5 milliLiter(s) IntraMuscular once  ondansetron Injectable 4 milliGRAM(s) IV Push every 6 hours  pantoprazole  Injectable 40 milliGRAM(s) IV Push two times a day  potassium chloride  10 mEq/100 mL IVPB 10 milliEquivalent(s) IV Intermittent every 1 hour  sucralfate suspension 1 Gram(s) Oral three times a day    MEDICATIONS  (PRN):  aluminum hydroxide/magnesium hydroxide/simethicone Suspension 30 milliLiter(s) Oral every 4 hours PRN Dyspepsia  morphine  - Injectable 4 milliGRAM(s) IV Push every 4 hours PRN moderate to severe pain
Zofran, daily

## 2017-10-05 NOTE — DISCHARGE NOTE ADULT - CARE PROVIDER_API CALL
Abram Han (MD), Gastroenterology; Internal Medicine  [address] [address] [place] [address]  [place] [address]  Phone: [phone]  Fax: [phone]    KHALIF DAI,   Phone: [phone]  Fax: (   )    -

## 2017-10-05 NOTE — MEDICAL STUDENT ADULT H&P (EDUCATION) - NS MD HP STUD ASPLAN PLAN FT
(1) Vomiting, nausea  - pt doing better and able to hold down meals - recommend discharge; acute end of symptoms further suggests cyclic vomiting as diagnosis   - c/w zofran as needed  - continue with amitriptyline as provided for vomiting episode prevention  - follow up with GI within 1-2 weeks of discharge    (2) Pain control  - patient no longer in pain, recommend discontinuation of IV morphine  - ibuprofen/tylenol as needed    (3) GERD (gastroesophageal reflux disease)  - discharge with PO PPI

## 2017-10-05 NOTE — MEDICAL STUDENT ADULT H&P (EDUCATION) - NS MD HP STUD RESULTS LAB FT
10/4 CBC, CMP wnl
10/3 CBC wnl  1-/2 CMP - glucose 122, otherwise wnl
None
10/2/17    CBC - normal WBC and RBC counts; hemoglobin 10.5, HC 33, MCV 80, RDW 15.7  CMP - normal, glucose 122    9/30/17  Urine hCG - negative  urine tox screen - + for cannabinoids, opiate  Urine Cx negative for 24 hours

## 2017-10-05 NOTE — DISCHARGE NOTE ADULT - PLAN OF CARE
resolved Continue Amitriptyline and zofran. Follow up with Dr. Han from gastroenterology within 1 week for re-eval and further management Continue carafate, and protonix Follow up with [facility] for routine OBGYN needs [phone]. Call for an appointment. Continue Amitriptyline and zofran. Follow up with Dr. Han from gastroenterology within 1 week for re-eval and further management.  You can also see Dr. Granados if you prefer- the GI doctor

## 2017-10-05 NOTE — CHART NOTE - NSCHARTNOTEFT_GEN_A_CORE
pt says she feels better  wants to go home  ate food  carafate helped  will go home today on PPI, carafate, percocet and zofran  to see gi as outpt  time 40min  see dc note

## 2017-10-05 NOTE — DISCHARGE NOTE ADULT - MEDICATION SUMMARY - MEDICATIONS TO TAKE
I will START or STAY ON the medications listed below when I get home from the hospital:    Percocet 5/325 oral tablet  -- 1 tab(s) by mouth every 4 hours, As Needed for pain MDD:30mg  -- Indication: For Cyclic vomiting syndrome    amitriptyline 25 mg oral tablet  -- 1 tab(s) by mouth once a day (at bedtime)  -- Indication: For Cyclic vomiting syndrome    ondansetron 4 mg oral tablet  -- 1 tab(s) by mouth every 6 hours, As Needed -for nausea  -- Indication: For Cyclic vomiting syndrome    sucralfate 1 g/10 mL oral suspension  -- 1 milliliter(s) by mouth 3 times a day  -- Indication: For GERD (gastroesophageal reflux disease)    Protonix 40 mg oral delayed release tablet  -- 1 tab(s) by mouth once a day  -- Indication: For GERD (gastroesophageal reflux disease)

## 2017-10-05 NOTE — MEDICAL STUDENT ADULT H&P (EDUCATION) - NS MD HP STUD PE GI FT
Soft, non-distended, non-tender to palpation, bowel sounds present in all 4 quadrants
Deferred due to patient request because of pain
soft, non-distended, moderate epigastric pain to palpation
Slight tenderness in the lower quadrants to palpation  Moderate tenderness in upper quadrants to palpation and percussion, more so in the LUQ

## 2017-10-05 NOTE — DISCHARGE NOTE ADULT - CARE PLAN
Principal Discharge DX:	Cyclic vomiting syndrome  Goal:	resolved  Instructions for follow-up, activity and diet:	Continue Amitriptyline and zofran. Follow up with Dr. Han from gastroenterology within 1 week for re-eval and further management  Secondary Diagnosis:	GERD (gastroesophageal reflux disease)  Instructions for follow-up, activity and diet:	Continue carafate, and protonix  Secondary Diagnosis:	Vaginal bleeding  Goal:	resolved  Instructions for follow-up, activity and diet:	Follow up with [facility] for routine OBGYN needs [phone]. Call for an appointment. Principal Discharge DX:	Cyclic vomiting syndrome  Goal:	resolved  Instructions for follow-up, activity and diet:	Continue Amitriptyline and zofran. Follow up with Dr. Han from gastroenterology within 1 week for re-eval and further management.  You can also see Dr. Granados if you prefer- the GI doctor  Secondary Diagnosis:	GERD (gastroesophageal reflux disease)  Instructions for follow-up, activity and diet:	Continue carafate, and protonix  Secondary Diagnosis:	Vaginal bleeding  Goal:	resolved  Instructions for follow-up, activity and diet:	Follow up with [facility] for routine OBGYN needs [phone]. Call for an appointment.

## 2017-10-05 NOTE — MEDICAL STUDENT ADULT H&P (EDUCATION) - NS MD HP STUD PE CONSITUTIONAL FT
Appears fatigued
Interactive
Happy and interactive
Patient is interactive and cooperative, appears fatigued

## 2017-10-05 NOTE — MEDICAL STUDENT ADULT H&P (EDUCATION) - NS MD HP STUD HX OF PRESENT ILLNESS FT
This is a 21 yo woman with pmhx of chronic vomiting since age 5, GERD, and termination of ectopic pregnancy in late July, presenting with nausea, vomiting, and abdominal pain for 5 days. She reports no acute events overnight. She was able to keep down ensure, but otherwise is not ready for solid foods. She had an episode of NBNB vomitus x 1, an improvement from the 4-5 yesterday. She reports that the nausea is also decreased from yesterday. Her main concern is the epigastic abdominal pain, for which she requests more morphine. Normal bowel movements and voiding.
Patient is a 23 yo woman with a pmhx of chronic vomiting since age 5, GERD, and termination of pregnancy in late July, presenting with 4 days of abdominal pain, nausea, vomiting. She reports no acute overnight events. Vomiting has continued, 4-5 times a day, but is now clear in color. She still reports abdominal pain, mostly in the epigastric region. Has not passed stool since yesterday morning, but has voided without any issues. She "cannot keep food down" and has had minimal PO intake.
This is a 23 yo woman with pmhx of chronic vomiting since age 5, GERD, and termination of ectopic pregnancy in late July, presenting with nausea, vomiting, and abdominal pain for 5 days. The patient reports no acute events overnight. She was able to eat a meal from [employer] yesterday, and had breakfast today. She remarks that she "feels back to normal today". No issues with stooling or voiding. No nausea, no vomiting since yesterday. No abdominal pain or heartburn. Reports that she feels ready to be discharged.
This is a 21 yo woman with pmhx of chronic vomiting since age 5, GERD, and termination of ectopic pregnancy in late July, presenting with nausea, vomiting, and abdominal pain for 3 days. She has a hx of chronic vomiting, assessed by [facility] at age 14 - endoscopy showed "scarring" but patient did not note any other findings. This chronic vomiting is currently undiagnosed, but she is scheduled to see Dr. Granados (GI) on October 3rd. She reports that the vomiting usually occurs <4 times a month, most concentrated in a single week.  s She doesn't report any precipitating factors, but notes that heartburn usually precedes the vomiting episodes. This current acute vomiting episode began at 3am September 30th. It is reddish-brown in color, with streaks of blood. She had a burger for dinner. She denies sick contacts at work and home. She reports epigastric tenderness and back pain, which she states is probably muscular in nature from all the vomiting. She denies passage of flatus, fever, changes in bowel movement, dysuria, vaginal discharge, weight loss/gain. She reports vaginal spotting, fatigue, heartburn. Patient thinks that this episode is likely viral in nature.

## 2017-10-05 NOTE — DISCHARGE NOTE ADULT - PATIENT PORTAL LINK FT
“You can access the [facility] Patient Portal, offered by [facility], by registering with the following website: [email or web address]”

## 2017-11-09 ENCOUNTER — INPATIENT (INPATIENT)
Facility: HOSPITAL | Age: 22
LOS: 3 days | Discharge: HOME | End: 2017-11-13
Attending: INTERNAL MEDICINE | Admitting: INTERNAL MEDICINE
Payer: COMMERCIAL

## 2017-11-09 VITALS
HEART RATE: 105 BPM | RESPIRATION RATE: 15 BRPM | DIASTOLIC BLOOD PRESSURE: 97 MMHG | OXYGEN SATURATION: 96 % | SYSTOLIC BLOOD PRESSURE: 155 MMHG

## 2017-11-09 DIAGNOSIS — Z29.9 ENCOUNTER FOR PROPHYLACTIC MEASURES, UNSPECIFIED: ICD-10-CM

## 2017-11-09 DIAGNOSIS — G43.A0 CYCLICAL VOMITING, IN MIGRAINE, NOT INTRACTABLE: ICD-10-CM

## 2017-11-09 DIAGNOSIS — K21.9 GASTRO-ESOPHAGEAL REFLUX DISEASE WITHOUT ESOPHAGITIS: ICD-10-CM

## 2017-11-09 LAB
ALBUMIN SERPL ELPH-MCNC: 5 G/DL — SIGNIFICANT CHANGE UP (ref 3.3–5)
ALP SERPL-CCNC: 81 U/L — SIGNIFICANT CHANGE UP (ref 40–120)
ALT FLD-CCNC: 20 U/L — SIGNIFICANT CHANGE UP (ref 4–33)
AMPHET UR-MCNC: NEGATIVE — SIGNIFICANT CHANGE UP
APPEARANCE UR: SIGNIFICANT CHANGE UP
AST SERPL-CCNC: 31 U/L — SIGNIFICANT CHANGE UP (ref 4–32)
BARBITURATES UR SCN-MCNC: NEGATIVE — SIGNIFICANT CHANGE UP
BASE EXCESS BLDV CALC-SCNC: -1 MMOL/L — SIGNIFICANT CHANGE UP
BASE EXCESS BLDV CALC-SCNC: -2.1 MMOL/L — SIGNIFICANT CHANGE UP
BASOPHILS # BLD AUTO: 0.02 K/UL — SIGNIFICANT CHANGE UP (ref 0–0.2)
BASOPHILS NFR BLD AUTO: 0.2 % — SIGNIFICANT CHANGE UP (ref 0–2)
BENZODIAZ UR-MCNC: NEGATIVE — SIGNIFICANT CHANGE UP
BILIRUB SERPL-MCNC: 1.1 MG/DL — SIGNIFICANT CHANGE UP (ref 0.2–1.2)
BILIRUB UR-MCNC: NEGATIVE — SIGNIFICANT CHANGE UP
BLOOD GAS VENOUS - CREATININE: 0.8 MG/DL — SIGNIFICANT CHANGE UP (ref 0.5–1.3)
BLOOD GAS VENOUS - CREATININE: 0.88 MG/DL — SIGNIFICANT CHANGE UP (ref 0.5–1.3)
BLOOD UR QL VISUAL: HIGH
BUN SERPL-MCNC: 15 MG/DL — SIGNIFICANT CHANGE UP (ref 7–23)
CALCIUM SERPL-MCNC: 10.4 MG/DL — SIGNIFICANT CHANGE UP (ref 8.4–10.5)
CANNABINOIDS UR-MCNC: POSITIVE — SIGNIFICANT CHANGE UP
CHLORIDE BLDV-SCNC: 107 MMOL/L — SIGNIFICANT CHANGE UP (ref 96–108)
CHLORIDE BLDV-SCNC: 111 MMOL/L — HIGH (ref 96–108)
CHLORIDE SERPL-SCNC: 101 MMOL/L — SIGNIFICANT CHANGE UP (ref 98–107)
CO2 SERPL-SCNC: 20 MMOL/L — LOW (ref 22–31)
COCAINE METAB.OTHER UR-MCNC: NEGATIVE — SIGNIFICANT CHANGE UP
COLOR SPEC: YELLOW — SIGNIFICANT CHANGE UP
CREAT SERPL-MCNC: 1.01 MG/DL — SIGNIFICANT CHANGE UP (ref 0.5–1.3)
EOSINOPHIL # BLD AUTO: 0.01 K/UL — SIGNIFICANT CHANGE UP (ref 0–0.5)
EOSINOPHIL NFR BLD AUTO: 0.1 % — SIGNIFICANT CHANGE UP (ref 0–6)
GAS PNL BLDV: 139 MMOL/L — SIGNIFICANT CHANGE UP (ref 136–146)
GAS PNL BLDV: 139 MMOL/L — SIGNIFICANT CHANGE UP (ref 136–146)
GLUCOSE BLDV-MCNC: 108 — HIGH (ref 70–99)
GLUCOSE BLDV-MCNC: 139 — HIGH (ref 70–99)
GLUCOSE SERPL-MCNC: 138 MG/DL — HIGH (ref 70–99)
GLUCOSE UR-MCNC: NEGATIVE — SIGNIFICANT CHANGE UP
HCG UR-SCNC: NEGATIVE — SIGNIFICANT CHANGE UP
HCO3 BLDV-SCNC: 21 MMOL/L — SIGNIFICANT CHANGE UP (ref 20–27)
HCO3 BLDV-SCNC: 23 MMOL/L — SIGNIFICANT CHANGE UP (ref 20–27)
HCT VFR BLD CALC: 38.3 % — SIGNIFICANT CHANGE UP (ref 34.5–45)
HCT VFR BLDV CALC: 36.5 % — SIGNIFICANT CHANGE UP (ref 34.5–45)
HCT VFR BLDV CALC: 37.7 % — SIGNIFICANT CHANGE UP (ref 34.5–45)
HGB BLD-MCNC: 12.3 G/DL — SIGNIFICANT CHANGE UP (ref 11.5–15.5)
HGB BLDV-MCNC: 11.8 G/DL — SIGNIFICANT CHANGE UP (ref 11.5–15.5)
HGB BLDV-MCNC: 12.2 G/DL — SIGNIFICANT CHANGE UP (ref 11.5–15.5)
IMM GRANULOCYTES # BLD AUTO: 0.03 # — SIGNIFICANT CHANGE UP
IMM GRANULOCYTES NFR BLD AUTO: 0.3 % — SIGNIFICANT CHANGE UP (ref 0–1.5)
KETONES UR-MCNC: SIGNIFICANT CHANGE UP
LACTATE BLDV-MCNC: 1.8 MMOL/L — SIGNIFICANT CHANGE UP (ref 0.5–2)
LACTATE BLDV-MCNC: 4.1 MMOL/L — CRITICAL HIGH (ref 0.5–2)
LEUKOCYTE ESTERASE UR-ACNC: HIGH
LIDOCAIN IGE QN: 31.8 U/L — SIGNIFICANT CHANGE UP (ref 7–60)
LYMPHOCYTES # BLD AUTO: 2.84 K/UL — SIGNIFICANT CHANGE UP (ref 1–3.3)
LYMPHOCYTES # BLD AUTO: 25 % — SIGNIFICANT CHANGE UP (ref 13–44)
MAGNESIUM SERPL-MCNC: 2 MG/DL — SIGNIFICANT CHANGE UP (ref 1.6–2.6)
MCHC RBC-ENTMCNC: 24.3 PG — LOW (ref 27–34)
MCHC RBC-ENTMCNC: 32.1 % — SIGNIFICANT CHANGE UP (ref 32–36)
MCV RBC AUTO: 75.5 FL — LOW (ref 80–100)
METHADONE UR-MCNC: NEGATIVE — SIGNIFICANT CHANGE UP
MONOCYTES # BLD AUTO: 0.87 K/UL — SIGNIFICANT CHANGE UP (ref 0–0.9)
MONOCYTES NFR BLD AUTO: 7.7 % — SIGNIFICANT CHANGE UP (ref 2–14)
MUCOUS THREADS # UR AUTO: SIGNIFICANT CHANGE UP
NEUTROPHILS # BLD AUTO: 7.58 K/UL — HIGH (ref 1.8–7.4)
NEUTROPHILS NFR BLD AUTO: 66.7 % — SIGNIFICANT CHANGE UP (ref 43–77)
NITRITE UR-MCNC: NEGATIVE — SIGNIFICANT CHANGE UP
NRBC # FLD: 0 — SIGNIFICANT CHANGE UP
OPIATES UR-MCNC: POSITIVE — SIGNIFICANT CHANGE UP
OXYCODONE UR-MCNC: NEGATIVE — SIGNIFICANT CHANGE UP
PCO2 BLDV: 33 MMHG — LOW (ref 41–51)
PCO2 BLDV: 45 MMHG — SIGNIFICANT CHANGE UP (ref 41–51)
PCP UR-MCNC: NEGATIVE — SIGNIFICANT CHANGE UP
PH BLDV: 7.33 PH — SIGNIFICANT CHANGE UP (ref 7.32–7.43)
PH BLDV: 7.45 PH — HIGH (ref 7.32–7.43)
PH UR: 8 — SIGNIFICANT CHANGE UP (ref 4.6–8)
PHOSPHATE SERPL-MCNC: 4 MG/DL — SIGNIFICANT CHANGE UP (ref 2.5–4.5)
PLATELET # BLD AUTO: 329 K/UL — SIGNIFICANT CHANGE UP (ref 150–400)
PMV BLD: 9.7 FL — SIGNIFICANT CHANGE UP (ref 7–13)
PO2 BLDV: 30 MMHG — LOW (ref 35–40)
PO2 BLDV: 35 MMHG — SIGNIFICANT CHANGE UP (ref 35–40)
POTASSIUM BLDV-SCNC: 3.9 MMOL/L — SIGNIFICANT CHANGE UP (ref 3.4–4.5)
POTASSIUM BLDV-SCNC: 5 MMOL/L — HIGH (ref 3.4–4.5)
POTASSIUM SERPL-MCNC: 4.4 MMOL/L — SIGNIFICANT CHANGE UP (ref 3.5–5.3)
POTASSIUM SERPL-SCNC: 4.4 MMOL/L — SIGNIFICANT CHANGE UP (ref 3.5–5.3)
PROT SERPL-MCNC: 8.6 G/DL — HIGH (ref 6–8.3)
PROT UR-MCNC: 300 — HIGH
RBC # BLD: 5.07 M/UL — SIGNIFICANT CHANGE UP (ref 3.8–5.2)
RBC # FLD: 16.2 % — HIGH (ref 10.3–14.5)
RBC CASTS # UR COMP ASSIST: SIGNIFICANT CHANGE UP (ref 0–?)
SAO2 % BLDV: 41.8 % — LOW (ref 60–85)
SAO2 % BLDV: 63.2 % — SIGNIFICANT CHANGE UP (ref 60–85)
SODIUM SERPL-SCNC: 142 MMOL/L — SIGNIFICANT CHANGE UP (ref 135–145)
SP GR SPEC: 1.03 — HIGH (ref 1–1.03)
SP GR UR: 1.03 — HIGH (ref 1–1.03)
SQUAMOUS # UR AUTO: SIGNIFICANT CHANGE UP
UROBILINOGEN FLD QL: 1 E.U. — SIGNIFICANT CHANGE UP (ref 0.1–0.2)
WBC # BLD: 11.35 K/UL — HIGH (ref 3.8–10.5)
WBC # FLD AUTO: 11.35 K/UL — HIGH (ref 3.8–10.5)
WBC UR QL: HIGH (ref 0–?)

## 2017-11-09 PROCEDURE — 99223 1ST HOSP IP/OBS HIGH 75: CPT

## 2017-11-09 RX ORDER — SUCRALFATE 1 G
1 TABLET ORAL THREE TIMES A DAY
Qty: 0 | Refills: 0 | Status: DISCONTINUED | OUTPATIENT
Start: 2017-11-09 | End: 2017-11-13

## 2017-11-09 RX ORDER — METOCLOPRAMIDE HCL 10 MG
10 TABLET ORAL ONCE
Qty: 0 | Refills: 0 | Status: COMPLETED | OUTPATIENT
Start: 2017-11-09 | End: 2017-11-09

## 2017-11-09 RX ORDER — ONDANSETRON 8 MG/1
4 TABLET, FILM COATED ORAL ONCE
Qty: 0 | Refills: 0 | Status: COMPLETED | OUTPATIENT
Start: 2017-11-09 | End: 2017-11-09

## 2017-11-09 RX ORDER — ACETAMINOPHEN 500 MG
650 TABLET ORAL EVERY 6 HOURS
Qty: 0 | Refills: 0 | Status: DISCONTINUED | OUTPATIENT
Start: 2017-11-09 | End: 2017-11-13

## 2017-11-09 RX ORDER — MORPHINE SULFATE 50 MG/1
4 CAPSULE, EXTENDED RELEASE ORAL ONCE
Qty: 0 | Refills: 0 | Status: DISCONTINUED | OUTPATIENT
Start: 2017-11-09 | End: 2017-11-09

## 2017-11-09 RX ORDER — OXYCODONE AND ACETAMINOPHEN 5; 325 MG/1; MG/1
2 TABLET ORAL EVERY 6 HOURS
Qty: 0 | Refills: 0 | Status: DISCONTINUED | OUTPATIENT
Start: 2017-11-09 | End: 2017-11-13

## 2017-11-09 RX ORDER — SODIUM CHLORIDE 9 MG/ML
1000 INJECTION INTRAMUSCULAR; INTRAVENOUS; SUBCUTANEOUS ONCE
Qty: 0 | Refills: 0 | Status: COMPLETED | OUTPATIENT
Start: 2017-11-09 | End: 2017-11-09

## 2017-11-09 RX ORDER — FAMOTIDINE 10 MG/ML
20 INJECTION INTRAVENOUS ONCE
Qty: 0 | Refills: 0 | Status: COMPLETED | OUTPATIENT
Start: 2017-11-09 | End: 2017-11-09

## 2017-11-09 RX ORDER — OXYCODONE AND ACETAMINOPHEN 5; 325 MG/1; MG/1
1 TABLET ORAL EVERY 6 HOURS
Qty: 0 | Refills: 0 | Status: DISCONTINUED | OUTPATIENT
Start: 2017-11-09 | End: 2017-11-13

## 2017-11-09 RX ORDER — ONDANSETRON 8 MG/1
4 TABLET, FILM COATED ORAL EVERY 6 HOURS
Qty: 0 | Refills: 0 | Status: DISCONTINUED | OUTPATIENT
Start: 2017-11-09 | End: 2017-11-13

## 2017-11-09 RX ORDER — FAMOTIDINE 10 MG/ML
20 INJECTION INTRAVENOUS DAILY
Qty: 0 | Refills: 0 | Status: DISCONTINUED | OUTPATIENT
Start: 2017-11-09 | End: 2017-11-09

## 2017-11-09 RX ORDER — SODIUM CHLORIDE 9 MG/ML
1000 INJECTION INTRAMUSCULAR; INTRAVENOUS; SUBCUTANEOUS
Qty: 0 | Refills: 0 | Status: DISCONTINUED | OUTPATIENT
Start: 2017-11-09 | End: 2017-11-13

## 2017-11-09 RX ORDER — PANTOPRAZOLE SODIUM 20 MG/1
40 TABLET, DELAYED RELEASE ORAL
Qty: 0 | Refills: 0 | Status: DISCONTINUED | OUTPATIENT
Start: 2017-11-09 | End: 2017-11-09

## 2017-11-09 RX ORDER — ENOXAPARIN SODIUM 100 MG/ML
40 INJECTION SUBCUTANEOUS EVERY 24 HOURS
Qty: 0 | Refills: 0 | Status: DISCONTINUED | OUTPATIENT
Start: 2017-11-09 | End: 2017-11-13

## 2017-11-09 RX ORDER — OXYCODONE AND ACETAMINOPHEN 5; 325 MG/1; MG/1
1 TABLET ORAL EVERY 6 HOURS
Qty: 0 | Refills: 0 | Status: DISCONTINUED | OUTPATIENT
Start: 2017-11-09 | End: 2017-11-09

## 2017-11-09 RX ORDER — MORPHINE SULFATE 50 MG/1
2 CAPSULE, EXTENDED RELEASE ORAL ONCE
Qty: 0 | Refills: 0 | Status: DISCONTINUED | OUTPATIENT
Start: 2017-11-09 | End: 2017-11-09

## 2017-11-09 RX ORDER — PANTOPRAZOLE SODIUM 20 MG/1
40 TABLET, DELAYED RELEASE ORAL DAILY
Qty: 0 | Refills: 0 | Status: DISCONTINUED | OUTPATIENT
Start: 2017-11-09 | End: 2017-11-13

## 2017-11-09 RX ORDER — AMITRIPTYLINE HCL 25 MG
25 TABLET ORAL AT BEDTIME
Qty: 0 | Refills: 0 | Status: DISCONTINUED | OUTPATIENT
Start: 2017-11-09 | End: 2017-11-13

## 2017-11-09 RX ADMIN — ONDANSETRON 4 MILLIGRAM(S): 8 TABLET, FILM COATED ORAL at 12:30

## 2017-11-09 RX ADMIN — MORPHINE SULFATE 4 MILLIGRAM(S): 50 CAPSULE, EXTENDED RELEASE ORAL at 04:00

## 2017-11-09 RX ADMIN — SODIUM CHLORIDE 4000 MILLILITER(S): 9 INJECTION INTRAMUSCULAR; INTRAVENOUS; SUBCUTANEOUS at 03:43

## 2017-11-09 RX ADMIN — SODIUM CHLORIDE 75 MILLILITER(S): 9 INJECTION INTRAMUSCULAR; INTRAVENOUS; SUBCUTANEOUS at 07:22

## 2017-11-09 RX ADMIN — MORPHINE SULFATE 4 MILLIGRAM(S): 50 CAPSULE, EXTENDED RELEASE ORAL at 04:15

## 2017-11-09 RX ADMIN — MORPHINE SULFATE 4 MILLIGRAM(S): 50 CAPSULE, EXTENDED RELEASE ORAL at 06:12

## 2017-11-09 RX ADMIN — MORPHINE SULFATE 4 MILLIGRAM(S): 50 CAPSULE, EXTENDED RELEASE ORAL at 18:12

## 2017-11-09 RX ADMIN — MORPHINE SULFATE 4 MILLIGRAM(S): 50 CAPSULE, EXTENDED RELEASE ORAL at 03:48

## 2017-11-09 RX ADMIN — MORPHINE SULFATE 4 MILLIGRAM(S): 50 CAPSULE, EXTENDED RELEASE ORAL at 06:27

## 2017-11-09 RX ADMIN — Medication 10 MILLIGRAM(S): at 03:59

## 2017-11-09 RX ADMIN — MORPHINE SULFATE 4 MILLIGRAM(S): 50 CAPSULE, EXTENDED RELEASE ORAL at 22:50

## 2017-11-09 RX ADMIN — FAMOTIDINE 20 MILLIGRAM(S): 10 INJECTION INTRAVENOUS at 03:33

## 2017-11-09 RX ADMIN — MORPHINE SULFATE 2 MILLIGRAM(S): 50 CAPSULE, EXTENDED RELEASE ORAL at 13:44

## 2017-11-09 RX ADMIN — MORPHINE SULFATE 4 MILLIGRAM(S): 50 CAPSULE, EXTENDED RELEASE ORAL at 18:27

## 2017-11-09 RX ADMIN — Medication 1 GRAM(S): at 13:59

## 2017-11-09 RX ADMIN — MORPHINE SULFATE 4 MILLIGRAM(S): 50 CAPSULE, EXTENDED RELEASE ORAL at 03:33

## 2017-11-09 RX ADMIN — SODIUM CHLORIDE 4000 MILLILITER(S): 9 INJECTION INTRAMUSCULAR; INTRAVENOUS; SUBCUTANEOUS at 02:52

## 2017-11-09 RX ADMIN — ONDANSETRON 4 MILLIGRAM(S): 8 TABLET, FILM COATED ORAL at 03:33

## 2017-11-09 RX ADMIN — MORPHINE SULFATE 2 MILLIGRAM(S): 50 CAPSULE, EXTENDED RELEASE ORAL at 13:59

## 2017-11-09 RX ADMIN — Medication 10 MILLIGRAM(S): at 06:12

## 2017-11-09 RX ADMIN — MORPHINE SULFATE 4 MILLIGRAM(S): 50 CAPSULE, EXTENDED RELEASE ORAL at 22:36

## 2017-11-09 RX ADMIN — Medication 10 MILLIGRAM(S): at 13:44

## 2017-11-09 NOTE — ED PROVIDER NOTE - MEDICAL DECISION MAKING DETAILS
Likely cyclic vomiting flare, Plan: pain control, cbc, cmp, lipase, vbg, iv fluids, zofran, iv pepcid, reassess

## 2017-11-09 NOTE — PROVIDER CONTACT NOTE (OTHER) - BACKGROUND
Patient admitted with non-intractable cyclical vomiting. Past medical history of cyclic vomiting syndrome and GERD

## 2017-11-09 NOTE — ED PROVIDER NOTE - PHYSICAL EXAMINATION
Gen: in pain, vomiting, AOx3  Head: NCAT  HEENT: PERRL, oral mucosa moist, normal conjunctiva  Lung: CTAB, no respiratory distress  CV: rrr, no murmurs, Normal perfusion  Abd: soft, tender epigastrium with guarding, no Moreno, no CVA tenderness  MSK: No edema, no visible deformities  Neuro: No focal neurologic deficits  Skin: No rash   Psych: normal affect

## 2017-11-09 NOTE — ED ADULT TRIAGE NOTE - CHIEF COMPLAINT QUOTE
Pt c/o abdominal pain with nausea and vomiting. not being able to keep anything down. Received the depo prev era  Pt appears uncomfortable-  unable to remain still. Pt c/o abdominal pain with nausea and vomiting. not being able to keep anything down. Received the depo prev era  Pt appears uncomfortable-  unable to remain still.  Hx of cyclic vomiting syndrome.

## 2017-11-09 NOTE — H&P ADULT - PROBLEM SELECTOR PLAN 1
-pt started on amitriptyline as per GI recs last admission, but has been noncomplaint  -restart amitriptyline  -c/w Zofran, Protonix  -c/w IVF hydration  -advance diet as tolerated   -minimize narcotics - Percocet PRN pain -pt started on amitriptyline as per GI recs last admission, but has been noncomplaint  -restart amitriptyline  -c/w Zofran, Protonix  -c/w IVF hydration  -advance diet as tolerated   -minimize narcotics - Percocet PRN pain  -pt denies marijuana use, but UTox positive last admission, will recheck  -avoid marijuana

## 2017-11-09 NOTE — H&P ADULT - HISTORY OF PRESENT ILLNESS
21 yo F w/ cyclic vomiting syndrome, recently admitted last month presents with intractable nausea and vomiting. Pt was admitted 9/30 for 10/5 for the same, seen by GI and started on amitriptyline. Pt states she has not taken it because she was told only to take it PRN and she has not had any flares up until yesterday. Pt states that yesterday afternoon, she was in her usual state of health, and had a salad with chicken, after which she instantly vomited. Thereafter, she could not tolerate anything to eat or drink. Nausea/vomiting is associated w/ severe epigastric pain during episodes of vomiting. Pt states she vomited 4-5 times after she got to the ED, currently feels well after receiving several doses of morphine. Willing to try clear liquid diet.   She denies fever, chills, cp, sob, cough, diarrhea, or dysuria.     In the ED, afebrile, initially tachy to 105, resolved, rest of vitals stable.   Labs significant for WBC 11.35. UA equivocal, UCx testing.   In the ED, pt was given NS 2L bolus, Pepcid 20 mg IV, Reglan 10 mg IV x2, morphine 4mg IV x 3, Zofran 4 mg IV x1.

## 2017-11-09 NOTE — ED ADULT NURSE NOTE - CHPI ED SYMPTOMS NEG
no diarrhea/no chills/no abdominal distension/no hematuria/no dysuria/no burning urination/no fever/no blood in stool

## 2017-11-09 NOTE — ED ADULT NURSE NOTE - OBJECTIVE STATEMENT
Patient is accompanied by mother , presents with complaints of chronic abdominal pain , worse today accompanied with nausea, vomiting. Denies fever, chills, recently got a depo shot. Patient appears uncomfortable, alert and oriented x 4, moaning, ambulatory , hyperventilating, normal sinus rhythm on cardiac monitor, abdomen is soft, 20 gauge saline lock inserted on left AC, blood drawn and sent. Will follow up and monitor.

## 2017-11-09 NOTE — ED ADULT NURSE NOTE - CHIEF COMPLAINT QUOTE
Pt c/o abdominal pain with nausea and vomiting. not being able to keep anything down. Received the depo prev era  Pt appears uncomfortable-  unable to remain still.  Hx of cyclic vomiting syndrome.

## 2017-11-09 NOTE — H&P ADULT - NSHPPHYSICALEXAM_GEN_ALL_CORE
Vital Signs Last 24 Hrs  T(C): 36.9 (09 Nov 2017 10:34), Max: 36.9 (09 Nov 2017 10:34)  T(F): 98.5 (09 Nov 2017 10:34), Max: 98.5 (09 Nov 2017 10:34)  HR: 100 (09 Nov 2017 10:34) (64 - 105)  BP: 125/62 (09 Nov 2017 10:34) (104/61 - 155/97)  BP(mean): --  RR: 18 (09 Nov 2017 10:34) (15 - 18)  SpO2: 96% (09 Nov 2017 10:34) (96% - 99%)

## 2017-11-09 NOTE — H&P ADULT - ASSESSMENT
23 yo F w/ cyclic vomiting syndrome, recently admitted last month for same, p/w intractable nausea and vomiting

## 2017-11-09 NOTE — ED PROVIDER NOTE - ATTENDING CONTRIBUTION TO CARE
21 y/o F with h/o cyclic vomiting syndrome here with 1 day of n/v/abd pain.  Pt admitted in Sept 2017 for same, has follow-up appointment with GI next Monday.  Pt has been taking zofran, protonix, and percocet at home without relief.  No fever, chills, cough, diarrhea, urinary sxs.  LMP in September, pt states she received depo-provera injection 2 days ago.  Pt lying in stretcher, awake and alert, nontoxic.  VSS.  Lungs cta bl.  Cards nl S1/S2, RRR, no MRG.  Abd soft diffusely tender, no rebound or guarding.  Plan for labs, ivfs, antiemetics, pain control, reassess.

## 2017-11-09 NOTE — ED PROVIDER NOTE - PROGRESS NOTE DETAILS
Pain and nausea improved after zofran, reglan, morphine. Repeat VBG after fluids, if lactate trending down and tolerating PO, patient can go home. -SM Pending repeat VBG, patient had another episode of vomiting, has not taken PO yet. Pain still 7/10, patient looks uncomfortable. Will give more reglan and morphine. -SM Repeat VBG shows normalized lactate after fluids, vitals better, requires admission because she is not tolerating PO after IV meds and requiring multiple doses of IV pain control, needs IV hydration. Admitting hospitalist requesting iv fluids normal saline at 75 ml/hr. -MARY

## 2017-11-09 NOTE — ED PROVIDER NOTE - OBJECTIVE STATEMENT
Patient is 22 y F with PMH cyclic vomiting syndrome, presenting with severe constant epigastric pain and vomiting x 1 day, not improved with home zofran and percocet, symptoms consistent with previous episodes of CVS. Had recent admission last month for same, has not been able to f/u with GI yet. Can't remember LMP, on depoprovera. No abdominal surgeries, no vaginal dc or bleeding.    PMD: Bambi Falk  GI: none  ROS: Denies fever, palpitations, chills, recent sickness, HA, vision changes, cough, SOB, chest pain, dysuria, hematuria, rash, new joint aches, sick contacts, and recent travel. Patient is 22 y F with PMH cyclic vomiting syndrome, presenting with severe constant epigastric pain and vomiting x 1 day, not improved with home zofran and percocet, symptoms consistent with previous episodes of CVS. Had recent admission last month for same, has not been able to f/u with GI yet. Can't remember LMP, on depo-provera. No abdominal surgeries, no vaginal dc or bleeding. Last BM today, normal.     PMD: Bambi Nurse  GI: none  ROS: Denies fever, palpitations, chills, recent sickness, HA, vision changes, cough, SOB, chest pain, dysuria, hematuria, rash, new joint aches, sick contacts, and recent travel.

## 2017-11-09 NOTE — H&P ADULT - NSHPREVIEWOFSYSTEMS_GEN_ALL_CORE
CONSTITUTIONAL: No weakness, fevers or chills  EYES/ENT: No visual changes;  no ear or throat pain   NECK: No pain or stiffness  RESPIRATORY:  no SOB, no cough    CARDIOVASCULAR: no chest pain, no palpitations, no MCLEAN    GASTROINTESTINAL: +epigastric pain +n/v  No hematemesis; No diarrhea or constipation. No melena or hematochezia.  GENITOURINARY: No dysuria, frequency or hematuria  NEUROLOGICAL: No numbness or weakness  SKIN: No itching, burning, rashes, or lesions   MSK: no arthralgias, myalgias

## 2017-11-10 DIAGNOSIS — F43.20 ADJUSTMENT DISORDER, UNSPECIFIED: ICD-10-CM
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
## 2017-11-10 DIAGNOSIS — D64.9 ANEMIA, UNSPECIFIED: ICD-10-CM

## 2017-11-10 LAB
ALBUMIN SERPL ELPH-MCNC: 4.2 G/DL — SIGNIFICANT CHANGE UP (ref 3.3–5)
ALP SERPL-CCNC: 65 U/L — SIGNIFICANT CHANGE UP (ref 40–120)
ALT FLD-CCNC: 14 U/L — SIGNIFICANT CHANGE UP (ref 4–33)
AST SERPL-CCNC: 19 U/L — SIGNIFICANT CHANGE UP (ref 4–32)
BACTERIA UR CULT: SIGNIFICANT CHANGE UP
BILIRUB SERPL-MCNC: 0.6 MG/DL — SIGNIFICANT CHANGE UP (ref 0.2–1.2)
BUN SERPL-MCNC: 17 MG/DL — SIGNIFICANT CHANGE UP (ref 7–23)
CALCIUM SERPL-MCNC: 8.3 MG/DL — LOW (ref 8.4–10.5)
CHLORIDE SERPL-SCNC: 106 MMOL/L — SIGNIFICANT CHANGE UP (ref 98–107)
CO2 SERPL-SCNC: 18 MMOL/L — LOW (ref 22–31)
CREAT SERPL-MCNC: 0.75 MG/DL — SIGNIFICANT CHANGE UP (ref 0.5–1.3)
GLUCOSE SERPL-MCNC: 104 MG/DL — HIGH (ref 70–99)
HCT VFR BLD CALC: 30.3 % — LOW (ref 34.5–45)
HGB BLD-MCNC: 9.4 G/DL — LOW (ref 11.5–15.5)
MCHC RBC-ENTMCNC: 23.6 PG — LOW (ref 27–34)
MCHC RBC-ENTMCNC: 31 % — LOW (ref 32–36)
MCV RBC AUTO: 75.9 FL — LOW (ref 80–100)
NRBC # FLD: 0 — SIGNIFICANT CHANGE UP
PLATELET # BLD AUTO: 234 K/UL — SIGNIFICANT CHANGE UP (ref 150–400)
PMV BLD: 10.3 FL — SIGNIFICANT CHANGE UP (ref 7–13)
POTASSIUM SERPL-MCNC: 3.9 MMOL/L — SIGNIFICANT CHANGE UP (ref 3.5–5.3)
POTASSIUM SERPL-SCNC: 3.9 MMOL/L — SIGNIFICANT CHANGE UP (ref 3.5–5.3)
PROT SERPL-MCNC: 7.1 G/DL — SIGNIFICANT CHANGE UP (ref 6–8.3)
RBC # BLD: 3.99 M/UL — SIGNIFICANT CHANGE UP (ref 3.8–5.2)
RBC # FLD: 16.5 % — HIGH (ref 10.3–14.5)
SODIUM SERPL-SCNC: 139 MMOL/L — SIGNIFICANT CHANGE UP (ref 135–145)
SPECIMEN SOURCE: SIGNIFICANT CHANGE UP
WBC # BLD: 10.04 K/UL — SIGNIFICANT CHANGE UP (ref 3.8–10.5)
WBC # FLD AUTO: 10.04 K/UL — SIGNIFICANT CHANGE UP (ref 3.8–10.5)

## 2017-11-10 PROCEDURE — 93010 ELECTROCARDIOGRAM REPORT: CPT

## 2017-11-10 PROCEDURE — 99233 SBSQ HOSP IP/OBS HIGH 50: CPT

## 2017-11-10 PROCEDURE — 99222 1ST HOSP IP/OBS MODERATE 55: CPT | Mod: GC

## 2017-11-10 RX ORDER — METOCLOPRAMIDE HCL 10 MG
5 TABLET ORAL EVERY 6 HOURS
Qty: 0 | Refills: 0 | Status: DISCONTINUED | OUTPATIENT
Start: 2017-11-10 | End: 2017-11-13

## 2017-11-10 RX ORDER — MORPHINE SULFATE 50 MG/1
4 CAPSULE, EXTENDED RELEASE ORAL ONCE
Qty: 0 | Refills: 0 | Status: DISCONTINUED | OUTPATIENT
Start: 2017-11-10 | End: 2017-11-10

## 2017-11-10 RX ORDER — KETOROLAC TROMETHAMINE 30 MG/ML
15 SYRINGE (ML) INJECTION EVERY 8 HOURS
Qty: 0 | Refills: 0 | Status: DISCONTINUED | OUTPATIENT
Start: 2017-11-10 | End: 2017-11-11

## 2017-11-10 RX ORDER — HYDROXYZINE HCL 10 MG
25 TABLET ORAL EVERY 6 HOURS
Qty: 0 | Refills: 0 | Status: DISCONTINUED | OUTPATIENT
Start: 2017-11-10 | End: 2017-11-13

## 2017-11-10 RX ORDER — MORPHINE SULFATE 50 MG/1
2 CAPSULE, EXTENDED RELEASE ORAL ONCE
Qty: 0 | Refills: 0 | Status: DISCONTINUED | OUTPATIENT
Start: 2017-11-10 | End: 2017-11-10

## 2017-11-10 RX ORDER — DIPHENHYDRAMINE HCL 50 MG
25 CAPSULE ORAL ONCE
Qty: 0 | Refills: 0 | Status: COMPLETED | OUTPATIENT
Start: 2017-11-10 | End: 2017-11-10

## 2017-11-10 RX ADMIN — Medication 1 GRAM(S): at 21:07

## 2017-11-10 RX ADMIN — Medication 15 MILLIGRAM(S): at 18:45

## 2017-11-10 RX ADMIN — MORPHINE SULFATE 2 MILLIGRAM(S): 50 CAPSULE, EXTENDED RELEASE ORAL at 13:12

## 2017-11-10 RX ADMIN — SODIUM CHLORIDE 75 MILLILITER(S): 9 INJECTION INTRAMUSCULAR; INTRAVENOUS; SUBCUTANEOUS at 03:24

## 2017-11-10 RX ADMIN — SODIUM CHLORIDE 75 MILLILITER(S): 9 INJECTION INTRAMUSCULAR; INTRAVENOUS; SUBCUTANEOUS at 09:01

## 2017-11-10 RX ADMIN — MORPHINE SULFATE 2 MILLIGRAM(S): 50 CAPSULE, EXTENDED RELEASE ORAL at 13:27

## 2017-11-10 RX ADMIN — MORPHINE SULFATE 4 MILLIGRAM(S): 50 CAPSULE, EXTENDED RELEASE ORAL at 03:23

## 2017-11-10 RX ADMIN — Medication 25 MILLIGRAM(S): at 08:57

## 2017-11-10 RX ADMIN — Medication 1 GRAM(S): at 13:16

## 2017-11-10 RX ADMIN — Medication 25 MILLIGRAM(S): at 21:07

## 2017-11-10 RX ADMIN — Medication 5 MILLIGRAM(S): at 18:44

## 2017-11-10 RX ADMIN — Medication 15 MILLIGRAM(S): at 19:00

## 2017-11-10 RX ADMIN — MORPHINE SULFATE 4 MILLIGRAM(S): 50 CAPSULE, EXTENDED RELEASE ORAL at 03:35

## 2017-11-10 RX ADMIN — PANTOPRAZOLE SODIUM 40 MILLIGRAM(S): 20 TABLET, DELAYED RELEASE ORAL at 13:12

## 2017-11-10 RX ADMIN — Medication 5 MILLIGRAM(S): at 13:15

## 2017-11-10 NOTE — PROGRESS NOTE ADULT - SUBJECTIVE AND OBJECTIVE BOX
Patient is a 22y old  Female who presents with a chief complaint of intractable nausea and vomiting (09 Nov 2017 10:42)    SUBJECTIVE: s/p fall    MEDICATIONS  (STANDING):  amitriptyline 25 milliGRAM(s) Oral at bedtime  enoxaparin Injectable 40 milliGRAM(s) SubCutaneous every 24 hours  ketorolac   Injectable 15 milliGRAM(s) IV Push every 8 hours  metoclopramide Injectable 5 milliGRAM(s) IV Push every 6 hours  pantoprazole  Injectable 40 milliGRAM(s) IV Push daily  sodium chloride 0.9%. 1000 milliLiter(s) (75 mL/Hr) IV Continuous <Continuous>  sucralfate suspension 1 Gram(s) Oral three times a day    MEDICATIONS  (PRN):  acetaminophen   Tablet 650 milliGRAM(s) Oral every 6 hours PRN For Temp greater than 38 C (100.4 F)  acetaminophen   Tablet. 650 milliGRAM(s) Oral every 6 hours PRN Mild Pain (1 - 3)  hydrOXYzine hydrochloride 25 milliGRAM(s) Oral every 6 hours PRN Anxiety  ondansetron Injectable 4 milliGRAM(s) IV Push every 6 hours PRN Nausea and/or Vomiting  oxyCODONE    5 mG/acetaminophen 325 mG 2 Tablet(s) Oral every 6 hours PRN Severe Pain (7 - 10)  oxyCODONE    5 mG/acetaminophen 325 mG 1 Tablet(s) Oral every 6 hours PRN Moderate Pain (4 - 6)    Vital Signs Last 24 Hrs  T(C): 37.1 (10 Nov 2017 21:06), Max: 37.1 (09 Nov 2017 22:05)  T(F): 98.8 (10 Nov 2017 21:06), Max: 98.8 (09 Nov 2017 22:05)  HR: 80 (10 Nov 2017 21:06) (72 - 90)  BP: 149/99 (10 Nov 2017 21:06) (130/87 - 156/110)  BP(mean): --  RR: 18 (10 Nov 2017 21:06) (18 - 20)  SpO2: 100% (10 Nov 2017 21:06) (100% - 100%)    REVIEW OF SYSTEMS    CONSTITUTIONAL: No weakness, fevers or chills  EYES/ENT: No visual changes;  no ear or throat pain   NECK: No pain or stiffness  RESPIRATORY:  no SOB, no cough    CARDIOVASCULAR: no chest pain, no palpitations, no MCLEAN    GASTROINTESTINAL: Reports nausea and vomiting. No hematemesis; No diarrhea or constipation. No melena or hematochezia.  GENITOURINARY: No dysuria, frequency or hematuria  NEUROLOGICAL: No numbness or weakness  SKIN: No itching, burning, rashes, or lesions   MSK: no arthralgias, myalgias    PHYSICAL EXAM:    GENERAL: NAD, well-developed  HEAD:  Atraumatic, Normocephalic  EYES: EOMI, PERRLA, conjunctiva and sclera clear  NECK: Supple, No JVD  CHEST/LUNG: Clear to auscultation bilaterally; No wheeze  HEART: s1 s2, regular rhythm and rate   ABDOMEN: Soft, TTP, Nondistended; Bowel sounds present  EXTREMITIES:  No joint pain, swelling or deformity; no limitation of movement  NEUROLOGY: Alert & oriented; no sensory, motor or coordination deficits, normal reflexes  SKIN: No rashes or lesions    Labs:                        9.4    10.04 )-----------( 234      ( 10 Nov 2017 05:30 )             30.3     11-10    139  |  106  |  17  ----------------------------<  104<H>  3.9   |  18<L>  |  0.75    Ca    8.3<L>      10 Nov 2017 05:30  Phos  4.0     11-09  Mg     2.0     11-09    TPro  7.1  /  Alb  4.2  /  TBili  0.6  /  DBili  x   /  AST  19  /  ALT  14  /  AlkPhos  65  11-10
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
## 2017-11-10 NOTE — BEHAVIORAL HEALTH ASSESSMENT NOTE - NSBHSUICRISKFACTOR_PSY_A_CORE
Substance abuse/dependence/Global insomnia/Chronic pain or acute medical issue/History of abuse/trauma

## 2017-11-10 NOTE — BEHAVIORAL HEALTH ASSESSMENT NOTE - SUMMARY
Pt is a 21 yo female, single, employed as , domiciled with parents, non caregiver with hx of mood d/o NOS and ODD, 1 past psychiatric hospitalization ([facility] 2011) after SA by OD of Motrin, hx of aggressive behavior, PMH GERD and cyclic vomiting syndrome p/w with intractable nausea and vomiting.  Pt recently admitted last month presents (9/30-10/5) for same.  Psych consulted for psychosomaticism.    On exam, pt AAOx4, superficially cooperative with interview.  She denies acute psychiatric symptoms.  Denies SI/HI/AH/VH or psychotic symptoms.  Denies acute stressors that could have triggered vomitting.  She reports difficulty sleeping.  Elavil 25mg PO qHS ordered per GI and can help with insomnia.  Pt did not receive medication last night.  Monitor response tonight.  Will follow.

## 2017-11-10 NOTE — PROGRESS NOTE ADULT - ASSESSMENT
Patient is a 22y old  Female who presents with a chief complaint of intractable nausea and vomiting. Now with s/p fall. Pt states she slid out of bed while going to bathroom. Denies any injuries or pain. Vital signs stable.

## 2017-11-10 NOTE — CONSULT NOTE ADULT - ASSESSMENT
1. intractable n/v with history of CVS vs. hyperemesis cannabanoid syndrome vs. gastroenteritis    Recs:  1. Marijuana cessation counseling  2. Zofran 8mg IV Q8 hours standing for n/v  3. Consider starting L-carnitine and coenzyme Q10 supplementation  4. EKG to rule out baseline QTc abnormalities and consider Reglan PRN.  5. Avoid opiates

## 2017-11-10 NOTE — BEHAVIORAL HEALTH ASSESSMENT NOTE - NSBHCHARTREVIEWVS_PSY_A_CORE FT
Vital Signs Last 24 Hrs  T(C): 36.9 (10 Nov 2017 13:10), Max: 37.1 (09 Nov 2017 22:05)  T(F): 98.4 (10 Nov 2017 13:10), Max: 98.8 (09 Nov 2017 22:05)  HR: 88 (10 Nov 2017 13:10) (72 - 92)  BP: 140/92 (10 Nov 2017 13:10) (121/82 - 156/110)  BP(mean): --  RR: 20 (10 Nov 2017 13:10) (18 - 20)  SpO2: 100% (10 Nov 2017 13:10) (95% - 100%)

## 2017-11-10 NOTE — CONSULT NOTE ADULT - SUBJECTIVE AND OBJECTIVE BOX
Chief Complaint:  Patient is a 22y old  Female who presents with a chief complaint of intractable nausea and vomiting (2017 10:42)      HPI:  This is a 22 year old woman with a history of cyclic vomiting syndrome who presents with 2 days of intractable emesis. The patient ate some fried food yesterday and suddenly began having  Allergies:  penicillin (Hives)      Home Medications:    Hospital Medications:  acetaminophen   Tablet 650 milliGRAM(s) Oral every 6 hours PRN  acetaminophen   Tablet. 650 milliGRAM(s) Oral every 6 hours PRN  amitriptyline 25 milliGRAM(s) Oral at bedtime  enoxaparin Injectable 40 milliGRAM(s) SubCutaneous every 24 hours  hydrOXYzine hydrochloride 25 milliGRAM(s) Oral every 6 hours PRN  metoclopramide Injectable 5 milliGRAM(s) IV Push every 6 hours  ondansetron Injectable 4 milliGRAM(s) IV Push every 6 hours PRN  oxyCODONE    5 mG/acetaminophen 325 mG 2 Tablet(s) Oral every 6 hours PRN  oxyCODONE    5 mG/acetaminophen 325 mG 1 Tablet(s) Oral every 6 hours PRN  pantoprazole  Injectable 40 milliGRAM(s) IV Push daily  sodium chloride 0.9%. 1000 milliLiter(s) IV Continuous <Continuous>  sucralfate suspension 1 Gram(s) Oral three times a day      PMHX/PSHX:  Cyclic vomiting syndrome  GERD (gastroesophageal reflux disease)  Ulcer of gastric cardia  No pertinent past medical history  No significant past surgical history      Family history:  No pertinent family history in first degree relatives  Family history of hypertension  No pertinent family history in first degree relatives      Social History:     ROS:     General:  No wt loss, fevers, chills, night sweats, fatigue,   Eyes:  Good vision, no reported pain  ENT:  No sore throat, pain, runny nose, dysphagia  CV:  No pain, palpitations, hypo/hypertension  Resp:  No dyspnea, cough, tachypnea, wheezing  GI:  See HPI  :  No pain, bleeding, incontinence, nocturia  Muscle:  No pain, weakness  Neuro:  No weakness, tingling, memory problems  Psych:  No fatigue, insomnia, mood problems, depression  Endocrine:  No polyuria, polydipsia, cold/heat intolerance  Heme:  No petechiae, ecchymosis, easy bruisability  Skin:  No rash, edema      PHYSICAL EXAM:     GENERAL:  Appears stated age, well-groomed, well-nourished, no distress  HEENT:  NC/AT,  conjunctivae clear and pink,  no JVD  CHEST:  Full & symmetric excursion, no increased effort, breath sounds clear  HEART:  Regular rhythm, S1, S2, no murmur/rub/S3/S4, no abdominal bruit, no edema  ABDOMEN:  Soft, non-tender, non-distended, normoactive bowel sounds,  no masses , no hepatosplenomegaly  EXTREMITIES:  no cyanosis,clubbing or edema  SKIN:  No rash/erythema/ecchymoses/petechiae/wounds/abscess/warm/dry  NEURO:  Alert, oriented    Vital Signs:  Vital Signs Last 24 Hrs  T(C): 36.9 (10 Nov 2017 13:10), Max: 37.1 (2017 22:05)  T(F): 98.4 (10 Nov 2017 13:10), Max: 98.8 (2017 22:05)  HR: 88 (10 Nov 2017 13:10) (72 - 92)  BP: 140/92 (10 Nov 2017 13:10) (121/82 - 156/110)  BP(mean): --  RR: 20 (10 Nov 2017 13:10) (18 - 20)  SpO2: 100% (10 Nov 2017 13:10) (95% - 100%)  Daily     Daily     LABS:                        9.4    10.04 )-----------( 234      ( 10 Nov 2017 05:30 )             30.3     11-10    139  |  106  |  17  ----------------------------<  104<H>  3.9   |  18<L>  |  0.75    Ca    8.3<L>      10 Nov 2017 05:30  Phos  4.0     -  Mg     2.0     -    TPro  7.1  /  Alb  4.2  /  TBili  0.6  /  DBili  x   /  AST  19  /  ALT  14  /  AlkPhos  65  -10    LIVER FUNCTIONS - ( 10 Nov 2017 05:30 )  Alb: 4.2 g/dL / Pro: 7.1 g/dL / ALK PHOS: 65 u/L / ALT: 14 u/L / AST: 19 u/L / GGT: x             Urinalysis Basic - ( 2017 02:39 )    Color: YELLOW / Appearance: HAZY / S.031 / pH: 8.0  Gluc: NEGATIVE / Ketone: LARGE  / Bili: NEGATIVE / Urobili: 1 E.U.   Blood: TRACE / Protein: 300 / Nitrite: NEGATIVE   Leuk Esterase: SMALL / RBC: 10-25 / WBC 5-10   Sq Epi: MANY / Non Sq Epi: x / Bacteria: x          Imaging: Chief Complaint:  Patient is a 22y old  Female who presents with a chief complaint of intractable nausea and vomiting (2017 10:42)      HPI:  This is a 22 year old woman with a history of cyclic vomiting syndrome who presents with 2 days of intractable emesis. The patient ate some fried food yesterday and suddenly began having nausea with intractable vomiting.  Patient is actively smoking marijuana.   she denies any sick contacts, recent travel recent antibiotics.  She has not seen a GI doctor as an outpatient for the management of CVS.  Last EGd in 2017 with gastritis, duodenitis, HP negative.  No diarrhea, fevers, chills.    Allergies:  penicillin (Hives)      Home Medications:    Hospital Medications:  acetaminophen   Tablet 650 milliGRAM(s) Oral every 6 hours PRN  acetaminophen   Tablet. 650 milliGRAM(s) Oral every 6 hours PRN  amitriptyline 25 milliGRAM(s) Oral at bedtime  enoxaparin Injectable 40 milliGRAM(s) SubCutaneous every 24 hours  hydrOXYzine hydrochloride 25 milliGRAM(s) Oral every 6 hours PRN  metoclopramide Injectable 5 milliGRAM(s) IV Push every 6 hours  ondansetron Injectable 4 milliGRAM(s) IV Push every 6 hours PRN  oxyCODONE    5 mG/acetaminophen 325 mG 2 Tablet(s) Oral every 6 hours PRN  oxyCODONE    5 mG/acetaminophen 325 mG 1 Tablet(s) Oral every 6 hours PRN  pantoprazole  Injectable 40 milliGRAM(s) IV Push daily  sodium chloride 0.9%. 1000 milliLiter(s) IV Continuous <Continuous>  sucralfate suspension 1 Gram(s) Oral three times a day      PMHX/PSHX:  Cyclic vomiting syndrome  GERD (gastroesophageal reflux disease)  Ulcer of gastric cardia  No pertinent past medical history  No significant past surgical history      Family history:  No pertinent family history in first degree relatives  Family history of hypertension  No pertinent family history in first degree relatives      Social History:     ROS:     General:  No wt loss, fevers, chills, night sweats, fatigue,   Eyes:  Good vision, no reported pain  ENT:  No sore throat, pain, runny nose, dysphagia  CV:  No pain, palpitations, hypo/hypertension  Resp:  No dyspnea, cough, tachypnea, wheezing  GI:  See HPI  :  No pain, bleeding, incontinence, nocturia  Muscle:  No pain, weakness  Neuro:  No weakness, tingling, memory problems  Psych:  No fatigue, insomnia, mood problems, depression  Endocrine:  No polyuria, polydipsia, cold/heat intolerance  Heme:  No petechiae, ecchymosis, easy bruisability  Skin:  No rash, edema      PHYSICAL EXAM:     GENERAL:  Appears stated age, well-groomed, well-nourished, no distress  HEENT:  NC/AT,  conjunctivae clear and pink,  no JVD  CHEST:  Full & symmetric excursion, no increased effort, breath sounds clear  HEART:  Regular rhythm, S1, S2, no murmur/rub/S3/S4, no abdominal bruit, no edema  ABDOMEN:  Soft, non-tender, non-distended, normoactive bowel sounds,  no masses , no hepatosplenomegaly  EXTREMITIES:  no cyanosis,clubbing or edema  SKIN:  No rash/erythema/ecchymoses/petechiae/wounds/abscess/warm/dry  NEURO:  Alert, oriented    Vital Signs:  Vital Signs Last 24 Hrs  T(C): 36.9 (10 Nov 2017 13:10), Max: 37.1 (2017 22:05)  T(F): 98.4 (10 Nov 2017 13:10), Max: 98.8 (2017 22:05)  HR: 88 (10 Nov 2017 13:10) (72 - 92)  BP: 140/92 (10 Nov 2017 13:10) (121/82 - 156/110)  BP(mean): --  RR: 20 (10 Nov 2017 13:10) (18 - 20)  SpO2: 100% (10 Nov 2017 13:10) (95% - 100%)  Daily     Daily     LABS:                        9.4    10.04 )-----------( 234      ( 10 Nov 2017 05:30 )             30.3     11-10    139  |  106  |  17  ----------------------------<  104<H>  3.9   |  18<L>  |  0.75    Ca    8.3<L>      10 Nov 2017 05:30  Phos  4.0     11-  Mg     2.0     11-    TPro  7.1  /  Alb  4.2  /  TBili  0.6  /  DBili  x   /  AST  19  /  ALT  14  /  AlkPhos  65  11-10    LIVER FUNCTIONS - ( 10 Nov 2017 05:30 )  Alb: 4.2 g/dL / Pro: 7.1 g/dL / ALK PHOS: 65 u/L / ALT: 14 u/L / AST: 19 u/L / GGT: x             Urinalysis Basic - ( 2017 02:39 )    Color: YELLOW / Appearance: HAZY / S.031 / pH: 8.0  Gluc: NEGATIVE / Ketone: LARGE  / Bili: NEGATIVE / Urobili: 1 E.U.   Blood: TRACE / Protein: 300 / Nitrite: NEGATIVE   Leuk Esterase: SMALL / RBC: 10-25 / WBC 5-10   Sq Epi: MANY / Non Sq Epi: x / Bacteria: x          Imaging:

## 2017-11-10 NOTE — PROGRESS NOTE ADULT - SUBJECTIVE AND OBJECTIVE BOX
Patient is a 22y old  Female who presents with a chief complaint of intractable nausea and vomiting (2017 10:42)      SUBJECTIVE / OVERNIGHT EVENTS:  Pt reports she can't take any oral pills for abd pain, only morphine iv helps. She demonstrated some bizarre behaviors, like became unresponsive all of sudden, and woke up immediately. She appears very distressed, agitated     MEDICATIONS  (STANDING):  amitriptyline 25 milliGRAM(s) Oral at bedtime  enoxaparin Injectable 40 milliGRAM(s) SubCutaneous every 24 hours  metoclopramide Injectable 5 milliGRAM(s) IV Push every 6 hours  pantoprazole  Injectable 40 milliGRAM(s) IV Push daily  sodium chloride 0.9%. 1000 milliLiter(s) (75 mL/Hr) IV Continuous <Continuous>  sucralfate suspension 1 Gram(s) Oral three times a day    MEDICATIONS  (PRN):  acetaminophen   Tablet 650 milliGRAM(s) Oral every 6 hours PRN For Temp greater than 38 C (100.4 F)  acetaminophen   Tablet. 650 milliGRAM(s) Oral every 6 hours PRN Mild Pain (1 - 3)  ondansetron Injectable 4 milliGRAM(s) IV Push every 6 hours PRN Nausea and/or Vomiting  oxyCODONE    5 mG/acetaminophen 325 mG 2 Tablet(s) Oral every 6 hours PRN Severe Pain (7 - 10)  oxyCODONE    5 mG/acetaminophen 325 mG 1 Tablet(s) Oral every 6 hours PRN Moderate Pain (4 - 6)      T(C): 36.9 (11-10-17 @ 13:10), Max: 37.1 (17 @ 22:05)  HR: 88 (11-10-17 @ 13:10) (72 - 92)  BP: 140/92 (11-10-17 @ 13:10) (121/82 - 156/110)  RR: 20 (11-10-17 @ 13:10) (18 - 20)  SpO2: 100% (11-10-17 @ 13:10) (95% - 100%)  CAPILLARY BLOOD GLUCOSE        I&O's Summary      PHYSICAL EXAM:  GENERAL: NAD, well-developed  HEAD:  Atraumatic, Normocephalic  EYES: EOMI, PERRLA, conjunctiva and sclera clear  NECK: Supple, No JVD  CHEST/LUNG: Clear to auscultation bilaterally; No wheeze  HEART: s1 s2, regular rhythm and rate   ABDOMEN: Soft, TTP, Nondistended; Bowel sounds present  EXTREMITIES:  2+ Peripheral Pulses, No clubbing, cyanosis, or edema  PSYCH: AAOx3, agitated   NEUROLOGY: non-focal  SKIN: No rashes or lesions    LABS:                        9.4    10.04 )-----------( 234      ( 10 Nov 2017 05:30 )             30.3     11-10    139  |  106  |  17  ----------------------------<  104<H>  3.9   |  18<L>  |  0.75    Ca    8.3<L>      10 Nov 2017 05:30  Phos  4.0     11-  Mg     2.0     -    TPro  7.1  /  Alb  4.2  /  TBili  0.6  /  DBili  x   /  AST  19  /  ALT  14  /  AlkPhos  65  11-10          Urinalysis Basic - ( 2017 02:39 )    Color: YELLOW / Appearance: HAZY / S.031 / pH: 8.0  Gluc: NEGATIVE / Ketone: LARGE  / Bili: NEGATIVE / Urobili: 1 E.U.   Blood: TRACE / Protein: 300 / Nitrite: NEGATIVE   Leuk Esterase: SMALL / RBC: 10-25 / WBC 5-10   Sq Epi: MANY / Non Sq Epi: x / Bacteria: x        RADIOLOGY & ADDITIONAL TESTS:    Imaging Personally Reviewed:    Consultant(s) Notes Reviewed:      Care Discussed with Consultants/Other Providers:

## 2017-11-10 NOTE — BEHAVIORAL HEALTH ASSESSMENT NOTE - NSBHCONSULTFOLLOWAFTERCARE_PSY_A_CORE FT
Referrals:  [facility] [phone]  [facility] [phone], M-F, 9am-7pm

## 2017-11-10 NOTE — BEHAVIORAL HEALTH ASSESSMENT NOTE - RISK ASSESSMENT
Pt with chronic elevated risk due to hx of psych hospitalization, hx of SA, hx of aggressive behaviors.  Acute risk factors include pain, likely marijuana use.  Pt with numerous protective factors to mitigate these risks, including stable domicile, employed, family support, no recent psychiatric issues or aggressive behaviors, future oriented.  Pt denies SI/HI/AH/VH, psychotic or manic symptoms.  She does not present an acute risk of harm to self or others.  She does not require inpatient psychiatric hospitalization.

## 2017-11-10 NOTE — BEHAVIORAL HEALTH ASSESSMENT NOTE - NSBHCHARTREVIEWLAB_PSY_A_CORE FT
9.4    10.04 )-----------( 234      ( 10 Nov 2017 05:30 )             30.3   11-10    139  |  106  |  17  ----------------------------<  104<H>  3.9   |  18<L>  |  0.75    Ca    8.3<L>      10 Nov 2017 05:30  Phos  4.0     11-09  Mg     2.0     11-09    TPro  7.1  /  Alb  4.2  /  TBili  0.6  /  DBili  x   /  AST  19  /  ALT  14  /  AlkPhos  65  11-10    Toxicology Screen, Drugs of Abuse, Urine (11.09.17 @ 13:46)    Phencyclidine Level, Urine: NEGATIVE    Amphetamine, Urine: NEGATIVE: PH-6.5    Barbiturates Screen, Urine: NEGATIVE    Benzodiazepine, Urine: NEGATIVE    Cannabinoids, Urine: POSITIVE    Cocaine Metabolite, Urine: NEGATIVE    Methadone, Urine: NEGATIVE    Opiate, Urine: POSITIVE    Oxycodone, Urine: NEGATIVE:

## 2017-11-10 NOTE — PROGRESS NOTE ADULT - PROBLEM SELECTOR PLAN 1
-pt started on amitriptyline as per GI recs last admission, but has been noncomplaint  -restart amitriptyline  -c/w Zofran, Protonix  -c/w IVF hydration  -advance diet as tolerated, ensure added   -minimize narcotics - Percocet PRN pain  -pt denies marijuana use, but UTox positive for cannabinoid   GI consulted  Psych also consulted for underlying psychogenic component CVS vs hyperemesis cannabinoid syndrome vs gastritis    -pt started on amitriptyline as per GI recs last admission, but has been noncomplaint  c/w amitriptyline  c/w Zofran, Protonix  c/w IVF hydration  -advance diet as tolerated, ensure added   -minimize narcotics  -pt denies marijuana use, but UTox positive for cannabinoid   GI consulted  psych consulted for possible psychosomatic vomiting

## 2017-11-11 ENCOUNTER — TRANSCRIPTION ENCOUNTER (OUTPATIENT)
Age: 22
End: 2017-11-11

## 2017-11-11 LAB
BUN SERPL-MCNC: 14 MG/DL — SIGNIFICANT CHANGE UP (ref 7–23)
CALCIUM SERPL-MCNC: 8.6 MG/DL — SIGNIFICANT CHANGE UP (ref 8.4–10.5)
CHLORIDE SERPL-SCNC: 103 MMOL/L — SIGNIFICANT CHANGE UP (ref 98–107)
CO2 SERPL-SCNC: 18 MMOL/L — LOW (ref 22–31)
CREAT SERPL-MCNC: 0.83 MG/DL — SIGNIFICANT CHANGE UP (ref 0.5–1.3)
GLUCOSE SERPL-MCNC: 128 MG/DL — HIGH (ref 70–99)
HCT VFR BLD CALC: 33.2 % — LOW (ref 34.5–45)
HGB BLD-MCNC: 10.4 G/DL — LOW (ref 11.5–15.5)
MCHC RBC-ENTMCNC: 23.8 PG — LOW (ref 27–34)
MCHC RBC-ENTMCNC: 31.3 % — LOW (ref 32–36)
MCV RBC AUTO: 76 FL — LOW (ref 80–100)
NRBC # FLD: 0 — SIGNIFICANT CHANGE UP
PLATELET # BLD AUTO: 255 K/UL — SIGNIFICANT CHANGE UP (ref 150–400)
PMV BLD: 10 FL — SIGNIFICANT CHANGE UP (ref 7–13)
POTASSIUM SERPL-MCNC: 3 MMOL/L — LOW (ref 3.5–5.3)
POTASSIUM SERPL-SCNC: 3 MMOL/L — LOW (ref 3.5–5.3)
RBC # BLD: 4.37 M/UL — SIGNIFICANT CHANGE UP (ref 3.8–5.2)
RBC # FLD: 16.7 % — HIGH (ref 10.3–14.5)
SODIUM SERPL-SCNC: 139 MMOL/L — SIGNIFICANT CHANGE UP (ref 135–145)
WBC # BLD: 11.56 K/UL — HIGH (ref 3.8–10.5)
WBC # FLD AUTO: 11.56 K/UL — HIGH (ref 3.8–10.5)

## 2017-11-11 PROCEDURE — 99233 SBSQ HOSP IP/OBS HIGH 50: CPT

## 2017-11-11 RX ORDER — POTASSIUM CHLORIDE 20 MEQ
10 PACKET (EA) ORAL
Qty: 0 | Refills: 0 | Status: COMPLETED | OUTPATIENT
Start: 2017-11-11 | End: 2017-11-11

## 2017-11-11 RX ORDER — POTASSIUM CHLORIDE 20 MEQ
20 PACKET (EA) ORAL
Qty: 0 | Refills: 0 | Status: DISCONTINUED | OUTPATIENT
Start: 2017-11-11 | End: 2017-11-11

## 2017-11-11 RX ORDER — POTASSIUM CHLORIDE 20 MEQ
10 PACKET (EA) ORAL
Qty: 0 | Refills: 0 | Status: DISCONTINUED | OUTPATIENT
Start: 2017-11-11 | End: 2017-11-11

## 2017-11-11 RX ADMIN — PANTOPRAZOLE SODIUM 40 MILLIGRAM(S): 20 TABLET, DELAYED RELEASE ORAL at 13:16

## 2017-11-11 RX ADMIN — Medication 15 MILLIGRAM(S): at 11:00

## 2017-11-11 RX ADMIN — SODIUM CHLORIDE 75 MILLILITER(S): 9 INJECTION INTRAMUSCULAR; INTRAVENOUS; SUBCUTANEOUS at 05:24

## 2017-11-11 RX ADMIN — Medication 1 GRAM(S): at 05:24

## 2017-11-11 RX ADMIN — Medication 15 MILLIGRAM(S): at 17:20

## 2017-11-11 RX ADMIN — Medication 15 MILLIGRAM(S): at 01:03

## 2017-11-11 RX ADMIN — Medication 100 MILLIEQUIVALENT(S): at 23:41

## 2017-11-11 RX ADMIN — Medication 15 MILLIGRAM(S): at 17:50

## 2017-11-11 RX ADMIN — Medication 15 MILLIGRAM(S): at 10:10

## 2017-11-11 RX ADMIN — Medication 100 MILLIEQUIVALENT(S): at 20:28

## 2017-11-11 RX ADMIN — Medication 100 MILLIEQUIVALENT(S): at 21:57

## 2017-11-11 NOTE — DISCHARGE NOTE ADULT - MEDICATION SUMMARY - MEDICATIONS TO STOP TAKING
I will STOP taking the medications listed below when I get home from the hospital:    Percocet 5/325 oral tablet  -- 1 tab(s) by mouth every 4 hours, As Needed for pain MDD:30mg

## 2017-11-11 NOTE — PROGRESS NOTE ADULT - ASSESSMENT
21 yo F w/ cyclic vomiting syndrome, recently admitted last month for same, p/w intractable nausea and vomiting

## 2017-11-11 NOTE — DISCHARGE NOTE ADULT - HOSPITAL COURSE
21 yo F w/ cyclic vomiting syndrome, recently admitted last month for same, p/w intractable nausea and vomiting    Hospital Course:     Cyclic vomiting syndrome.    -pt started on amitriptyline as per GI recs last admission, but has been noncomplaint  -restart amitriptyline  -c/w Zofran, Protonix  -c/w IVF hydration  -advance diet as tolerated   -minimize narcotics - Percocet PRN pain  -pt denies marijuana use, but UTox positive last admission, will recheck  -avoid marijuana.    GERD (gastroesophageal reflux disease).    -Plan: -c/w Protonix.     Prophylactic measure.  Plan: -Lovenox.     FALL 11/10 o/n  - no injuries   -fall precautions    dispo: home 21 yo F w/ cyclic vomiting syndrome, recently admitted last month for same, p/w intractable nausea and vomiting    Hospital Course:     Cyclic vomiting syndrome.    -pt started on amitriptyline as per GI recs last admission, but has been noncomplaint  -restart amitriptyline  -c/w Zofran, Protonix  -c/w IVF hydration  -advance diet as tolerated   -minimize narcotics - Percocet PRN pain  -pt denies marijuana use, but UTox positive last admission, will recheck  -avoid marijuana.    GERD (gastroesophageal reflux disease).    -Plan: -c/w Protonix.     Prophylactic measure.  Plan: -Lovenox.     FALL 11/10 o/n  - no injuries   -fall precautions  -Hypokalemia, supplemented prior to discharge    dispo: home

## 2017-11-11 NOTE — PROGRESS NOTE ADULT - PROBLEM SELECTOR PLAN 1
CVS vs hyperemesis cannabinoid syndrome vs gastritis    -pt started on amitriptyline as per GI recs last admission, but has been noncompliant, c/w amitriptyline  c/w Zofran, Protonix  c/w IVF hydration, c/w supportive care  -advance diet as tolerated, ensure added   avoid narcotics per GI  -pt denies marijuana use, but UTox positive for cannabinoid   psych consulted for possible psychosomatic vomiting  marijuana cessation education provided

## 2017-11-11 NOTE — DISCHARGE NOTE ADULT - CARE PROVIDERS DIRECT ADDRESSES
[email or web address]

## 2017-11-11 NOTE — DISCHARGE NOTE ADULT - ADDITIONAL INSTRUCTIONS
Discharge follow up with psychiatry: [facility] [phone]--- [facility] [phone], M-F, 9am-7pm

## 2017-11-11 NOTE — DISCHARGE NOTE ADULT - CARE PROVIDER_API CALL
Nurse, Bambi HERNÁNDEZ), Internal Medicine  [address] [place] [address]  Phone: [phone]  Fax: [phone] Nurse, Bambi HERNÁNDEZ), Internal Medicine  [address] [place] [address]  Phone: [phone]  Fax: [phone]    [facility],   Phone: [phone]  Fax: (   )    -

## 2017-11-11 NOTE — DISCHARGE NOTE ADULT - PROVIDER TOKENS
TOKEN:'[id number]' TOKEN:'[id number]',FREE:[LAST:[[facility]],PHONE:[phone]],FAX:[(   )    -]]

## 2017-11-11 NOTE — DISCHARGE NOTE ADULT - CARE PLAN
Principal Discharge DX:	Cyclic vomiting syndrome  Goal:	resolved.  Instructions for follow-up, activity and diet:	Your Urine Tox screen was positive for cannabinoid use which was likely the source of your cyclic vomiting. Marijuana cessation education provided in the hospital. Your symptoms have resolved, please follow up with your  Nurse within 1-2 weeks for further medical management.  Secondary Diagnosis:	GERD (gastroesophageal reflux disease)  Instructions for follow-up, activity and diet:	continue with protonix  Secondary Diagnosis:	Anemia, unspecified type Principal Discharge DX:	Cyclic vomiting syndrome  Goal:	resolved.  Instructions for follow-up, activity and diet:	Your Urine Tox screen was positive for cannabinoid use which was likely the source of your cyclic vomiting. Marijuana cessation education provided in the hospital. Your symptoms have resolved, please follow up with your  Nurse within 1-2 weeks for further medical management.  Secondary Diagnosis:	GERD (gastroesophageal reflux disease)  Instructions for follow-up, activity and diet:	continue with protonix  Secondary Diagnosis:	Anemia, unspecified type  Instructions for follow-up, activity and diet:	Continue with iron supplement daily. Follow up blood work with Dr. Falk.

## 2017-11-11 NOTE — PROGRESS NOTE ADULT - SUBJECTIVE AND OBJECTIVE BOX
Patient is a 22y old  Female who presents with a chief complaint of intractable nausea and vomiting (11 Nov 2017 07:49)      SUBJECTIVE / OVERNIGHT EVENTS:  Pt states she wants morphine iv, that's the only medicine will help. she appears comfortable in the bed. she wants regular ensure, not the clear ensure.     MEDICATIONS  (STANDING):  amitriptyline 25 milliGRAM(s) Oral at bedtime  enoxaparin Injectable 40 milliGRAM(s) SubCutaneous every 24 hours  metoclopramide Injectable 5 milliGRAM(s) IV Push every 6 hours  pantoprazole  Injectable 40 milliGRAM(s) IV Push daily  potassium chloride    Tablet ER 20 milliEquivalent(s) Oral two times a day  sodium chloride 0.9%. 1000 milliLiter(s) (75 mL/Hr) IV Continuous <Continuous>  sucralfate suspension 1 Gram(s) Oral three times a day    MEDICATIONS  (PRN):  acetaminophen   Tablet 650 milliGRAM(s) Oral every 6 hours PRN For Temp greater than 38 C (100.4 F)  acetaminophen   Tablet. 650 milliGRAM(s) Oral every 6 hours PRN Mild Pain (1 - 3)  hydrOXYzine hydrochloride 25 milliGRAM(s) Oral every 6 hours PRN Anxiety  ondansetron Injectable 4 milliGRAM(s) IV Push every 6 hours PRN Nausea and/or Vomiting  oxyCODONE    5 mG/acetaminophen 325 mG 2 Tablet(s) Oral every 6 hours PRN Severe Pain (7 - 10)  oxyCODONE    5 mG/acetaminophen 325 mG 1 Tablet(s) Oral every 6 hours PRN Moderate Pain (4 - 6)      T(C): 36.9 (11-11-17 @ 13:17), Max: 37.1 (11-10-17 @ 21:06)  HR: 79 (11-11-17 @ 13:17) (79 - 86)  BP: 147/98 (11-11-17 @ 13:17) (141/96 - 149/99)  RR: 18 (11-11-17 @ 13:17) (18 - 18)  SpO2: 100% (11-11-17 @ 13:17) (100% - 100%)  CAPILLARY BLOOD GLUCOSE        I&O's Summary      PHYSICAL EXAM:  GENERAL: NAD, well-developed  HEAD:  Atraumatic, Normocephalic  EYES: EOMI, PERRLA, conjunctiva and sclera clear  NECK: Supple, No JVD  CHEST/LUNG: Clear to auscultation bilaterally; No wheeze  HEART: s1 s2, regular rhythm and rate   ABDOMEN: Soft, Nontender, Nondistended; Bowel sounds present  EXTREMITIES:  2+ Peripheral Pulses, No clubbing, cyanosis, or edema  PSYCH: AAOx3, mildly anxious  NEUROLOGY: non-focal  SKIN: No rashes or lesions    LABS:                        10.4   11.56 )-----------( 255      ( 11 Nov 2017 14:54 )             33.2     11-11    139  |  103  |  14  ----------------------------<  128<H>  3.0<L>   |  18<L>  |  0.83    Ca    8.6      11 Nov 2017 13:36    TPro  7.1  /  Alb  4.2  /  TBili  0.6  /  DBili  x   /  AST  19  /  ALT  14  /  AlkPhos  65  11-10              RADIOLOGY & ADDITIONAL TESTS:    Imaging Personally Reviewed:    Consultant(s) Notes Reviewed:      Care Discussed with Consultants/Other Providers:

## 2017-11-11 NOTE — DISCHARGE NOTE ADULT - PATIENT PORTAL LINK FT
“You can access the Follow[employer] Patient Portal, offered by [facility], by registering with the following website: [email or web address]”

## 2017-11-11 NOTE — DISCHARGE NOTE ADULT - MEDICATION SUMMARY - MEDICATIONS TO TAKE
I will START or STAY ON the medications listed below when I get home from the hospital:    amitriptyline 25 mg oral tablet  -- 1 tab(s) by mouth once a day (at bedtime)  -- Indication: For Cyclic vomiting syndrome    ondansetron 4 mg oral tablet  -- 1 tab(s) by mouth every 6 hours, As Needed -for nausea  -- Indication: For Cyclic vomiting syndrome    ferrous sulfate 325 mg (65 mg elemental iron) oral tablet  -- 1 tab(s) by mouth once a day   -- Check with your doctor before becoming pregnant.  Do not chew, break, or crush.  May discolor urine or feces.    -- Indication: For Anemia, unspecified type    sucralfate 1 g/10 mL oral suspension  -- 1 gram(s) by mouth 3 times a day   -- Indication: For Cyclic vomiting syndrome    Protonix 40 mg oral delayed release tablet  -- 1 tab(s) by mouth once a day  -- Indication: For Cyclic vomiting syndrome

## 2017-11-11 NOTE — DISCHARGE NOTE ADULT - PLAN OF CARE
resolved. Your Urine Tox screen was positive for cannabinoid use which was likely the source of your cyclic vomiting. Marijuana cessation education provided in the hospital. Your symptoms have resolved, please follow up with your  Nurse within 1-2 weeks for further medical management. continue with protonix Continue with iron supplement daily. Follow up blood work with Dr. Falk.

## 2017-11-12 DIAGNOSIS — D72.829 ELEVATED WHITE BLOOD CELL COUNT, UNSPECIFIED: ICD-10-CM

## 2017-11-12 LAB
BUN SERPL-MCNC: 13 MG/DL — SIGNIFICANT CHANGE UP (ref 7–23)
CALCIUM SERPL-MCNC: 8.5 MG/DL — SIGNIFICANT CHANGE UP (ref 8.4–10.5)
CHLORIDE SERPL-SCNC: 99 MMOL/L — SIGNIFICANT CHANGE UP (ref 98–107)
CO2 SERPL-SCNC: 18 MMOL/L — LOW (ref 22–31)
CREAT SERPL-MCNC: 0.68 MG/DL — SIGNIFICANT CHANGE UP (ref 0.5–1.3)
FERRITIN SERPL-MCNC: 11.8 NG/ML — LOW (ref 15–150)
GLUCOSE SERPL-MCNC: 113 MG/DL — HIGH (ref 70–99)
HCT VFR BLD CALC: 38.4 % — SIGNIFICANT CHANGE UP (ref 34.5–45)
HGB BLD-MCNC: 12.4 G/DL — SIGNIFICANT CHANGE UP (ref 11.5–15.5)
IRON SATN MFR SERPL: 22 UG/DL — LOW (ref 30–160)
IRON SATN MFR SERPL: 460 UG/DL — SIGNIFICANT CHANGE UP (ref 140–530)
MCHC RBC-ENTMCNC: 24.4 PG — LOW (ref 27–34)
MCHC RBC-ENTMCNC: 32.3 % — SIGNIFICANT CHANGE UP (ref 32–36)
MCV RBC AUTO: 75.6 FL — LOW (ref 80–100)
NRBC # FLD: 0 — SIGNIFICANT CHANGE UP
PLATELET # BLD AUTO: 256 K/UL — SIGNIFICANT CHANGE UP (ref 150–400)
PMV BLD: 9.2 FL — SIGNIFICANT CHANGE UP (ref 7–13)
POTASSIUM SERPL-MCNC: 3.6 MMOL/L — SIGNIFICANT CHANGE UP (ref 3.5–5.3)
POTASSIUM SERPL-SCNC: 3.6 MMOL/L — SIGNIFICANT CHANGE UP (ref 3.5–5.3)
RBC # BLD: 5.08 M/UL — SIGNIFICANT CHANGE UP (ref 3.8–5.2)
RBC # FLD: 16.3 % — HIGH (ref 10.3–14.5)
SODIUM SERPL-SCNC: 135 MMOL/L — SIGNIFICANT CHANGE UP (ref 135–145)
UIBC SERPL-MCNC: 438 UG/DL — HIGH (ref 110–370)
WBC # BLD: 12.68 K/UL — HIGH (ref 3.8–10.5)
WBC # FLD AUTO: 12.68 K/UL — HIGH (ref 3.8–10.5)

## 2017-11-12 PROCEDURE — 99232 SBSQ HOSP IP/OBS MODERATE 35: CPT

## 2017-11-12 RX ORDER — LANOLIN ALCOHOL/MO/W.PET/CERES
3 CREAM (GRAM) TOPICAL AT BEDTIME
Qty: 0 | Refills: 0 | Status: DISCONTINUED | OUTPATIENT
Start: 2017-11-12 | End: 2017-11-13

## 2017-11-12 RX ORDER — FERROUS SULFATE 325(65) MG
325 TABLET ORAL DAILY
Qty: 0 | Refills: 0 | Status: DISCONTINUED | OUTPATIENT
Start: 2017-11-12 | End: 2017-11-13

## 2017-11-12 RX ORDER — ACETAMINOPHEN 500 MG
1000 TABLET ORAL ONCE
Qty: 0 | Refills: 0 | Status: COMPLETED | OUTPATIENT
Start: 2017-11-12 | End: 2017-11-12

## 2017-11-12 RX ADMIN — PANTOPRAZOLE SODIUM 40 MILLIGRAM(S): 20 TABLET, DELAYED RELEASE ORAL at 13:01

## 2017-11-12 RX ADMIN — Medication 1000 MILLIGRAM(S): at 22:41

## 2017-11-12 RX ADMIN — Medication 400 MILLIGRAM(S): at 22:26

## 2017-11-12 RX ADMIN — Medication 1 GRAM(S): at 13:01

## 2017-11-12 RX ADMIN — Medication 1 GRAM(S): at 21:34

## 2017-11-12 NOTE — PROGRESS NOTE ADULT - PROBLEM SELECTOR PLAN 1
CVS vs hyperemesis cannabinoid syndrome vs gastritis, clinically improved   -pt started on amitriptyline as per GI recs last admission, but has been noncompliant, c/w amitriptyline  c/w Zofran, Protonix  c/w IVF hydration, c/w supportive care  -advance diet as tolerated, ensure added   avoid narcotics per GI  -pt denies marijuana use, but UTox positive for cannabinoid   psych consulted for possible psychosomatic vomiting  marijuana cessation education provided

## 2017-11-12 NOTE — PROGRESS NOTE ADULT - SUBJECTIVE AND OBJECTIVE BOX
Patient is a 22y old  Female who presents with a chief complaint of intractable nausea and vomiting (11 Nov 2017 07:49)      SUBJECTIVE / OVERNIGHT EVENTS:  Pt reports n/v/abd pain improved. requests regular diet.     MEDICATIONS  (STANDING):  amitriptyline 25 milliGRAM(s) Oral at bedtime  enoxaparin Injectable 40 milliGRAM(s) SubCutaneous every 24 hours  melatonin 3 milliGRAM(s) Oral at bedtime  metoclopramide Injectable 5 milliGRAM(s) IV Push every 6 hours  pantoprazole  Injectable 40 milliGRAM(s) IV Push daily  sodium chloride 0.9%. 1000 milliLiter(s) (75 mL/Hr) IV Continuous <Continuous>  sucralfate suspension 1 Gram(s) Oral three times a day    MEDICATIONS  (PRN):  acetaminophen   Tablet 650 milliGRAM(s) Oral every 6 hours PRN For Temp greater than 38 C (100.4 F)  acetaminophen   Tablet. 650 milliGRAM(s) Oral every 6 hours PRN Mild Pain (1 - 3)  hydrOXYzine hydrochloride 25 milliGRAM(s) Oral every 6 hours PRN Anxiety  ondansetron Injectable 4 milliGRAM(s) IV Push every 6 hours PRN Nausea and/or Vomiting  oxyCODONE    5 mG/acetaminophen 325 mG 2 Tablet(s) Oral every 6 hours PRN Severe Pain (7 - 10)  oxyCODONE    5 mG/acetaminophen 325 mG 1 Tablet(s) Oral every 6 hours PRN Moderate Pain (4 - 6)      T(C): 37.1 (11-12-17 @ 12:46), Max: 37.1 (11-12-17 @ 05:42)  HR: 88 (11-12-17 @ 12:46) (60 - 88)  BP: 135/90 (11-12-17 @ 12:46) (135/90 - 144/92)  RR: 18 (11-12-17 @ 12:46) (18 - 18)  SpO2: 100% (11-12-17 @ 12:46) (100% - 100%)  CAPILLARY BLOOD GLUCOSE        I&O's Summary      PHYSICAL EXAM:  GENERAL: NAD, well-developed  HEAD:  Atraumatic, Normocephalic  EYES: EOMI, PERRLA, conjunctiva and sclera clear  NECK: Supple, No JVD  CHEST/LUNG: Clear to auscultation bilaterally; No wheeze  HEART: s1 s2, regular rhythm and rate   ABDOMEN: Soft, Nontender, Nondistended; Bowel sounds present  EXTREMITIES:  2+ Peripheral Pulses, No clubbing, cyanosis, or edema  PSYCH: AAOx3, calm   NEUROLOGY: non-focal  SKIN: No rashes or lesions    LABS:                        12.4   12.68 )-----------( 256      ( 12 Nov 2017 15:30 )             38.4     11-12    135  |  99  |  13  ----------------------------<  113<H>  3.6   |  18<L>  |  0.68    Ca    8.5      12 Nov 2017 15:30                RADIOLOGY & ADDITIONAL TESTS:    Imaging Personally Reviewed:    Consultant(s) Notes Reviewed:      Care Discussed with Consultants/Other Providers:

## 2017-11-13 VITALS
HEART RATE: 73 BPM | TEMPERATURE: 97 F | DIASTOLIC BLOOD PRESSURE: 99 MMHG | RESPIRATION RATE: 19 BRPM | SYSTOLIC BLOOD PRESSURE: 136 MMHG | OXYGEN SATURATION: 100 %

## 2017-11-13 DIAGNOSIS — F45.1 UNDIFFERENTIATED SOMATOFORM DISORDER: ICD-10-CM

## 2017-11-13 DIAGNOSIS — F12.10 CANNABIS ABUSE, UNCOMPLICATED: ICD-10-CM

## 2017-11-13 LAB
BUN SERPL-MCNC: 10 MG/DL — SIGNIFICANT CHANGE UP (ref 7–23)
BUN SERPL-MCNC: 10 MG/DL — SIGNIFICANT CHANGE UP (ref 7–23)
CALCIUM SERPL-MCNC: 8.3 MG/DL — LOW (ref 8.4–10.5)
CALCIUM SERPL-MCNC: 8.5 MG/DL — SIGNIFICANT CHANGE UP (ref 8.4–10.5)
CHLORIDE SERPL-SCNC: 99 MMOL/L — SIGNIFICANT CHANGE UP (ref 98–107)
CHLORIDE SERPL-SCNC: 99 MMOL/L — SIGNIFICANT CHANGE UP (ref 98–107)
CO2 SERPL-SCNC: 20 MMOL/L — LOW (ref 22–31)
CO2 SERPL-SCNC: 23 MMOL/L — SIGNIFICANT CHANGE UP (ref 22–31)
CREAT SERPL-MCNC: 0.65 MG/DL — SIGNIFICANT CHANGE UP (ref 0.5–1.3)
CREAT SERPL-MCNC: 0.67 MG/DL — SIGNIFICANT CHANGE UP (ref 0.5–1.3)
GLUCOSE SERPL-MCNC: 96 MG/DL — SIGNIFICANT CHANGE UP (ref 70–99)
GLUCOSE SERPL-MCNC: 99 MG/DL — SIGNIFICANT CHANGE UP (ref 70–99)
HCT VFR BLD CALC: 36.4 % — SIGNIFICANT CHANGE UP (ref 34.5–45)
HGB BLD-MCNC: 11.6 G/DL — SIGNIFICANT CHANGE UP (ref 11.5–15.5)
MAGNESIUM SERPL-MCNC: 2.2 MG/DL — SIGNIFICANT CHANGE UP (ref 1.6–2.6)
MCHC RBC-ENTMCNC: 23.9 PG — LOW (ref 27–34)
MCHC RBC-ENTMCNC: 31.9 % — LOW (ref 32–36)
MCV RBC AUTO: 75.1 FL — LOW (ref 80–100)
NRBC # FLD: 0 — SIGNIFICANT CHANGE UP
PHOSPHATE SERPL-MCNC: 2.7 MG/DL — SIGNIFICANT CHANGE UP (ref 2.5–4.5)
PLATELET # BLD AUTO: 273 K/UL — SIGNIFICANT CHANGE UP (ref 150–400)
PMV BLD: 10.9 FL — SIGNIFICANT CHANGE UP (ref 7–13)
POTASSIUM SERPL-MCNC: 3.1 MMOL/L — LOW (ref 3.5–5.3)
POTASSIUM SERPL-MCNC: 3.4 MMOL/L — LOW (ref 3.5–5.3)
POTASSIUM SERPL-SCNC: 3.1 MMOL/L — LOW (ref 3.5–5.3)
POTASSIUM SERPL-SCNC: 3.4 MMOL/L — LOW (ref 3.5–5.3)
RBC # BLD: 4.85 M/UL — SIGNIFICANT CHANGE UP (ref 3.8–5.2)
RBC # FLD: 15.8 % — HIGH (ref 10.3–14.5)
SODIUM SERPL-SCNC: 135 MMOL/L — SIGNIFICANT CHANGE UP (ref 135–145)
SODIUM SERPL-SCNC: 137 MMOL/L — SIGNIFICANT CHANGE UP (ref 135–145)
WBC # BLD: 10.51 K/UL — HIGH (ref 3.8–10.5)
WBC # FLD AUTO: 10.51 K/UL — HIGH (ref 3.8–10.5)

## 2017-11-13 PROCEDURE — 99239 HOSP IP/OBS DSCHRG MGMT >30: CPT

## 2017-11-13 PROCEDURE — 99233 SBSQ HOSP IP/OBS HIGH 50: CPT

## 2017-11-13 RX ORDER — SUCRALFATE 1 G
1 TABLET ORAL
Qty: 100 | Refills: 0 | OUTPATIENT
Start: 2017-11-13 | End: 2017-12-13

## 2017-11-13 RX ORDER — SUCRALFATE 1 G
10 TABLET ORAL
Qty: 1200 | Refills: 0 | OUTPATIENT
Start: 2017-11-13 | End: 2017-12-13

## 2017-11-13 RX ORDER — POTASSIUM CHLORIDE 20 MEQ
20 PACKET (EA) ORAL ONCE
Qty: 0 | Refills: 0 | Status: COMPLETED | OUTPATIENT
Start: 2017-11-13 | End: 2017-11-13

## 2017-11-13 RX ORDER — POTASSIUM CHLORIDE 20 MEQ
20 PACKET (EA) ORAL
Qty: 0 | Refills: 0 | Status: DISCONTINUED | OUTPATIENT
Start: 2017-11-13 | End: 2017-11-13

## 2017-11-13 RX ORDER — CALCIUM CARBONATE 500(1250)
1 TABLET ORAL ONCE
Qty: 0 | Refills: 0 | Status: COMPLETED | OUTPATIENT
Start: 2017-11-13 | End: 2017-11-13

## 2017-11-13 RX ORDER — FERROUS SULFATE 325(65) MG
1 TABLET ORAL
Qty: 30 | Refills: 0 | OUTPATIENT
Start: 2017-11-13 | End: 2017-12-13

## 2017-11-13 RX ORDER — POTASSIUM CHLORIDE 20 MEQ
40 PACKET (EA) ORAL ONCE
Qty: 0 | Refills: 0 | Status: COMPLETED | OUTPATIENT
Start: 2017-11-13 | End: 2017-11-13

## 2017-11-13 RX ORDER — AMITRIPTYLINE HCL 25 MG
1 TABLET ORAL
Qty: 30 | Refills: 0 | OUTPATIENT
Start: 2017-11-13 | End: 2017-12-13

## 2017-11-13 RX ORDER — PANTOPRAZOLE SODIUM 20 MG/1
1 TABLET, DELAYED RELEASE ORAL
Qty: 30 | Refills: 0 | OUTPATIENT
Start: 2017-11-13 | End: 2017-12-13

## 2017-11-13 RX ORDER — POTASSIUM CHLORIDE 20 MEQ
10 PACKET (EA) ORAL
Qty: 0 | Refills: 0 | Status: COMPLETED | OUTPATIENT
Start: 2017-11-13 | End: 2017-11-13

## 2017-11-13 RX ORDER — ONDANSETRON 8 MG/1
1 TABLET, FILM COATED ORAL
Qty: 28 | Refills: 0 | OUTPATIENT
Start: 2017-11-13 | End: 2017-11-20

## 2017-11-13 RX ORDER — AMITRIPTYLINE HCL 25 MG
1 TABLET ORAL
Qty: 0 | Refills: 0 | COMMUNITY
Start: 2017-11-13

## 2017-11-13 RX ADMIN — Medication 1 GRAM(S): at 14:33

## 2017-11-13 RX ADMIN — Medication 100 MILLIEQUIVALENT(S): at 11:14

## 2017-11-13 RX ADMIN — ENOXAPARIN SODIUM 40 MILLIGRAM(S): 100 INJECTION SUBCUTANEOUS at 05:02

## 2017-11-13 RX ADMIN — SODIUM CHLORIDE 75 MILLILITER(S): 9 INJECTION INTRAMUSCULAR; INTRAVENOUS; SUBCUTANEOUS at 12:40

## 2017-11-13 RX ADMIN — SODIUM CHLORIDE 75 MILLILITER(S): 9 INJECTION INTRAMUSCULAR; INTRAVENOUS; SUBCUTANEOUS at 05:02

## 2017-11-13 RX ADMIN — Medication 100 MILLIEQUIVALENT(S): at 12:41

## 2017-11-13 RX ADMIN — PANTOPRAZOLE SODIUM 40 MILLIGRAM(S): 20 TABLET, DELAYED RELEASE ORAL at 11:12

## 2017-11-13 RX ADMIN — ONDANSETRON 4 MILLIGRAM(S): 8 TABLET, FILM COATED ORAL at 03:41

## 2017-11-13 RX ADMIN — Medication 1 TABLET(S): at 05:42

## 2017-11-13 RX ADMIN — OXYCODONE AND ACETAMINOPHEN 1 TABLET(S): 5; 325 TABLET ORAL at 05:42

## 2017-11-13 RX ADMIN — OXYCODONE AND ACETAMINOPHEN 1 TABLET(S): 5; 325 TABLET ORAL at 15:51

## 2017-11-13 RX ADMIN — Medication 40 MILLIEQUIVALENT(S): at 12:39

## 2017-11-13 RX ADMIN — Medication 20 MILLIEQUIVALENT(S): at 16:42

## 2017-11-13 RX ADMIN — OXYCODONE AND ACETAMINOPHEN 1 TABLET(S): 5; 325 TABLET ORAL at 06:40

## 2017-11-13 RX ADMIN — OXYCODONE AND ACETAMINOPHEN 1 TABLET(S): 5; 325 TABLET ORAL at 16:51

## 2017-11-13 RX ADMIN — Medication 1 GRAM(S): at 05:01

## 2017-11-13 NOTE — PROGRESS NOTE BEHAVIORAL HEALTH - NSBHCONSULTFOLLOWAFTERCARE_PSY_A_CORE FT
Referrals:  [facility] [phone]  [facility] walk[facility]in [facility] [phone], M-F, 9am-7pm

## 2017-11-13 NOTE — PROGRESS NOTE BEHAVIORAL HEALTH - NSBHFUPINTERVALHXFT_PSY_A_CORE
Patient seen for follow-up of r/o psychosomatization.  No overnight events or PRNs.  This AM patient states mood as "fine", denies depressed mood or symptoms of anxiety.  Denies psychosocial stressors at home or at work.  Endorses good appetite and sleep.  Able to tolerate PO without further episodes of vomiting this AM.  Denies SI/I/P and HI.

## 2017-11-13 NOTE — PROGRESS NOTE BEHAVIORAL HEALTH - NSBHCHARTREVIEWVS_PSY_A_CORE FT
Vital Signs Last 24 Hrs  T(C): 36.3 (13 Nov 2017 12:55), Max: 37.2 (12 Nov 2017 20:30)  T(F): 97.4 (13 Nov 2017 12:55), Max: 99 (12 Nov 2017 20:30)  HR: 73 (13 Nov 2017 12:55) (73 - 86)  BP: 136/99 (13 Nov 2017 12:55) (136/99 - 140/90)  BP(mean): --  RR: 19 (13 Nov 2017 12:55) (18 - 19)  SpO2: 100% (13 Nov 2017 12:55) (100% - 100%)

## 2017-11-13 NOTE — PROGRESS NOTE ADULT - SUBJECTIVE AND OBJECTIVE BOX
Patient is a 22y old  Female who presents with a chief complaint of intractable nausea and vomiting (11 Nov 2017 07:49)      SUBJECTIVE / OVERNIGHT EVENTS: No complaints. No N/V.    MEDICATIONS  (STANDING):  amitriptyline 25 milliGRAM(s) Oral at bedtime  enoxaparin Injectable 40 milliGRAM(s) SubCutaneous every 24 hours  ferrous    sulfate 325 milliGRAM(s) Oral daily  melatonin 3 milliGRAM(s) Oral at bedtime  metoclopramide Injectable 5 milliGRAM(s) IV Push every 6 hours  pantoprazole  Injectable 40 milliGRAM(s) IV Push daily  potassium chloride   Powder 40 milliEquivalent(s) Oral once  potassium chloride  10 mEq/100 mL IVPB 10 milliEquivalent(s) IV Intermittent every 1 hour  sodium chloride 0.9%. 1000 milliLiter(s) (75 mL/Hr) IV Continuous <Continuous>  sucralfate suspension 1 Gram(s) Oral three times a day    MEDICATIONS  (PRN):  acetaminophen   Tablet 650 milliGRAM(s) Oral every 6 hours PRN For Temp greater than 38 C (100.4 F)  acetaminophen   Tablet. 650 milliGRAM(s) Oral every 6 hours PRN Mild Pain (1 - 3)  hydrOXYzine hydrochloride 25 milliGRAM(s) Oral every 6 hours PRN Anxiety  ondansetron Injectable 4 milliGRAM(s) IV Push every 6 hours PRN Nausea and/or Vomiting  oxyCODONE    5 mG/acetaminophen 325 mG 2 Tablet(s) Oral every 6 hours PRN Severe Pain (7 - 10)  oxyCODONE    5 mG/acetaminophen 325 mG 1 Tablet(s) Oral every 6 hours PRN Moderate Pain (4 - 6)      Vital Signs Last 24 Hrs  T(C): 37 (13 Nov 2017 05:00), Max: 37.2 (12 Nov 2017 20:30)  T(F): 98.6 (13 Nov 2017 05:00), Max: 99 (12 Nov 2017 20:30)  HR: 86 (13 Nov 2017 05:00) (79 - 88)  BP: 140/90 (13 Nov 2017 05:00) (135/90 - 140/90)  BP(mean): --  RR: 18 (13 Nov 2017 05:00) (18 - 18)  SpO2: 100% (13 Nov 2017 05:00) (100% - 100%)  CAPILLARY BLOOD GLUCOSE        I&O's Summary      PHYSICAL EXAM:  GENERAL: NAD, well-developed  HEAD:  Atraumatic, Normocephalic  EYES: EOMI, PERRLA, conjunctiva and sclera clear  NECK: Supple, No JVD  CHEST/LUNG: Clear to auscultation bilaterally; No wheeze  HEART: Regular rate and rhythm; No murmurs, rubs, or gallops  ABDOMEN: Soft, Nontender, Nondistended; Bowel sounds present  EXTREMITIES:  2+ Peripheral Pulses, No clubbing, cyanosis, or edema  PSYCH: AAOx3  NEUROLOGY: non-focal  SKIN: No rashes or lesions    LABS:                        11.6   10.51 )-----------( 273      ( 13 Nov 2017 05:54 )             36.4     11-13    137  |  99  |  10  ----------------------------<  96  3.1<L>   |  23  |  0.67    Ca    8.5      13 Nov 2017 05:54  Phos  2.7     11-13  Mg     2.2     11-13                RADIOLOGY & ADDITIONAL TESTS:    Imaging Personally Reviewed:    Consultant(s) Notes Reviewed:      Care Discussed with Consultants/Other Providers:

## 2017-11-13 NOTE — PROGRESS NOTE BEHAVIORAL HEALTH - NSBHCHARTREVIEWLAB_PSY_A_CORE FT
11.6   10.51 )-----------( 273      ( 13 Nov 2017 05:54 )             36.4     11-13    137  |  99  |  10  ----------------------------<  96  3.1<L>   |  23  |  0.67    Ca    8.5      13 Nov 2017 05:54  Phos  2.7     11-13  Mg     2.2     11-13

## 2017-11-13 NOTE — PROGRESS NOTE BEHAVIORAL HEALTH - SUMMARY
21 yo female, single, employed as , domiciled with parents, non caregiver with hx of mood d/o NOS and ODD, 1 past psychiatric hospitalization ([facility] 2011) after SA by OD of Motrin, hx of aggressive behavior, PMH GERD and cyclic vomiting syndrome p/w with intractable nausea and vomiting.  Pt recently admitted last month presents (9/30-10/5) for same.  Psych consulted for psychosomaticism.  At this time cycle vomiting does not appear to be due to a somatoform disorder but may be due to hyperemesis cannabinoid syndrome; she currently denies recent THC use however Utox on admission noted to be positive for THC.    1.) C/w Elavil 25mg daily per GI, Atarax 25mg PO q6h PRN anxiety.  2.)  For agitation - can given Ativan 1mg PO/IV/IM q6h PRN  3.) Does not require inpatient psychiatric hospitalization at this time.  4.)  Patient refusing to f/u with outpatient psychiatry or therapist, however if interested can f/u with [facility] Adult Outpatient Department ([phone]) and/or Crisis Clinic ([phone]). 21 yo female, single, employed as , domiciled with parents, non caregiver with hx of mood d/o NOS and ODD, 1 past psychiatric hospitalization ([facility] 2011) after SA by OD of Motrin, hx of aggressive behavior, PMH GERD and cyclic vomiting syndrome p/w with intractable nausea and vomiting.  Pt recently admitted last month presents (9/30-10/5) for same.  Psych consulted for psychosomatization.  The patient's cyclical vomiting syndrome meets criteria for somatic symptom disorder, as even if there is an organic component to her symptoms, the sx are exaggerated. The patient's sx may also be due to hyperemesis cannabinoid syndrome; she currently denies recent THC use however Utox on admission noted to be positive for THC.    1.) C/w Elavil 25mg daily per GI, Atarax 25mg PO q6h PRN anxiety.  2.)  For agitation - can given Ativan 1mg PO/IV/IM q6h PRN  3.) Does not require inpatient psychiatric hospitalization at this time.  4.)  Patient refusing to f/u with outpatient psychiatry or therapist, however if interested can f/u with [facility] Adult Outpatient Department ([phone]) and/or Crisis Clinic ([phone]).

## 2017-11-13 NOTE — PROGRESS NOTE ADULT - PROBLEM SELECTOR PLAN 2
[facility] vs hyperemesis cannabinoid syndrome vs gastritis, clinically improved   -pt started on amitriptyline as per GI recs last admission, but has been noncompliant, c/w amitriptyline  c/w Zofran, Protonix  c/w IVF hydration, c/w supportive care  -advance diet as tolerated, ensure added   avoid narcotics per GI  -pt denies marijuana use, but UTox positive for cannabinoid   psych consulted for possible psychosomatic vomiting  marijuana cessation education provided  Possible d/c today after supplement of Potassium

## 2017-11-13 NOTE — PROGRESS NOTE BEHAVIORAL HEALTH - CASE SUMMARY
Pt seen/evaluated in follow-up. On exam pt is A&Ox3, no SI/HI, no psychotic sx, and appears euthymic. She reports occasional cannabis use ("last many months ago") but her Utox is positive for cannabis, denies other substance use or abuse. Pt was offered outpt psychiatry and therapy referral but she declines these, states she will try to stay on the Elavil started here in the hospital. Impression: somatic sx disorder, cannabis abuse; r/o hyperemesis cannabinoid syndrome. Plan: as above- pt is psychiatrically cleared for D/C to home, no indication for inpatient psych treatment, continue Elavil on discharge.

## 2017-11-13 NOTE — PROGRESS NOTE ADULT - ASSESSMENT
23 yo F w/ cyclic vomiting syndrome, recently admitted last month for same, p/w intractable nausea and vomiting. Doing well. (+) hyponatremia.

## 2018-04-10 RX ADMIN — Medication 15 MILLIGRAM(S): at 10:08

## 2018-04-17 ENCOUNTER — EMERGENCY (EMERGENCY)
Facility: HOSPITAL | Age: 23
LOS: 1 days | Discharge: HOME | End: 2018-04-17
Attending: EMERGENCY MEDICINE | Admitting: EMERGENCY MEDICINE
Payer: COMMERCIAL

## 2018-04-17 VITALS
RESPIRATION RATE: 18 BRPM | OXYGEN SATURATION: 98 % | SYSTOLIC BLOOD PRESSURE: 147 MMHG | HEART RATE: 85 BPM | DIASTOLIC BLOOD PRESSURE: 90 MMHG

## 2018-04-17 LAB
ALBUMIN SERPL ELPH-MCNC: 5.2 G/DL — HIGH (ref 3.3–5)
ALP SERPL-CCNC: 78 U/L — SIGNIFICANT CHANGE UP (ref 40–120)
ALT FLD-CCNC: 43 U/L — HIGH (ref 4–33)
APPEARANCE UR: CLEAR — SIGNIFICANT CHANGE UP
AST SERPL-CCNC: 51 U/L — HIGH (ref 4–32)
BASOPHILS # BLD AUTO: 0.02 K/UL — SIGNIFICANT CHANGE UP (ref 0–0.2)
BASOPHILS NFR BLD AUTO: 0.1 % — SIGNIFICANT CHANGE UP (ref 0–2)
BILIRUB SERPL-MCNC: 0.6 MG/DL — SIGNIFICANT CHANGE UP (ref 0.2–1.2)
BILIRUB UR-MCNC: NEGATIVE — SIGNIFICANT CHANGE UP
BLOOD UR QL VISUAL: NEGATIVE — SIGNIFICANT CHANGE UP
BUN SERPL-MCNC: 22 MG/DL — SIGNIFICANT CHANGE UP (ref 7–23)
CALCIUM SERPL-MCNC: 9.6 MG/DL — SIGNIFICANT CHANGE UP (ref 8.4–10.5)
CHLORIDE SERPL-SCNC: 104 MMOL/L — SIGNIFICANT CHANGE UP (ref 98–107)
CO2 SERPL-SCNC: 24 MMOL/L — SIGNIFICANT CHANGE UP (ref 22–31)
COLOR SPEC: YELLOW — SIGNIFICANT CHANGE UP
CREAT SERPL-MCNC: 0.85 MG/DL — SIGNIFICANT CHANGE UP (ref 0.5–1.3)
EOSINOPHIL # BLD AUTO: 0 K/UL — SIGNIFICANT CHANGE UP (ref 0–0.5)
EOSINOPHIL NFR BLD AUTO: 0 % — SIGNIFICANT CHANGE UP (ref 0–6)
GLUCOSE SERPL-MCNC: 122 MG/DL — HIGH (ref 70–99)
GLUCOSE UR-MCNC: NEGATIVE — SIGNIFICANT CHANGE UP
HCT VFR BLD CALC: 40.7 % — SIGNIFICANT CHANGE UP (ref 34.5–45)
HGB BLD-MCNC: 13.2 G/DL — SIGNIFICANT CHANGE UP (ref 11.5–15.5)
IMM GRANULOCYTES # BLD AUTO: 0.04 # — SIGNIFICANT CHANGE UP
IMM GRANULOCYTES NFR BLD AUTO: 0.3 % — SIGNIFICANT CHANGE UP (ref 0–1.5)
KETONES UR-MCNC: NEGATIVE — SIGNIFICANT CHANGE UP
LEUKOCYTE ESTERASE UR-ACNC: NEGATIVE — SIGNIFICANT CHANGE UP
LYMPHOCYTES # BLD AUTO: 1.96 K/UL — SIGNIFICANT CHANGE UP (ref 1–3.3)
LYMPHOCYTES # BLD AUTO: 13.5 % — SIGNIFICANT CHANGE UP (ref 13–44)
MCHC RBC-ENTMCNC: 26.3 PG — LOW (ref 27–34)
MCHC RBC-ENTMCNC: 32.4 % — SIGNIFICANT CHANGE UP (ref 32–36)
MCV RBC AUTO: 81.2 FL — SIGNIFICANT CHANGE UP (ref 80–100)
MONOCYTES # BLD AUTO: 1.02 K/UL — HIGH (ref 0–0.9)
MONOCYTES NFR BLD AUTO: 7 % — SIGNIFICANT CHANGE UP (ref 2–14)
MUCOUS THREADS # UR AUTO: SIGNIFICANT CHANGE UP
NEUTROPHILS # BLD AUTO: 11.46 K/UL — HIGH (ref 1.8–7.4)
NEUTROPHILS NFR BLD AUTO: 79.1 % — HIGH (ref 43–77)
NITRITE UR-MCNC: NEGATIVE — SIGNIFICANT CHANGE UP
NRBC # FLD: 0 — SIGNIFICANT CHANGE UP
PH UR: 8.5 — HIGH (ref 4.6–8)
PLATELET # BLD AUTO: 335 K/UL — SIGNIFICANT CHANGE UP (ref 150–400)
PMV BLD: 9.3 FL — SIGNIFICANT CHANGE UP (ref 7–13)
POTASSIUM SERPL-MCNC: 3.8 MMOL/L — SIGNIFICANT CHANGE UP (ref 3.5–5.3)
POTASSIUM SERPL-SCNC: 3.8 MMOL/L — SIGNIFICANT CHANGE UP (ref 3.5–5.3)
PROT SERPL-MCNC: 8.5 G/DL — HIGH (ref 6–8.3)
PROT UR-MCNC: 100 MG/DL — HIGH
RBC # BLD: 5.01 M/UL — SIGNIFICANT CHANGE UP (ref 3.8–5.2)
RBC # FLD: 16 % — HIGH (ref 10.3–14.5)
RBC CASTS # UR COMP ASSIST: SIGNIFICANT CHANGE UP (ref 0–?)
SODIUM SERPL-SCNC: 145 MMOL/L — SIGNIFICANT CHANGE UP (ref 135–145)
SP GR SPEC: 1.04 — SIGNIFICANT CHANGE UP (ref 1–1.04)
SQUAMOUS # UR AUTO: SIGNIFICANT CHANGE UP
UROBILINOGEN FLD QL: 1 MG/DL — SIGNIFICANT CHANGE UP
WBC # BLD: 14.5 K/UL — HIGH (ref 3.8–10.5)
WBC # FLD AUTO: 14.5 K/UL — HIGH (ref 3.8–10.5)
WBC UR QL: SIGNIFICANT CHANGE UP (ref 0–?)

## 2018-04-17 PROCEDURE — 99284 EMERGENCY DEPT VISIT MOD MDM: CPT

## 2018-04-17 RX ORDER — SODIUM CHLORIDE 9 MG/ML
1000 INJECTION INTRAMUSCULAR; INTRAVENOUS; SUBCUTANEOUS ONCE
Qty: 0 | Refills: 0 | Status: COMPLETED | OUTPATIENT
Start: 2018-04-17 | End: 2018-04-17

## 2018-04-17 RX ORDER — ONDANSETRON 8 MG/1
4 TABLET, FILM COATED ORAL ONCE
Qty: 0 | Refills: 0 | Status: COMPLETED | OUTPATIENT
Start: 2018-04-17 | End: 2018-04-17

## 2018-04-17 RX ORDER — METOCLOPRAMIDE HCL 10 MG
10 TABLET ORAL ONCE
Qty: 0 | Refills: 0 | Status: COMPLETED | OUTPATIENT
Start: 2018-04-17 | End: 2018-04-17

## 2018-04-17 RX ORDER — KETOROLAC TROMETHAMINE 30 MG/ML
15 SYRINGE (ML) INJECTION ONCE
Qty: 0 | Refills: 0 | Status: DISCONTINUED | OUTPATIENT
Start: 2018-04-17 | End: 2018-04-17

## 2018-04-17 RX ORDER — FAMOTIDINE 10 MG/ML
20 INJECTION INTRAVENOUS ONCE
Qty: 0 | Refills: 0 | Status: COMPLETED | OUTPATIENT
Start: 2018-04-17 | End: 2018-04-17

## 2018-04-17 RX ORDER — ONDANSETRON 8 MG/1
1 TABLET, FILM COATED ORAL
Qty: 9 | Refills: 0 | OUTPATIENT
Start: 2018-04-17 | End: 2018-04-19

## 2018-04-17 RX ORDER — ACETAMINOPHEN 500 MG
650 TABLET ORAL ONCE
Qty: 0 | Refills: 0 | Status: COMPLETED | OUTPATIENT
Start: 2018-04-17 | End: 2018-04-17

## 2018-04-17 RX ORDER — PANTOPRAZOLE SODIUM 20 MG/1
1 TABLET, DELAYED RELEASE ORAL
Qty: 30 | Refills: 0 | OUTPATIENT
Start: 2018-04-17 | End: 2018-05-16

## 2018-04-17 RX ADMIN — ONDANSETRON 4 MILLIGRAM(S): 8 TABLET, FILM COATED ORAL at 13:42

## 2018-04-17 RX ADMIN — Medication 650 MILLIGRAM(S): at 13:00

## 2018-04-17 RX ADMIN — Medication 15 MILLIGRAM(S): at 09:38

## 2018-04-17 RX ADMIN — SODIUM CHLORIDE 1000 MILLILITER(S): 9 INJECTION INTRAMUSCULAR; INTRAVENOUS; SUBCUTANEOUS at 09:38

## 2018-04-17 RX ADMIN — Medication 10 MILLIGRAM(S): at 10:31

## 2018-04-17 RX ADMIN — ONDANSETRON 4 MILLIGRAM(S): 8 TABLET, FILM COATED ORAL at 09:38

## 2018-04-17 RX ADMIN — FAMOTIDINE 20 MILLIGRAM(S): 10 INJECTION INTRAVENOUS at 09:36

## 2018-04-17 NOTE — ED PROVIDER NOTE - ABDOMINAL EXAM
diffuse abdominal tenderness, no rebound, no guarding/PSOAS SIGN diffuse abdominal tenderness, no rebound, no guarding

## 2018-04-17 NOTE — ED PROVIDER NOTE - CARE PLAN
Principal Discharge DX:	Abdominal pain  Assessment and plan of treatment:	You were seen for abdominal pain.  Your labs were reassuring.  Follow up with your primary physician in 3-4 days. If needed call [phone] to find a primary care physician or call  [phone] to schedule an appointment with the general medicine clinic. Follow up with the GI clinic in 1-2 weeks.  You were given copies of all labs from this ER visit, please take them to your appointment.  Return to the ER for worsening symptoms including increased pain, fever, vomiting or any other concerns.

## 2018-04-17 NOTE — ED PROVIDER NOTE - MEDICAL DECISION MAKING DETAILS
23 y/o F with abdominal pain and vomiting similar to prior sx of cyclic vomiting syndrome. Plan: antiemetics, basic labs, UCG and reevaluation

## 2018-04-17 NOTE — ED PROVIDER NOTE - PROGRESS NOTE DETAILS
Labs reassuring, however pt w continued vomiting, given Reglan Able to tolerate PO.  Plan for d/c home with GI followup. Encouraged to stop marijuana use.

## 2018-04-17 NOTE — ED ADULT TRIAGE NOTE - CHIEF COMPLAINT QUOTE
p/t c/o of lower abd pain /menstrual cramps for past few days and vomiting since this am, p/t denies any other discomfort nad noted

## 2018-04-17 NOTE — ED PROVIDER NOTE - PLAN OF CARE
You were seen for abdominal pain.  Your labs were reassuring.  Follow up with your primary physician in 3-4 days. If needed call [phone] to find a primary care physician or call  [phone] to schedule an appointment with the general medicine clinic. Follow up with the GI clinic in 1-2 weeks.  You were given copies of all labs from this ER visit, please take them to your appointment.  Return to the ER for worsening symptoms including increased pain, fever, vomiting or any other concerns.

## 2018-04-17 NOTE — ED PROVIDER NOTE - OBJECTIVE STATEMENT
21 y/o F with PMHx of cyclic vomiting presents with abdominal pain, nausea, and vomiting. Pt states she developed more severe menstrual cramping last week and started vomiting 5 days ago w/ difficulty keeping down any po. She tried Zofran with no relief, sx are similar to prior episodes of abdominal pain and vomiting. Pt reports abdominal pain is epigastric and lower pelvic. Pt reports menstrual bleeding started last week. Pt admits smoking marijuana daily but stopped smoking for up to one year but still has vomiting. Pt has seen GI and work up has been unremarkable. Denies vaginal discharge, no fever, no chills, or any other complaints.

## 2018-05-21 NOTE — PATIENT PROFILE ADULT. - TEACHING/LEARNING LEARNING PREFERENCES
15 y/o female with PM of sickle cell disease who presents to ED for persistent itchy rash. Patient states she was seen in this ED 1 week ago for rash and was given a rx for steroids however pharmacy did not get the prescription and patient did not take steroids. No fever or chills. computer/internet/written material/skill demonstration/pictorial/verbal instruction/group instruction/audio/individual instruction/video

## 2018-05-30 ENCOUNTER — INPATIENT (INPATIENT)
Facility: HOSPITAL | Age: 23
LOS: 2 days | Discharge: HOME | End: 2018-06-02
Attending: HOSPITALIST | Admitting: HOSPITALIST
Payer: COMMERCIAL

## 2018-05-30 VITALS
WEIGHT: 139.99 LBS | HEART RATE: 120 BPM | RESPIRATION RATE: 22 BRPM | TEMPERATURE: 98 F | SYSTOLIC BLOOD PRESSURE: 123 MMHG | HEIGHT: 64 IN | OXYGEN SATURATION: 100 % | DIASTOLIC BLOOD PRESSURE: 96 MMHG

## 2018-05-30 LAB
ALBUMIN SERPL ELPH-MCNC: 5.8 G/DL — HIGH (ref 3.3–5)
ALP SERPL-CCNC: 79 U/L — SIGNIFICANT CHANGE UP (ref 40–120)
ALT FLD-CCNC: 36 U/L — SIGNIFICANT CHANGE UP (ref 12–78)
ANION GAP SERPL CALC-SCNC: 16 MMOL/L — SIGNIFICANT CHANGE UP (ref 5–17)
APPEARANCE UR: CLEAR — SIGNIFICANT CHANGE UP
AST SERPL-CCNC: 36 U/L — SIGNIFICANT CHANGE UP (ref 15–37)
BACTERIA # UR AUTO: ABNORMAL
BASOPHILS # BLD AUTO: 0.03 K/UL — SIGNIFICANT CHANGE UP (ref 0–0.2)
BASOPHILS NFR BLD AUTO: 0.2 % — SIGNIFICANT CHANGE UP (ref 0–2)
BILIRUB SERPL-MCNC: 1.3 MG/DL — HIGH (ref 0.2–1.2)
BILIRUB UR-MCNC: NEGATIVE — SIGNIFICANT CHANGE UP
BUN SERPL-MCNC: 27 MG/DL — HIGH (ref 7–23)
CALCIUM SERPL-MCNC: 9.9 MG/DL — SIGNIFICANT CHANGE UP (ref 8.5–10.1)
CHLORIDE SERPL-SCNC: 107 MMOL/L — SIGNIFICANT CHANGE UP (ref 96–108)
CO2 SERPL-SCNC: 21 MMOL/L — LOW (ref 22–31)
COLOR SPEC: YELLOW — SIGNIFICANT CHANGE UP
CREAT SERPL-MCNC: 1.37 MG/DL — HIGH (ref 0.5–1.3)
DIFF PNL FLD: ABNORMAL
EOSINOPHIL # BLD AUTO: 0 K/UL — SIGNIFICANT CHANGE UP (ref 0–0.5)
EOSINOPHIL NFR BLD AUTO: 0 % — SIGNIFICANT CHANGE UP (ref 0–6)
EPI CELLS # UR: SIGNIFICANT CHANGE UP
GLUCOSE SERPL-MCNC: 120 MG/DL — HIGH (ref 70–99)
GLUCOSE UR QL: NEGATIVE MG/DL — SIGNIFICANT CHANGE UP
HCG SERPL-ACNC: <1 MIU/ML — SIGNIFICANT CHANGE UP
HCT VFR BLD CALC: 41.8 % — SIGNIFICANT CHANGE UP (ref 34.5–45)
HGB BLD-MCNC: 13.8 G/DL — SIGNIFICANT CHANGE UP (ref 11.5–15.5)
IMM GRANULOCYTES NFR BLD AUTO: 1 % — SIGNIFICANT CHANGE UP (ref 0–1.5)
KETONES UR-MCNC: ABNORMAL
LEUKOCYTE ESTERASE UR-ACNC: ABNORMAL
LIDOCAIN IGE QN: 215 U/L — SIGNIFICANT CHANGE UP (ref 73–393)
LYMPHOCYTES # BLD AUTO: 15 % — SIGNIFICANT CHANGE UP (ref 13–44)
LYMPHOCYTES # BLD AUTO: 2.16 K/UL — SIGNIFICANT CHANGE UP (ref 1–3.3)
MCHC RBC-ENTMCNC: 26.1 PG — LOW (ref 27–34)
MCHC RBC-ENTMCNC: 33 GM/DL — SIGNIFICANT CHANGE UP (ref 32–36)
MCV RBC AUTO: 79.2 FL — LOW (ref 80–100)
MONOCYTES # BLD AUTO: 1.17 K/UL — HIGH (ref 0–0.9)
MONOCYTES NFR BLD AUTO: 8.1 % — SIGNIFICANT CHANGE UP (ref 2–14)
NEUTROPHILS # BLD AUTO: 10.89 K/UL — HIGH (ref 1.8–7.4)
NEUTROPHILS NFR BLD AUTO: 75.7 % — SIGNIFICANT CHANGE UP (ref 43–77)
NITRITE UR-MCNC: NEGATIVE — SIGNIFICANT CHANGE UP
NRBC # BLD: 0 /100 WBCS — SIGNIFICANT CHANGE UP (ref 0–0)
PH UR: 9 — HIGH (ref 5–8)
PLATELET # BLD AUTO: 343 K/UL — SIGNIFICANT CHANGE UP (ref 150–400)
POTASSIUM SERPL-MCNC: 3.2 MMOL/L — LOW (ref 3.5–5.3)
POTASSIUM SERPL-SCNC: 3.2 MMOL/L — LOW (ref 3.5–5.3)
PROT SERPL-MCNC: 10.1 GM/DL — HIGH (ref 6–8.3)
PROT UR-MCNC: 100 MG/DL
RBC # BLD: 5.28 M/UL — HIGH (ref 3.8–5.2)
RBC # FLD: 14.6 % — HIGH (ref 10.3–14.5)
RBC CASTS # UR COMP ASSIST: ABNORMAL /HPF (ref 0–4)
SODIUM SERPL-SCNC: 144 MMOL/L — SIGNIFICANT CHANGE UP (ref 135–145)
SP GR SPEC: 1.01 — SIGNIFICANT CHANGE UP (ref 1.01–1.02)
UROBILINOGEN FLD QL: NEGATIVE MG/DL — SIGNIFICANT CHANGE UP
WBC # BLD: 14.4 K/UL — HIGH (ref 3.8–10.5)
WBC # FLD AUTO: 14.4 K/UL — HIGH (ref 3.8–10.5)
WBC UR QL: ABNORMAL

## 2018-05-30 PROCEDURE — 99285 EMERGENCY DEPT VISIT HI MDM: CPT

## 2018-05-30 RX ORDER — MORPHINE SULFATE 50 MG/1
4 CAPSULE, EXTENDED RELEASE ORAL ONCE
Qty: 0 | Refills: 0 | Status: DISCONTINUED | OUTPATIENT
Start: 2018-05-30 | End: 2018-05-30

## 2018-05-30 RX ORDER — SODIUM CHLORIDE 9 MG/ML
3000 INJECTION INTRAMUSCULAR; INTRAVENOUS; SUBCUTANEOUS ONCE
Qty: 0 | Refills: 0 | Status: COMPLETED | OUTPATIENT
Start: 2018-05-30 | End: 2018-05-30

## 2018-05-30 RX ORDER — POTASSIUM CHLORIDE 20 MEQ
40 PACKET (EA) ORAL ONCE
Qty: 0 | Refills: 0 | Status: COMPLETED | OUTPATIENT
Start: 2018-05-30 | End: 2018-05-30

## 2018-05-30 RX ORDER — ONDANSETRON 8 MG/1
8 TABLET, FILM COATED ORAL ONCE
Qty: 0 | Refills: 0 | Status: COMPLETED | OUTPATIENT
Start: 2018-05-30 | End: 2018-05-30

## 2018-05-30 RX ORDER — KETOROLAC TROMETHAMINE 30 MG/ML
30 SYRINGE (ML) INJECTION ONCE
Qty: 0 | Refills: 0 | Status: DISCONTINUED | OUTPATIENT
Start: 2018-05-30 | End: 2018-05-30

## 2018-05-30 RX ORDER — IOHEXOL 300 MG/ML
30 INJECTION, SOLUTION INTRAVENOUS ONCE
Qty: 0 | Refills: 0 | Status: COMPLETED | OUTPATIENT
Start: 2018-05-30 | End: 2018-05-30

## 2018-05-30 RX ADMIN — ONDANSETRON 8 MILLIGRAM(S): 8 TABLET, FILM COATED ORAL at 22:41

## 2018-05-30 RX ADMIN — Medication 30 MILLIGRAM(S): at 22:51

## 2018-05-30 RX ADMIN — MORPHINE SULFATE 4 MILLIGRAM(S): 50 CAPSULE, EXTENDED RELEASE ORAL at 23:39

## 2018-05-30 RX ADMIN — IOHEXOL 30 MILLILITER(S): 300 INJECTION, SOLUTION INTRAVENOUS at 23:39

## 2018-05-30 RX ADMIN — MORPHINE SULFATE 2 MILLIGRAM(S): 50 CAPSULE, EXTENDED RELEASE ORAL at 21:30

## 2018-05-30 RX ADMIN — SODIUM CHLORIDE 3000 MILLILITER(S): 9 INJECTION INTRAMUSCULAR; INTRAVENOUS; SUBCUTANEOUS at 22:42

## 2018-05-30 NOTE — ED PROVIDER NOTE - PHYSICAL EXAMINATION
Gen: Alert, Well appearing. NAD , hyperventilating  Head: NC, AT, PERRL, EOMI, normal lids/conjunctiva   ENT: Bilateral TM WNL, normal hearing, patent oropharynx without erythema/exudate, uvula midline  Neck: supple, no tenderness/meningismus/JVD   Pulm: Bilateral clear BS, normal resp effort, no wheeze/stridor/retractions  CV: RRR, no M/R/G, +dist pulses   Abd: soft, + gen tenderness, ND, +BS, no guarding/rebound tenderness  Mskel: no edema/erythema/cyanosis   Skin: no rash   Neuro: AAOx3, no sensory/motor deficits, CN 2-12 intact

## 2018-05-30 NOTE — ED PROVIDER NOTE - CARE PLAN
Principal Discharge DX:	Intractable vomiting  Secondary Diagnosis:	Gastritis  Secondary Diagnosis:	UTI (urinary tract infection)

## 2018-05-30 NOTE — ED PROVIDER NOTE - OBJECTIVE STATEMENT
21 yo female with pmh cyclic vomiting for many years - s/p 3 endoscopies, CT 2 years ago reported normal), presents with abdominal pain, vomtiing.  Pt reports usu related to menses, but saw OBGYN and states unlikely  States had IUD placed, but removed 2 days ago bc pt unable to tolerate the pain. started vag bleeding 2 days.  Pt seen 1 month ago for similar pain. States symptoms don't stop until she gets IV and morphine. Trying the ziofran ODT at home, but states she just vomits.  pain is diffuse to abd. no diarrhea/constipation. Pt also hyperventilating and gettings tingling to b/l hands.     ROS: No fever/chills. No photophobia/eye pain/changes in vision, No ear pain/sore throat/dysphagia, No chest pain/palpitations. No SOB/cough/stridor.  +abdominal pain, +N/V, no D, no black/bloody bm. No dysuria/frequency/discharge/+ vag bleeding, No headache. No Dizziness.  No rash.

## 2018-05-30 NOTE — ED PROVIDER NOTE - MEDICAL DECISION MAKING DETAILS
pt with intractable vomiting and unable to tolerate po. + arf, hypokalemia. d/w dr. paiz for admission.

## 2018-05-30 NOTE — ED ADULT TRIAGE NOTE - CHIEF COMPLAINT QUOTE
Patient states I cant keep anything down, I have abdominal cramping, and I fingers feel numb, and I have been vomiting for two days. IUD removed today

## 2018-05-31 DIAGNOSIS — N17.9 ACUTE KIDNEY FAILURE, UNSPECIFIED: ICD-10-CM

## 2018-05-31 DIAGNOSIS — K22.6 GASTRO-ESOPHAGEAL LACERATION-HEMORRHAGE SYNDROME: ICD-10-CM

## 2018-05-31 DIAGNOSIS — N39.0 URINARY TRACT INFECTION, SITE NOT SPECIFIED: ICD-10-CM

## 2018-05-31 DIAGNOSIS — G43.A1 CYCLICAL VOMITING, IN MIGRAINE, INTRACTABLE: ICD-10-CM

## 2018-05-31 DIAGNOSIS — E87.6 HYPOKALEMIA: ICD-10-CM

## 2018-05-31 LAB
AMPHET UR-MCNC: NEGATIVE — SIGNIFICANT CHANGE UP
ANION GAP SERPL CALC-SCNC: 13 MMOL/L — SIGNIFICANT CHANGE UP (ref 5–17)
ANION GAP SERPL CALC-SCNC: 8 MMOL/L — SIGNIFICANT CHANGE UP (ref 5–17)
APTT BLD: 26 SEC — LOW (ref 27.5–37.4)
BARBITURATES UR SCN-MCNC: NEGATIVE — SIGNIFICANT CHANGE UP
BENZODIAZ UR-MCNC: NEGATIVE — SIGNIFICANT CHANGE UP
BLD GP AB SCN SERPL QL: SIGNIFICANT CHANGE UP
BUN SERPL-MCNC: 14 MG/DL — SIGNIFICANT CHANGE UP (ref 7–23)
BUN SERPL-MCNC: 20 MG/DL — SIGNIFICANT CHANGE UP (ref 7–23)
CALCIUM SERPL-MCNC: 7.7 MG/DL — LOW (ref 8.5–10.1)
CALCIUM SERPL-MCNC: 8.3 MG/DL — LOW (ref 8.5–10.1)
CHLORIDE SERPL-SCNC: 113 MMOL/L — HIGH (ref 96–108)
CHLORIDE SERPL-SCNC: 116 MMOL/L — HIGH (ref 96–108)
CO2 SERPL-SCNC: 20 MMOL/L — LOW (ref 22–31)
CO2 SERPL-SCNC: 22 MMOL/L — SIGNIFICANT CHANGE UP (ref 22–31)
COCAINE METAB.OTHER UR-MCNC: NEGATIVE — SIGNIFICANT CHANGE UP
CREAT SERPL-MCNC: 0.89 MG/DL — SIGNIFICANT CHANGE UP (ref 0.5–1.3)
CREAT SERPL-MCNC: 1.18 MG/DL — SIGNIFICANT CHANGE UP (ref 0.5–1.3)
GLUCOSE SERPL-MCNC: 86 MG/DL — SIGNIFICANT CHANGE UP (ref 70–99)
GLUCOSE SERPL-MCNC: 96 MG/DL — SIGNIFICANT CHANGE UP (ref 70–99)
INR BLD: 1.24 RATIO — HIGH (ref 0.88–1.16)
METHADONE UR-MCNC: NEGATIVE — SIGNIFICANT CHANGE UP
OB PNL STL: POSITIVE
OPIATES UR-MCNC: POSITIVE — SIGNIFICANT CHANGE UP
PCP SPEC-MCNC: SIGNIFICANT CHANGE UP
PCP UR-MCNC: NEGATIVE — SIGNIFICANT CHANGE UP
POTASSIUM SERPL-MCNC: 3.2 MMOL/L — LOW (ref 3.5–5.3)
POTASSIUM SERPL-MCNC: 4 MMOL/L — SIGNIFICANT CHANGE UP (ref 3.5–5.3)
POTASSIUM SERPL-SCNC: 3.2 MMOL/L — LOW (ref 3.5–5.3)
POTASSIUM SERPL-SCNC: 4 MMOL/L — SIGNIFICANT CHANGE UP (ref 3.5–5.3)
PROTHROM AB SERPL-ACNC: 13.6 SEC — HIGH (ref 9.8–12.7)
SODIUM SERPL-SCNC: 146 MMOL/L — HIGH (ref 135–145)
SODIUM SERPL-SCNC: 146 MMOL/L — HIGH (ref 135–145)
THC UR QL: POSITIVE — SIGNIFICANT CHANGE UP

## 2018-05-31 PROCEDURE — 12345: CPT | Mod: NC

## 2018-05-31 PROCEDURE — 99223 1ST HOSP IP/OBS HIGH 75: CPT

## 2018-05-31 PROCEDURE — 74177 CT ABD & PELVIS W/CONTRAST: CPT | Mod: 26

## 2018-05-31 RX ORDER — FAMOTIDINE 10 MG/ML
20 INJECTION INTRAVENOUS ONCE
Qty: 0 | Refills: 0 | Status: DISCONTINUED | OUTPATIENT
Start: 2018-05-31 | End: 2018-05-31

## 2018-05-31 RX ORDER — ONDANSETRON 8 MG/1
4 TABLET, FILM COATED ORAL EVERY 6 HOURS
Qty: 0 | Refills: 0 | Status: DISCONTINUED | OUTPATIENT
Start: 2018-05-31 | End: 2018-06-02

## 2018-05-31 RX ORDER — PANTOPRAZOLE SODIUM 20 MG/1
8 TABLET, DELAYED RELEASE ORAL
Qty: 80 | Refills: 0 | Status: DISCONTINUED | OUTPATIENT
Start: 2018-05-31 | End: 2018-06-01

## 2018-05-31 RX ORDER — POTASSIUM CHLORIDE 20 MEQ
40 PACKET (EA) ORAL ONCE
Qty: 0 | Refills: 0 | Status: DISCONTINUED | OUTPATIENT
Start: 2018-05-31 | End: 2018-05-31

## 2018-05-31 RX ORDER — SODIUM CHLORIDE 9 MG/ML
3 INJECTION INTRAMUSCULAR; INTRAVENOUS; SUBCUTANEOUS ONCE
Qty: 0 | Refills: 0 | Status: COMPLETED | OUTPATIENT
Start: 2018-05-31 | End: 2018-05-31

## 2018-05-31 RX ORDER — CEFTRIAXONE 500 MG/1
1 INJECTION, POWDER, FOR SOLUTION INTRAMUSCULAR; INTRAVENOUS EVERY 24 HOURS
Qty: 0 | Refills: 0 | Status: DISCONTINUED | OUTPATIENT
Start: 2018-05-31 | End: 2018-05-31

## 2018-05-31 RX ORDER — SODIUM CHLORIDE 9 MG/ML
1000 INJECTION INTRAMUSCULAR; INTRAVENOUS; SUBCUTANEOUS ONCE
Qty: 0 | Refills: 0 | Status: COMPLETED | OUTPATIENT
Start: 2018-05-31 | End: 2018-05-31

## 2018-05-31 RX ORDER — PANTOPRAZOLE SODIUM 20 MG/1
40 TABLET, DELAYED RELEASE ORAL ONCE
Qty: 0 | Refills: 0 | Status: COMPLETED | OUTPATIENT
Start: 2018-05-31 | End: 2018-05-31

## 2018-05-31 RX ORDER — MORPHINE SULFATE 50 MG/1
2 CAPSULE, EXTENDED RELEASE ORAL EVERY 4 HOURS
Qty: 0 | Refills: 0 | Status: DISCONTINUED | OUTPATIENT
Start: 2018-05-31 | End: 2018-06-01

## 2018-05-31 RX ORDER — METOCLOPRAMIDE HCL 10 MG
10 TABLET ORAL ONCE
Qty: 0 | Refills: 0 | Status: COMPLETED | OUTPATIENT
Start: 2018-05-31 | End: 2018-05-31

## 2018-05-31 RX ORDER — MORPHINE SULFATE 50 MG/1
2 CAPSULE, EXTENDED RELEASE ORAL ONCE
Qty: 0 | Refills: 0 | Status: DISCONTINUED | OUTPATIENT
Start: 2018-05-31 | End: 2018-05-31

## 2018-05-31 RX ORDER — POTASSIUM CHLORIDE 20 MEQ
10 PACKET (EA) ORAL
Qty: 0 | Refills: 0 | Status: COMPLETED | OUTPATIENT
Start: 2018-05-31 | End: 2018-05-31

## 2018-05-31 RX ORDER — CEFTRIAXONE 500 MG/1
1 INJECTION, POWDER, FOR SOLUTION INTRAMUSCULAR; INTRAVENOUS ONCE
Qty: 0 | Refills: 0 | Status: COMPLETED | OUTPATIENT
Start: 2018-05-31 | End: 2018-05-31

## 2018-05-31 RX ORDER — POTASSIUM CHLORIDE 20 MEQ
40 PACKET (EA) ORAL ONCE
Qty: 0 | Refills: 0 | Status: COMPLETED | OUTPATIENT
Start: 2018-05-31 | End: 2018-05-31

## 2018-05-31 RX ORDER — SODIUM CHLORIDE 9 MG/ML
1000 INJECTION INTRAMUSCULAR; INTRAVENOUS; SUBCUTANEOUS
Qty: 0 | Refills: 0 | Status: DISCONTINUED | OUTPATIENT
Start: 2018-05-31 | End: 2018-06-02

## 2018-05-31 RX ADMIN — MORPHINE SULFATE 2 MILLIGRAM(S): 50 CAPSULE, EXTENDED RELEASE ORAL at 10:00

## 2018-05-31 RX ADMIN — MORPHINE SULFATE 2 MILLIGRAM(S): 50 CAPSULE, EXTENDED RELEASE ORAL at 15:20

## 2018-05-31 RX ADMIN — Medication 100 MILLIEQUIVALENT(S): at 11:12

## 2018-05-31 RX ADMIN — Medication 10 MILLIGRAM(S): at 02:11

## 2018-05-31 RX ADMIN — PANTOPRAZOLE SODIUM 10 MG/HR: 20 TABLET, DELAYED RELEASE ORAL at 06:22

## 2018-05-31 RX ADMIN — MORPHINE SULFATE 2 MILLIGRAM(S): 50 CAPSULE, EXTENDED RELEASE ORAL at 02:39

## 2018-05-31 RX ADMIN — MORPHINE SULFATE 2 MILLIGRAM(S): 50 CAPSULE, EXTENDED RELEASE ORAL at 05:19

## 2018-05-31 RX ADMIN — ONDANSETRON 4 MILLIGRAM(S): 8 TABLET, FILM COATED ORAL at 06:48

## 2018-05-31 RX ADMIN — Medication 30 MILLIGRAM(S): at 05:14

## 2018-05-31 RX ADMIN — SODIUM CHLORIDE 1000 MILLILITER(S): 9 INJECTION INTRAMUSCULAR; INTRAVENOUS; SUBCUTANEOUS at 02:38

## 2018-05-31 RX ADMIN — PANTOPRAZOLE SODIUM 40 MILLIGRAM(S): 20 TABLET, DELAYED RELEASE ORAL at 04:10

## 2018-05-31 RX ADMIN — Medication 30 MILLILITER(S): at 05:15

## 2018-05-31 RX ADMIN — ONDANSETRON 4 MILLIGRAM(S): 8 TABLET, FILM COATED ORAL at 21:18

## 2018-05-31 RX ADMIN — MORPHINE SULFATE 2 MILLIGRAM(S): 50 CAPSULE, EXTENDED RELEASE ORAL at 02:11

## 2018-05-31 RX ADMIN — SODIUM CHLORIDE 120 MILLILITER(S): 9 INJECTION INTRAMUSCULAR; INTRAVENOUS; SUBCUTANEOUS at 06:00

## 2018-05-31 RX ADMIN — MORPHINE SULFATE 2 MILLIGRAM(S): 50 CAPSULE, EXTENDED RELEASE ORAL at 06:00

## 2018-05-31 RX ADMIN — CEFTRIAXONE 100 GRAM(S): 500 INJECTION, POWDER, FOR SOLUTION INTRAMUSCULAR; INTRAVENOUS at 04:00

## 2018-05-31 RX ADMIN — MORPHINE SULFATE 2 MILLIGRAM(S): 50 CAPSULE, EXTENDED RELEASE ORAL at 09:49

## 2018-05-31 RX ADMIN — MORPHINE SULFATE 4 MILLIGRAM(S): 50 CAPSULE, EXTENDED RELEASE ORAL at 00:32

## 2018-05-31 RX ADMIN — MORPHINE SULFATE 2 MILLIGRAM(S): 50 CAPSULE, EXTENDED RELEASE ORAL at 21:09

## 2018-05-31 RX ADMIN — Medication 100 MILLIEQUIVALENT(S): at 05:56

## 2018-05-31 RX ADMIN — MORPHINE SULFATE 2 MILLIGRAM(S): 50 CAPSULE, EXTENDED RELEASE ORAL at 15:06

## 2018-05-31 RX ADMIN — Medication 100 MILLIEQUIVALENT(S): at 09:50

## 2018-05-31 RX ADMIN — SODIUM CHLORIDE 3 MILLILITER(S): 9 INJECTION INTRAMUSCULAR; INTRAVENOUS; SUBCUTANEOUS at 05:15

## 2018-05-31 NOTE — CHART NOTE - NSCHARTNOTEFT_GEN_A_CORE
23 y/o female PMH cyclic vomiting for many years - s/p 3 endoscopies, CT 2 years ago reported normal), presents with abdominal pain, vomiting for several days  GI consulted, IVF , advance diet as tolerated , on protonix drip , pain control   Hypokalemia , monitor electrolytes, KIMBERLY

## 2018-05-31 NOTE — ED ADULT NURSE NOTE - OBJECTIVE STATEMENT
Patient complaining of severe abdominal pain , nausea and vomiting for the last  weeks , was discharged home 2 weeks ago for same, cannot eat or drink due to vomiting

## 2018-05-31 NOTE — H&P ADULT - NSHPPHYSICALEXAM_GEN_ALL_CORE
Patient is a 22y old  Female who presents with a chief complaint of     INTERVAL HPI/OVERNIGHT EVENTS:    Vital Signs Last 24 Hrs  T(C): 36.9 (30 May 2018 23:35), Max: 36.9 (30 May 2018 23:35)  T(F): 98.4 (30 May 2018 23:35), Max: 98.4 (30 May 2018 23:35)  HR: 100 (30 May 2018 23:35) (100 - 120)  BP: 131/85 (30 May 2018 23:35) (123/96 - 131/85)  BP(mean): --  RR: 20 (30 May 2018 23:35) (20 - 22)  SpO2: 100% (30 May 2018 23:35)   PHYSICAL EXAM:    GENERAL: NAD, well-groomed, well-developed  HEAD:  Atraumatic, Normocephalic  EYES: EOMI, PERRLA, conjunctiva and sclera clear  ENMT: No tonsillar erythema, exudates, or enlargement; Moist mucous membranes,  No lesions  NECK: Supple, No JVD, Normal thyroid  NERVOUS SYSTEM:  Alert & Oriented X3, Good concentration; Motor Strength 5/5 B/L upper and lower extremities; DTRs 2+ intact and symmetric  CHEST/LUNG: Clear to percussion bilaterally; No rales, rhonchi, wheezing, or rubs  HEART: Regular rate and rhythm; No murmurs, rubs, or gallops  ABDOMEN: Soft, diffuse tenderness, no guarding/rebound, Nondistended; Bowel sounds present 4 quadrants  EXTREMITIES:  2+ Peripheral Pulses, No clubbing, cyanosis, or edema  LYMPH: No lymphadenopathy noted  SKIN: No rashes or lesions

## 2018-05-31 NOTE — H&P ADULT - NSHPREVIEWOFSYSTEMS_GEN_ALL_CORE
REVIEW OF SYSTEMS:  CONSTITUTIONAL: No fever, weight loss, or fatigue  EYES: No eye pain, visual disturbances, or discharge  ENMT:  No difficulty hearing, tinnitus, vertigo; No sinus or throat pain  NECK: No pain or stiffness  BREASTS: No pain, masses, or nipple discharge  RESPIRATORY: No cough, wheezing, chills or hemoptysis; No shortness of breath  CARDIOVASCULAR: No chest pain, palpitations, dizziness, or leg swelling  GASTROINTESTINAL: +abdominal  pain. +nausea, vomiting, +hematemesis (in ED); No diarrhea or constipation. No melena or hematochezia.  GENITOURINARY: No dysuria, frequency, hematuria, or incontinence  NEUROLOGICAL: No headaches, memory loss, loss of strength, numbness, or tremors  SKIN: No itching, burning, rashes, or lesions   LYMPH NODES: No enlarged glands  ENDOCRINE: No heat or cold intolerance; No hair loss  MUSCULOSKELETAL: No joint pain or swelling; No muscle, back, or extremity pain  PSYCHIATRIC: No depression, anxiety, mood swings, or difficulty sleeping  HEME/LYMPH: No easy bruising, or bleeding gums  ALLERGY AND IMMUNOLOGIC: No hives or eczema

## 2018-05-31 NOTE — CONSULT NOTE ADULT - SUBJECTIVE AND OBJECTIVE BOX
HPI:  21 y/o female PMH cyclic vomiting for many years - s/p 3 endoscopies, CT 2 years ago reported normal), presents with abdominal pain, vomiting for several days.  Pt reports usually related to menses, but saw OBRITESHN who states unlikely.  States had IUD placed, but removed 2 days ago because pt unable to tolerate the pain and started vag bleeding 2 days.  Pt seen 1 month ago for similar pain, denies aspirin or NSAID use. States symptoms don't stop until she gets IV and morphine. Trying the zofran ODT at home, but states she just vomits.  The pain is diffuse in abdomen, crampy, non radiating, sustained, for several days; vomitus has been stomach contents, she has no diarrhea/constipation, melena or rectal bleeding.. Pt also hyperventilating and getting tingling to b/l hands. Pt vomited bloody material prior to my examination,   stat type and screen ordered and pt started on protonix drip. (31 May 2018 04:47)  -------------------------------------------- As Above -------------------------------------------------------------------------------------------------  The patient states that she has been vomiting for almost a year. In January she was diagnosed with cyclic vomiting syndrome      PAST MEDICAL & SURGICAL HISTORY:  Cyclic vomiting syndrome  GERD (gastroesophageal reflux disease)  No significant past surgical history      MEDICATIONS  (STANDING):  cefTRIAXone   IVPB 1 Gram(s) IV Intermittent every 24 hours  pantoprazole Infusion 8 mG/Hr (10 mL/Hr) IV Continuous <Continuous>  potassium chloride  10 mEq/100 mL IVPB 10 milliEquivalent(s) IV Intermittent every 1 hour  sodium chloride 0.9%. 1000 milliLiter(s) (120 mL/Hr) IV Continuous <Continuous>    MEDICATIONS  (PRN):  morphine  - Injectable 2 milliGRAM(s) IV Push every 4 hours PRN Severe Pain (7 - 10)  ondansetron Injectable 4 milliGRAM(s) IV Push every 6 hours PRN Nausea and/or Vomiting      Allergies    penicillin (Hives)    Intolerances        FAMILY HISTORY:  No pertinent family history in first degree relatives      REVIEW OF SYSTEMS:    CONSTITUTIONAL: No fever, weight loss, or fatigue  EYES: No eye pain, visual disturbances, or discharge  ENMT:  No difficulty hearing, tinnitus, vertigo; No sinus or throat pain  NECK: No pain or stiffness  BREASTS: No pain, masses, or nipple discharge  RESPIRATORY: No cough, wheezing, chills or hemoptysis; No shortness of breath  CARDIOVASCULAR: No chest pain, palpitations, dizziness, or leg swelling  GASTROINTESTINAL: No abdominal or epigastric pain. No nausea, vomiting, or hematemesis; No diarrhea or constipation. No melena or hematochezia.  GENITOURINARY: No dysuria, frequency, hematuria, or incontinence  NEUROLOGICAL: No headaches, memory loss, loss of strength, numbness, or tremors  SKIN: No itching, burning, rashes, or lesions   LYMPH NODES: No enlarged glands  ENDOCRINE: No heat or cold intolerance; No hair loss  MUSCULOSKELETAL: No joint pain or swelling; No muscle, back, or extremity pain  PSYCHIATRIC: No depression, anxiety, mood swings, or difficulty sleeping  HEME/LYMPH: No easy bruising, or bleeding gums  ALLERGY AND IMMUNOLOGIC: No hives or eczema          SOCIAL HISTORY:    FAMILY HISTORY:  No pertinent family history in first degree relatives      Vital Signs Last 24 Hrs  T(C): 36.9 (31 May 2018 06:51), Max: 37.1 (31 May 2018 05:24)  T(F): 98.5 (31 May 2018 06:51), Max: 98.7 (31 May 2018 05:24)  HR: 83 (31 May 2018 06:51) (83 - 120)  BP: 137/86 (31 May 2018 06:51) (123/96 - 137/86)  BP(mean): --  RR: 18 (31 May 2018 06:51) (18 - 22)  SpO2: 100% (31 May 2018 06:51) (97% - 100%)    PHYSICAL EXAM:    GENERAL: NAD, well-groomed, well-developed  HEAD:  Atraumatic, Normocephalic  EYES: EOMI, PERRLA, conjunctiva and sclera clear  ENMT: No tonsillar erythema, exudates, or enlargement; Moist mucous membranes, Good dentition, No lesions  NECK: Supple, No JVD, Normal thyroid  NERVOUS SYSTEM:  Alert & Oriented X3, Good concentration; Motor Strength 5/5 B/L upper and lower extremities; DTRs 2+ intact and symmetric  CHEST/LUNG: Clear to percussion bilaterally; No rales, rhonchi, wheezing, or rubs  HEART: Regular rate and rhythm; No murmurs, rubs, or gallops  ABDOMEN: Soft, Nontender, Nondistended; Bowel sounds present  EXTREMITIES:  2+ Peripheral Pulses, No clubbing, cyanosis, or edema  LYMPH: No lymphadenopathy noted   RECTAL: No masses, prostate normal size and smooth, Guaiaci negative   BREAST: No palpable masses, skin no lesions no retractions, no discharges. adnexal no palpable masses noted   GYN: uterus normal size, adnexal, no palpable masses, no CMT, no uterine discharge   SKIN: No rashes or lesions    LABS:                        13.8   14.40 )-----------( 343      ( 30 May 2018 22:48 )             41.8       CBC:   @ 22:48  WBC  14.40  HGB 13.8  HCT 41.8 Plate 343  MCV 79.2           30 May 2018 22:48    144    |  107    |  27     ----------------------------<  120    3.2     |  21     |  1.37     Ca    9.9        30 May 2018 22:48    TPro  10.1   /  Alb  5.8    /  TBili  1.3    /  DBili  x      /  AST  36     /  ALT  36     /  AlkPhos  79     30 May 2018 22:48    PT/INR - ( 31 May 2018 07:41 )   PT: 13.6 sec;   INR: 1.24 ratio         PTT - ( 31 May 2018 07:41 )  PTT:26.0 sec  Urinalysis Basic - ( 30 May 2018 23:19 )    Color: Yellow / Appearance: Clear / S.015 / pH: x  Gluc: x / Ketone: Moderate  / Bili: Negative / Urobili: Negative mg/dL   Blood: x / Protein: 100 mg/dL / Nitrite: Negative   Leuk Esterase: Small / RBC: 11-25 /HPF / WBC 6-10   Sq Epi: x / Non Sq Epi: Few / Bacteria: Few          RADIOLOGY & ADDITIONAL STUDIES: HPI:  21 y/o female PMH cyclic vomiting for many years - s/p 3 endoscopies, CT 2 years ago reported normal), presents with abdominal pain, vomiting for several days.  Pt reports usually related to menses, but saw DORA who states unlikely.  States had IUD placed, but removed 2 days ago because pt unable to tolerate the pain and started vag bleeding 2 days.  Pt seen 1 month ago for similar pain, denies aspirin or NSAID use. States symptoms don't stop until she gets IV and morphine. Trying the zofran ODT at home, but states she just vomits.  The pain is diffuse in abdomen, crampy, non radiating, sustained, for several days; vomitus has been stomach contents, she has no diarrhea/constipation, melena or rectal bleeding.. Pt also hyperventilating and getting tingling to b/l hands. Pt vomited bloody material prior to my examination,   stat type and screen ordered and pt started on protonix drip. (31 May 2018 04:47)  -------------------------------------------- As Above -------------------------------------------------------------------------------------------------  The patient states that she has been vomiting for almost a year. In January she was diagnosed with cyclic vomiting syndrome. She had an EGD over the past two months which revealed a Sheree Arora tear. A full work up as per patient was negative. See above.   The patient states that she had began to vomit again 5 days ago. toward the end, she saw streaks of blood and a few blood clots. this happened during her last vomiting episode.       PAST MEDICAL & SURGICAL HISTORY:  Cyclic vomiting syndrome  GERD (gastroesophageal reflux disease)  No significant past surgical history      MEDICATIONS  (STANDING):  cefTRIAXone   IVPB 1 Gram(s) IV Intermittent every 24 hours  pantoprazole Infusion 8 mG/Hr (10 mL/Hr) IV Continuous <Continuous>  potassium chloride  10 mEq/100 mL IVPB 10 milliEquivalent(s) IV Intermittent every 1 hour  sodium chloride 0.9%. 1000 milliLiter(s) (120 mL/Hr) IV Continuous <Continuous>    MEDICATIONS  (PRN):  morphine  - Injectable 2 milliGRAM(s) IV Push every 4 hours PRN Severe Pain (7 - 10)  ondansetron Injectable 4 milliGRAM(s) IV Push every 6 hours PRN Nausea and/or Vomiting      Allergies    penicillin (Hives)    Intolerances        FAMILY HISTORY:  No pertinent family history in first degree relatives      REVIEW OF SYSTEMS:    CONSTITUTIONAL: No fever, weight loss, or fatigue  EYES: No eye pain, visual disturbances, or discharge  ENMT:  No difficulty hearing, tinnitus, vertigo; No sinus or throat pain  NECK: No pain or stiffness  BREASTS: No pain, masses, or nipple discharge  RESPIRATORY: No cough, wheezing, chills or hemoptysis; No shortness of breath  CARDIOVASCULAR: No chest pain, palpitations, dizziness, or leg swelling  GASTROINTESTINAL: No abdominal or epigastric pain. No nausea, vomiting, or hematemesis; No diarrhea or constipation. No melena or hematochezia.  GENITOURINARY: No dysuria, frequency, hematuria, or incontinence  NEUROLOGICAL: No headaches, memory loss, loss of strength, numbness, or tremors  SKIN: No itching, burning, rashes, or lesions   LYMPH NODES: No enlarged glands  ENDOCRINE: No heat or cold intolerance; No hair loss  MUSCULOSKELETAL: No joint pain or swelling; No muscle, back, or extremity pain  PSYCHIATRIC: No depression, anxiety, mood swings, or difficulty sleeping  HEME/LYMPH: No easy bruising, or bleeding gums  ALLERGY AND IMMUNOLOGIC: No hives or eczema          SOCIAL HISTORY:    FAMILY HISTORY:  No pertinent family history in first degree relatives      Vital Signs Last 24 Hrs  T(C): 36.9 (31 May 2018 06:51), Max: 37.1 (31 May 2018 05:24)  T(F): 98.5 (31 May 2018 06:51), Max: 98.7 (31 May 2018 05:24)  HR: 83 (31 May 2018 06:51) (83 - 120)  BP: 137/86 (31 May 2018 06:51) (123/96 - 137/86)  BP(mean): --  RR: 18 (31 May 2018 06:51) (18 - 22)  SpO2: 100% (31 May 2018 06:51) (97% - 100%)    PHYSICAL EXAM:    GENERAL: NAD, well-groomed, well-developed  HEAD:  Atraumatic, Normocephalic  EYES: EOMI, PERRLA, conjunctiva and sclera clear  ENMT: No tonsillar erythema, exudates, or enlargement; Moist mucous membranes, Good dentition, No lesions  NECK: Supple, No JVD, Normal thyroid  NERVOUS SYSTEM:  Alert & Oriented X3, Good concentration; Motor Strength 5/5 B/L upper and lower extremities; DTRs 2+ intact and symmetric  CHEST/LUNG: Clear to percussion bilaterally; No rales, rhonchi, wheezing, or rubs  HEART: Regular rate and rhythm; No murmurs, rubs, or gallops  ABDOMEN: Soft, Nontender, Nondistended; Bowel sounds present  EXTREMITIES:  2+ Peripheral Pulses, No clubbing, cyanosis, or edema  LYMPH: No lymphadenopathy noted   RECTAL: No masses, prostate normal size and smooth, Guaiaci negative   BREAST: No palpable masses, skin no lesions no retractions, no discharges. adnexal no palpable masses noted   GYN: uterus normal size, adnexal, no palpable masses, no CMT, no uterine discharge   SKIN: No rashes or lesions    LABS:                        13.8   14.40 )-----------( 343      ( 30 May 2018 22:48 )             41.8       CBC:  - @ 22:48  WBC  14.40  HGB 13.8  HCT 41.8 Plate 343  MCV 79.2           30 May 2018 22:48    144    |  107    |  27     ----------------------------<  120    3.2     |  21     |  1.37     Ca    9.9        30 May 2018 22:48    TPro  10.1   /  Alb  5.8    /  TBili  1.3    /  DBili  x      /  AST  36     /  ALT  36     /  AlkPhos  79     30 May 2018 22:48    PT/INR - ( 31 May 2018 07:41 )   PT: 13.6 sec;   INR: 1.24 ratio         PTT - ( 31 May 2018 07:41 )  PTT:26.0 sec  Urinalysis Basic - ( 30 May 2018 23:19 )    Color: Yellow / Appearance: Clear / S.015 / pH: x  Gluc: x / Ketone: Moderate  / Bili: Negative / Urobili: Negative mg/dL   Blood: x / Protein: 100 mg/dL / Nitrite: Negative   Leuk Esterase: Small / RBC: 11-25 /HPF / WBC 6-10   Sq Epi: x / Non Sq Epi: Few / Bacteria: Few          RADIOLOGY & ADDITIONAL STUDIES: Patient seen earlier today    HPI:  23 y/o female PMH cyclic vomiting for many years - s/p 3 endoscopies, CT 2 years ago reported normal), presents with abdominal pain, vomiting for several days.  Pt reports usually related to menses, but saw OBGYN who states unlikely.  States had IUD placed, but removed 2 days ago because pt unable to tolerate the pain and started vag bleeding 2 days.  Pt seen 1 month ago for similar pain, denies aspirin or NSAID use. States symptoms don't stop until she gets IV and morphine. Trying the zofran ODT at home, but states she just vomits.  The pain is diffuse in abdomen, crampy, non radiating, sustained, for several days; vomitus has been stomach contents, she has no diarrhea/constipation, melena or rectal bleeding.. Pt also hyperventilating and getting tingling to b/l hands. Pt vomited bloody material prior to my examination,   stat type and screen ordered and pt started on protonix drip. (31 May 2018 04:47)  -------------------------------------------- As Above -------------------------------------------------------------------------------------------------  The patient states that she has been vomiting for almost a year. In January she was diagnosed with cyclic vomiting syndrome. She had an EGD over the past two months which revealed a Sheree Arora tear. A full work up as per patient was negative. See above.   The patient states that she had began to vomit again 5 days ago. Toward the end, she saw streaks of blood and a few blood clots. This happened during her last vomiting episode, too.  Patient feels better today. Work up revealed a UTI.       PAST MEDICAL & SURGICAL HISTORY:  Cyclic vomiting syndrome  GERD (gastroesophageal reflux disease)  No significant past surgical history      MEDICATIONS  (STANDING):  cefTRIAXone   IVPB 1 Gram(s) IV Intermittent every 24 hours  pantoprazole Infusion 8 mG/Hr (10 mL/Hr) IV Continuous <Continuous>  potassium chloride  10 mEq/100 mL IVPB 10 milliEquivalent(s) IV Intermittent every 1 hour  sodium chloride 0.9%. 1000 milliLiter(s) (120 mL/Hr) IV Continuous <Continuous>    MEDICATIONS  (PRN):  morphine  - Injectable 2 milliGRAM(s) IV Push every 4 hours PRN Severe Pain (7 - 10)  ondansetron Injectable 4 milliGRAM(s) IV Push every 6 hours PRN Nausea and/or Vomiting      Allergies    penicillin (Hives)    Intolerances        FAMILY HISTORY:  No pertinent family history in first degree relatives      REVIEW OF SYSTEMS:    CONSTITUTIONAL: No fever, weight loss, or fatigue  EYES: No eye pain, visual disturbances, or discharge  ENMT:  No difficulty hearing, tinnitus, vertigo; No sinus or throat pain  NECK: No pain or stiffness  BREASTS: No pain, masses, or nipple discharge  RESPIRATORY: No cough, wheezing, chills or hemoptysis; No shortness of breath  CARDIOVASCULAR: No chest pain, palpitations, dizziness, or leg swelling  GASTROINTESTINAL: See above  GENITOURINARY: No dysuria, frequency, hematuria, or incontinence  NEUROLOGICAL: No headaches, memory loss, loss of strength, numbness, or tremors  SKIN: No itching, burning, rashes, or lesions   LYMPH NODES: No enlarged glands  ENDOCRINE: No heat or cold intolerance; No hair loss  MUSCULOSKELETAL: No joint pain or swelling; No muscle, back, or extremity pain  PSYCHIATRIC: No depression, anxiety, mood swings, or difficulty sleeping  HEME/LYMPH: No easy bruising, or bleeding gums  ALLERGY AND IMMUNOLOGIC: No hives or eczema          SOCIAL HISTORY:    FAMILY HISTORY:  No pertinent family history in first degree relatives      Vital Signs Last 24 Hrs  T(C): 36.9 (31 May 2018 06:51), Max: 37.1 (31 May 2018 05:24)  T(F): 98.5 (31 May 2018 06:51), Max: 98.7 (31 May 2018 05:24)  HR: 83 (31 May 2018 06:51) (83 - 120)  BP: 137/86 (31 May 2018 06:51) (123/96 - 137/86)  BP(mean): --  RR: 18 (31 May 2018 06:51) (18 - 22)  SpO2: 100% (31 May 2018 06:51) (97% - 100%)    PHYSICAL EXAM:    GENERAL: NAD, well-groomed, well-developed  HEAD:  Atraumatic, Normocephalic  EYES: EOMI, PERRLA, conjunctiva and sclera clear  ENMT: No tonsillar erythema, exudates, or enlargement; Moist mucous membranes, Good dentition, No lesions  NECK: Supple, No JVD, Normal thyroid  NERVOUS SYSTEM:  Alert & Oriented X3, Good concentration; Motor Strength 5/5 B/L upper and lower extremities; DTRs 2+ intact and symmetric  CHEST/LUNG: Clear to percussion bilaterally; No rales, rhonchi, wheezing, or rubs  HEART: Regular rate and rhythm; No murmurs, rubs, or gallops  ABDOMEN: Soft, Very mild diffuse tenderness, Nondistended; Bowel sounds present  EXTREMITIES:  2+ Peripheral Pulses, No clubbing, cyanosis, or edema  LYMPH: No lymphadenopathy noted   RECTAL: Deferred  SKIN: No rashes or lesions    LABS:                        13.8   14.40 )-----------( 343      ( 30 May 2018 22:48 )             41.8       CBC:   @ 22:48  WBC  14.40  HGB 13.8  HCT 41.8 Plate 343  MCV 79.2           30 May 2018 22:48    144    |  107    |  27     ----------------------------<  120    3.2     |  21     |  1.37     Ca    9.9        30 May 2018 22:48    TPro  10.1   /  Alb  5.8    /  TBili  1.3    /  DBili  x      /  AST  36     /  ALT  36     /  AlkPhos  79     30 May 2018 22:48    PT/INR - ( 31 May 2018 07:41 )   PT: 13.6 sec;   INR: 1.24 ratio         PTT - ( 31 May 2018 07:41 )  PTT:26.0 sec  Urinalysis Basic - ( 30 May 2018 23:19 )    Color: Yellow / Appearance: Clear / S.015 / pH: x  Gluc: x / Ketone: Moderate  / Bili: Negative / Urobili: Negative mg/dL   Blood: x / Protein: 100 mg/dL / Nitrite: Negative   Leuk Esterase: Small / RBC: 11-25 /HPF / WBC 6-10   Sq Epi: x / Non Sq Epi: Few / Bacteria: Few          RADIOLOGY & ADDITIONAL STUDIES:

## 2018-05-31 NOTE — H&P ADULT - NSHPLABSRESULTS_GEN_ALL_CORE
LABS:                        13.8   14.40 )-----------( 343      ( 30 May 2018 22:48 )             41.8         144  |  107  |  27<H>  ----------------------------<  120<H>  3.2<L>   |  21<L>  |  1.37<H>    Ca    9.9      30 May 2018 22:48    TPro  10.1<H>  /  Alb  5.8<H>  /  TBili  1.3<H>  /  DBili  x   /  AST  36  /  ALT  36  /  AlkPhos  79        Urinalysis Basic - ( 30 May 2018 23:19 )    Color: Yellow / Appearance: Clear / S.015 / pH: x  Gluc: x / Ketone: Moderate  / Bili: Negative / Urobili: Negative mg/dL   Blood: x / Protein: 100 mg/dL / Nitrite: Negative   Leuk Esterase: Small / RBC: 11-25 /HPF / WBC 6-10   Sq Epi: x / Non Sq Epi: Few / Bacteria: Few      CAPILLARY BLOOD GLUCOSE          RADIOLOGY & ADDITIONAL TESTS:  < from: CT Abdomen and Pelvis w/ Oral Cont and w/ IV Cont (18 @ 01:30) >    No acute intra-abdominal abnormality.    Limited evaluation of the descending and transverse colon due to lack of   distention.    < end of copied text >      Imaging Personally Reviewed:  [x ] YES  [ ] NO

## 2018-05-31 NOTE — CONSULT NOTE ADULT - ASSESSMENT
HPI:  23 y/o female PMH cyclic vomiting for many years - s/p 3 endoscopies, CT 2 years ago reported normal), presents with abdominal pain, vomiting for several days.  Pt reports usually related to menses, but saw DORA who states unlikely.  States had IUD placed, but removed 2 days ago because pt unable to tolerate the pain and started vag bleeding 2 days.  Pt seen 1 month ago for similar pain, denies aspirin or NSAID use. States symptoms don't stop until she gets IV and morphine. Trying the zofran ODT at home, but states she just vomits.  The pain is diffuse in abdomen, crampy, non radiating, sustained, for several days; vomitus has been stomach contents, she has no diarrhea/constipation, melena or rectal bleeding.. Pt also hyperventilating and getting tingling to b/l hands. Pt vomited bloody material prior to my examination,   stat type and screen ordered and pt started on protonix drip. (31 May 2018 04:47)  -------------------------------------------- As Above -------------------------------------------------------------------------------------------------  The patient states that she has been vomiting for almost a year. In January she was diagnosed with cyclic vomiting syndrome. She had an EGD over the past two months which revealed a Sheree Arora tear. A full work up as per patient was negative. See above.   The patient states that she had began to vomit again 5 days ago. Toward the end, she saw streaks of blood and a few blood clots. This happened during her last vomiting episode, too.  Patient feels better today. Work up revealed a UTI.   =================== N/V, hematemesis, history of Cyclic vomiting syndrome - Patient refusing EGD HPI:  21 y/o female PMH cyclic vomiting for many years - s/p 3 endoscopies, CT 2 years ago reported normal), presents with abdominal pain, vomiting for several days.  Pt reports usually related to menses, but saw OBGYN who states unlikely.  States had IUD placed, but removed 2 days ago because pt unable to tolerate the pain and started vag bleeding 2 days.  Pt seen 1 month ago for similar pain, denies aspirin or NSAID use. States symptoms don't stop until she gets IV and morphine. Trying the zofran ODT at home, but states she just vomits.  The pain is diffuse in abdomen, crampy, non radiating, sustained, for several days; vomitus has been stomach contents, she has no diarrhea/constipation, melena or rectal bleeding.. Pt also hyperventilating and getting tingling to b/l hands. Pt vomited bloody material prior to my examination,   stat type and screen ordered and pt started on protonix drip. (31 May 2018 04:47)  -------------------------------------------- As Above -------------------------------------------------------------------------------------------------  The patient states that she has been vomiting for almost a year. In January she was diagnosed with cyclic vomiting syndrome. She had an EGD over the past two months which revealed a Sheree Arora tear. A full work up as per patient was negative. See above.   The patient states that she had began to vomit again 5 days ago. Toward the end, she saw streaks of blood and a few blood clots. This happened during her last vomiting episode, too.  Patient feels better today. Work up revealed a UTI.   =================== N/V, hematemesis, history of Cyclic vomiting syndrome - Patient refusing EGD. Supportive care for now. HIDA Scan w/ [facility] when off Zofran/opiods/Reglan, clear liquid diet.  Discussed with patient and mom

## 2018-05-31 NOTE — H&P ADULT - ASSESSMENT
23 y/o woman with cyclic vomiting syndrome presents after vomiting stomach contents for several days, last episode vomitus contained blood; must be concerned about Sheree-Arora tear. Type and screen, antiemetics, pain control, PPI drip,  urgent GI consult for possible EGD.  IMPROVE VTE Individual Risk Assessment          RISK                                                          Points    [  ] Previous VTE                                                3    [  ] Thrombophilia                                             2    [  ] Lower limb paralysis                                    2        (unable to hold up >15 seconds)      [  ] Current Cancer                                             2         (within 6 months)    [  ] Immobilization > 24 hrs                              1    [  ] ICU/CCU stay > 24 hours                            1    [  ] Age > 60                                                    1    IMPROVE VTE Score _____0____

## 2018-05-31 NOTE — H&P ADULT - HISTORY OF PRESENT ILLNESS
21 y/o female PMH cyclic vomiting for many years - s/p 3 endoscopies, CT 2 years ago reported normal), presents with abdominal pain, vomiting for several days.  Pt reports usually related to menses, but saw OBGYN who states unlikely.  States had IUD placed, but removed 2 days ago because pt unable to tolerate the pain and started vag bleeding 2 days.  Pt seen 1 month ago for similar pain, denies aspirin or NSAID use. States symptoms don't stop until she gets IV and morphine. Trying the zofran ODT at home, but states she just vomits.  The pain is diffuse in abdomen, crampy, non radiating, sustained, for several days; vomitus has been stomach contents, she has no diarrhea/constipation, melena or rectal bleeding.. Pt also hyperventilating and getting tingling to b/l hands. Pt vomited bloody material prior to my examination,   stat type and screen ordered and pt started on protonix drip.

## 2018-06-01 ENCOUNTER — TRANSCRIPTION ENCOUNTER (OUTPATIENT)
Age: 23
End: 2018-06-01

## 2018-06-01 VITALS
TEMPERATURE: 99 F | DIASTOLIC BLOOD PRESSURE: 77 MMHG | SYSTOLIC BLOOD PRESSURE: 124 MMHG | HEART RATE: 79 BPM | OXYGEN SATURATION: 100 % | RESPIRATION RATE: 17 BRPM

## 2018-06-01 LAB
CULTURE RESULTS: SIGNIFICANT CHANGE UP
SPECIMEN SOURCE: SIGNIFICANT CHANGE UP

## 2018-06-01 PROCEDURE — 99239 HOSP IP/OBS DSCHRG MGMT >30: CPT

## 2018-06-01 RX ORDER — ACETAMINOPHEN 500 MG
650 TABLET ORAL EVERY 6 HOURS
Qty: 0 | Refills: 0 | Status: DISCONTINUED | OUTPATIENT
Start: 2018-06-01 | End: 2018-06-02

## 2018-06-01 RX ORDER — PANTOPRAZOLE SODIUM 20 MG/1
1 TABLET, DELAYED RELEASE ORAL
Qty: 30 | Refills: 0 | OUTPATIENT
Start: 2018-06-01 | End: 2018-06-30

## 2018-06-01 RX ORDER — ACETAMINOPHEN 500 MG
1000 TABLET ORAL ONCE
Qty: 0 | Refills: 0 | Status: COMPLETED | OUTPATIENT
Start: 2018-06-01 | End: 2018-06-01

## 2018-06-01 RX ORDER — SUCRALFATE 1 G
1 TABLET ORAL
Qty: 100 | Refills: 0 | OUTPATIENT
Start: 2018-06-01 | End: 2018-06-30

## 2018-06-01 RX ORDER — ONDANSETRON 8 MG/1
1 TABLET, FILM COATED ORAL
Qty: 28 | Refills: 0 | OUTPATIENT
Start: 2018-06-01 | End: 2018-06-07

## 2018-06-01 RX ORDER — KETOROLAC TROMETHAMINE 30 MG/ML
15 SYRINGE (ML) INJECTION ONCE
Qty: 0 | Refills: 0 | Status: DISCONTINUED | OUTPATIENT
Start: 2018-06-01 | End: 2018-06-02

## 2018-06-01 RX ADMIN — MORPHINE SULFATE 2 MILLIGRAM(S): 50 CAPSULE, EXTENDED RELEASE ORAL at 01:05

## 2018-06-01 RX ADMIN — MORPHINE SULFATE 2 MILLIGRAM(S): 50 CAPSULE, EXTENDED RELEASE ORAL at 01:30

## 2018-06-01 RX ADMIN — ONDANSETRON 4 MILLIGRAM(S): 8 TABLET, FILM COATED ORAL at 10:59

## 2018-06-01 RX ADMIN — ONDANSETRON 4 MILLIGRAM(S): 8 TABLET, FILM COATED ORAL at 05:33

## 2018-06-01 RX ADMIN — Medication 400 MILLIGRAM(S): at 12:55

## 2018-06-01 RX ADMIN — MORPHINE SULFATE 2 MILLIGRAM(S): 50 CAPSULE, EXTENDED RELEASE ORAL at 05:33

## 2018-06-01 RX ADMIN — MORPHINE SULFATE 2 MILLIGRAM(S): 50 CAPSULE, EXTENDED RELEASE ORAL at 06:30

## 2018-06-01 RX ADMIN — Medication 1000 MILLIGRAM(S): at 13:05

## 2018-06-01 NOTE — DISCHARGE NOTE ADULT - MEDICATION SUMMARY - MEDICATIONS TO TAKE
I will START or STAY ON the medications listed below when I get home from the hospital:    ondansetron 4 mg oral tablet  -- 1 tab(s) by mouth every 6 hours, As Needed -for nausea  -- Indication: For Intractable vomiting    sucralfate 1 g/10 mL oral suspension  -- 1 gram(s) by mouth 3 times a day   -- Indication: For Intractable cyclical vomiting with nausea    Protonix 40 mg oral delayed release tablet  -- 1 tab(s) by mouth once a day  -- Indication: For Intractable cyclical vomiting with nausea I will START or STAY ON the medications listed below when I get home from the hospital:    ondansetron 4 mg oral tablet  -- 1 tab(s) by mouth every 6 hours, As Needed -for nausea  -- Indication: For nausea    sucralfate 1 g/10 mL oral suspension  -- 1 gram(s) by mouth 3 times a day   -- Indication: For Acid reflux    Protonix 40 mg oral delayed release tablet  -- 1 tab(s) by mouth once a day  -- Indication: For Acid reflux

## 2018-06-01 NOTE — DISCHARGE NOTE ADULT - CARE PROVIDER_API CALL
Norberto Loredo), Medicine  [address] [place] [address]  Phone: [phone]  Fax: [phone]    your PMD,   Phone: (   )    -  Fax: (   )    -

## 2018-06-01 NOTE — PROGRESS NOTE ADULT - SUBJECTIVE AND OBJECTIVE BOX
Patient is a 22y old  Female who presents with a chief complaint of     HPI:  23 y/o female PMH cyclic vomiting for many years - s/p 3 endoscopies, CT 2 years ago reported normal), presents with abdominal pain, vomiting for several days.  Pt reports usually related to menses, but saw OBGYN who states unlikely.  States had IUD placed, but removed 2 days ago because pt unable to tolerate the pain and started vag bleeding 2 days.  Pt seen 1 month ago for similar pain, denies aspirin or NSAID use. States symptoms don't stop until she gets IV and morphine. Trying the zofran ODT at home, but states she just vomits.  The pain is diffuse in abdomen, crampy, non radiating, sustained, for several days; vomitus has been stomach contents, she has no diarrhea/constipation, melena or rectal bleeding.. Pt also hyperventilating and getting tingling to b/l hands. Pt vomited bloody material prior to my examination,   stat type and screen ordered and pt started on protonix drip. (31 May 2018 04:47)      INTERVAL HPI/OVERNIGHT EVENTS:  The patient is feeling better. Minimal nausea diffuse abdominal pain. Tolerated clear liquids.    MEDICATIONS  (STANDING):  sodium chloride 0.9%. 1000 milliLiter(s) (120 mL/Hr) IV Continuous <Continuous>    MEDICATIONS  (PRN):  acetaminophen   Tablet. 650 milliGRAM(s) Oral every 6 hours PRN Mild Pain (1 - 3)  ketorolac   Injectable 15 milliGRAM(s) IV Push once PRN Severe Pain (7 - 10)  ondansetron Injectable 4 milliGRAM(s) IV Push every 6 hours PRN Nausea and/or Vomiting      FAMILY HISTORY:  No pertinent family history in first degree relatives      Allergies    penicillin (Hives)    Intolerances        PMH/PSH:  Cyclic vomiting syndrome  GERD (gastroesophageal reflux disease)  Ulcer of gastric cardia  No pertinent past medical history  No significant past surgical history        REVIEW OF SYSTEMS:  CONSTITUTIONAL: No fever, weight loss, or fatigue  EYES: No eye pain, visual disturbances, or discharge  ENMT:  No difficulty hearing, tinnitus, vertigo; No sinus or throat pain  NECK: No pain or stiffness  BREASTS: No pain, masses, or nipple discharge  RESPIRATORY: No cough, wheezing, chills or hemoptysis; No shortness of breath  CARDIOVASCULAR: No chest pain, palpitations, dizziness, or leg swelling  GASTROINTESTINAL: See above.  GENITOURINARY: No dysuria, frequency, hematuria, or incontinence  NEUROLOGICAL: No headaches, memory loss, loss of strength, numbness, or tremors  SKIN: No itching, burning, rashes, or lesions   LYMPH NODES: No enlarged glands  ENDOCRINE: No heat or cold intolerance; No hair loss  MUSCULOSKELETAL: No joint pain or swelling; No muscle, back, or extremity pain  PSYCHIATRIC: No depression, anxiety, mood swings, or difficulty sleeping  HEME/LYMPH: No easy bruising, or bleeding gums  ALLERGY AND IMMUNOLOGIC: No hives or eczema    Vital Signs Last 24 Hrs  T(C): 36.7 (2018 11:22), Max: 36.8 (31 May 2018 17:02)  T(F): 98 (:), Max: 98.3 (31 May 2018 17:02)  HR: 73 (2018 11:22) (55 - 73)  BP: 116/64 (2018 11:22) (113/70 - 129/84)  BP(mean): --  RR: 17 (2018 11:22) (17 - 18)  SpO2: 99% (2018 11:22) (99% - 100%)    PHYSICAL EXAM:  GENERAL: NAD, well-groomed, well-developed  HEAD:  Atraumatic, Normocephalic  EYES: EOMI, PERRLA, conjunctiva and sclera clear  ENMT: No tonsillar erythema, exudates, or enlargement; Moist mucous membranes, Good dentition, No lesions  NECK: Supple, No JVD, Normal thyroid  NERVOUS SYSTEM:  Alert & Oriented X3, Good concentration; Motor Strength 5/5 B/L upper and lower extremities  CHEST/LUNG: Clear to percussion bilaterally; No rales, rhonchi, wheezing, or rubs  HEART: Regular rate and rhythm; No murmurs, rubs, or gallops  ABDOMEN: Soft, Nontender, Nondistended; Bowel sounds present  EXTREMITIES:  2+ Peripheral Pulses, No clubbing, cyanosis, or edema  LYMPH: No lymphadenopathy noted  SKIN: No rashes or lesions    LAB   @ 22:48  amylase --   lipase 215                           13.8   14.40 )-----------( 343      ( 30 May 2018 22:48 )             41.8       CBC:   @ 22:48  WBC 14.40   Hgb 13.8   Hct 41.8   Plts 343  MCV 79.2      Chemistry:   @ 18:36  Na+ 146  K+ 4.0  Cl- 116  CO2 22  BUN 14  Cr 0.89      @ 11:06  Na+ 146  K+ 3.2  Cl- 113  CO2 20  BUN 20  Cr 1.18      @ 22:48  Na+ 144  K+ 3.2  Cl- 107  CO2 21  BUN 27  Cr 1.37         Glucose, Serum: 86 mg/dL ( @ 18:36)  Glucose, Serum: 96 mg/dL ( @ 11:06)  Glucose, Serum: 120 mg/dL ( @ 22:48)      31 May 2018 18:36    146    |  116    |  14     ----------------------------<  86     4.0     |  22     |  0.89   31 May 2018 11:06    146    |  113    |  20     ----------------------------<  96     3.2     |  20     |  1.18   30 May 2018 22:48    144    |  107    |  27     ----------------------------<  120    3.2     |  21     |  1.37     Ca    7.7        31 May 2018 18:36  Ca    8.3        31 May 2018 11:06  Ca    9.9        30 May 2018 22:48    TPro  10.1   /  Alb  5.8    /  TBili  1.3    /  DBili  x      /  AST  36     /  ALT  36     /  AlkPhos  79     30 May 2018 22:48      PT/INR - ( 31 May 2018 07:41 )   PT: 13.6 sec;   INR: 1.24 ratio         PTT - ( 31 May 2018 07:41 )  PTT:26.0 sec    Urinalysis Basic - ( 30 May 2018 23:19 )    Color: Yellow / Appearance: Clear / S.015 / pH: x  Gluc: x / Ketone: Moderate  / Bili: Negative / Urobili: Negative mg/dL   Blood: x / Protein: 100 mg/dL / Nitrite: Negative   Leuk Esterase: Small / RBC: 11-25 /HPF / WBC 6-10   Sq Epi: x / Non Sq Epi: Few / Bacteria: Few        CAPILLARY BLOOD GLUCOSE              RADIOLOGY & ADDITIONAL TESTS:    Imaging Personally Reviewed:  [ ] YES  [ ] NO    Consultant(s) Notes Reviewed:  [ ] YES  [ ] NO    Care Discussed with Consultants/Other Providers [ ] YES  [ ] NO

## 2018-06-01 NOTE — PROGRESS NOTE ADULT - PROBLEM SELECTOR PLAN 1
clinically better. Tolerated clear liquids. 1) advance to full liquids 2) Replace Morphine with Tylenol 3) Will follow

## 2018-06-01 NOTE — DISCHARGE NOTE ADULT - PLAN OF CARE
resolve, stable follow up with GI  within 3-4 days upon discharge repleted   follow up with your PMD upon discharge

## 2018-06-01 NOTE — DISCHARGE NOTE ADULT - NURSING SECTION COMPLETE
Tried calling patient to notify her form is completed and ready for .   No answer and unable to leave message.   Form placed at .    Patient/Caregiver provided printed discharge information.

## 2018-06-01 NOTE — DISCHARGE NOTE ADULT - HOSPITAL COURSE
21 y/o female PMH cyclic vomiting for many years - s/p 3 endoscopies, CT 2 years ago reported normal), presents with abdominal pain, vomiting for several days  GI consulted, IVF , advance diet as tolerated , on protonix drip , pain control   Hypokalemia , monitor electrolytes, KIMBERLY. leukocytosis likely 2/2 to vomiting . occult + likely vaginal bleeding contaminated  discussed in detail w/ pt and pt's mother upon pt's request - matt [phone], discussed w/ GI, D/c d morphine IV , supplemented with tylenol, toradol  d/c planning when pt tolerates diet well 23 y/o female PMH cyclic vomiting for many years - s/p 3 endoscopies, CT 2 years ago reported normal), presents with abdominal pain, vomiting for several days  GI consulted, IVF , advance diet as tolerated , on protonix drip , pain control   Hypokalemia , monitor electrolytes, KIMBERLY. leukocytosis likely 2/2 to vomiting . occult + likely vaginal bleeding contaminated  discussed in detail w/ pt and pt's mother upon pt's request - matt [phone], discussed w/ GI, D/c d morphine IV , supplemented with tylenol, toradol  d/c planning when pt tolerates diet well   Addendum 6/14/17:  During this visit , there were no episodes of bloody steak vomitus in the 2 days when I took care of the pt, so paulino-ha was not a significant diagnosis in this admission

## 2018-06-01 NOTE — CHART NOTE - NSCHARTNOTEFT_GEN_A_CORE
To Whomever this may concern,     Ms. Marla Hilliard has been hospitalized from May 31st, 2018 through June 1st, 2018. Please excuse her absence. If you have any questions or concerns, please call [phone]. Thank you for your understanding.       Diane Mota PA-C

## 2018-06-01 NOTE — DISCHARGE NOTE ADULT - CARE PLAN
Principal Discharge DX:	Intractable cyclical vomiting with nausea  Goal:	resolve, stable  Assessment and plan of treatment:	follow up with GI  within 3-4 days upon discharge  Secondary Diagnosis:	Hypokalemia  Assessment and plan of treatment:	repleted   follow up with your PMD upon discharge

## 2018-06-01 NOTE — DISCHARGE NOTE ADULT - PATIENT PORTAL LINK FT
You can access the [facility] Portal, offered by [facility], by registering with the following website: [email or web address]

## 2018-06-14 DIAGNOSIS — N39.0 URINARY TRACT INFECTION, SITE NOT SPECIFIED: ICD-10-CM

## 2018-06-14 DIAGNOSIS — D72.829 ELEVATED WHITE BLOOD CELL COUNT, UNSPECIFIED: ICD-10-CM

## 2018-06-14 DIAGNOSIS — E87.6 HYPOKALEMIA: ICD-10-CM

## 2018-06-14 DIAGNOSIS — G43.A1 CYCLICAL VOMITING, IN MIGRAINE, INTRACTABLE: ICD-10-CM

## 2018-06-14 DIAGNOSIS — K29.70 GASTRITIS, UNSPECIFIED, WITHOUT BLEEDING: ICD-10-CM

## 2018-06-14 DIAGNOSIS — Z88.0 ALLERGY STATUS TO PENICILLIN: ICD-10-CM

## 2018-06-14 DIAGNOSIS — N17.9 ACUTE KIDNEY FAILURE, UNSPECIFIED: ICD-10-CM

## 2018-06-14 DIAGNOSIS — K21.9 GASTRO-ESOPHAGEAL REFLUX DISEASE WITHOUT ESOPHAGITIS: ICD-10-CM

## 2018-07-06 ENCOUNTER — INPATIENT (INPATIENT)
Facility: HOSPITAL | Age: 23
LOS: 5 days | Discharge: HOME | End: 2018-07-12
Attending: HOSPITALIST | Admitting: HOSPITALIST
Payer: COMMERCIAL

## 2018-07-06 VITALS
SYSTOLIC BLOOD PRESSURE: 133 MMHG | DIASTOLIC BLOOD PRESSURE: 84 MMHG | HEART RATE: 87 BPM | HEIGHT: 64 IN | OXYGEN SATURATION: 100 % | RESPIRATION RATE: 16 BRPM | WEIGHT: 139.99 LBS | TEMPERATURE: 98 F

## 2018-07-06 LAB
ALBUMIN SERPL ELPH-MCNC: 4 G/DL — SIGNIFICANT CHANGE UP (ref 3.3–5)
ALP SERPL-CCNC: 70 U/L — SIGNIFICANT CHANGE UP (ref 40–120)
ALT FLD-CCNC: 19 U/L — SIGNIFICANT CHANGE UP (ref 12–78)
ANION GAP SERPL CALC-SCNC: 9 MMOL/L — SIGNIFICANT CHANGE UP (ref 5–17)
APPEARANCE UR: CLEAR — SIGNIFICANT CHANGE UP
AST SERPL-CCNC: 29 U/L — SIGNIFICANT CHANGE UP (ref 15–37)
BASOPHILS # BLD AUTO: 0.02 K/UL — SIGNIFICANT CHANGE UP (ref 0–0.2)
BASOPHILS NFR BLD AUTO: 0.2 % — SIGNIFICANT CHANGE UP (ref 0–2)
BILIRUB SERPL-MCNC: 0.7 MG/DL — SIGNIFICANT CHANGE UP (ref 0.2–1.2)
BILIRUB UR-MCNC: NEGATIVE — SIGNIFICANT CHANGE UP
BUN SERPL-MCNC: 11 MG/DL — SIGNIFICANT CHANGE UP (ref 7–23)
CALCIUM SERPL-MCNC: 8.1 MG/DL — LOW (ref 8.5–10.1)
CHLORIDE SERPL-SCNC: 110 MMOL/L — HIGH (ref 96–108)
CO2 SERPL-SCNC: 21 MMOL/L — LOW (ref 22–31)
COLOR SPEC: YELLOW — SIGNIFICANT CHANGE UP
CREAT SERPL-MCNC: 0.8 MG/DL — SIGNIFICANT CHANGE UP (ref 0.5–1.3)
DIFF PNL FLD: ABNORMAL
EOSINOPHIL # BLD AUTO: 0 K/UL — SIGNIFICANT CHANGE UP (ref 0–0.5)
EOSINOPHIL NFR BLD AUTO: 0 % — SIGNIFICANT CHANGE UP (ref 0–6)
GLUCOSE SERPL-MCNC: 108 MG/DL — HIGH (ref 70–99)
GLUCOSE UR QL: NEGATIVE MG/DL — SIGNIFICANT CHANGE UP
HCG SERPL-ACNC: <1 MIU/ML — SIGNIFICANT CHANGE UP
HCT VFR BLD CALC: 37 % — SIGNIFICANT CHANGE UP (ref 34.5–45)
HGB BLD-MCNC: 11.9 G/DL — SIGNIFICANT CHANGE UP (ref 11.5–15.5)
IMM GRANULOCYTES NFR BLD AUTO: 0.3 % — SIGNIFICANT CHANGE UP (ref 0–1.5)
KETONES UR-MCNC: ABNORMAL
LACTATE SERPL-SCNC: 1.7 MMOL/L — SIGNIFICANT CHANGE UP (ref 0.7–2)
LEUKOCYTE ESTERASE UR-ACNC: NEGATIVE — SIGNIFICANT CHANGE UP
LIDOCAIN IGE QN: 114 U/L — SIGNIFICANT CHANGE UP (ref 73–393)
LYMPHOCYTES # BLD AUTO: 0.94 K/UL — LOW (ref 1–3.3)
LYMPHOCYTES # BLD AUTO: 8.1 % — LOW (ref 13–44)
MCHC RBC-ENTMCNC: 26.5 PG — LOW (ref 27–34)
MCHC RBC-ENTMCNC: 32.2 GM/DL — SIGNIFICANT CHANGE UP (ref 32–36)
MCV RBC AUTO: 82.4 FL — SIGNIFICANT CHANGE UP (ref 80–100)
MONOCYTES # BLD AUTO: 0.32 K/UL — SIGNIFICANT CHANGE UP (ref 0–0.9)
MONOCYTES NFR BLD AUTO: 2.8 % — SIGNIFICANT CHANGE UP (ref 2–14)
NEUTROPHILS # BLD AUTO: 10.29 K/UL — HIGH (ref 1.8–7.4)
NEUTROPHILS NFR BLD AUTO: 88.6 % — HIGH (ref 43–77)
NITRITE UR-MCNC: NEGATIVE — SIGNIFICANT CHANGE UP
NRBC # BLD: 0 /100 WBCS — SIGNIFICANT CHANGE UP (ref 0–0)
PH UR: 8 — SIGNIFICANT CHANGE UP (ref 5–8)
PLATELET # BLD AUTO: 275 K/UL — SIGNIFICANT CHANGE UP (ref 150–400)
POTASSIUM SERPL-MCNC: 4 MMOL/L — SIGNIFICANT CHANGE UP (ref 3.5–5.3)
POTASSIUM SERPL-SCNC: 4 MMOL/L — SIGNIFICANT CHANGE UP (ref 3.5–5.3)
PROT SERPL-MCNC: 7.7 GM/DL — SIGNIFICANT CHANGE UP (ref 6–8.3)
PROT UR-MCNC: 15 MG/DL
RBC # BLD: 4.49 M/UL — SIGNIFICANT CHANGE UP (ref 3.8–5.2)
RBC # FLD: 14.6 % — HIGH (ref 10.3–14.5)
SODIUM SERPL-SCNC: 140 MMOL/L — SIGNIFICANT CHANGE UP (ref 135–145)
SP GR SPEC: 1.01 — SIGNIFICANT CHANGE UP (ref 1.01–1.02)
UROBILINOGEN FLD QL: NEGATIVE MG/DL — SIGNIFICANT CHANGE UP
WBC # BLD: 11.6 K/UL — HIGH (ref 3.8–10.5)
WBC # FLD AUTO: 11.6 K/UL — HIGH (ref 3.8–10.5)

## 2018-07-06 PROCEDURE — 99285 EMERGENCY DEPT VISIT HI MDM: CPT

## 2018-07-06 RX ORDER — PANTOPRAZOLE SODIUM 20 MG/1
40 TABLET, DELAYED RELEASE ORAL ONCE
Qty: 0 | Refills: 0 | Status: COMPLETED | OUTPATIENT
Start: 2018-07-06 | End: 2018-07-06

## 2018-07-06 RX ORDER — ONDANSETRON 8 MG/1
4 TABLET, FILM COATED ORAL ONCE
Qty: 0 | Refills: 0 | Status: COMPLETED | OUTPATIENT
Start: 2018-07-06 | End: 2018-07-06

## 2018-07-06 RX ORDER — SODIUM CHLORIDE 9 MG/ML
2000 INJECTION, SOLUTION INTRAVENOUS ONCE
Qty: 0 | Refills: 0 | Status: COMPLETED | OUTPATIENT
Start: 2018-07-06 | End: 2018-07-06

## 2018-07-06 RX ORDER — MORPHINE SULFATE 50 MG/1
4 CAPSULE, EXTENDED RELEASE ORAL ONCE
Qty: 0 | Refills: 0 | Status: DISCONTINUED | OUTPATIENT
Start: 2018-07-06 | End: 2018-07-06

## 2018-07-06 RX ORDER — HALOPERIDOL DECANOATE 100 MG/ML
5 INJECTION INTRAMUSCULAR ONCE
Qty: 0 | Refills: 0 | Status: COMPLETED | OUTPATIENT
Start: 2018-07-06 | End: 2018-07-06

## 2018-07-06 RX ORDER — SODIUM CHLORIDE 9 MG/ML
2000 INJECTION INTRAMUSCULAR; INTRAVENOUS; SUBCUTANEOUS ONCE
Qty: 0 | Refills: 0 | Status: COMPLETED | OUTPATIENT
Start: 2018-07-06 | End: 2018-07-06

## 2018-07-06 RX ADMIN — HALOPERIDOL DECANOATE 5 MILLIGRAM(S): 100 INJECTION INTRAMUSCULAR at 21:19

## 2018-07-06 RX ADMIN — MORPHINE SULFATE 4 MILLIGRAM(S): 50 CAPSULE, EXTENDED RELEASE ORAL at 21:45

## 2018-07-06 RX ADMIN — PANTOPRAZOLE SODIUM 40 MILLIGRAM(S): 20 TABLET, DELAYED RELEASE ORAL at 21:19

## 2018-07-06 RX ADMIN — MORPHINE SULFATE 4 MILLIGRAM(S): 50 CAPSULE, EXTENDED RELEASE ORAL at 21:19

## 2018-07-06 RX ADMIN — ONDANSETRON 4 MILLIGRAM(S): 8 TABLET, FILM COATED ORAL at 21:20

## 2018-07-06 RX ADMIN — SODIUM CHLORIDE 2000 MILLILITER(S): 9 INJECTION INTRAMUSCULAR; INTRAVENOUS; SUBCUTANEOUS at 21:20

## 2018-07-06 NOTE — ED ADULT TRIAGE NOTE - CHIEF COMPLAINT QUOTE
Pt states she is here for vomiting that is uncontrollable. Pt states that she is throwing up bile. Pt states that she was diagnosed with cyclical vomiting before.

## 2018-07-06 NOTE — ED PROVIDER NOTE - CARE PLAN
Principal Discharge DX:	Colitis  Secondary Diagnosis:	Intractable vomiting  Secondary Diagnosis:	Ruptured ovarian cyst

## 2018-07-06 NOTE — ED PROVIDER NOTE - OBJECTIVE STATEMENT
Pertinent PMH/PSH/FHx/SHx and Review of Systems contained within:    21yo F w PMH of GERD, cyclic vomiting syndrome, +marijuana smoker, presents to ED for eval of vomiting & abd pain.  Pt states sx started today, no known triggers/ingestion of spoiled foods.  Pt c/o epigastric pain.  +subjective fever/chills.  Pt has hx of mult endoscopies, GI- [facility].    +chills, No photophobia/eye pain, No ear pain/sore throat, No chest pain/palpitations, no SOB/cough/wheeze/stridor, +abdominal pain, No neck/back pain, no rash, no changes in neurological status/function.

## 2018-07-06 NOTE — ED ADULT NURSE NOTE - OBJECTIVE STATEMENT
Assuming care of pt AOx4, Pt c/o of abd pain 10/10, vomiting 10 times beginning today. pt denies diarrhea. pt states vomiting streaks of blood.

## 2018-07-06 NOTE — ED PROVIDER NOTE - MEDICAL DECISION MAKING DETAILS
Pt w above dx, hypotension improved w IVF.  Abx initiated in ED.  Pt admitted for further eval/mgmt.

## 2018-07-06 NOTE — ED PROVIDER NOTE - PHYSICAL EXAMINATION
Gen: Alert, c/o pain, requesting zofran & morphine  Head: NC, AT, EOMI, normal lids/conjunctiva  ENT: normal hearing, patent oropharynx, MMM  Neck: supple, no tenderness/meningismus, FROM  Pulm: Bilateral clear BS, normal resp effort, no wheeze/stridor/retractions  CV: RRR, no M/R/G, +dist pulses  Abd: soft, +epigastric TTP, ND, +BS, no guarding/rebound tenderness  Mskel: no edema/erythema/cyanosis  Skin: no rash  Neuro: no sensory/motor deficits

## 2018-07-07 DIAGNOSIS — K21.0 GASTRO-ESOPHAGEAL REFLUX DISEASE WITH ESOPHAGITIS: ICD-10-CM

## 2018-07-07 DIAGNOSIS — G43.A1 CYCLICAL VOMITING, IN MIGRAINE, INTRACTABLE: ICD-10-CM

## 2018-07-07 DIAGNOSIS — Z29.9 ENCOUNTER FOR PROPHYLACTIC MEASURES, UNSPECIFIED: ICD-10-CM

## 2018-07-07 LAB
BACTERIA # UR AUTO: ABNORMAL
EPI CELLS # UR: ABNORMAL
WBC UR QL: SIGNIFICANT CHANGE UP

## 2018-07-07 PROCEDURE — 76856 US EXAM PELVIC COMPLETE: CPT | Mod: 26

## 2018-07-07 PROCEDURE — 74177 CT ABD & PELVIS W/CONTRAST: CPT | Mod: 26

## 2018-07-07 PROCEDURE — 12345: CPT | Mod: NC

## 2018-07-07 PROCEDURE — 99222 1ST HOSP IP/OBS MODERATE 55: CPT | Mod: AI

## 2018-07-07 RX ORDER — ONDANSETRON 8 MG/1
4 TABLET, FILM COATED ORAL EVERY 6 HOURS
Qty: 0 | Refills: 0 | Status: DISCONTINUED | OUTPATIENT
Start: 2018-07-07 | End: 2018-07-12

## 2018-07-07 RX ORDER — METRONIDAZOLE 500 MG
500 TABLET ORAL ONCE
Qty: 0 | Refills: 0 | Status: COMPLETED | OUTPATIENT
Start: 2018-07-07 | End: 2018-07-07

## 2018-07-07 RX ORDER — MORPHINE SULFATE 50 MG/1
2 CAPSULE, EXTENDED RELEASE ORAL ONCE
Qty: 0 | Refills: 0 | Status: DISCONTINUED | OUTPATIENT
Start: 2018-07-07 | End: 2018-07-07

## 2018-07-07 RX ORDER — MORPHINE SULFATE 50 MG/1
4 CAPSULE, EXTENDED RELEASE ORAL ONCE
Qty: 0 | Refills: 0 | Status: DISCONTINUED | OUTPATIENT
Start: 2018-07-07 | End: 2018-07-07

## 2018-07-07 RX ORDER — CIPROFLOXACIN LACTATE 400MG/40ML
400 VIAL (ML) INTRAVENOUS ONCE
Qty: 0 | Refills: 0 | Status: COMPLETED | OUTPATIENT
Start: 2018-07-07 | End: 2018-07-07

## 2018-07-07 RX ORDER — ONDANSETRON 8 MG/1
4 TABLET, FILM COATED ORAL ONCE
Qty: 0 | Refills: 0 | Status: COMPLETED | OUTPATIENT
Start: 2018-07-07 | End: 2018-07-07

## 2018-07-07 RX ORDER — PANTOPRAZOLE SODIUM 20 MG/1
40 TABLET, DELAYED RELEASE ORAL
Qty: 0 | Refills: 0 | Status: DISCONTINUED | OUTPATIENT
Start: 2018-07-07 | End: 2018-07-12

## 2018-07-07 RX ORDER — CIPROFLOXACIN LACTATE 400MG/40ML
400 VIAL (ML) INTRAVENOUS EVERY 12 HOURS
Qty: 0 | Refills: 0 | Status: DISCONTINUED | OUTPATIENT
Start: 2018-07-07 | End: 2018-07-11

## 2018-07-07 RX ORDER — METOCLOPRAMIDE HCL 10 MG
10 TABLET ORAL ONCE
Qty: 0 | Refills: 0 | Status: COMPLETED | OUTPATIENT
Start: 2018-07-07 | End: 2018-07-07

## 2018-07-07 RX ORDER — METRONIDAZOLE 500 MG
500 TABLET ORAL EVERY 12 HOURS
Qty: 0 | Refills: 0 | Status: DISCONTINUED | OUTPATIENT
Start: 2018-07-07 | End: 2018-07-12

## 2018-07-07 RX ORDER — SODIUM CHLORIDE 9 MG/ML
1000 INJECTION INTRAMUSCULAR; INTRAVENOUS; SUBCUTANEOUS
Qty: 0 | Refills: 0 | Status: DISCONTINUED | OUTPATIENT
Start: 2018-07-07 | End: 2018-07-12

## 2018-07-07 RX ORDER — MORPHINE SULFATE 50 MG/1
1 CAPSULE, EXTENDED RELEASE ORAL ONCE
Qty: 0 | Refills: 0 | Status: DISCONTINUED | OUTPATIENT
Start: 2018-07-07 | End: 2018-07-07

## 2018-07-07 RX ADMIN — ONDANSETRON 4 MILLIGRAM(S): 8 TABLET, FILM COATED ORAL at 06:43

## 2018-07-07 RX ADMIN — MORPHINE SULFATE 2 MILLIGRAM(S): 50 CAPSULE, EXTENDED RELEASE ORAL at 04:37

## 2018-07-07 RX ADMIN — Medication 100 MILLIGRAM(S): at 17:01

## 2018-07-07 RX ADMIN — Medication 10 MILLIGRAM(S): at 21:28

## 2018-07-07 RX ADMIN — MORPHINE SULFATE 4 MILLIGRAM(S): 50 CAPSULE, EXTENDED RELEASE ORAL at 00:56

## 2018-07-07 RX ADMIN — Medication 200 MILLIGRAM(S): at 04:40

## 2018-07-07 RX ADMIN — MORPHINE SULFATE 1 MILLIGRAM(S): 50 CAPSULE, EXTENDED RELEASE ORAL at 16:05

## 2018-07-07 RX ADMIN — MORPHINE SULFATE 4 MILLIGRAM(S): 50 CAPSULE, EXTENDED RELEASE ORAL at 01:20

## 2018-07-07 RX ADMIN — MORPHINE SULFATE 2 MILLIGRAM(S): 50 CAPSULE, EXTENDED RELEASE ORAL at 21:42

## 2018-07-07 RX ADMIN — Medication 200 MILLIGRAM(S): at 17:01

## 2018-07-07 RX ADMIN — SODIUM CHLORIDE 2000 MILLILITER(S): 9 INJECTION, SOLUTION INTRAVENOUS at 00:34

## 2018-07-07 RX ADMIN — ONDANSETRON 4 MILLIGRAM(S): 8 TABLET, FILM COATED ORAL at 11:52

## 2018-07-07 RX ADMIN — SODIUM CHLORIDE 100 MILLILITER(S): 9 INJECTION INTRAMUSCULAR; INTRAVENOUS; SUBCUTANEOUS at 20:33

## 2018-07-07 RX ADMIN — MORPHINE SULFATE 2 MILLIGRAM(S): 50 CAPSULE, EXTENDED RELEASE ORAL at 05:00

## 2018-07-07 RX ADMIN — SODIUM CHLORIDE 100 MILLILITER(S): 9 INJECTION INTRAMUSCULAR; INTRAVENOUS; SUBCUTANEOUS at 06:42

## 2018-07-07 RX ADMIN — Medication 100 MILLIGRAM(S): at 05:44

## 2018-07-07 RX ADMIN — ONDANSETRON 4 MILLIGRAM(S): 8 TABLET, FILM COATED ORAL at 00:36

## 2018-07-07 RX ADMIN — PANTOPRAZOLE SODIUM 40 MILLIGRAM(S): 20 TABLET, DELAYED RELEASE ORAL at 17:01

## 2018-07-07 RX ADMIN — MORPHINE SULFATE 2 MILLIGRAM(S): 50 CAPSULE, EXTENDED RELEASE ORAL at 21:27

## 2018-07-07 RX ADMIN — MORPHINE SULFATE 1 MILLIGRAM(S): 50 CAPSULE, EXTENDED RELEASE ORAL at 15:51

## 2018-07-07 RX ADMIN — ONDANSETRON 4 MILLIGRAM(S): 8 TABLET, FILM COATED ORAL at 20:33

## 2018-07-07 RX ADMIN — ONDANSETRON 4 MILLIGRAM(S): 8 TABLET, FILM COATED ORAL at 04:37

## 2018-07-07 NOTE — H&P ADULT - HISTORY OF PRESENT ILLNESS
Pt is a  23 y/o female w/PMH cyclic vomiting for many years - s/p 3 endoscopies, presents with abdominal pain, and n/v since yesterday, has not had marijuana for days, only smokes occasionally and usually episodes unrelated to consumption per pt.  Pt denies any unusual foods, no sick contacts, no one sick at home, no travels.  states is like her typical episodes.  pt denies any fever, chills, sob, cp, palpitations, diarrhea or constipation.

## 2018-07-07 NOTE — CONSULT NOTE ADULT - SUBJECTIVE AND OBJECTIVE BOX
Chief Complaint:  Patient is a 22y old  Female who presents with a chief complaint of n/v (2018 06:31)      HPI:  Pt is a  23 y/o female w/PMH cyclic vomiting for many years - s/p 3 endoscopies, presents with abdominal pain, and n/v since yesterday, has not had marijuana for days, only smokes occasionally and usually episodes unrelated to consumption per pt.  Pt denies any unusual foods, no sick contacts, no one sick at home, no travels.  states is like her typical episodes.  pt denies any fever, chills, sob, cp, palpitations, diarrhea or constipation. (2018 06:31)      PMH/PSH:PAST MEDICAL & SURGICAL HISTORY:  Cyclic vomiting syndrome  GERD (gastroesophageal reflux disease)  No significant past surgical history      Allergies:  penicillin (Hives)      Medications:  ondansetron Injectable 4 milliGRAM(s) IV Push every 6 hours PRN  pantoprazole  Injectable 40 milliGRAM(s) IV Push two times a day  sodium chloride 0.9%. 1000 milliLiter(s) IV Continuous <Continuous>      Review of Systems:    General:  No weight loss, fevers, chills, night sweats, fatigue,   Eyes:  Good vision, no reported pain  ENT:  No sore throat, pain, runny nose, dysphagia  CV:  No pain, palpitations, hypo/hypertension  Resp:  No dyspnea, cough, tachypnea, wheezing  GI:  + diffuse abdominal pain, + nausea, + vomiting, No diarrhea, No constipation, No pruritis, No rectal bleeding, No tarry stools, No dysphagia,  :  No pain, bleeding, incontinence, nocturia  Muscle:  No pain, weakness  Breast:  No pain, abscess, mass, discharge  Neuro:  No weakness, tingling, memory problems  Psych:  No fatigue, insomnia, mood problems, depression  Endocrine:  No polyuria, polydipsia, cold/heat intolerance  Heme:  No petechiae, ecchymosis, easy bruisability  Skin:  No rash, tattoos, scars, edema    Relevant Family History:   FAMILY HISTORY:  No pertinent family history in first degree relatives      Relevant Social History:  Denies ETOH or tobacco history. + Marijuana use    Physical Exam:    Vital Signs:  Vital Signs Last 24 Hrs  T(C): 36.5 (2018 09:41), Max: 37 (2018 09:09)  T(F): 97.7 (2018 09:41), Max: 98.6 (2018 09:09)  HR: 74 (2018 09:41) (69 - 98)  BP: 122/77 (2018 09:41) (88/44 - 133/84)  BP(mean): --  RR: 16 (2018 09:41) (15 - 17)  SpO2: 100% (2018 09:41) (98% - 100%)  Daily Height in cm: 162.56 (2018 09:41)    Daily     General:  Appears stated age, well-groomed, well-nourished, no distress  HEENT:  NC/AT,  conjunctivae clear and pink, no thyromegaly, nodules, adenopathy, no JVD  Chest:  Full & symmetric excursion, no increased effort, breath sounds clear  Cardiovascular:  Regular rhythm, S1, S2, no murmur/rub/S3/S4, no abdominal bruit, no edema  Abdomen:  Soft, + epigastric tenderness, non-distended, normoactive bowel sounds,  no masses , no hepatosplenomegaly, no signs of chronic liver disease  Extremities:  no cyanosis, clubbing or edema  Skin:  No rash/erythema/ecchymoses/petechiae/wounds/abscess/warm/dry  Neuro/Psych:  Alert, oriented, no asterixis, no tremor, no encephalopathy    Laboratory:                          11.9   11.60 )-----------( 275      ( 2018 23:07 )             37.0     07-06    140  |  110<H>  |  11  ----------------------------<  108<H>  4.0   |  21<L>  |  0.80    Ca    8.1<L>      2018 23:07    TPro  7.7  /  Alb  4.0  /  TBili  0.7  /  DBili  x   /  AST  29  /  ALT  19  /  AlkPhos  70  07-06    LIVER FUNCTIONS - ( 2018 23:07 )  Alb: 4.0 g/dL / Pro: 7.7 gm/dL / ALK PHOS: 70 U/L / ALT: 19 U/L / AST: 29 U/L / GGT: x             Urinalysis Basic - ( 2018 23:08 )    Color: Yellow / Appearance: Clear / S.015 / pH: x  Gluc: x / Ketone: Large  / Bili: Negative / Urobili: Negative mg/dL   Blood: x / Protein: 15 mg/dL / Nitrite: Negative   Leuk Esterase: Negative / RBC: x / WBC 0-2   Sq Epi: x / Non Sq Epi: Moderate / Bacteria: Few      Lipase [id number] U/L       Imaging:  < from: CT Abdomen and Pelvis w/ IV Cont (18 @ 00:25) >    EXAM:  CT ABDOMEN AND PELVIS IC                            PROCEDURE DATE:  2018          INTERPRETATION:  CLINICAL INFORMATION: Abdominal pain and vomiting.    COMPARISON: CT abdomen/pelvis of 2018.    PROCEDURE:   CT of the Abdomen andPelvis was performed with intravenous contrast.   Intravenous contrast: 85 ml Omnipaque 350. 15 ml discarded.  Oral contrast: None.  Sagittal and coronal reformats were performed.    FINDINGS:    LOWER CHEST: Within normal limits.    LIVER: Within normal limits.  BILE DUCTS: Normal caliber.  GALLBLADDER: Within normal limits.  SPLEEN: Within normal limits.  PANCREAS: Within normal limits.  ADRENALS: Within normal limits.  KIDNEYS/URETERS: Within normal limits.    BLADDER: Within normal limits.  REPRODUCTIVE ORGANS: 4.7 x 3.9 cm right adnexal cyst. Uterus and left   adnexa are unremarkable.    BOWEL: No bowel obstruction. Ascending and proximal to mid transverse   colon demonstrates mild wall thickening despite underdistention. Appendix   normal.  PERITONEUM: Small pelvic free fluid.  VESSELS:  Within normal limits.  RETROPERITONEUM: No lymphadenopathy.    ABDOMINAL WALL: Small fat-containing umbilical hernia.  BONES: Within normal limits.    IMPRESSION:    Mild wall thickening involving the descending and transverse colon,   despite underdistention, concerning for colitis. Infectious and   inflammatory etiologies are considered.    4.7 cm right adnexal cyst and small pelvic free fluid. Further evaluation   with pelvic ultrasound is recommended.    Dr. Roberts discussed the above findings with Dr. Taylor at 1:30 AM on   2018 with read back    < end of copied text >

## 2018-07-07 NOTE — H&P ADULT - ASSESSMENT
23 y/o female w/PMH cyclic vomiting for many years - s/p 3 endoscopies, presents with abdominal pain, and n/v

## 2018-07-07 NOTE — PROGRESS NOTE ADULT - PROBLEM SELECTOR PLAN 1
-continue IVF, Zofran PRN  -CT abdomen and pelvis showed colitis with mild elevated WBC  -Us abdomen showed R ovarian cyst with small free fluid  -continue cipro and flagyl for now  -IV PPI, clear liquid diet if tolerated  -GI consult appreciated/noted

## 2018-07-07 NOTE — H&P ADULT - NSHPPHYSICALEXAM_GEN_ALL_CORE
Vital Signs Last 24 Hrs  T(C): 36.8 (07 Jul 2018 07:43), Max: 36.9 (06 Jul 2018 20:11)  T(F): 98.3 (07 Jul 2018 07:43), Max: 98.4 (06 Jul 2018 20:11)  HR: 69 (07 Jul 2018 07:43) (69 - 87)  BP: 128/71 (07 Jul 2018 07:43) (88/44 - 133/84)  BP(mean): --  RR: 16 (07 Jul 2018 07:43) (16 - 17)  SpO2: 100% (07 Jul 2018 07:43) (98% - 100%)    PHYSICAL EXAM:    GENERAL: NAD, well-groomed, well-developed  HEAD:  Atraumatic, Normocephalic  EYES: EOMI, PERRLA, conjunctiva and sclera clear  ENMT: No tonsillar erythema, exudates, or enlargement; Moist mucous membranes, No lesions  NECK: Supple, No JVD, Normal thyroid  NERVOUS SYSTEM:  Alert & Oriented X3, Good concentration; Motor Strength 5/5 B/L upper and lower extremities; DTRs 2+ intact and symmetric  CHEST/LUNG: Clear to percussion bilaterally; No rales, rhonchi, wheezing, or rubs  HEART: Regular rate and rhythm; No rubs, or gallops, +S1,S2  ABDOMEN: Soft, diffusely mildy tender >epigastric, Nondistended; Bowel sounds present  EXTREMITIES:  2+ Peripheral Pulses, No clubbing, cyanosis, or edema  LYMPH: No cervical adenopathy  RECTAL: deferred  BREAST: deferred  : deferred  SKIN: No rashes or lesions    IMPROVE VTE Individual Risk Assessment        RISK                                                          Points  [  ] Previous VTE                                                3  [  ] Thrombophilia                                             2  [  ] Lower limb paralysis                                    2        (unable to hold up >15 seconds)    [  ] Current Cancer                                             2         (within 6 months)  [  ] Immobilization > 24 hrs                              1  [  ] ICU/CCU stay > 24 hours                            1  [  ] Age > 60                                                    1  IMPROVE VTE Score ________0_

## 2018-07-07 NOTE — H&P ADULT - NSHPLABSRESULTS_GEN_ALL_CORE
LABS:                        11.9   11.60 )-----------( 275      ( 2018 23:07 )             37.0     07-06    140  |  110<H>  |  11  ----------------------------<  108<H>  4.0   |  21<L>  |  0.80    Ca    8.1<L>      2018 23:07    TPro  7.7  /  Alb  4.0  /  TBili  0.7  /  DBili  x   /  AST  29  /  ALT  19  /  AlkPhos  70  07-06      Urinalysis Basic - ( 2018 23:08 )    Color: Yellow / Appearance: Clear / S.015 / pH: x  Gluc: x / Ketone: Large  / Bili: Negative / Urobili: Negative mg/dL   Blood: x / Protein: 15 mg/dL / Nitrite: Negative   Leuk Esterase: Negative / RBC: x / WBC 0-2   Sq Epi: x / Non Sq Epi: Moderate / Bacteria: Few      CAPILLARY BLOOD GLUCOSE          RADIOLOGY & ADDITIONAL TESTS:    Imaging Personally Reviewed:  [ X] YES  [ ] NO

## 2018-07-08 DIAGNOSIS — E83.42 HYPOMAGNESEMIA: ICD-10-CM

## 2018-07-08 LAB
ANION GAP SERPL CALC-SCNC: 9 MMOL/L — SIGNIFICANT CHANGE UP (ref 5–17)
BUN SERPL-MCNC: 7 MG/DL — SIGNIFICANT CHANGE UP (ref 7–23)
CALCIUM SERPL-MCNC: 7.2 MG/DL — LOW (ref 8.5–10.1)
CHLORIDE SERPL-SCNC: 105 MMOL/L — SIGNIFICANT CHANGE UP (ref 96–108)
CO2 SERPL-SCNC: 25 MMOL/L — SIGNIFICANT CHANGE UP (ref 22–31)
CREAT SERPL-MCNC: 0.65 MG/DL — SIGNIFICANT CHANGE UP (ref 0.5–1.3)
CRP SERPL-MCNC: <0.2 MG/DL — SIGNIFICANT CHANGE UP (ref 0–0.4)
CULTURE RESULTS: SIGNIFICANT CHANGE UP
GLUCOSE SERPL-MCNC: 109 MG/DL — HIGH (ref 70–99)
HCT VFR BLD CALC: 30.7 % — LOW (ref 34.5–45)
HGB BLD-MCNC: 10.1 G/DL — LOW (ref 11.5–15.5)
MCHC RBC-ENTMCNC: 26.9 PG — LOW (ref 27–34)
MCHC RBC-ENTMCNC: 32.9 GM/DL — SIGNIFICANT CHANGE UP (ref 32–36)
MCV RBC AUTO: 81.6 FL — SIGNIFICANT CHANGE UP (ref 80–100)
NRBC # BLD: 0 /100 WBCS — SIGNIFICANT CHANGE UP (ref 0–0)
PLATELET # BLD AUTO: 224 K/UL — SIGNIFICANT CHANGE UP (ref 150–400)
POTASSIUM SERPL-MCNC: 3 MMOL/L — LOW (ref 3.5–5.3)
POTASSIUM SERPL-SCNC: 3 MMOL/L — LOW (ref 3.5–5.3)
RBC # BLD: 3.76 M/UL — LOW (ref 3.8–5.2)
RBC # FLD: 14.7 % — HIGH (ref 10.3–14.5)
SODIUM SERPL-SCNC: 139 MMOL/L — SIGNIFICANT CHANGE UP (ref 135–145)
SPECIMEN SOURCE: SIGNIFICANT CHANGE UP
WBC # BLD: 10.49 K/UL — SIGNIFICANT CHANGE UP (ref 3.8–10.5)
WBC # FLD AUTO: 10.49 K/UL — SIGNIFICANT CHANGE UP (ref 3.8–10.5)

## 2018-07-08 PROCEDURE — 76700 US EXAM ABDOM COMPLETE: CPT | Mod: 26

## 2018-07-08 PROCEDURE — 99233 SBSQ HOSP IP/OBS HIGH 50: CPT

## 2018-07-08 RX ORDER — ONDANSETRON 8 MG/1
4 TABLET, FILM COATED ORAL ONCE
Qty: 0 | Refills: 0 | Status: COMPLETED | OUTPATIENT
Start: 2018-07-08 | End: 2018-07-08

## 2018-07-08 RX ORDER — MORPHINE SULFATE 50 MG/1
2 CAPSULE, EXTENDED RELEASE ORAL ONCE
Qty: 0 | Refills: 0 | Status: DISCONTINUED | OUTPATIENT
Start: 2018-07-08 | End: 2018-07-08

## 2018-07-08 RX ORDER — POTASSIUM CHLORIDE 20 MEQ
10 PACKET (EA) ORAL
Qty: 0 | Refills: 0 | Status: COMPLETED | OUTPATIENT
Start: 2018-07-08 | End: 2018-07-08

## 2018-07-08 RX ADMIN — MORPHINE SULFATE 2 MILLIGRAM(S): 50 CAPSULE, EXTENDED RELEASE ORAL at 20:44

## 2018-07-08 RX ADMIN — PANTOPRAZOLE SODIUM 40 MILLIGRAM(S): 20 TABLET, DELAYED RELEASE ORAL at 18:31

## 2018-07-08 RX ADMIN — MORPHINE SULFATE 2 MILLIGRAM(S): 50 CAPSULE, EXTENDED RELEASE ORAL at 14:06

## 2018-07-08 RX ADMIN — MORPHINE SULFATE 2 MILLIGRAM(S): 50 CAPSULE, EXTENDED RELEASE ORAL at 05:56

## 2018-07-08 RX ADMIN — Medication 100 MILLIEQUIVALENT(S): at 17:15

## 2018-07-08 RX ADMIN — MORPHINE SULFATE 2 MILLIGRAM(S): 50 CAPSULE, EXTENDED RELEASE ORAL at 21:00

## 2018-07-08 RX ADMIN — ONDANSETRON 4 MILLIGRAM(S): 8 TABLET, FILM COATED ORAL at 05:24

## 2018-07-08 RX ADMIN — ONDANSETRON 4 MILLIGRAM(S): 8 TABLET, FILM COATED ORAL at 17:18

## 2018-07-08 RX ADMIN — ONDANSETRON 4 MILLIGRAM(S): 8 TABLET, FILM COATED ORAL at 12:09

## 2018-07-08 RX ADMIN — ONDANSETRON 4 MILLIGRAM(S): 8 TABLET, FILM COATED ORAL at 01:04

## 2018-07-08 RX ADMIN — Medication 100 MILLIGRAM(S): at 17:15

## 2018-07-08 RX ADMIN — MORPHINE SULFATE 2 MILLIGRAM(S): 50 CAPSULE, EXTENDED RELEASE ORAL at 05:41

## 2018-07-08 RX ADMIN — Medication 100 MILLIGRAM(S): at 05:00

## 2018-07-08 RX ADMIN — Medication 100 MILLIEQUIVALENT(S): at 14:16

## 2018-07-08 RX ADMIN — Medication 200 MILLIGRAM(S): at 17:15

## 2018-07-08 RX ADMIN — Medication 200 MILLIGRAM(S): at 06:23

## 2018-07-08 RX ADMIN — PANTOPRAZOLE SODIUM 40 MILLIGRAM(S): 20 TABLET, DELAYED RELEASE ORAL at 05:24

## 2018-07-08 RX ADMIN — MORPHINE SULFATE 2 MILLIGRAM(S): 50 CAPSULE, EXTENDED RELEASE ORAL at 14:15

## 2018-07-08 RX ADMIN — Medication 100 MILLIEQUIVALENT(S): at 15:41

## 2018-07-08 RX ADMIN — SODIUM CHLORIDE 100 MILLILITER(S): 9 INJECTION INTRAMUSCULAR; INTRAVENOUS; SUBCUTANEOUS at 05:23

## 2018-07-08 NOTE — PROGRESS NOTE ADULT - PROBLEM SELECTOR PLAN 1
-continue IVF, Zofran PRN  -CT abdomen and pelvis showed colitis with mild elevated WBC  -US pelvis showed R ovarian cyst with small free fluid  -US abdomen is unremarkable  -continue cipro and flagyl for now(d/w GI PA-ok to continue abx )  -IV PPI, clear liquid diet if tolerated  -GI consult appreciated/noted, recommended outpatient colonoscopy in 6 wks

## 2018-07-08 NOTE — PROGRESS NOTE ADULT - PROBLEM SELECTOR PLAN 1
Diet as tolerated  ultrasound unremarkable  Zofran prn nausea  Continue with IV PPI.  add Carafate q6h Diet as tolerated  ultrasound unremarkable  Zofran prn nausea  Continue with IV PPI.  add Carafate q6h, crp  outpatient colonoscopy in 6 weeks

## 2018-07-09 DIAGNOSIS — N83.209 UNSPECIFIED OVARIAN CYST, UNSPECIFIED SIDE: ICD-10-CM

## 2018-07-09 DIAGNOSIS — E83.39 OTHER DISORDERS OF PHOSPHORUS METABOLISM: ICD-10-CM

## 2018-07-09 DIAGNOSIS — G43.A0 CYCLICAL VOMITING, IN MIGRAINE, NOT INTRACTABLE: ICD-10-CM

## 2018-07-09 LAB
ANION GAP SERPL CALC-SCNC: 12 MMOL/L — SIGNIFICANT CHANGE UP (ref 5–17)
BUN SERPL-MCNC: 7 MG/DL — SIGNIFICANT CHANGE UP (ref 7–23)
CALCIUM SERPL-MCNC: 7.7 MG/DL — LOW (ref 8.5–10.1)
CHLORIDE SERPL-SCNC: 103 MMOL/L — SIGNIFICANT CHANGE UP (ref 96–108)
CO2 SERPL-SCNC: 23 MMOL/L — SIGNIFICANT CHANGE UP (ref 22–31)
CREAT SERPL-MCNC: 0.8 MG/DL — SIGNIFICANT CHANGE UP (ref 0.5–1.3)
GLUCOSE SERPL-MCNC: 92 MG/DL — SIGNIFICANT CHANGE UP (ref 70–99)
HCT VFR BLD CALC: 36.1 % — SIGNIFICANT CHANGE UP (ref 34.5–45)
HGB BLD-MCNC: 11.7 G/DL — SIGNIFICANT CHANGE UP (ref 11.5–15.5)
MAGNESIUM SERPL-MCNC: 2.3 MG/DL — SIGNIFICANT CHANGE UP (ref 1.6–2.6)
MCHC RBC-ENTMCNC: 26.6 PG — LOW (ref 27–34)
MCHC RBC-ENTMCNC: 32.4 GM/DL — SIGNIFICANT CHANGE UP (ref 32–36)
MCV RBC AUTO: 82 FL — SIGNIFICANT CHANGE UP (ref 80–100)
NRBC # BLD: 0 /100 WBCS — SIGNIFICANT CHANGE UP (ref 0–0)
PHOSPHATE SERPL-MCNC: 1.7 MG/DL — LOW (ref 2.5–4.5)
PLATELET # BLD AUTO: 247 K/UL — SIGNIFICANT CHANGE UP (ref 150–400)
POTASSIUM SERPL-MCNC: 3.2 MMOL/L — LOW (ref 3.5–5.3)
POTASSIUM SERPL-SCNC: 3.2 MMOL/L — LOW (ref 3.5–5.3)
RBC # BLD: 4.4 M/UL — SIGNIFICANT CHANGE UP (ref 3.8–5.2)
RBC # FLD: 14.4 % — SIGNIFICANT CHANGE UP (ref 10.3–14.5)
SODIUM SERPL-SCNC: 138 MMOL/L — SIGNIFICANT CHANGE UP (ref 135–145)
WBC # BLD: 9.74 K/UL — SIGNIFICANT CHANGE UP (ref 3.8–10.5)
WBC # FLD AUTO: 9.74 K/UL — SIGNIFICANT CHANGE UP (ref 3.8–10.5)

## 2018-07-09 PROCEDURE — 99233 SBSQ HOSP IP/OBS HIGH 50: CPT

## 2018-07-09 RX ORDER — POTASSIUM PHOSPHATE, MONOBASIC POTASSIUM PHOSPHATE, DIBASIC 236; 224 MG/ML; MG/ML
15 INJECTION, SOLUTION INTRAVENOUS ONCE
Qty: 0 | Refills: 0 | Status: COMPLETED | OUTPATIENT
Start: 2018-07-09 | End: 2018-07-09

## 2018-07-09 RX ORDER — SODIUM,POTASSIUM PHOSPHATES 278-250MG
1 POWDER IN PACKET (EA) ORAL
Qty: 0 | Refills: 0 | Status: COMPLETED | OUTPATIENT
Start: 2018-07-09 | End: 2018-07-10

## 2018-07-09 RX ORDER — ACETAMINOPHEN 500 MG
650 TABLET ORAL EVERY 6 HOURS
Qty: 0 | Refills: 0 | Status: DISCONTINUED | OUTPATIENT
Start: 2018-07-09 | End: 2018-07-12

## 2018-07-09 RX ORDER — KETOROLAC TROMETHAMINE 30 MG/ML
15 SYRINGE (ML) INJECTION ONCE
Qty: 0 | Refills: 0 | Status: DISCONTINUED | OUTPATIENT
Start: 2018-07-09 | End: 2018-07-09

## 2018-07-09 RX ORDER — DIPHENHYDRAMINE HCL 50 MG
25 CAPSULE ORAL EVERY 6 HOURS
Qty: 0 | Refills: 0 | Status: DISCONTINUED | OUTPATIENT
Start: 2018-07-09 | End: 2018-07-12

## 2018-07-09 RX ORDER — METOCLOPRAMIDE HCL 10 MG
10 TABLET ORAL ONCE
Qty: 0 | Refills: 0 | Status: COMPLETED | OUTPATIENT
Start: 2018-07-09 | End: 2018-07-09

## 2018-07-09 RX ORDER — MORPHINE SULFATE 50 MG/1
2 CAPSULE, EXTENDED RELEASE ORAL ONCE
Qty: 0 | Refills: 0 | Status: DISCONTINUED | OUTPATIENT
Start: 2018-07-09 | End: 2018-07-09

## 2018-07-09 RX ORDER — POTASSIUM CHLORIDE 20 MEQ
20 PACKET (EA) ORAL
Qty: 0 | Refills: 0 | Status: COMPLETED | OUTPATIENT
Start: 2018-07-09 | End: 2018-07-09

## 2018-07-09 RX ORDER — POTASSIUM CHLORIDE 20 MEQ
10 PACKET (EA) ORAL
Qty: 0 | Refills: 0 | Status: COMPLETED | OUTPATIENT
Start: 2018-07-09 | End: 2018-07-09

## 2018-07-09 RX ORDER — ACETAMINOPHEN 500 MG
1000 TABLET ORAL ONCE
Qty: 0 | Refills: 0 | Status: COMPLETED | OUTPATIENT
Start: 2018-07-09 | End: 2018-07-09

## 2018-07-09 RX ADMIN — Medication 1000 MILLIGRAM(S): at 12:26

## 2018-07-09 RX ADMIN — Medication 15 MILLIGRAM(S): at 21:23

## 2018-07-09 RX ADMIN — POTASSIUM PHOSPHATE, MONOBASIC POTASSIUM PHOSPHATE, DIBASIC 63.75 MILLIMOLE(S): 236; 224 INJECTION, SOLUTION INTRAVENOUS at 14:38

## 2018-07-09 RX ADMIN — Medication 200 MILLIGRAM(S): at 05:09

## 2018-07-09 RX ADMIN — Medication 100 MILLIEQUIVALENT(S): at 13:39

## 2018-07-09 RX ADMIN — ONDANSETRON 4 MILLIGRAM(S): 8 TABLET, FILM COATED ORAL at 21:00

## 2018-07-09 RX ADMIN — PANTOPRAZOLE SODIUM 40 MILLIGRAM(S): 20 TABLET, DELAYED RELEASE ORAL at 05:09

## 2018-07-09 RX ADMIN — Medication 15 MILLIGRAM(S): at 20:53

## 2018-07-09 RX ADMIN — Medication 100 MILLIGRAM(S): at 18:28

## 2018-07-09 RX ADMIN — Medication 100 MILLIGRAM(S): at 05:09

## 2018-07-09 RX ADMIN — MORPHINE SULFATE 2 MILLIGRAM(S): 50 CAPSULE, EXTENDED RELEASE ORAL at 03:00

## 2018-07-09 RX ADMIN — Medication 25 MILLIGRAM(S): at 17:05

## 2018-07-09 RX ADMIN — Medication 650 MILLIGRAM(S): at 17:05

## 2018-07-09 RX ADMIN — Medication 400 MILLIGRAM(S): at 12:11

## 2018-07-09 RX ADMIN — Medication 100 MILLIEQUIVALENT(S): at 10:14

## 2018-07-09 RX ADMIN — Medication 200 MILLIGRAM(S): at 17:05

## 2018-07-09 RX ADMIN — Medication 100 MILLIEQUIVALENT(S): at 11:29

## 2018-07-09 RX ADMIN — ONDANSETRON 4 MILLIGRAM(S): 8 TABLET, FILM COATED ORAL at 02:03

## 2018-07-09 RX ADMIN — MORPHINE SULFATE 2 MILLIGRAM(S): 50 CAPSULE, EXTENDED RELEASE ORAL at 02:46

## 2018-07-09 RX ADMIN — PANTOPRAZOLE SODIUM 40 MILLIGRAM(S): 20 TABLET, DELAYED RELEASE ORAL at 17:06

## 2018-07-09 RX ADMIN — Medication 10 MILLIGRAM(S): at 12:11

## 2018-07-09 RX ADMIN — Medication 650 MILLIGRAM(S): at 17:35

## 2018-07-09 RX ADMIN — SODIUM CHLORIDE 100 MILLILITER(S): 9 INJECTION INTRAMUSCULAR; INTRAVENOUS; SUBCUTANEOUS at 02:47

## 2018-07-09 NOTE — PROGRESS NOTE ADULT - PROBLEM SELECTOR PLAN 1
-continue IVF, Zofran reglan   -CT abdomen and pelvis showed colitis with mild elevated WBC  -US pelvis showed R ovarian cyst with small free fluid  -US abdomen is unremarkable  -continue cipro and flagyl for now(d/w GI PA-ok to continue abx )  -IV PPI, clear liquid diet if tolerated  -GI consult appreciated/noted, recommended outpatient colonoscopy in 6 wks

## 2018-07-09 NOTE — CHART NOTE - NSCHARTNOTEFT_GEN_A_CORE
To whom it may concern:    Patient Marla Hilliard ( 1995) has been and continues to be hospitalized at [facility] since 18. During this time her Parents have been the primary care giver please provide any required accommodations.   If I can be of any assistance please do not hesitate to call.     Rbo Foster MD   [phone]

## 2018-07-09 NOTE — PROGRESS NOTE ADULT - PROBLEM SELECTOR PLAN 1
WITH ABDOMINAL PAIN. - Cyclic vomiting syndrome, r/o GYN source, see pelvic sonogram - 1) NPO  2) IV PPi 3) Gyn consult 4) CRP WITH ABDOMINAL PAIN. - Cyclic vomiting syndrome, r/o GYN source, see pelvic sonogram - 1) NPO  2) IV PPi 3) Gyn consult 4) CRP 5) correct hypokalemia

## 2018-07-10 LAB
ANION GAP SERPL CALC-SCNC: 11 MMOL/L — SIGNIFICANT CHANGE UP (ref 5–17)
BUN SERPL-MCNC: 8 MG/DL — SIGNIFICANT CHANGE UP (ref 7–23)
CALCIUM SERPL-MCNC: 7.8 MG/DL — LOW (ref 8.5–10.1)
CHLORIDE SERPL-SCNC: 101 MMOL/L — SIGNIFICANT CHANGE UP (ref 96–108)
CO2 SERPL-SCNC: 21 MMOL/L — LOW (ref 22–31)
CREAT SERPL-MCNC: 0.65 MG/DL — SIGNIFICANT CHANGE UP (ref 0.5–1.3)
CRP SERPL-MCNC: 0.2 MG/DL — SIGNIFICANT CHANGE UP (ref 0–0.4)
GLUCOSE SERPL-MCNC: 72 MG/DL — SIGNIFICANT CHANGE UP (ref 70–99)
HCT VFR BLD CALC: 32.9 % — LOW (ref 34.5–45)
HGB BLD-MCNC: 11 G/DL — LOW (ref 11.5–15.5)
MAGNESIUM SERPL-MCNC: 2.1 MG/DL — SIGNIFICANT CHANGE UP (ref 1.6–2.6)
MCHC RBC-ENTMCNC: 26.7 PG — LOW (ref 27–34)
MCHC RBC-ENTMCNC: 33.4 GM/DL — SIGNIFICANT CHANGE UP (ref 32–36)
MCV RBC AUTO: 79.9 FL — LOW (ref 80–100)
NRBC # BLD: 0 /100 WBCS — SIGNIFICANT CHANGE UP (ref 0–0)
PHOSPHATE SERPL-MCNC: 2.2 MG/DL — LOW (ref 2.5–4.5)
PLATELET # BLD AUTO: 259 K/UL — SIGNIFICANT CHANGE UP (ref 150–400)
POTASSIUM SERPL-MCNC: 3.6 MMOL/L — SIGNIFICANT CHANGE UP (ref 3.5–5.3)
POTASSIUM SERPL-SCNC: 3.6 MMOL/L — SIGNIFICANT CHANGE UP (ref 3.5–5.3)
RBC # BLD: 4.12 M/UL — SIGNIFICANT CHANGE UP (ref 3.8–5.2)
RBC # FLD: 14 % — SIGNIFICANT CHANGE UP (ref 10.3–14.5)
SODIUM SERPL-SCNC: 133 MMOL/L — LOW (ref 135–145)
WBC # BLD: 9.27 K/UL — SIGNIFICANT CHANGE UP (ref 3.8–10.5)
WBC # FLD AUTO: 9.27 K/UL — SIGNIFICANT CHANGE UP (ref 3.8–10.5)

## 2018-07-10 PROCEDURE — 99233 SBSQ HOSP IP/OBS HIGH 50: CPT

## 2018-07-10 RX ORDER — POTASSIUM PHOSPHATE, MONOBASIC POTASSIUM PHOSPHATE, DIBASIC 236; 224 MG/ML; MG/ML
15 INJECTION, SOLUTION INTRAVENOUS ONCE
Qty: 0 | Refills: 0 | Status: DISCONTINUED | OUTPATIENT
Start: 2018-07-10 | End: 2018-07-10

## 2018-07-10 RX ORDER — POTASSIUM PHOSPHATE, MONOBASIC POTASSIUM PHOSPHATE, DIBASIC 236; 224 MG/ML; MG/ML
15 INJECTION, SOLUTION INTRAVENOUS ONCE
Qty: 0 | Refills: 0 | Status: COMPLETED | OUTPATIENT
Start: 2018-07-10 | End: 2018-07-10

## 2018-07-10 RX ORDER — KETOROLAC TROMETHAMINE 30 MG/ML
15 SYRINGE (ML) INJECTION ONCE
Qty: 0 | Refills: 0 | Status: DISCONTINUED | OUTPATIENT
Start: 2018-07-10 | End: 2018-07-10

## 2018-07-10 RX ORDER — IBUPROFEN 200 MG
600 TABLET ORAL EVERY 6 HOURS
Qty: 0 | Refills: 0 | Status: DISCONTINUED | OUTPATIENT
Start: 2018-07-10 | End: 2018-07-11

## 2018-07-10 RX ORDER — ACETAMINOPHEN 500 MG
1000 TABLET ORAL ONCE
Qty: 0 | Refills: 0 | Status: COMPLETED | OUTPATIENT
Start: 2018-07-10 | End: 2018-07-10

## 2018-07-10 RX ADMIN — POTASSIUM PHOSPHATE, MONOBASIC POTASSIUM PHOSPHATE, DIBASIC 63.75 MILLIMOLE(S): 236; 224 INJECTION, SOLUTION INTRAVENOUS at 09:00

## 2018-07-10 RX ADMIN — SODIUM CHLORIDE 100 MILLILITER(S): 9 INJECTION INTRAMUSCULAR; INTRAVENOUS; SUBCUTANEOUS at 02:54

## 2018-07-10 RX ADMIN — Medication 100 MILLIGRAM(S): at 05:28

## 2018-07-10 RX ADMIN — Medication 600 MILLIGRAM(S): at 13:19

## 2018-07-10 RX ADMIN — Medication 15 MILLIGRAM(S): at 22:11

## 2018-07-10 RX ADMIN — Medication 200 MILLIGRAM(S): at 17:31

## 2018-07-10 RX ADMIN — Medication 1000 MILLIGRAM(S): at 03:24

## 2018-07-10 RX ADMIN — Medication 600 MILLIGRAM(S): at 14:19

## 2018-07-10 RX ADMIN — Medication 400 MILLIGRAM(S): at 02:54

## 2018-07-10 RX ADMIN — Medication 25 MILLIGRAM(S): at 09:15

## 2018-07-10 RX ADMIN — Medication 200 MILLIGRAM(S): at 05:28

## 2018-07-10 RX ADMIN — PANTOPRAZOLE SODIUM 40 MILLIGRAM(S): 20 TABLET, DELAYED RELEASE ORAL at 17:31

## 2018-07-10 RX ADMIN — Medication 100 MILLIGRAM(S): at 17:32

## 2018-07-10 RX ADMIN — PANTOPRAZOLE SODIUM 40 MILLIGRAM(S): 20 TABLET, DELAYED RELEASE ORAL at 05:29

## 2018-07-10 RX ADMIN — Medication 15 MILLIGRAM(S): at 21:56

## 2018-07-10 NOTE — PROGRESS NOTE ADULT - PROBLEM SELECTOR PLAN 1
WITH ABDOMINAL PAIN. - Cyclic vomiting syndrome, r/o GYN source, see pelvic sonogram - 1) clear liquid diet and advance slowly  2) IV PPi 3) Gyn consult 4) CRP 5) correct hypokalemia. Discussed with patient & mom for ~ 15 minutes WITH ABDOMINAL PAIN. - Cyclic vomiting syndrome, r/o GYN source, see pelvic sonogram - 1) clear liquid diet and advance slowly  2) IV PPi 3) Gyn consult 4) CRP 5) correct hypokalemia. Discussed with patient & mom for ~ 15 minutes  Addendum- [id number] 9:50 PM  Patient refused EGD. Discussed R/B/ A

## 2018-07-11 LAB
ANION GAP SERPL CALC-SCNC: 11 MMOL/L — SIGNIFICANT CHANGE UP (ref 5–17)
ANION GAP SERPL CALC-SCNC: 12 MMOL/L — SIGNIFICANT CHANGE UP (ref 5–17)
BASOPHILS # BLD AUTO: 0.01 K/UL — SIGNIFICANT CHANGE UP (ref 0–0.2)
BASOPHILS NFR BLD AUTO: 0.1 % — SIGNIFICANT CHANGE UP (ref 0–2)
BUN SERPL-MCNC: 7 MG/DL — SIGNIFICANT CHANGE UP (ref 7–23)
BUN SERPL-MCNC: 7 MG/DL — SIGNIFICANT CHANGE UP (ref 7–23)
CALCIUM SERPL-MCNC: 7.9 MG/DL — LOW (ref 8.5–10.1)
CALCIUM SERPL-MCNC: 7.9 MG/DL — LOW (ref 8.5–10.1)
CHLORIDE SERPL-SCNC: 100 MMOL/L — SIGNIFICANT CHANGE UP (ref 96–108)
CHLORIDE SERPL-SCNC: 98 MMOL/L — SIGNIFICANT CHANGE UP (ref 96–108)
CO2 SERPL-SCNC: 22 MMOL/L — SIGNIFICANT CHANGE UP (ref 22–31)
CO2 SERPL-SCNC: 25 MMOL/L — SIGNIFICANT CHANGE UP (ref 22–31)
CREAT SERPL-MCNC: 0.65 MG/DL — SIGNIFICANT CHANGE UP (ref 0.5–1.3)
CREAT SERPL-MCNC: 0.72 MG/DL — SIGNIFICANT CHANGE UP (ref 0.5–1.3)
EOSINOPHIL # BLD AUTO: 0.03 K/UL — SIGNIFICANT CHANGE UP (ref 0–0.5)
EOSINOPHIL NFR BLD AUTO: 0.3 % — SIGNIFICANT CHANGE UP (ref 0–6)
GLUCOSE SERPL-MCNC: 74 MG/DL — SIGNIFICANT CHANGE UP (ref 70–99)
GLUCOSE SERPL-MCNC: 75 MG/DL — SIGNIFICANT CHANGE UP (ref 70–99)
HCT VFR BLD CALC: 34.5 % — SIGNIFICANT CHANGE UP (ref 34.5–45)
HCT VFR BLD CALC: 35.1 % — SIGNIFICANT CHANGE UP (ref 34.5–45)
HGB BLD-MCNC: 11.7 G/DL — SIGNIFICANT CHANGE UP (ref 11.5–15.5)
HGB BLD-MCNC: 11.7 G/DL — SIGNIFICANT CHANGE UP (ref 11.5–15.5)
IMM GRANULOCYTES NFR BLD AUTO: 0.3 % — SIGNIFICANT CHANGE UP (ref 0–1.5)
LYMPHOCYTES # BLD AUTO: 2.81 K/UL — SIGNIFICANT CHANGE UP (ref 1–3.3)
LYMPHOCYTES # BLD AUTO: 30.1 % — SIGNIFICANT CHANGE UP (ref 13–44)
MAGNESIUM SERPL-MCNC: 2.3 MG/DL — SIGNIFICANT CHANGE UP (ref 1.6–2.6)
MCHC RBC-ENTMCNC: 26.3 PG — LOW (ref 27–34)
MCHC RBC-ENTMCNC: 26.7 PG — LOW (ref 27–34)
MCHC RBC-ENTMCNC: 33.3 GM/DL — SIGNIFICANT CHANGE UP (ref 32–36)
MCHC RBC-ENTMCNC: 33.9 GM/DL — SIGNIFICANT CHANGE UP (ref 32–36)
MCV RBC AUTO: 78.8 FL — LOW (ref 80–100)
MCV RBC AUTO: 78.9 FL — LOW (ref 80–100)
MONOCYTES # BLD AUTO: 1.1 K/UL — HIGH (ref 0–0.9)
MONOCYTES NFR BLD AUTO: 11.8 % — SIGNIFICANT CHANGE UP (ref 2–14)
NEUTROPHILS # BLD AUTO: 5.36 K/UL — SIGNIFICANT CHANGE UP (ref 1.8–7.4)
NEUTROPHILS NFR BLD AUTO: 57.4 % — SIGNIFICANT CHANGE UP (ref 43–77)
NRBC # BLD: 0 /100 WBCS — SIGNIFICANT CHANGE UP (ref 0–0)
PHOSPHATE SERPL-MCNC: 2.1 MG/DL — LOW (ref 2.5–4.5)
PLATELET # BLD AUTO: 271 K/UL — SIGNIFICANT CHANGE UP (ref 150–400)
PLATELET # BLD AUTO: 272 K/UL — SIGNIFICANT CHANGE UP (ref 150–400)
POTASSIUM SERPL-MCNC: 3 MMOL/L — LOW (ref 3.5–5.3)
POTASSIUM SERPL-MCNC: 3.3 MMOL/L — LOW (ref 3.5–5.3)
POTASSIUM SERPL-SCNC: 3 MMOL/L — LOW (ref 3.5–5.3)
POTASSIUM SERPL-SCNC: 3.3 MMOL/L — LOW (ref 3.5–5.3)
RBC # BLD: 4.38 M/UL — SIGNIFICANT CHANGE UP (ref 3.8–5.2)
RBC # BLD: 4.45 M/UL — SIGNIFICANT CHANGE UP (ref 3.8–5.2)
RBC # FLD: 13.8 % — SIGNIFICANT CHANGE UP (ref 10.3–14.5)
RBC # FLD: 13.9 % — SIGNIFICANT CHANGE UP (ref 10.3–14.5)
SODIUM SERPL-SCNC: 134 MMOL/L — LOW (ref 135–145)
SODIUM SERPL-SCNC: 134 MMOL/L — LOW (ref 135–145)
WBC # BLD: 10.24 K/UL — SIGNIFICANT CHANGE UP (ref 3.8–10.5)
WBC # BLD: 9.34 K/UL — SIGNIFICANT CHANGE UP (ref 3.8–10.5)
WBC # FLD AUTO: 10.24 K/UL — SIGNIFICANT CHANGE UP (ref 3.8–10.5)
WBC # FLD AUTO: 9.34 K/UL — SIGNIFICANT CHANGE UP (ref 3.8–10.5)

## 2018-07-11 PROCEDURE — 99233 SBSQ HOSP IP/OBS HIGH 50: CPT

## 2018-07-11 PROCEDURE — 74177 CT ABD & PELVIS W/CONTRAST: CPT | Mod: 26

## 2018-07-11 RX ORDER — CALCIUM CARBONATE 500(1250)
1 TABLET ORAL
Qty: 0 | Refills: 0 | Status: DISCONTINUED | OUTPATIENT
Start: 2018-07-11 | End: 2018-07-12

## 2018-07-11 RX ORDER — CIPROFLOXACIN LACTATE 400MG/40ML
200 VIAL (ML) INTRAVENOUS EVERY 12 HOURS
Qty: 0 | Refills: 0 | Status: DISCONTINUED | OUTPATIENT
Start: 2018-07-11 | End: 2018-07-12

## 2018-07-11 RX ORDER — ACETAMINOPHEN 500 MG
1000 TABLET ORAL ONCE
Qty: 0 | Refills: 0 | Status: COMPLETED | OUTPATIENT
Start: 2018-07-11 | End: 2018-07-11

## 2018-07-11 RX ORDER — KETOROLAC TROMETHAMINE 30 MG/ML
30 SYRINGE (ML) INJECTION EVERY 8 HOURS
Qty: 0 | Refills: 0 | Status: DISCONTINUED | OUTPATIENT
Start: 2018-07-11 | End: 2018-07-12

## 2018-07-11 RX ORDER — SUCRALFATE 1 G
1 TABLET ORAL
Qty: 0 | Refills: 0 | Status: DISCONTINUED | OUTPATIENT
Start: 2018-07-11 | End: 2018-07-12

## 2018-07-11 RX ORDER — METOCLOPRAMIDE HCL 10 MG
10 TABLET ORAL ONCE
Qty: 0 | Refills: 0 | Status: COMPLETED | OUTPATIENT
Start: 2018-07-11 | End: 2018-07-11

## 2018-07-11 RX ADMIN — Medication 30 MILLIGRAM(S): at 12:10

## 2018-07-11 RX ADMIN — Medication 1000 MILLIGRAM(S): at 01:56

## 2018-07-11 RX ADMIN — Medication 30 MILLIGRAM(S): at 12:23

## 2018-07-11 RX ADMIN — PANTOPRAZOLE SODIUM 40 MILLIGRAM(S): 20 TABLET, DELAYED RELEASE ORAL at 05:58

## 2018-07-11 RX ADMIN — Medication 100 MILLIGRAM(S): at 05:58

## 2018-07-11 RX ADMIN — Medication 200 MILLIGRAM(S): at 07:07

## 2018-07-11 RX ADMIN — PANTOPRAZOLE SODIUM 40 MILLIGRAM(S): 20 TABLET, DELAYED RELEASE ORAL at 17:10

## 2018-07-11 RX ADMIN — ONDANSETRON 4 MILLIGRAM(S): 8 TABLET, FILM COATED ORAL at 11:42

## 2018-07-11 RX ADMIN — Medication 100 MILLIGRAM(S): at 17:09

## 2018-07-11 RX ADMIN — Medication 400 MILLIGRAM(S): at 01:26

## 2018-07-11 RX ADMIN — SODIUM CHLORIDE 100 MILLILITER(S): 9 INJECTION INTRAMUSCULAR; INTRAVENOUS; SUBCUTANEOUS at 01:27

## 2018-07-11 RX ADMIN — ONDANSETRON 4 MILLIGRAM(S): 8 TABLET, FILM COATED ORAL at 02:46

## 2018-07-11 RX ADMIN — Medication 10 MILLIGRAM(S): at 05:59

## 2018-07-11 NOTE — CHART NOTE - NSCHARTNOTEFT_GEN_A_CORE
Medicine hospitalist Pa    Called by RN to evaluate patient for abdominal pain and green vaginal discharge.   Patient is a 23 y/o female admitted for N/V, colitis, and rupture ovarian cyst. Patient complaining about worsening abdominal pain and is screaming. States pain was better yesterday but started worsening just today. Pain originally lower quadrant, now throughout her abdomen. Patient received toradol IVP earlier however states it has not helped improve her pain. Also noted green discharge originating from her vagina, started today. Admits to being sexually active, however states last sexual intercourse few months back. Uses protection. Denies N/V, fever, chills, chest pain, palpitations, sob, urinary/bowel changes.     Vital Signs Last 24 Hrs  T(C): 37.2 (10 Jul 2018 23:26), Max: 37.2 (10 Jul 2018 23:26)  T(F): 99 (10 Jul 2018 23:26), Max: 99 (10 Jul 2018 23:26)  HR: 76 (10 Jul 2018 23:26) (68 - 76)  BP: 152/99 (10 Jul 2018 23:26) (143/92 - 152/99)  RR: 17 (10 Jul 2018 23:26) (17 - 18)  SpO2: 100% (10 Jul 2018 23:26) (100% - 100%)    General: A+Ox3, NAD, PERRL, EOM intact.  Cardio: S1S2 regular rate/rhythm  Resp: Good air entry B/L. No rales, rhonchi, wheezes.  Abd: Generalized pain throughout abdomen lower quadrants>upper quadrants. Soft nondistended, +BS. No rebound tenderness or guarding  Ext: No lower extremity edema. 2+ pulses b/l  Neuro: 5/5 strength b/l upper and lower extremity. Good handgrip. No arm or leg drift.    A&P  23 y/o female admitted for N/V, colitis, and ruptured ovarian cyst now with worsening generalized abdominal pain  -CTAP with IV contrast urgent  -cbc, bmp STAT  -tylenol IVPB for pain  -urine gc/chlamydia   -GYN consult for ruptured ovarian cyst  -will follow

## 2018-07-11 NOTE — PROGRESS NOTE ADULT - PROBLEM SELECTOR PLAN 3
ambulatory
-supplement   -repeat mag level in AM

## 2018-07-11 NOTE — PROGRESS NOTE ADULT - PROBLEM SELECTOR PLAN 1
-continue IVF, Zofran raglan   -CT abdomen and pelvis showed colitis with mild elevated WBC  -US pelvis showed R ovarian cyst with small free fluid  -US abdomen is unremarkable  -continue cipro and flagyl for now(d/w GI PA-ok to continue abx )  -IV PPI, clear liquid diet if tolerated  -GI consult appreciated/noted, recommended outpatient colonoscopy in 6 wks

## 2018-07-11 NOTE — PROGRESS NOTE ADULT - PROBLEM SELECTOR PLAN 1
WITH ABDOMINAL PAIN. -symptoms recurred but now has vaginal discharge. 1) NPO  2) IV fluid 3) gyn consult  Patient again refused EGD. Discussed R/B/ A

## 2018-07-11 NOTE — PROGRESS NOTE ADULT - PROBLEM SELECTOR PROBLEM 2
Gastroesophageal reflux disease with esophagitis

## 2018-07-12 ENCOUNTER — TRANSCRIPTION ENCOUNTER (OUTPATIENT)
Age: 23
End: 2018-07-12

## 2018-07-12 VITALS
HEART RATE: 69 BPM | OXYGEN SATURATION: 100 % | TEMPERATURE: 98 F | SYSTOLIC BLOOD PRESSURE: 116 MMHG | DIASTOLIC BLOOD PRESSURE: 80 MMHG | RESPIRATION RATE: 18 BRPM

## 2018-07-12 LAB
ANION GAP SERPL CALC-SCNC: 11 MMOL/L — SIGNIFICANT CHANGE UP (ref 5–17)
BUN SERPL-MCNC: 8 MG/DL — SIGNIFICANT CHANGE UP (ref 7–23)
C TRACH RRNA SPEC QL NAA+PROBE: SIGNIFICANT CHANGE UP
CALCIUM SERPL-MCNC: 7.8 MG/DL — LOW (ref 8.5–10.1)
CHLORIDE SERPL-SCNC: 102 MMOL/L — SIGNIFICANT CHANGE UP (ref 96–108)
CO2 SERPL-SCNC: 24 MMOL/L — SIGNIFICANT CHANGE UP (ref 22–31)
CREAT SERPL-MCNC: 0.7 MG/DL — SIGNIFICANT CHANGE UP (ref 0.5–1.3)
GLUCOSE SERPL-MCNC: 86 MG/DL — SIGNIFICANT CHANGE UP (ref 70–99)
HCT VFR BLD CALC: 33.2 % — LOW (ref 34.5–45)
HGB BLD-MCNC: 11.2 G/DL — LOW (ref 11.5–15.5)
MAGNESIUM SERPL-MCNC: 2.2 MG/DL — SIGNIFICANT CHANGE UP (ref 1.6–2.6)
MCHC RBC-ENTMCNC: 26.7 PG — LOW (ref 27–34)
MCHC RBC-ENTMCNC: 33.7 GM/DL — SIGNIFICANT CHANGE UP (ref 32–36)
MCV RBC AUTO: 79 FL — LOW (ref 80–100)
N GONORRHOEA RRNA SPEC QL NAA+PROBE: SIGNIFICANT CHANGE UP
NRBC # BLD: 0 /100 WBCS — SIGNIFICANT CHANGE UP (ref 0–0)
PHOSPHATE SERPL-MCNC: 2.5 MG/DL — SIGNIFICANT CHANGE UP (ref 2.5–4.5)
PLATELET # BLD AUTO: 272 K/UL — SIGNIFICANT CHANGE UP (ref 150–400)
POTASSIUM SERPL-MCNC: 3 MMOL/L — LOW (ref 3.5–5.3)
POTASSIUM SERPL-SCNC: 3 MMOL/L — LOW (ref 3.5–5.3)
RBC # BLD: 4.2 M/UL — SIGNIFICANT CHANGE UP (ref 3.8–5.2)
RBC # FLD: 13.7 % — SIGNIFICANT CHANGE UP (ref 10.3–14.5)
SODIUM SERPL-SCNC: 137 MMOL/L — SIGNIFICANT CHANGE UP (ref 135–145)
SPECIMEN SOURCE: SIGNIFICANT CHANGE UP
WBC # BLD: 8.22 K/UL — SIGNIFICANT CHANGE UP (ref 3.8–10.5)
WBC # FLD AUTO: 8.22 K/UL — SIGNIFICANT CHANGE UP (ref 3.8–10.5)

## 2018-07-12 PROCEDURE — 99239 HOSP IP/OBS DSCHRG MGMT >30: CPT

## 2018-07-12 RX ORDER — PANTOPRAZOLE SODIUM 20 MG/1
1 TABLET, DELAYED RELEASE ORAL
Qty: 30 | Refills: 0 | OUTPATIENT
Start: 2018-07-12 | End: 2018-08-10

## 2018-07-12 RX ORDER — POTASSIUM CHLORIDE 20 MEQ
40 PACKET (EA) ORAL EVERY 4 HOURS
Qty: 0 | Refills: 0 | Status: DISCONTINUED | OUTPATIENT
Start: 2018-07-12 | End: 2018-07-12

## 2018-07-12 RX ORDER — SUCRALFATE 1 G
1 TABLET ORAL
Qty: 120 | Refills: 0 | OUTPATIENT
Start: 2018-07-12 | End: 2018-08-10

## 2018-07-12 RX ADMIN — Medication 1 GRAM(S): at 00:30

## 2018-07-12 RX ADMIN — Medication 1 GRAM(S): at 05:36

## 2018-07-12 RX ADMIN — Medication 100 MILLIGRAM(S): at 05:35

## 2018-07-12 RX ADMIN — Medication 1 GRAM(S): at 14:57

## 2018-07-12 RX ADMIN — Medication 40 MILLIEQUIVALENT(S): at 14:57

## 2018-07-12 RX ADMIN — PANTOPRAZOLE SODIUM 40 MILLIGRAM(S): 20 TABLET, DELAYED RELEASE ORAL at 05:35

## 2018-07-12 RX ADMIN — SODIUM CHLORIDE 100 MILLILITER(S): 9 INJECTION INTRAMUSCULAR; INTRAVENOUS; SUBCUTANEOUS at 00:30

## 2018-07-12 NOTE — DISCHARGE NOTE ADULT - HOSPITAL COURSE
History of Present Illness:  Reason for Admission: n/v	  History of Present Illness: 	  Pt is a  21 y/o female w/PMH cyclic vomiting for many years - s/p 3 endoscopies, presents with abdominal pain, and n/v since yesterday, has not had marijuana for days, only smokes occasionally and usually episodes unrelated to consumption per pt.  Pt denies any unusual foods, no sick contacts, no one sick at home, no travels.  states is like her typical episodes.  pt denies any fever, chills, sob, cp, palpitations, diarrhea or constipation.    HYSICAL EXAM:  GENERAL: NAD, well-groomed, well-developed  HEAD:  Atraumatic, Normocephalic  EYES: EOMI, PERRLA, conjunctiva and sclera clear  ENMT: No tonsillar erythema, exudates, or enlargement; Moist mucous membranes, Good dentition, No lesions  NECK: Supple, No JVD, Normal thyroid  NERVOUS SYSTEM:  Alert & Oriented X3, Good concentration; Motor Strength 5/5 B/L upper and lower extremities; DTRs 2+ intact and symmetric  CHEST/LUNG: Clear to percussion bilaterally; No rales, rhonchi, wheezing, or rubs  HEART: Regular rate and rhythm; No murmurs, rubs, or gallops  ABDOMEN: Soft, Nontender, Nondistended; Bowel sounds present  EXTREMITIES:  2+ Peripheral Pulses, No clubbing, cyanosis, or edema  LYMPH: No lymphadenopathy noted  SKIN: No rashes or lesions      Assessment and Plan:   · Assessment		  21 y/o female w/PMH cyclic vomiting for many years - s/p 3 endoscopies, presents with abdominal pain, and n/v .  urine tox + marijuana and opiates.    still with nausea and vomiting ovarian cyst can be evaluated with out patient Gyn     some improvement will to be able to tolerate food for discharge non opiate pain control for rnow   will be a challinging discharge      Problem/Plan - 1:  ·  Problem: Intractable cyclical vomiting with nausea.  Plan: -continue IVF, Zofran raglan   -CT abdomen and pelvis showed colitis with mild elevated WBC  -US pelvis showed R ovarian cyst with small free fluid  -US abdomen is unremarkable  -continue cipro and flagyl for now(d/w GI PA-ok to continue abx )  -IV PPI, clear liquid diet if tolerated  -GI consult appreciated/noted, recommended outpatient colonoscopy in 6 wks.      Problem/Plan - 2:  ·  Problem: Gastroesophageal reflux disease with esophagitis.  Plan: PPI.      Problem/Plan - 3:  ·  Problem: Hypomagnesemia.  Plan: -supplement   -repeat mag level in AM.      Problem/Plan - 4:  ·  Problem: Preventive measure.  Plan: ambulatory.      Problem/Plan - 5:  ·  Problem: Ruptured ovarian cyst.  Plan: gyrn follow up as outpatient.      Problem/Plan - 6:  Problem: Hypophosphatemia. Plan: replete  with kphosph iv.    Attending Attestation:   45 minutes spent on total discharge   Patient to follow up with gyn

## 2018-07-12 NOTE — PROGRESS NOTE ADULT - SUBJECTIVE AND OBJECTIVE BOX
Gastroenterolgy  Patient seen and examined bedside   c/o abdominal pain  intermittent nausea and vomiting. Able to consume ensure.  Normal flatus    T(F): 98.2 (07-08-18 @ 05:15), Max: 98.3 (07-07-18 @ 17:45)  HR: 76 (07-08-18 @ 05:15) (74 - 76)  BP: 129/68 (07-08-18 @ 05:15) (129/68 - 138/86)  RR: 18 (07-08-18 @ 05:15) (18 - 18)  SpO2: 100% (07-08-18 @ 05:15) (100% - 100%)  Wt(kg): --  CAPILLARY BLOOD GLUCOSE      PHYSICAL EXAM:  General: NAD, WDWN.   Neuro:  Alert and responsive  HEENT: NCAT, EOMI, conjunctiva clear  CV: +S1+S2 regular rate and rhythm  Lung: clear to ausculation bilaterally, respirations nonlabored, good inspiratory effort  Abdomen: soft,+ epigastric tenderness, No distention Normal active BS  Extremities: no cyanosis, clubbing or edema    LABS:                        10.1   10.49 )-----------( 224      ( 08 Jul 2018 09:08 )             30.7     07-08    139  |  105  |  7   ----------------------------<  109<H>  3.0<L>   |  25  |  0.65    Ca    7.2<L>      08 Jul 2018 09:08    TPro  7.7  /  Alb  4.0  /  TBili  0.7  /  DBili  x   /  AST  29  /  ALT  19  /  AlkPhos  70  07-06    LIVER FUNCTIONS - ( 06 Jul 2018 23:07 )  Alb: 4.0 g/dL / Pro: 7.7 gm/dL / ALK PHOS: 70 U/L / ALT: 19 U/L / AST: 29 U/L / GGT: x             I&O's Detail    07 Jul 2018 07:01  -  08 Jul 2018 07:00  --------------------------------------------------------  IN:    sodium chloride 0.9%.: 1200 mL    Solution: 200 mL    Solution: 100 mL  Total IN: 1500 mL    OUT:  Total OUT: 0 mL    Total NET: 1500 mL        07-08 @ 09:08    139 | 105 | 7 /7.2 | -- | --  _______________________/  3.0 | 25 | 0.65                           \par   Lipase, Serum: 114 U/L (07-06 @ 23:07)    < from: US Abdomen Complete (07.08.18 @ 09:37) >    EXAM:  US ABDOMINAL COMPLETE                            PROCEDURE DATE:  07/08/2018          INTERPRETATION:  CLINICAL INFORMATION: Nausea and vomiting    COMPARISON: CT dated 7/7/2018    TECHNIQUE: Sonography of the abdomen.     FINDINGS:    Liver: Within normal limits.    Bile ducts: Normal caliber. Common bile duct measures 3 mm.     Gallbladder: Within normal limits.        Pancreas: Visualized portions are within normal limits.    Spleen: 7.4 cm. Within normal limits.    Right kidney: 9.7 cm. No hydronephrosis.        Left kidney: 9.8 cm.  No hydronephrosis.        Ascites: Trace.    Aorta and IVC: Visualized portions are within normal limits.    IMPRESSION:     No cholelithiasis or biliary ductal dilatation.
Patient is a 22y old  Female who presents with a chief complaint of n/v (2018 06:31)      INTERVAL HPI/OVERNIGHT EVENTS:   Patient seen and examined at bed side in [facility]. Patient still having abdominal pain with intermittent nausea and vomiting.     MEDICATIONS  (STANDING):  ciprofloxacin   IVPB 400 milliGRAM(s) IV Intermittent every 12 hours  metroNIDAZOLE  IVPB 500 milliGRAM(s) IV Intermittent every 12 hours  morphine  - Injectable 2 milliGRAM(s) IV Push once  pantoprazole  Injectable 40 milliGRAM(s) IV Push two times a day  sodium chloride 0.9%. 1000 milliLiter(s) (100 mL/Hr) IV Continuous <Continuous>    MEDICATIONS  (PRN):  ondansetron Injectable 4 milliGRAM(s) IV Push every 6 hours PRN Nausea and/or Vomiting      Allergies    penicillin (Hives)    Intolerances      Vital Signs Last 24 Hrs  T(C): 36.7 (2018 11:43), Max: 36.8 (2018 17:45)  T(F): 98 (2018 11:43), Max: 98.3 (2018 17:45)  HR: 91 (2018 11:43) (74 - 91)  BP: 136/90 (2018 11:43) (129/68 - 138/86)  BP(mean): --  RR: 16 (2018 11:43) (16 - 18)  SpO2: 100% (2018 11:43) (100% - 100%)    PHYSICAL EXAM:  GENERAL: young female in bed in NAD  HEENT:  Atraumatic, Normocephalic, EOMI, PERRLA, Moist mucous membranes  NECK: Supple, No JVD  NERVOUS SYSTEM:  Alert & Oriented X3, Good concentration; no deficit  CHEST/LUNG: Clear to percussion bilaterally  HEART: Regular rate and rhythm; No murmurs  ABDOMEN: Soft, mid diffuse tenderness on palpation, Nondistended; Bowel sounds present  EXTREMITIES:  2+ Peripheral Pulses, No clubbing, cyanosis, or edema    LABS:                        10.1   10.49 )-----------( 224      ( 2018 09:08 )             30.7     07-08    139  |  105  |  7   ----------------------------<  109<H>  3.0<L>   |  25  |  0.65    Ca    7.2<L>      2018 09:08    TPro  7.7  /  Alb  4.0  /  TBili  0.7  /  DBili  x   /  AST  29  /  ALT  19  /  AlkPhos  70  07-06      Urinalysis Basic - ( 2018 23:08 )    Color: Yellow / Appearance: Clear / S.015 / pH: x  Gluc: x / Ketone: Large  / Bili: Negative / Urobili: Negative mg/dL   Blood: x / Protein: 15 mg/dL / Nitrite: Negative   Leuk Esterase: Negative / RBC: x / WBC 0-2   Sq Epi: x / Non Sq Epi: Moderate / Bacteria: Few      CAPILLARY BLOOD GLUCOSE          Culture - Urine (collected 2018 10:34)  Source: .Urine Clean Catch (Midstream)  Final Report (2018 12:17):    <10,000 CFU/ml Normal Urogenital donald present      RADIOLOGY & ADDITIONAL TESTS:    Imaging Personally Reviewed:  [ ] YES  [ ] NO    Consultant(s) Notes Reviewed:  [ ] YES  [ ] NO    Care Discussed with Consultants/Other Providers [ ] YES  [ ] NO
Patient is a 22y old  Female who presents with a chief complaint of n/v (07 Jul 2018 06:31)      HPI:  Pt is a  21 y/o female w/PMH cyclic vomiting for many years - s/p 3 endoscopies, presents with abdominal pain, and n/v since yesterday, has not had marijuana for days, only smokes occasionally and usually episodes unrelated to consumption per pt.  Pt denies any unusual foods, no sick contacts, no one sick at home, no travels.  states is like her typical episodes.  pt denies any fever, chills, sob, cp, palpitations, diarrhea or constipation. (07 Jul 2018 06:31)      INTERVAL HPI/OVERNIGHT EVENTS:  The patient continues to have N/V and abdominal pain. Flatus (+)    MEDICATIONS  (STANDING):  ciprofloxacin   IVPB 400 milliGRAM(s) IV Intermittent every 12 hours  metroNIDAZOLE  IVPB 500 milliGRAM(s) IV Intermittent every 12 hours  pantoprazole  Injectable 40 milliGRAM(s) IV Push two times a day  potassium acid phosphate/sodium acid phosphate tablet (K-PHOS No. 2) 1 Tablet(s) Oral four times a day with meals  sodium chloride 0.9%. 1000 milliLiter(s) (100 mL/Hr) IV Continuous <Continuous>    MEDICATIONS  (PRN):  ondansetron Injectable 4 milliGRAM(s) IV Push every 6 hours PRN Nausea and/or Vomiting      FAMILY HISTORY:  No pertinent family history in first degree relatives      Allergies    penicillin (Hives)    Intolerances        PMH/PSH:  Cyclic vomiting syndrome  GERD (gastroesophageal reflux disease)  Ulcer of gastric cardia  No pertinent past medical history  No significant past surgical history        REVIEW OF SYSTEMS:  CONSTITUTIONAL: No fever, weight loss, or fatigue  EYES: No eye pain, visual disturbances, or discharge  ENMT:  No difficulty hearing, tinnitus, vertigo; No sinus or throat pain  NECK: No pain or stiffness  BREASTS: No pain, masses, or nipple discharge  RESPIRATORY: No cough, wheezing, chills or hemoptysis; No shortness of breath  CARDIOVASCULAR: No chest pain, palpitations, dizziness, or leg swelling  GENITOURINARY: No dysuria, frequency, hematuria, or incontinence  NEUROLOGICAL: No headaches, memory loss, loss of strength, numbness, or tremors  SKIN: No itching, burning, rashes, or lesions   LYMPH NODES: No enlarged glands  ENDOCRINE: No heat or cold intolerance; No hair loss  MUSCULOSKELETAL: No joint pain or swelling; No muscle, back, or extremity pain  PSYCHIATRIC: No depression, anxiety, mood swings, or difficulty sleeping  HEME/LYMPH: No easy bruising, or bleeding gums  ALLERGY AND IMMUNOLOGIC: No hives or eczema    Vital Signs Last 24 Hrs  T(C): 36.8 (09 Jul 2018 05:04), Max: 37 (08 Jul 2018 23:16)  T(F): 98.3 (09 Jul 2018 05:04), Max: 98.6 (08 Jul 2018 23:16)  HR: 80 (09 Jul 2018 05:04) (75 - 83)  BP: 139/87 (09 Jul 2018 05:04) (134/84 - 141/86)  BP(mean): --  RR: 17 (09 Jul 2018 05:04) (17 - 17)  SpO2: 100% (09 Jul 2018 05:04) (100% - 100%)    PHYSICAL EXAM:  GENERAL: NAD, well-groomed, well-developed  HEAD:  Atraumatic, Normocephalic  EYES: EOMI, PERRLA, conjunctiva and sclera clear  NECK: Supple, No JVD, Normal thyroid  NERVOUS SYSTEM:  Alert & Oriented X3, Good concentration; Motor Strength 5/5 B/L upper and lower extremities;   CHEST/LUNG: Clear to percussion bilaterally; No rales, rhonchi, wheezing, or rubs  HEART: Regular rate and rhythm; No murmurs, rubs, or gallops  ABDOMEN: Soft, Nontender, Nondistended; Bowel sounds present  EXTREMITIES:  2+ Peripheral Pulses, No clubbing, cyanosis, or edema  LYMPH: No lymphadenopathy noted  SKIN: No rashes or lesions    LAB  07-06 @ 23:07  amylase --   lipase 114                           11.7   9.74  )-----------( 247      ( 09 Jul 2018 07:57 )             36.1       CBC:  07-09 @ 07:57  WBC 9.74   Hgb 11.7   Hct 36.1   Plts 247  MCV 82.0  07-08 @ 09:08  WBC 10.49   Hgb 10.1   Hct 30.7   Plts 224  MCV 81.6  07-06 @ 23:07  WBC 11.60   Hgb 11.9   Hct 37.0   Plts 275  MCV 82.4      Chemistry:  07-09 @ 07:57  Na+ 138  K+ 3.2  Cl- 103  CO2 23  BUN 7  Cr 0.80     07-08 @ 09:08  Na+ 139  K+ 3.0  Cl- 105  CO2 25  BUN 7  Cr 0.65     07-06 @ 23:07  Na+ 140  K+ 4.0  Cl- 110  CO2 21  BUN 11  Cr 0.80         Glucose, Serum: 92 mg/dL (07-09 @ 07:57)  Glucose, Serum: 109 mg/dL (07-08 @ 09:08)  Glucose, Serum: 108 mg/dL (07-06 @ 23:07)      09 Jul 2018 07:57    138    |  103    |  7      ----------------------------<  92     3.2     |  23     |  0.80   08 Jul 2018 09:08    139    |  105    |  7      ----------------------------<  109    3.0     |  25     |  0.65   06 Jul 2018 23:07    140    |  110    |  11     ----------------------------<  108    4.0     |  21     |  0.80     Ca    7.7        09 Jul 2018 07:57  Ca    7.2        08 Jul 2018 09:08  Ca    8.1        06 Jul 2018 23:07  Phos  1.7       09 Jul 2018 07:57  Mg     2.3       09 Jul 2018 07:57    TPro  7.7    /  Alb  4.0    /  TBili  0.7    /  DBili  x      /  AST  29     /  ALT  19     /  AlkPhos  70     06 Jul 2018 23:07              CAPILLARY BLOOD GLUCOSE          C-Reactive Protein, Serum: <0.20 mg/dL (07-08 @ 14:52)      RADIOLOGY & ADDITIONAL TESTS:    Imaging Personally Reviewed:  [ ] YES  [ ] NO    Consultant(s) Notes Reviewed:  [ ] YES  [ ] NO    Care Discussed with Consultants/Other Providers [ ] YES  [ ] NO
Patient is a 22y old  Female who presents with a chief complaint of n/v (07 Jul 2018 06:31)      HPI:  Pt is a  21 y/o female w/PMH cyclic vomiting for many years - s/p 3 endoscopies, presents with abdominal pain, and n/v since yesterday, has not had marijuana for days, only smokes occasionally and usually episodes unrelated to consumption per pt.  Pt denies any unusual foods, no sick contacts, no one sick at home, no travels.  states is like her typical episodes.  pt denies any fever, chills, sob, cp, palpitations, diarrhea or constipation. (07 Jul 2018 06:31)      INTERVAL HPI/OVERNIGHT EVENTS:  The patient's symptoms recurred ( N/V, abdominal pain ) but now has green vaginal discharge    MEDICATIONS  (STANDING):  metroNIDAZOLE  IVPB 500 milliGRAM(s) IV Intermittent every 12 hours  pantoprazole  Injectable 40 milliGRAM(s) IV Push two times a day  sodium chloride 0.9%. 1000 milliLiter(s) (100 mL/Hr) IV Continuous <Continuous>    MEDICATIONS  (PRN):  acetaminophen   Tablet. 650 milliGRAM(s) Oral every 6 hours PRN Moderate Pain (4 - 6)  calcium carbonate    500 mG (Tums) Chewable 1 Tablet(s) Chew four times a day PRN Heartburn  diphenhydrAMINE   Capsule 25 milliGRAM(s) Oral every 6 hours PRN Rash and/or Itching mild pain  ketorolac   Injectable 30 milliGRAM(s) IV Push every 8 hours PRN Severe Pain (7 - 10)  ondansetron Injectable 4 milliGRAM(s) IV Push every 6 hours PRN Nausea and/or Vomiting      FAMILY HISTORY:  No pertinent family history in first degree relatives      Allergies    penicillin (Hives)    Intolerances        PMH/PSH:  Cyclic vomiting syndrome  GERD (gastroesophageal reflux disease)  Ulcer of gastric cardia  No pertinent past medical history  No significant past surgical history        REVIEW OF SYSTEMS:  CONSTITUTIONAL: No fever, weight loss, or fatigue  EYES: No eye pain, visual disturbances, or discharge  ENMT:  No difficulty hearing, tinnitus, vertigo; No sinus or throat pain  NECK: No pain or stiffness  BREASTS: No pain, masses, or nipple discharge  RESPIRATORY: No cough, wheezing, chills or hemoptysis; No shortness of breath  CARDIOVASCULAR: No chest pain, palpitations, dizziness, or leg swelling  GASTROINTESTINAL: See above  GENITOURINARY: No dysuria, frequency, hematuria, or incontinence  NEUROLOGICAL: No headaches, memory loss, loss of strength, numbness, or tremors  SKIN: No itching, burning, rashes, or lesions   LYMPH NODES: No enlarged glands  ENDOCRINE: No heat or cold intolerance; No hair loss  MUSCULOSKELETAL: No joint pain or swelling; No muscle, back, or extremity pain  PSYCHIATRIC: No depression, anxiety, mood swings, or difficulty sleeping  HEME/LYMPH: No easy bruising, or bleeding gums  ALLERGY AND IMMUNOLOGIC: No hives or eczema    Vital Signs Last 24 Hrs  T(C): 37 (11 Jul 2018 11:52), Max: 37.2 (10 Jul 2018 23:26)  T(F): 98.6 (11 Jul 2018 11:52), Max: 99 (10 Jul 2018 23:26)  HR: 82 (11 Jul 2018 11:52) (69 - 82)  BP: 151/103 (11 Jul 2018 11:52) (133/99 - 152/99)  BP(mean): --  RR: 16 (11 Jul 2018 11:52) (16 - 17)  SpO2: 100% (11 Jul 2018 11:52) (100% - 100%)    PHYSICAL EXAM:  GENERAL: NAD, well-groomed, well-developed  HEAD:  Atraumatic, Normocephalic  EYES: EOMI, PERRLA, conjunctiva and sclera clear  NECK: Supple, No JVD, Normal thyroid  NERVOUS SYSTEM:  Alert & Oriented X3, Good concentration; Motor Strength 5/5 B/L upper and lower extremities;  CHEST/LUNG: Clear to percussion bilaterally; No rales, rhonchi, wheezing, or rubs  HEART: Regular rate and rhythm; No murmurs, rubs, or gallops  ABDOMEN: Soft, mild/mod mid epigastric tenderness , Nondistended; Bowel sounds present  EXTREMITIES:  2+ Peripheral Pulses, No clubbing, cyanosis, or edema  LYMPH: No lymphadenopathy noted  SKIN: No rashes or lesions    LAB  07-06 @ 23:07  amylase --   lipase 114                           11.7   10.24 )-----------( 271      ( 11 Jul 2018 07:24 )             34.5       CBC:  07-11 @ 07:24  WBC 10.24   Hgb 11.7   Hct 34.5   Plts 271  MCV 78.8  07-11 @ 03:23  WBC 9.34   Hgb 11.7   Hct 35.1   Plts 272  MCV 78.9  07-10 @ 09:18  WBC 9.27   Hgb 11.0   Hct 32.9   Plts 259  MCV 79.9  07-09 @ 07:57  WBC 9.74   Hgb 11.7   Hct 36.1   Plts 247  MCV 82.0  07-08 @ 09:08  WBC 10.49   Hgb 10.1   Hct 30.7   Plts 224  MCV 81.6  07-06 @ 23:07  WBC 11.60   Hgb 11.9   Hct 37.0   Plts 275  MCV 82.4      Chemistry:  07-11 @ 07:24  Na+ 134  K+ 3.0  Cl- 100  CO2 22  BUN 7  Cr 0.65     07-11 @ 03:23  Na+ 134  K+ 3.3  Cl- 98  CO2 25  BUN 7  Cr 0.72     07-10 @ 07:13  Na+ 133  K+ 3.6  Cl- 101  CO2 21  BUN 8  Cr 0.65     07-09 @ 07:57  Na+ 138  K+ 3.2  Cl- 103  CO2 23  BUN 7  Cr 0.80     07-08 @ 09:08  Na+ 139  K+ 3.0  Cl- 105  CO2 25  BUN 7  Cr 0.65     07-06 @ 23:07  Na+ 140  K+ 4.0  Cl- 110  CO2 21  BUN 11  Cr 0.80         Glucose, Serum: 75 mg/dL (07-11 @ 07:24)  Glucose, Serum: 74 mg/dL (07-11 @ 03:23)  Glucose, Serum: 72 mg/dL (07-10 @ 07:13)  Glucose, Serum: 92 mg/dL (07-09 @ 07:57)  Glucose, Serum: 109 mg/dL (07-08 @ 09:08)  Glucose, Serum: 108 mg/dL (07-06 @ 23:07)      11 Jul 2018 07:24    134    |  100    |  7      ----------------------------<  75     3.0     |  22     |  0.65   11 Jul 2018 03:23    134    |  98     |  7      ----------------------------<  74     3.3     |  25     |  0.72   10 Jul 2018 07:13    133    |  101    |  8      ----------------------------<  72     3.6     |  21     |  0.65   09 Jul 2018 07:57    138    |  103    |  7      ----------------------------<  92     3.2     |  23     |  0.80   08 Jul 2018 09:08    139    |  105    |  7      ----------------------------<  109    3.0     |  25     |  0.65   06 Jul 2018 23:07    140    |  110    |  11     ----------------------------<  108    4.0     |  21     |  0.80     Ca    7.9        11 Jul 2018 07:24  Ca    7.9        11 Jul 2018 03:23  Ca    7.8        10 Jul 2018 07:13  Ca    7.7        09 Jul 2018 07:57  Ca    7.2        08 Jul 2018 09:08  Ca    8.1        06 Jul 2018 23:07  Phos  2.1       11 Jul 2018 07:24  Phos  2.2       10 Jul 2018 07:13  Phos  1.7       09 Jul 2018 07:57  Mg     2.3       11 Jul 2018 07:24  Mg     2.1       10 Jul 2018 07:13  Mg     2.3       09 Jul 2018 07:57    TPro  7.7    /  Alb  4.0    /  TBili  0.7    /  DBili  x      /  AST  29     /  ALT  19     /  AlkPhos  70     06 Jul 2018 23:07              CAPILLARY BLOOD GLUCOSE          C-Reactive Protein, Serum: 0.20 mg/dL (07-10 @ 08:45)  C-Reactive Protein, Serum: <0.20 mg/dL (07-08 @ 14:52)      RADIOLOGY & ADDITIONAL TESTS:    Imaging Personally Reviewed:  [ ] YES  [ ] NO    Consultant(s) Notes Reviewed:  [ ] YES  [ ] NO    Care Discussed with Consultants/Other Providers [ ] YES  [ ] NO
Patient is a 22y old  Female who presents with a chief complaint of n/v (07 Jul 2018 06:31)      HPI:  Pt is a  23 y/o female w/PMH cyclic vomiting for many years - s/p 3 endoscopies, presents with abdominal pain, and n/v since yesterday, has not had marijuana for days, only smokes occasionally and usually episodes unrelated to consumption per pt.  Pt denies any unusual foods, no sick contacts, no one sick at home, no travels.  states is like her typical episodes.  pt denies any fever, chills, sob, cp, palpitations, diarrhea or constipation. (07 Jul 2018 06:31)      INTERVAL HPI/OVERNIGHT EVENTS:  Feeling much better. Minimal nausea, and abdominal pain. Mild reflux.     MEDICATIONS  (STANDING):  ciprofloxacin   IVPB 400 milliGRAM(s) IV Intermittent every 12 hours  metroNIDAZOLE  IVPB 500 milliGRAM(s) IV Intermittent every 12 hours  pantoprazole  Injectable 40 milliGRAM(s) IV Push two times a day  sodium chloride 0.9%. 1000 milliLiter(s) (100 mL/Hr) IV Continuous <Continuous>    MEDICATIONS  (PRN):  acetaminophen   Tablet. 650 milliGRAM(s) Oral every 6 hours PRN Moderate Pain (4 - 6)  diphenhydrAMINE   Capsule 25 milliGRAM(s) Oral every 6 hours PRN Rash and/or Itching mild pain  ibuprofen  Tablet 600 milliGRAM(s) Oral every 6 hours PRN Severe pain  ondansetron Injectable 4 milliGRAM(s) IV Push every 6 hours PRN Nausea and/or Vomiting      FAMILY HISTORY:  No pertinent family history in first degree relatives      Allergies    penicillin (Hives)    Intolerances        PMH/PSH:  Cyclic vomiting syndrome  GERD (gastroesophageal reflux disease)  Ulcer of gastric cardia  No pertinent past medical history  No significant past surgical history        REVIEW OF SYSTEMS:  CONSTITUTIONAL: No fever, weight loss, or fatigue  EYES: No eye pain, visual disturbances, or discharge  ENMT:  No difficulty hearing, tinnitus, vertigo; No sinus or throat pain  NECK: No pain or stiffness  BREASTS: No pain, masses, or nipple discharge  RESPIRATORY: No cough, wheezing, chills or hemoptysis; No shortness of breath  CARDIOVASCULAR: No chest pain, palpitations, dizziness, or leg swelling  GASTROINTESTINAL: No abdominal or epigastric pain. No nausea, vomiting, or hematemesis; No diarrhea or constipation. No melena or hematochezia.  GENITOURINARY: No dysuria, frequency, hematuria, or incontinence  NEUROLOGICAL: No headaches, memory loss, loss of strength, numbness, or tremors  SKIN: No itching, burning, rashes, or lesions   LYMPH NODES: No enlarged glands  ENDOCRINE: No heat or cold intolerance; No hair loss  MUSCULOSKELETAL: No joint pain or swelling; No muscle, back, or extremity pain  PSYCHIATRIC: No depression, anxiety, mood swings, or difficulty sleeping  HEME/LYMPH: No easy bruising, or bleeding gums  ALLERGY AND IMMUNOLOGIC: No hives or eczema    Vital Signs Last 24 Hrs  T(C): 36.7 (10 Jul 2018 14:59), Max: 37.5 (09 Jul 2018 23:50)  T(F): 98.1 (10 Jul 2018 14:59), Max: 99.5 (09 Jul 2018 23:50)  HR: 75 (10 Jul 2018 14:59) (68 - 77)  BP: 151/98 (10 Jul 2018 14:59) (144/97 - 151/98)  BP(mean): --  RR: 17 (10 Jul 2018 14:59) (17 - 18)  SpO2: 100% (10 Jul 2018 14:59) (99% - 100%)    PHYSICAL EXAM:  GENERAL: NAD, well-groomed, well-developed  HEAD:  Atraumatic, Normocephalic  EYES: EOMI, PERRLA, conjunctiva and sclera clear  ENMT: No tonsillar erythema, exudates, or enlargement; Moist mucous membranes, Good dentition, No lesions  NECK: Supple, No JVD, Normal thyroid  NERVOUS SYSTEM:  Alert & Oriented X3, Good concentration; Motor Strength 5/5 B/L upper and lower extremities; DTRs 2+ intact and symmetric  CHEST/LUNG: Clear to percussion bilaterally; No rales, rhonchi, wheezing, or rubs  HEART: Regular rate and rhythm; No murmurs, rubs, or gallops  ABDOMEN: Soft, Nontender, Nondistended; Bowel sounds present  EXTREMITIES:  2+ Peripheral Pulses, No clubbing, cyanosis, or edema  LYMPH: No lymphadenopathy noted  SKIN: No rashes or lesions    LAB  07-06 @ 23:07  amylase --   lipase 114                           11.0   9.27  )-----------( 259      ( 10 Jul 2018 09:18 )             32.9       CBC:  07-10 @ 09:18  WBC 9.27   Hgb 11.0   Hct 32.9   Plts 259  MCV 79.9  07-09 @ 07:57  WBC 9.74   Hgb 11.7   Hct 36.1   Plts 247  MCV 82.0  07-08 @ 09:08  WBC 10.49   Hgb 10.1   Hct 30.7   Plts 224  MCV 81.6  07-06 @ 23:07  WBC 11.60   Hgb 11.9   Hct 37.0   Plts 275  MCV 82.4      Chemistry:  07-10 @ 07:13  Na+ 133  K+ 3.6  Cl- 101  CO2 21  BUN 8  Cr 0.65     07-09 @ 07:57  Na+ 138  K+ 3.2  Cl- 103  CO2 23  BUN 7  Cr 0.80     07-08 @ 09:08  Na+ 139  K+ 3.0  Cl- 105  CO2 25  BUN 7  Cr 0.65     07-06 @ 23:07  Na+ 140  K+ 4.0  Cl- 110  CO2 21  BUN 11  Cr 0.80         Glucose, Serum: 72 mg/dL (07-10 @ 07:13)  Glucose, Serum: 92 mg/dL (07-09 @ 07:57)  Glucose, Serum: 109 mg/dL (07-08 @ 09:08)  Glucose, Serum: 108 mg/dL (07-06 @ 23:07)      10 Jul 2018 07:13    133    |  101    |  8      ----------------------------<  72     3.6     |  21     |  0.65   09 Jul 2018 07:57    138    |  103    |  7      ----------------------------<  92     3.2     |  23     |  0.80   08 Jul 2018 09:08    139    |  105    |  7      ----------------------------<  109    3.0     |  25     |  0.65   06 Jul 2018 23:07    140    |  110    |  11     ----------------------------<  108    4.0     |  21     |  0.80     Ca    7.8        10 Jul 2018 07:13  Ca    7.7        09 Jul 2018 07:57  Ca    7.2        08 Jul 2018 09:08  Ca    8.1        06 Jul 2018 23:07  Phos  2.2       10 Jul 2018 07:13  Phos  1.7       09 Jul 2018 07:57  Mg     2.1       10 Jul 2018 07:13  Mg     2.3       09 Jul 2018 07:57    TPro  7.7    /  Alb  4.0    /  TBili  0.7    /  DBili  x      /  AST  29     /  ALT  19     /  AlkPhos  70     06 Jul 2018 23:07              CAPILLARY BLOOD GLUCOSE          C-Reactive Protein, Serum: 0.20 mg/dL (07-10 @ 08:45)  C-Reactive Protein, Serum: <0.20 mg/dL (07-08 @ 14:52)      RADIOLOGY & ADDITIONAL TESTS:    Imaging Personally Reviewed:  [ ] YES  [ ] NO    Consultant(s) Notes Reviewed:  [ ] YES  [ ] NO    Care Discussed with Consultants/Other Providers [ ] YES  [ ] NO
Patient is a 22y old  Female who presents with a chief complaint of n/v (07 Jul 2018 06:31)      HPI:  Pt is a  23 y/o female w/PMH cyclic vomiting for many years - s/p 3 endoscopies, presents with abdominal pain, and n/v since yesterday, has not had marijuana for days, only smokes occasionally and usually episodes unrelated to consumption per pt.  Pt denies any unusual foods, no sick contacts, no one sick at home, no travels.  states is like her typical episodes.  pt denies any fever, chills, sob, cp, palpitations, diarrhea or constipation. (07 Jul 2018 06:31)      INTERVAL HPI/OVERNIGHT EVENTS:  The patient denies melena, hematochezia, hematemesis, nausea, vomiting, abdominal pain, constipation, diarrhea, or change in bowel movements Patient tolerating full liquids. No recent pain meds/ anti emetics    MEDICATIONS  (STANDING):  ciprofloxacin   IVPB 200 milliGRAM(s) IV Intermittent every 12 hours  metroNIDAZOLE  IVPB 500 milliGRAM(s) IV Intermittent every 12 hours  pantoprazole  Injectable 40 milliGRAM(s) IV Push two times a day  potassium chloride    Tablet ER 40 milliEquivalent(s) Oral every 4 hours  sodium chloride 0.9%. 1000 milliLiter(s) (100 mL/Hr) IV Continuous <Continuous>  sucralfate 1 Gram(s) Oral four times a day    MEDICATIONS  (PRN):  acetaminophen   Tablet. 650 milliGRAM(s) Oral every 6 hours PRN Moderate Pain (4 - 6)  calcium carbonate    500 mG (Tums) Chewable 1 Tablet(s) Chew four times a day PRN Heartburn  diphenhydrAMINE   Capsule 25 milliGRAM(s) Oral every 6 hours PRN Rash and/or Itching mild pain  ketorolac   Injectable 30 milliGRAM(s) IV Push every 8 hours PRN Severe Pain (7 - 10)  ondansetron Injectable 4 milliGRAM(s) IV Push every 6 hours PRN Nausea and/or Vomiting      FAMILY HISTORY:  No pertinent family history in first degree relatives      Allergies    penicillin (Hives)    Intolerances        PMH/PSH:  Cyclic vomiting syndrome  GERD (gastroesophageal reflux disease)  Ulcer of gastric cardia  No pertinent past medical history  No significant past surgical history        REVIEW OF SYSTEMS:  CONSTITUTIONAL: No fever, weight loss, or fatigue  EYES: No eye pain, visual disturbances, or discharge  ENMT:  No difficulty hearing, tinnitus, vertigo; No sinus or throat pain  NECK: No pain or stiffness  BREASTS: No pain, masses, or nipple discharge  RESPIRATORY: No cough, wheezing, chills or hemoptysis; No shortness of breath  CARDIOVASCULAR: No chest pain, palpitations, dizziness, or leg swelling  GASTROINTESTINAL:  see above  GENITOURINARY: No dysuria, frequency, hematuria, or incontinence  NEUROLOGICAL: No headaches, memory loss, loss of strength, numbness, or tremors  SKIN: No itching, burning, rashes, or lesions   LYMPH NODES: No enlarged glands  ENDOCRINE: No heat or cold intolerance; No hair loss  MUSCULOSKELETAL: No joint pain or swelling; No muscle, back, or extremity pain  PSYCHIATRIC: No depression, anxiety, mood swings, or difficulty sleeping  HEME/LYMPH: No easy bruising, or bleeding gums  ALLERGY AND IMMUNOLOGIC: No hives or eczema    Vital Signs Last 24 Hrs  T(C): 36.6 (12 Jul 2018 06:30), Max: 37.1 (11 Jul 2018 17:06)  T(F): 97.9 (12 Jul 2018 06:30), Max: 98.7 (11 Jul 2018 17:06)  HR: 66 (12 Jul 2018 06:30) (66 - 82)  BP: 112/68 (12 Jul 2018 06:30) (112/68 - 151/103)  BP(mean): --  RR: 17 (12 Jul 2018 06:30) (16 - 17)  SpO2: 100% (12 Jul 2018 06:30) (98% - 100%)    PHYSICAL EXAM:  GENERAL: NAD, well-groomed, well-developed  HEAD:  Atraumatic, Normocephalic  EYES: EOMI, PERRLA, conjunctiva and sclera clear  NECK: Supple, No JVD, Normal thyroid  NERVOUS SYSTEM:  Alert & Oriented X3, Good concentration; Motor Strength 5/5 B/L upper and lower extremities;   CHEST/LUNG: Clear to percussion bilaterally; No rales, rhonchi, wheezing, or rubs  HEART: Regular rate and rhythm; No murmurs, rubs, or gallops  ABDOMEN: Soft, Nontender, Nondistended; Bowel sounds present  EXTREMITIES:  2+ Peripheral Pulses, No clubbing, cyanosis, or edema  LYMPH: No lymphadenopathy noted  SKIN: No rashes or lesions    LAB                          11.2   8.22  )-----------( 272      ( 12 Jul 2018 07:05 )             33.2       CBC:  07-12 @ 07:05  WBC 8.22   Hgb 11.2   Hct 33.2   Plts 272  MCV 79.0  07-11 @ 07:24  WBC 10.24   Hgb 11.7   Hct 34.5   Plts 271  MCV 78.8  07-11 @ 03:23  WBC 9.34   Hgb 11.7   Hct 35.1   Plts 272  MCV 78.9  07-10 @ 09:18  WBC 9.27   Hgb 11.0   Hct 32.9   Plts 259  MCV 79.9  07-09 @ 07:57  WBC 9.74   Hgb 11.7   Hct 36.1   Plts 247  MCV 82.0  07-08 @ 09:08  WBC 10.49   Hgb 10.1   Hct 30.7   Plts 224  MCV 81.6  07-06 @ 23:07  WBC 11.60   Hgb 11.9   Hct 37.0   Plts 275  MCV 82.4      Chemistry:  07-12 @ 07:05  Na+ 137  K+ 3.0  Cl- 102  CO2 24  BUN 8  Cr 0.70     07-11 @ 07:24  Na+ 134  K+ 3.0  Cl- 100  CO2 22  BUN 7  Cr 0.65     07-11 @ 03:23  Na+ 134  K+ 3.3  Cl- 98  CO2 25  BUN 7  Cr 0.72     07-10 @ 07:13  Na+ 133  K+ 3.6  Cl- 101  CO2 21  BUN 8  Cr 0.65     07-09 @ 07:57  Na+ 138  K+ 3.2  Cl- 103  CO2 23  BUN 7  Cr 0.80     07-08 @ 09:08  Na+ 139  K+ 3.0  Cl- 105  CO2 25  BUN 7  Cr 0.65     07-06 @ 23:07  Na+ 140  K+ 4.0  Cl- 110  CO2 21  BUN 11  Cr 0.80         Glucose, Serum: 86 mg/dL (07-12 @ 07:05)  Glucose, Serum: 75 mg/dL (07-11 @ 07:24)  Glucose, Serum: 74 mg/dL (07-11 @ 03:23)  Glucose, Serum: 72 mg/dL (07-10 @ 07:13)  Glucose, Serum: 92 mg/dL (07-09 @ 07:57)  Glucose, Serum: 109 mg/dL (07-08 @ 09:08)  Glucose, Serum: 108 mg/dL (07-06 @ 23:07)      12 Jul 2018 07:05    137    |  102    |  8      ----------------------------<  86     3.0     |  24     |  0.70   11 Jul 2018 07:24    134    |  100    |  7      ----------------------------<  75     3.0     |  22     |  0.65   11 Jul 2018 03:23    134    |  98     |  7      ----------------------------<  74     3.3     |  25     |  0.72   10 Jul 2018 07:13    133    |  101    |  8      ----------------------------<  72     3.6     |  21     |  0.65   09 Jul 2018 07:57    138    |  103    |  7      ----------------------------<  92     3.2     |  23     |  0.80   08 Jul 2018 09:08    139    |  105    |  7      ----------------------------<  109    3.0     |  25     |  0.65   06 Jul 2018 23:07    140    |  110    |  11     ----------------------------<  108    4.0     |  21     |  0.80     Ca    7.8        12 Jul 2018 07:05  Ca    7.9        11 Jul 2018 07:24  Ca    7.9        11 Jul 2018 03:23  Ca    7.8        10 Jul 2018 07:13  Ca    7.7        09 Jul 2018 07:57  Ca    7.2        08 Jul 2018 09:08  Ca    8.1        06 Jul 2018 23:07  Phos  2.5       12 Jul 2018 07:05  Phos  2.1       11 Jul 2018 07:24  Phos  2.2       10 Jul 2018 07:13  Phos  1.7       09 Jul 2018 07:57  Mg     2.2       12 Jul 2018 07:05  Mg     2.3       11 Jul 2018 07:24  Mg     2.1       10 Jul 2018 07:13  Mg     2.3       09 Jul 2018 07:57    TPro  7.7    /  Alb  4.0    /  TBili  0.7    /  DBili  x      /  AST  29     /  ALT  19     /  AlkPhos  70     06 Jul 2018 23:07              CAPILLARY BLOOD GLUCOSE          C-Reactive Protein, Serum: 0.20 mg/dL (07-10 @ 08:45)  C-Reactive Protein, Serum: <0.20 mg/dL (07-08 @ 14:52)      RADIOLOGY & ADDITIONAL TESTS:    Imaging Personally Reviewed:  [ ] YES  [ ] NO    Consultant(s) Notes Reviewed:  [ ] YES  [ ] NO    Care Discussed with Consultants/Other Providers [ ] YES  [ ] NO
Patient is a 22y old  Female who presents with a chief complaint of n/v (07 Jul 2018 06:31)      INTERVAL HPI/OVERNIGHT EVENTS: continues to have [place] vomiting     MEDICATIONS  (STANDING):  ciprofloxacin   IVPB 400 milliGRAM(s) IV Intermittent every 12 hours  metroNIDAZOLE  IVPB 500 milliGRAM(s) IV Intermittent every 12 hours  pantoprazole  Injectable 40 milliGRAM(s) IV Push two times a day  potassium acid phosphate/sodium acid phosphate tablet (K-PHOS No. 2) 1 Tablet(s) Oral four times a day with meals  sodium chloride 0.9%. 1000 milliLiter(s) (100 mL/Hr) IV Continuous <Continuous>    MEDICATIONS  (PRN):  acetaminophen   Tablet. 650 milliGRAM(s) Oral every 6 hours PRN Moderate Pain (4 - 6)  diphenhydrAMINE   Capsule 25 milliGRAM(s) Oral every 6 hours PRN Rash and/or Itching mild pain  ondansetron Injectable 4 milliGRAM(s) IV Push every 6 hours PRN Nausea and/or Vomiting      Allergies    penicillin (Hives)    Intolerances        REVIEW OF SYSTEMS:  CONSTITUTIONAL: No fever, weight loss, or fatigue  EYES: No eye pain, visual disturbances, or discharge  ENMT:  No difficulty hearing, tinnitus, vertigo; No sinus or throat pain  NECK: No pain or stiffness  BREASTS: No pain, masses, or nipple discharge  RESPIRATORY: No cough, wheezing, chills or hemoptysis; No shortness of breath  CARDIOVASCULAR: No chest pain, palpitations, dizziness, or leg swelling  GASTROINTESTINAL: abdominal and pelvic epigastric pain. nausea, vomiting,no  hematemesis; No diarrhea or constipation. No melena or hematochezia.  GENITOURINARY: No dysuria, frequency, hematuria, or incontinence  NEUROLOGICAL: No headaches, memory loss, loss of strength, numbness, or tremors  SKIN: No itching, burning, rashes, or lesions   LYMPH NODES: No enlarged glands  ENDOCRINE: No heat or cold intolerance; No hair loss  MUSCULOSKELETAL: No joint pain or swelling; No muscle, back, or extremity pain  PSYCHIATRIC: No depression, anxiety, mood swings, or difficulty sleeping  HEME/LYMPH: No easy bruising, or bleeding gums  ALLERGY AND IMMUNOLOGIC: No hives or eczema    Vital Signs Last 24 Hrs  T(C): 36.4 (09 Jul 2018 17:43), Max: 37 (08 Jul 2018 23:16)  T(F): 97.5 (09 Jul 2018 17:43), Max: 98.6 (08 Jul 2018 23:16)  HR: 77 (09 Jul 2018 17:43) (75 - 83)  BP: 145/93 (09 Jul 2018 17:43) (134/84 - 145/93)  BP(mean): --  RR: 18 (09 Jul 2018 17:43) (17 - 18)  SpO2: 99% (09 Jul 2018 17:43) (99% - 100%)    PHYSICAL EXAM:  GENERAL: NAD, well-groomed, well-developed  HEAD:  Atraumatic, Normocephalic  EYES: EOMI, PERRLA, conjunctiva and sclera clear  ENMT: No tonsillar erythema, exudates, or enlargement; Moist mucous membranes, Good dentition, No lesions  NECK: Supple, No JVD, Normal thyroid  NERVOUS SYSTEM:  Alert & Oriented X3, Good concentration; Motor Strength 5/5 B/L upper and lower extremities; DTRs 2+ intact and symmetric  CHEST/LUNG: Clear to percussion bilaterally; No rales, rhonchi, wheezing, or rubs  HEART: Regular rate and rhythm; No murmurs, rubs, or gallops  ABDOMEN: Soft, Nontender, Nondistended; Bowel sounds present  EXTREMITIES:  2+ Peripheral Pulses, No clubbing, cyanosis, or edema  LYMPH: No lymphadenopathy noted  SKIN: No rashes or lesions    LABS:                        11.7   9.74  )-----------( 247      ( 09 Jul 2018 07:57 )             36.1     07-09    138  |  103  |  7   ----------------------------<  92  3.2<L>   |  23  |  0.80    Ca    7.7<L>      09 Jul 2018 07:57  Phos  1.7     07-09  Mg     2.3     07-09          CAPILLARY BLOOD GLUCOSE          RADIOLOGY & ADDITIONAL TESTS:    Imaging Personally Reviewed:  [ X] YES  [ ] NO    Consultant(s) Notes Reviewed:  [ X] YES  [ ] NO    Care Discussed with Consultants/Other Providers [X ] YES  [ ] NO
Patient is a 22y old  Female who presents with a chief complaint of n/v (07 Jul 2018 06:31)      INTERVAL HPI/OVERNIGHT EVENTS: cvontinues to have difficulty tolerating diet     MEDICATIONS  (STANDING):  ciprofloxacin   IVPB 400 milliGRAM(s) IV Intermittent every 12 hours  metroNIDAZOLE  IVPB 500 milliGRAM(s) IV Intermittent every 12 hours  pantoprazole  Injectable 40 milliGRAM(s) IV Push two times a day  sodium chloride 0.9%. 1000 milliLiter(s) (100 mL/Hr) IV Continuous <Continuous>    MEDICATIONS  (PRN):  acetaminophen   Tablet. 650 milliGRAM(s) Oral every 6 hours PRN Moderate Pain (4 - 6)  diphenhydrAMINE   Capsule 25 milliGRAM(s) Oral every 6 hours PRN Rash and/or Itching mild pain  ibuprofen  Tablet 600 milliGRAM(s) Oral every 6 hours PRN Severe pain  ondansetron Injectable 4 milliGRAM(s) IV Push every 6 hours PRN Nausea and/or Vomiting      Allergies    penicillin (Hives)    Intolerances        REVIEW OF SYSTEMS:  CONSTITUTIONAL: No fever, weight loss, or fatigue  EYES: No eye pain, visual disturbances, or discharge  ENMT:  No difficulty hearing, tinnitus, vertigo; No sinus or throat pain  NECK: No pain or stiffness  BREASTS: No pain, masses, or nipple discharge  RESPIRATORY: No cough, wheezing, chills or hemoptysis; No shortness of breath  CARDIOVASCULAR: No chest pain, palpitations, dizziness, or leg swelling  GASTROINTESTINAL: abdominal and pelvic pain with N?V   GENITOURINARY: No dysuria, frequency, hematuria, or incontinence  NEUROLOGICAL: No headaches, memory loss, loss of strength, numbness, or tremors  SKIN: No itching, burning, rashes, or lesions   LYMPH NODES: No enlarged glands  ENDOCRINE: No heat or cold intolerance; No hair loss  MUSCULOSKELETAL: No joint pain or swelling; No muscle, back, or extremity pain  PSYCHIATRIC: No depression, anxiety, mood swings, or difficulty sleeping  HEME/LYMPH: No easy bruising, or bleeding gums  ALLERGY AND IMMUNOLOGIC: No hives or eczema    Vital Signs Last 24 Hrs  T(C): 36.7 (10 Jul 2018 14:59), Max: 37.5 (09 Jul 2018 23:50)  T(F): 98.1 (10 Jul 2018 14:59), Max: 99.5 (09 Jul 2018 23:50)  HR: 75 (10 Jul 2018 14:59) (68 - 77)  BP: 151/98 (10 Jul 2018 14:59) (144/97 - 151/98)  BP(mean): --  RR: 17 (10 Jul 2018 14:59) (17 - 18)  SpO2: 100% (10 Jul 2018 14:59) (99% - 100%)    PHYSICAL EXAM:  GENERAL: NAD, well-groomed, well-developed  HEAD:  Atraumatic, Normocephalic  EYES: EOMI, PERRLA, conjunctiva and sclera clear  ENMT: No tonsillar erythema, exudates, or enlargement; Moist mucous membranes, Good dentition, No lesions  NECK: Supple, No JVD, Normal thyroid  NERVOUS SYSTEM:  Alert & Oriented X3, Good concentration; Motor Strength 5/5 B/L upper and lower extremities; DTRs 2+ intact and symmetric  CHEST/LUNG: Clear to percussion bilaterally; No rales, rhonchi, wheezing, or rubs  HEART: Regular rate and rhythm; No murmurs, rubs, or gallops  ABDOMEN: Soft, Nontender, Nondistended; Bowel sounds present  EXTREMITIES:  2+ Peripheral Pulses, No clubbing, cyanosis, or edema  LYMPH: No lymphadenopathy noted  SKIN: No rashes or lesions    LABS:                        11.0   9.27  )-----------( 259      ( 10 Jul 2018 09:18 )             32.9     07-10    133<L>  |  101  |  8   ----------------------------<  72  3.6   |  21<L>  |  0.65    Ca    7.8<L>      10 Jul 2018 07:13  Phos  2.2     07-10  Mg     2.1     07-10          CAPILLARY BLOOD GLUCOSE          RADIOLOGY & ADDITIONAL TESTS:    Imaging Personally Reviewed:  [ X] YES  [ ] NO    Consultant(s) Notes Reviewed:  [X ] YES  [ ] NO    Care Discussed with Consultants/Other Providers [X ] YES  [ ] NO
Patient is a 22y old  Female who presents with a chief complaint of n/v (07 Jul 2018 06:31)      INTERVAL HPI/OVERNIGHT EVENTS: no accuye     MEDICATIONS  (STANDING):  ciprofloxacin   IVPB 200 milliGRAM(s) IV Intermittent every 12 hours  metroNIDAZOLE  IVPB 500 milliGRAM(s) IV Intermittent every 12 hours  pantoprazole  Injectable 40 milliGRAM(s) IV Push two times a day  sodium chloride 0.9%. 1000 milliLiter(s) (100 mL/Hr) IV Continuous <Continuous>    MEDICATIONS  (PRN):  acetaminophen   Tablet. 650 milliGRAM(s) Oral every 6 hours PRN Moderate Pain (4 - 6)  calcium carbonate    500 mG (Tums) Chewable 1 Tablet(s) Chew four times a day PRN Heartburn  diphenhydrAMINE   Capsule 25 milliGRAM(s) Oral every 6 hours PRN Rash and/or Itching mild pain  ketorolac   Injectable 30 milliGRAM(s) IV Push every 8 hours PRN Severe Pain (7 - 10)  ondansetron Injectable 4 milliGRAM(s) IV Push every 6 hours PRN Nausea and/or Vomiting      Allergies    penicillin (Hives)    Intolerances        REVIEW OF SYSTEMS:  CONSTITUTIONAL: No fever, weight loss, or fatigue  EYES: No eye pain, visual disturbances, or discharge  ENMT:  No difficulty hearing, tinnitus, vertigo; No sinus or throat pain  NECK: No pain or stiffness  BREASTS: No pain, masses, or nipple discharge  RESPIRATORY: No cough, wheezing, chills or hemoptysis; No shortness of breath  CARDIOVASCULAR: No chest pain, palpitations, dizziness, or leg swelling  GASTROINTESTINAL: No abdominal or epigastric pain. No nausea, vomiting, or hematemesis; No diarrhea or constipation. No melena or hematochezia.  GENITOURINARY: No dysuria, frequency, hematuria, or incontinence  NEUROLOGICAL: No headaches, memory loss, loss of strength, numbness, or tremors  SKIN: No itching, burning, rashes, or lesions   LYMPH NODES: No enlarged glands  ENDOCRINE: No heat or cold intolerance; No hair loss  MUSCULOSKELETAL: No joint pain or swelling; No muscle, back, or extremity pain  PSYCHIATRIC: No depression, anxiety, mood swings, or difficulty sleeping  HEME/LYMPH: No easy bruising, or bleeding gums  ALLERGY AND IMMUNOLOGIC: No hives or eczema    Vital Signs Last 24 Hrs  T(C): 37.1 (11 Jul 2018 17:06), Max: 37.2 (10 Jul 2018 23:26)  T(F): 98.7 (11 Jul 2018 17:06), Max: 99 (10 Jul 2018 23:26)  HR: 72 (11 Jul 2018 17:06) (69 - 82)  BP: 126/82 (11 Jul 2018 17:06) (126/82 - 152/99)  BP(mean): --  RR: 17 (11 Jul 2018 17:06) (16 - 17)  SpO2: 100% (11 Jul 2018 17:06) (100% - 100%)    PHYSICAL EXAM:  GENERAL: NAD, well-groomed, well-developed  HEAD:  Atraumatic, Normocephalic  EYES: EOMI, PERRLA, conjunctiva and sclera clear  ENMT: No tonsillar erythema, exudates, or enlargement; Moist mucous membranes, Good dentition, No lesions  NECK: Supple, No JVD, Normal thyroid  NERVOUS SYSTEM:  Alert & Oriented X3, Good concentration; Motor Strength 5/5 B/L upper and lower extremities; DTRs 2+ intact and symmetric  CHEST/LUNG: Clear to percussion bilaterally; No rales, rhonchi, wheezing, or rubs  HEART: Regular rate and rhythm; No murmurs, rubs, or gallops  ABDOMEN: Soft, Nontender, Nondistended; Bowel sounds present  EXTREMITIES:  2+ Peripheral Pulses, No clubbing, cyanosis, or edema  LYMPH: No lymphadenopathy noted  SKIN: No rashes or lesions    LABS:                        11.7   10.24 )-----------( 271      ( 11 Jul 2018 07:24 )             34.5     07-11    134<L>  |  100  |  7   ----------------------------<  75  3.0<L>   |  22  |  0.65    Ca    7.9<L>      11 Jul 2018 07:24  Phos  2.1     07-11  Mg     2.3     07-11          CAPILLARY BLOOD GLUCOSE          RADIOLOGY & ADDITIONAL TESTS:  < from: CT Abdomen and Pelvis w/ IV Cont (07.11.18 @ 00:55) >  BONES: Left sacroiliac sclerosis.    IMPRESSION:     Descending and sigmoid colon wall thickening, question underdistention or   mild colitis.  Gastric antrum and pyloric wall thickening, question gastritis.  4.2 cm right adnexal cyst.          < end of copied text >    Imaging Personally Reviewed:  [ X] YES  [ ] NO    Consultant(s) Notes Reviewed:  [X ] YES  [ ] NO    Care Discussed with Consultants/Other Providers [ ] YES  [ ] NO
Patient is a 22y old  Female who presents with a chief complaint of n/v (2018 06:31)    Patient seen and examined at bed side in [facility], still vomiting and complaining of abdominal pain.  urine tox + marijuana and opiates    MEDICATIONS  (STANDING):  ciprofloxacin   IVPB 400 milliGRAM(s) IV Intermittent every 12 hours  metroNIDAZOLE  IVPB 500 milliGRAM(s) IV Intermittent every 12 hours  morphine  - Injectable 1 milliGRAM(s) IV Push once  pantoprazole  Injectable 40 milliGRAM(s) IV Push two times a day  sodium chloride 0.9%. 1000 milliLiter(s) (100 mL/Hr) IV Continuous <Continuous>    MEDICATIONS  (PRN):  ondansetron Injectable 4 milliGRAM(s) IV Push every 6 hours PRN Nausea and/or Vomiting      Allergies    penicillin (Hives)    Intolerances    Vital Signs Last 24 Hrs  T(C): 36.5 (2018 09:41), Max: 37 (2018 09:09)  T(F): 97.7 (2018 09:41), Max: 98.6 (2018 09:09)  HR: 74 (2018 09:41) (69 - 98)  BP: 122/77 (2018 09:41) (88/44 - 133/84)  BP(mean): --  RR: 16 (2018 09:41) (15 - 17)  SpO2: 100% (2018 09:41) (98% - 100%)    PHYSICAL EXAM:  GENERAL: young female in bed in NAD  HEENT:  Atraumatic, Normocephalic, EOMI, PERRLA, Moist mucous membranes  NECK: Supple, No JVD  NERVOUS SYSTEM:  Alert & Oriented X3, Good concentration; no deficit  CHEST/LUNG: Clear to percussion bilaterally  HEART: Regular rate and rhythm; No murmurs  ABDOMEN: Soft, mid diffuse tenderness on palpation, Nondistended; Bowel sounds present  EXTREMITIES:  2+ Peripheral Pulses, No clubbing, cyanosis, or edema    LABS:                        11.9   11.60 )-----------( 275      ( 2018 23:07 )             37.0     07-06    140  |  110<H>  |  11  ----------------------------<  108<H>  4.0   |  21<L>  |  0.80    Ca    8.1<L>      2018 23:07    TPro  7.7  /  Alb  4.0  /  TBili  0.7  /  DBili  x   /  AST  29  /  ALT  19  /  AlkPhos  70  07-06      Urinalysis Basic - ( 2018 23:08 )    Color: Yellow / Appearance: Clear / S.015 / pH: x  Gluc: x / Ketone: Large  / Bili: Negative / Urobili: Negative mg/dL   Blood: x / Protein: 15 mg/dL / Nitrite: Negative   Leuk Esterase: Negative / RBC: x / WBC 0-2   Sq Epi: x / Non Sq Epi: Moderate / Bacteria: Few      CAPILLARY BLOOD GLUCOSE          RADIOLOGY & ADDITIONAL TESTS:    Imaging Personally Reviewed:  [ ] YES  [ ] NO    Consultant(s) Notes Reviewed:  [ ] YES  [ ] NO    Care Discussed with Consultants/Other Providers [ ] YES  [ ] NO

## 2018-07-12 NOTE — DISCHARGE NOTE ADULT - MEDICATION SUMMARY - MEDICATIONS TO TAKE
I will START or STAY ON the medications listed below when I get home from the hospital:    Carafate 1 g oral tablet  -- 1 tab(s) by mouth 4 times a day  -- Indication: For Gastroesophageal reflux disease with esophagitis    pantoprazole 40 mg oral delayed release tablet  -- 1 tab(s) by mouth once a day  -- Indication: For Gastroesophageal reflux disease with esophagitis

## 2018-07-12 NOTE — PROGRESS NOTE ADULT - PROBLEM SELECTOR PLAN 1
WITH ABDOMINAL PAIN. -symptoms resolved. Diet advanced. patient stable for discharge from GI point of view for discharge as long as patient tolerates regular diet. Patient knows to follow up in GI clinic.

## 2018-07-12 NOTE — DISCHARGE NOTE ADULT - SECONDARY DIAGNOSIS.
Gastroesophageal reflux disease with esophagitis Ruptured ovarian cyst Colitis Hypophosphatemia Hypomagnesemia

## 2018-07-12 NOTE — PROGRESS NOTE ADULT - PROVIDER SPECIALTY LIST ADULT
Gastroenterology
Hospitalist
Gastroenterology
Hospitalist

## 2018-07-12 NOTE — PROGRESS NOTE ADULT - ASSESSMENT
21y/o female with intractable nausea and vomiting, urine tox + marijuana and opiates
23 y/o female w/PMH cyclic vomiting for many years - s/p 3 endoscopies, presents with abdominal pain, and n/v .  urine tox + marijuana and opiates.
21 y/o female w/PMH cyclic vomiting for many years - s/p 3 endoscopies, presents with abdominal pain, and n/v .  urine tox + marijuana and opiates.    still with nausea and vomiting ovarian cyst can be evaluated with out patient Gyn     some improvement will to be abkle to tolerate food for discharge
21 y/o female w/PMH cyclic vomiting for many years - s/p 3 endoscopies, presents with abdominal pain, and n/v .  urine tox + marijuana and opiates.    still with nausea and vomiting ovarian cyst can be evaluated with out patient Gyn     some improvement will to be able to tolerate food for discharge non opiate pain control for rnow   will be a challinging discharge
23 y/o female w/PMH cyclic vomiting for many years - s/p 3 endoscopies, presents with abdominal pain, and n/v .  urine tox + marijuana and opiates.    still with nausea and vomiting ovarian cyst can be evaluated with out patient Gyn
HPI:  Pt is a  21 y/o female w/PMH cyclic vomiting for many years - s/p 3 endoscopies, presents with abdominal pain, and n/v since yesterday, has not had marijuana for days, only smokes occasionally and usually episodes unrelated to consumption per pt.  Pt denies any unusual foods, no sick contacts, no one sick at home, no travels.  states is like her typical episodes.  pt denies any fever, chills, sob, cp, palpitations, diarrhea or constipation. (07 Jul 2018 06:31)
HPI:  Pt is a  23 y/o female w/PMH cyclic vomiting for many years - s/p 3 endoscopies, presents with abdominal pain, and n/v since yesterday, has not had marijuana for days, only smokes occasionally and usually episodes unrelated to consumption per pt.  Pt denies any unusual foods, no sick contacts, no one sick at home, no travels.  states is like her typical episodes.  pt denies any fever, chills, sob, cp, palpitations, diarrhea or constipation. (07 Jul 2018 06:31)
21 y/o female w/PMH cyclic vomiting for many years - s/p 3 endoscopies, presents with abdominal pain, and n/v

## 2018-07-12 NOTE — DISCHARGE NOTE ADULT - CARE PROVIDERS DIRECT ADDRESSES
,[email or web address],DirectAddress_Unknown,DirectAddress_Unknown

## 2018-07-12 NOTE — DISCHARGE NOTE ADULT - MEDICATION SUMMARY - MEDICATIONS TO CHANGE
I will SWITCH the dose or number of times a day I take the medications listed below when I get home from the hospital:    sucralfate 1 g/10 mL oral suspension  -- 1 gram(s) by mouth 3 times a day

## 2018-07-12 NOTE — PROGRESS NOTE ADULT - PROBLEM SELECTOR PROBLEM 1
Intractable cyclical vomiting with nausea
Intractable cyclical vomiting with nausea
Cyclic vomiting syndrome, intractability of vomiting not specified, presence of nausea not specified
Intractable cyclical vomiting with nausea

## 2018-07-12 NOTE — DISCHARGE NOTE ADULT - PATIENT PORTAL LINK FT
You can access the [facility] Patient Portal, offered by [facility], by registering with the following website: [email or web address]

## 2018-07-12 NOTE — DISCHARGE NOTE ADULT - CARE PLAN
Principal Discharge DX:	Cyclic vomiting syndrome, intractability of vomiting not specified, presence of nausea not specified  Goal:	Nausea resolved  Assessment and plan of treatment:	Avoid food which aggravates symptoms  Follow up with PMD, GI  Secondary Diagnosis:	Gastroesophageal reflux disease with esophagitis  Assessment and plan of treatment:	Stable  Continue medications as prescribed  Secondary Diagnosis:	Ruptured ovarian cyst  Assessment and plan of treatment:	Stable - manage pain with OTC medication as needed  Follow up with gynecologist  Secondary Diagnosis:	Colitis  Assessment and plan of treatment:	Resolved  Secondary Diagnosis:	Hypophosphatemia  Assessment and plan of treatment:	Repleated  Secondary Diagnosis:	Hypomagnesemia  Assessment and plan of treatment:	Repleated

## 2018-07-12 NOTE — DISCHARGE NOTE ADULT - PLAN OF CARE
Nausea resolved Avoid food which aggravates symptoms  Follow up with PMD, GI Stable  Continue medications as prescribed Stable - manage pain with OTC medication as needed  Follow up with gynecologist Resolved Repleated

## 2018-07-12 NOTE — DISCHARGE NOTE ADULT - PROVIDER TOKENS
TOKEN:'[id number]:'[id number]' TOKEN:'[id number]:'[id number]:'[id number]'

## 2018-07-12 NOTE — DISCHARGE NOTE ADULT - CARE PROVIDER_API CALL
Will Ricketts), Internal Medicine  [address] [place] [address]  Phone: [phone]  Fax: [phone]    Tessie Pierce), Obstetrics and Gynecology  [address] [place] [address]  Phone: [phone]  Fax: [phone] Will Ricketts), Internal Medicine  [address] [facility] [address]  [place] [address]  Phone: [phone]  Fax: [phone]    Tessie Pierce), Obstetrics and Gynecology  [address] [place] [address]  Phone: [phone]  Fax: [phone]    Norberto Loredo), Medicine  [address] [place] [address]  Phone: [phone]  Fax: [phone]

## 2018-07-18 DIAGNOSIS — E83.42 HYPOMAGNESEMIA: ICD-10-CM

## 2018-07-18 DIAGNOSIS — Z88.0 ALLERGY STATUS TO PENICILLIN: ICD-10-CM

## 2018-07-18 DIAGNOSIS — E83.39 OTHER DISORDERS OF PHOSPHORUS METABOLISM: ICD-10-CM

## 2018-07-18 DIAGNOSIS — K31.89 OTHER DISEASES OF STOMACH AND DUODENUM: ICD-10-CM

## 2018-07-18 DIAGNOSIS — K21.0 GASTRO-ESOPHAGEAL REFLUX DISEASE WITH ESOPHAGITIS: ICD-10-CM

## 2018-07-18 DIAGNOSIS — R11.2 NAUSEA WITH VOMITING, UNSPECIFIED: ICD-10-CM

## 2018-07-18 DIAGNOSIS — N83.299 OTHER OVARIAN CYST, UNSPECIFIED SIDE: ICD-10-CM

## 2018-07-18 DIAGNOSIS — K52.9 NONINFECTIVE GASTROENTERITIS AND COLITIS, UNSPECIFIED: ICD-10-CM

## 2018-09-14 ENCOUNTER — INPATIENT (INPATIENT)
Facility: HOSPITAL | Age: 23
LOS: 1 days | Discharge: HOME | End: 2018-09-16
Attending: INTERNAL MEDICINE | Admitting: INTERNAL MEDICINE
Payer: COMMERCIAL

## 2018-09-14 VITALS
WEIGHT: 139.99 LBS | HEIGHT: 62 IN | RESPIRATION RATE: 20 BRPM | SYSTOLIC BLOOD PRESSURE: 137 MMHG | TEMPERATURE: 99 F | OXYGEN SATURATION: 99 % | DIASTOLIC BLOOD PRESSURE: 91 MMHG | HEART RATE: 90 BPM

## 2018-09-14 DIAGNOSIS — K21.9 GASTRO-ESOPHAGEAL REFLUX DISEASE WITHOUT ESOPHAGITIS: ICD-10-CM

## 2018-09-14 DIAGNOSIS — K92.0 HEMATEMESIS: ICD-10-CM

## 2018-09-14 DIAGNOSIS — G43.A1 CYCLICAL VOMITING, IN MIGRAINE, INTRACTABLE: ICD-10-CM

## 2018-09-14 DIAGNOSIS — Z29.9 ENCOUNTER FOR PROPHYLACTIC MEASURES, UNSPECIFIED: ICD-10-CM

## 2018-09-14 DIAGNOSIS — F12.90 CANNABIS USE, UNSPECIFIED, UNCOMPLICATED: ICD-10-CM

## 2018-09-14 PROBLEM — G43.A0 CYCLICAL VOMITING, IN MIGRAINE, NOT INTRACTABLE: Chronic | Status: ACTIVE | Noted: 2017-09-30

## 2018-09-14 LAB
ALBUMIN SERPL ELPH-MCNC: 4.1 G/DL — SIGNIFICANT CHANGE UP (ref 3.3–5)
ALP SERPL-CCNC: 69 U/L — SIGNIFICANT CHANGE UP (ref 40–120)
ALT FLD-CCNC: 33 U/L — SIGNIFICANT CHANGE UP (ref 12–78)
ANION GAP SERPL CALC-SCNC: 14 MMOL/L — SIGNIFICANT CHANGE UP (ref 5–17)
APPEARANCE UR: CLEAR — SIGNIFICANT CHANGE UP
AST SERPL-CCNC: 27 U/L — SIGNIFICANT CHANGE UP (ref 15–37)
BACTERIA # UR AUTO: ABNORMAL
BASOPHILS # BLD AUTO: 0.01 K/UL — SIGNIFICANT CHANGE UP (ref 0–0.2)
BASOPHILS # BLD AUTO: 0.02 K/UL — SIGNIFICANT CHANGE UP (ref 0–0.2)
BASOPHILS NFR BLD AUTO: 0.1 % — SIGNIFICANT CHANGE UP (ref 0–2)
BASOPHILS NFR BLD AUTO: 0.2 % — SIGNIFICANT CHANGE UP (ref 0–2)
BILIRUB SERPL-MCNC: 0.9 MG/DL — SIGNIFICANT CHANGE UP (ref 0.2–1.2)
BILIRUB UR-MCNC: NEGATIVE — SIGNIFICANT CHANGE UP
BUN SERPL-MCNC: 21 MG/DL — SIGNIFICANT CHANGE UP (ref 7–23)
CALCIUM SERPL-MCNC: 8.8 MG/DL — SIGNIFICANT CHANGE UP (ref 8.5–10.1)
CHLORIDE SERPL-SCNC: 112 MMOL/L — HIGH (ref 96–108)
CO2 SERPL-SCNC: 20 MMOL/L — LOW (ref 22–31)
COLOR SPEC: YELLOW — SIGNIFICANT CHANGE UP
CREAT SERPL-MCNC: 0.96 MG/DL — SIGNIFICANT CHANGE UP (ref 0.5–1.3)
DIFF PNL FLD: ABNORMAL
EOSINOPHIL # BLD AUTO: 0 K/UL — SIGNIFICANT CHANGE UP (ref 0–0.5)
EOSINOPHIL # BLD AUTO: 0.02 K/UL — SIGNIFICANT CHANGE UP (ref 0–0.5)
EOSINOPHIL NFR BLD AUTO: 0 % — SIGNIFICANT CHANGE UP (ref 0–6)
EOSINOPHIL NFR BLD AUTO: 0.2 % — SIGNIFICANT CHANGE UP (ref 0–6)
GLUCOSE SERPL-MCNC: 94 MG/DL — SIGNIFICANT CHANGE UP (ref 70–99)
GLUCOSE UR QL: NEGATIVE MG/DL — SIGNIFICANT CHANGE UP
HCG SERPL-ACNC: <1 MIU/ML — SIGNIFICANT CHANGE UP
HCT VFR BLD CALC: 31.4 % — LOW (ref 34.5–45)
HCT VFR BLD CALC: 35.8 % — SIGNIFICANT CHANGE UP (ref 34.5–45)
HGB BLD-MCNC: 11.3 G/DL — LOW (ref 11.5–15.5)
HGB BLD-MCNC: 9.9 G/DL — LOW (ref 11.5–15.5)
IMM GRANULOCYTES NFR BLD AUTO: 0.2 % — SIGNIFICANT CHANGE UP (ref 0–1.5)
IMM GRANULOCYTES NFR BLD AUTO: 0.3 % — SIGNIFICANT CHANGE UP (ref 0–1.5)
KETONES UR-MCNC: ABNORMAL
LACTATE SERPL-SCNC: 2.3 MMOL/L — HIGH (ref 0.7–2)
LEUKOCYTE ESTERASE UR-ACNC: NEGATIVE — SIGNIFICANT CHANGE UP
LIDOCAIN IGE QN: 212 U/L — SIGNIFICANT CHANGE UP (ref 73–393)
LYMPHOCYTES # BLD AUTO: 1.82 K/UL — SIGNIFICANT CHANGE UP (ref 1–3.3)
LYMPHOCYTES # BLD AUTO: 20.9 % — SIGNIFICANT CHANGE UP (ref 13–44)
LYMPHOCYTES # BLD AUTO: 3.09 K/UL — SIGNIFICANT CHANGE UP (ref 1–3.3)
LYMPHOCYTES # BLD AUTO: 35.3 % — SIGNIFICANT CHANGE UP (ref 13–44)
MCHC RBC-ENTMCNC: 25.2 PG — LOW (ref 27–34)
MCHC RBC-ENTMCNC: 25.2 PG — LOW (ref 27–34)
MCHC RBC-ENTMCNC: 31.5 GM/DL — LOW (ref 32–36)
MCHC RBC-ENTMCNC: 31.6 GM/DL — LOW (ref 32–36)
MCV RBC AUTO: 79.9 FL — LOW (ref 80–100)
MCV RBC AUTO: 79.9 FL — LOW (ref 80–100)
MONOCYTES # BLD AUTO: 0.78 K/UL — SIGNIFICANT CHANGE UP (ref 0–0.9)
MONOCYTES # BLD AUTO: 0.8 K/UL — SIGNIFICANT CHANGE UP (ref 0–0.9)
MONOCYTES NFR BLD AUTO: 9 % — SIGNIFICANT CHANGE UP (ref 2–14)
MONOCYTES NFR BLD AUTO: 9.1 % — SIGNIFICANT CHANGE UP (ref 2–14)
NEUTROPHILS # BLD AUTO: 4.81 K/UL — SIGNIFICANT CHANGE UP (ref 1.8–7.4)
NEUTROPHILS # BLD AUTO: 6.07 K/UL — SIGNIFICANT CHANGE UP (ref 1.8–7.4)
NEUTROPHILS NFR BLD AUTO: 55 % — SIGNIFICANT CHANGE UP (ref 43–77)
NEUTROPHILS NFR BLD AUTO: 69.7 % — SIGNIFICANT CHANGE UP (ref 43–77)
NITRITE UR-MCNC: NEGATIVE — SIGNIFICANT CHANGE UP
NRBC # BLD: 0 /100 WBCS — SIGNIFICANT CHANGE UP (ref 0–0)
PH UR: 8 — SIGNIFICANT CHANGE UP (ref 5–8)
PLATELET # BLD AUTO: 248 K/UL — SIGNIFICANT CHANGE UP (ref 150–400)
PLATELET # BLD AUTO: 270 K/UL — SIGNIFICANT CHANGE UP (ref 150–400)
POTASSIUM SERPL-MCNC: 3.1 MMOL/L — LOW (ref 3.5–5.3)
POTASSIUM SERPL-SCNC: 3.1 MMOL/L — LOW (ref 3.5–5.3)
PROT SERPL-MCNC: 7.7 GM/DL — SIGNIFICANT CHANGE UP (ref 6–8.3)
PROT UR-MCNC: 15 MG/DL
RBC # BLD: 3.93 M/UL — SIGNIFICANT CHANGE UP (ref 3.8–5.2)
RBC # BLD: 4.48 M/UL — SIGNIFICANT CHANGE UP (ref 3.8–5.2)
RBC # FLD: 15.9 % — HIGH (ref 10.3–14.5)
RBC # FLD: 16.1 % — HIGH (ref 10.3–14.5)
RBC CASTS # UR COMP ASSIST: SIGNIFICANT CHANGE UP /HPF (ref 0–4)
SODIUM SERPL-SCNC: 146 MMOL/L — HIGH (ref 135–145)
SP GR SPEC: 1.01 — SIGNIFICANT CHANGE UP (ref 1.01–1.02)
UROBILINOGEN FLD QL: NEGATIVE MG/DL — SIGNIFICANT CHANGE UP
WBC # BLD: 8.71 K/UL — SIGNIFICANT CHANGE UP (ref 3.8–10.5)
WBC # BLD: 8.76 K/UL — SIGNIFICANT CHANGE UP (ref 3.8–10.5)
WBC # FLD AUTO: 8.71 K/UL — SIGNIFICANT CHANGE UP (ref 3.8–10.5)
WBC # FLD AUTO: 8.76 K/UL — SIGNIFICANT CHANGE UP (ref 3.8–10.5)

## 2018-09-14 PROCEDURE — 99285 EMERGENCY DEPT VISIT HI MDM: CPT | Mod: 25

## 2018-09-14 PROCEDURE — 12345: CPT | Mod: NC

## 2018-09-14 PROCEDURE — 99223 1ST HOSP IP/OBS HIGH 75: CPT

## 2018-09-14 RX ORDER — METOCLOPRAMIDE HCL 10 MG
10 TABLET ORAL ONCE
Qty: 0 | Refills: 0 | Status: COMPLETED | OUTPATIENT
Start: 2018-09-14 | End: 2018-09-14

## 2018-09-14 RX ORDER — POTASSIUM CHLORIDE 20 MEQ
10 PACKET (EA) ORAL
Qty: 0 | Refills: 0 | Status: COMPLETED | OUTPATIENT
Start: 2018-09-14 | End: 2018-09-14

## 2018-09-14 RX ORDER — ONDANSETRON 8 MG/1
4 TABLET, FILM COATED ORAL ONCE
Qty: 0 | Refills: 0 | Status: COMPLETED | OUTPATIENT
Start: 2018-09-14 | End: 2018-09-14

## 2018-09-14 RX ORDER — PANTOPRAZOLE SODIUM 20 MG/1
40 TABLET, DELAYED RELEASE ORAL ONCE
Qty: 0 | Refills: 0 | Status: COMPLETED | OUTPATIENT
Start: 2018-09-14 | End: 2018-09-14

## 2018-09-14 RX ORDER — POTASSIUM CHLORIDE 20 MEQ
40 PACKET (EA) ORAL ONCE
Qty: 0 | Refills: 0 | Status: DISCONTINUED | OUTPATIENT
Start: 2018-09-14 | End: 2018-09-14

## 2018-09-14 RX ORDER — MORPHINE SULFATE 50 MG/1
2 CAPSULE, EXTENDED RELEASE ORAL ONCE
Qty: 0 | Refills: 0 | Status: DISCONTINUED | OUTPATIENT
Start: 2018-09-14 | End: 2018-09-14

## 2018-09-14 RX ORDER — KETOROLAC TROMETHAMINE 30 MG/ML
30 SYRINGE (ML) INJECTION ONCE
Qty: 0 | Refills: 0 | Status: DISCONTINUED | OUTPATIENT
Start: 2018-09-14 | End: 2018-09-14

## 2018-09-14 RX ORDER — DIPHENHYDRAMINE HCL 50 MG
50 CAPSULE ORAL ONCE
Qty: 0 | Refills: 0 | Status: COMPLETED | OUTPATIENT
Start: 2018-09-14 | End: 2018-09-14

## 2018-09-14 RX ORDER — SODIUM CHLORIDE 9 MG/ML
1000 INJECTION, SOLUTION INTRAVENOUS ONCE
Qty: 0 | Refills: 0 | Status: COMPLETED | OUTPATIENT
Start: 2018-09-14 | End: 2018-09-14

## 2018-09-14 RX ORDER — ONDANSETRON 8 MG/1
8 TABLET, FILM COATED ORAL EVERY 8 HOURS
Qty: 0 | Refills: 0 | Status: DISCONTINUED | OUTPATIENT
Start: 2018-09-14 | End: 2018-09-16

## 2018-09-14 RX ORDER — SODIUM CHLORIDE 9 MG/ML
1000 INJECTION INTRAMUSCULAR; INTRAVENOUS; SUBCUTANEOUS
Qty: 0 | Refills: 0 | Status: DISCONTINUED | OUTPATIENT
Start: 2018-09-14 | End: 2018-09-15

## 2018-09-14 RX ORDER — MORPHINE SULFATE 50 MG/1
2 CAPSULE, EXTENDED RELEASE ORAL EVERY 4 HOURS
Qty: 0 | Refills: 0 | Status: DISCONTINUED | OUTPATIENT
Start: 2018-09-14 | End: 2018-09-16

## 2018-09-14 RX ORDER — PANTOPRAZOLE SODIUM 20 MG/1
8 TABLET, DELAYED RELEASE ORAL
Qty: 80 | Refills: 0 | Status: DISCONTINUED | OUTPATIENT
Start: 2018-09-14 | End: 2018-09-15

## 2018-09-14 RX ORDER — INFLUENZA VIRUS VACCINE 15; 15; 15; 15 UG/.5ML; UG/.5ML; UG/.5ML; UG/.5ML
0.5 SUSPENSION INTRAMUSCULAR ONCE
Qty: 0 | Refills: 0 | Status: COMPLETED | OUTPATIENT
Start: 2018-09-14 | End: 2018-09-14

## 2018-09-14 RX ORDER — SODIUM CHLORIDE 9 MG/ML
2000 INJECTION INTRAMUSCULAR; INTRAVENOUS; SUBCUTANEOUS ONCE
Qty: 0 | Refills: 0 | Status: COMPLETED | OUTPATIENT
Start: 2018-09-14 | End: 2018-09-14

## 2018-09-14 RX ADMIN — Medication 30 MILLIGRAM(S): at 03:00

## 2018-09-14 RX ADMIN — SODIUM CHLORIDE 1000 MILLILITER(S): 9 INJECTION, SOLUTION INTRAVENOUS at 04:23

## 2018-09-14 RX ADMIN — Medication 100 MILLIEQUIVALENT(S): at 05:42

## 2018-09-14 RX ADMIN — MORPHINE SULFATE 2 MILLIGRAM(S): 50 CAPSULE, EXTENDED RELEASE ORAL at 22:00

## 2018-09-14 RX ADMIN — PANTOPRAZOLE SODIUM 10 MG/HR: 20 TABLET, DELAYED RELEASE ORAL at 08:26

## 2018-09-14 RX ADMIN — ONDANSETRON 8 MILLIGRAM(S): 8 TABLET, FILM COATED ORAL at 21:17

## 2018-09-14 RX ADMIN — ONDANSETRON 4 MILLIGRAM(S): 8 TABLET, FILM COATED ORAL at 06:00

## 2018-09-14 RX ADMIN — PANTOPRAZOLE SODIUM 40 MILLIGRAM(S): 20 TABLET, DELAYED RELEASE ORAL at 08:22

## 2018-09-14 RX ADMIN — Medication 50 MILLIGRAM(S): at 03:32

## 2018-09-14 RX ADMIN — MORPHINE SULFATE 2 MILLIGRAM(S): 50 CAPSULE, EXTENDED RELEASE ORAL at 06:47

## 2018-09-14 RX ADMIN — SODIUM CHLORIDE 2000 MILLILITER(S): 9 INJECTION INTRAMUSCULAR; INTRAVENOUS; SUBCUTANEOUS at 02:36

## 2018-09-14 RX ADMIN — Medication 10 MILLIGRAM(S): at 03:32

## 2018-09-14 RX ADMIN — Medication 100 MILLIEQUIVALENT(S): at 04:23

## 2018-09-14 RX ADMIN — MORPHINE SULFATE 2 MILLIGRAM(S): 50 CAPSULE, EXTENDED RELEASE ORAL at 21:45

## 2018-09-14 RX ADMIN — Medication 30 MILLIGRAM(S): at 02:36

## 2018-09-14 RX ADMIN — SODIUM CHLORIDE 100 MILLILITER(S): 9 INJECTION INTRAMUSCULAR; INTRAVENOUS; SUBCUTANEOUS at 11:38

## 2018-09-14 RX ADMIN — Medication 100 MILLIEQUIVALENT(S): at 06:52

## 2018-09-14 RX ADMIN — PANTOPRAZOLE SODIUM 40 MILLIGRAM(S): 20 TABLET, DELAYED RELEASE ORAL at 02:36

## 2018-09-14 RX ADMIN — PANTOPRAZOLE SODIUM 10 MG/HR: 20 TABLET, DELAYED RELEASE ORAL at 21:17

## 2018-09-14 RX ADMIN — MORPHINE SULFATE 2 MILLIGRAM(S): 50 CAPSULE, EXTENDED RELEASE ORAL at 12:37

## 2018-09-14 NOTE — H&P ADULT - HISTORY OF PRESENT ILLNESS
22 year old woman with PMH cyclic vomiting for many years - s/p 3 endoscopies, presents with abdominal pain, and n/v since yesterday.  She states that she has smoked marijuana in the past but her smoking does not seem to coincide with episodes of vomiting.  She states that her last use of marijuana was about 1 week ago.  She was just discharged from [facility] with same symptoms, work up was normal.  Today's symptoms are similar to those in the past, however she vomited dark coffee ground material while in the ED.

## 2018-09-14 NOTE — ED PROVIDER NOTE - MEDICAL DECISION MAKING DETAILS
Pt w vomiting, hypoKalemia.  On re-eval vomitus in basin coffee-ground appearance.  Protonix initiated, pt admitted for further eval/mgmt.

## 2018-09-14 NOTE — ED ADULT NURSE NOTE - NSIMPLEMENTINTERV_GEN_ALL_ED
Implemented All Universal Safety Interventions:  [place] to call system. Call bell, personal items and telephone within reach. Instruct patient to call for assistance. Room bathroom lighting operational. Non-slip footwear when patient is off stretcher. Physically safe environment: no spills, clutter or unnecessary equipment. Stretcher in lowest position, wheels locked, appropriate side rails in place.

## 2018-09-14 NOTE — CHART NOTE - NSCHARTNOTEFT_GEN_A_CORE
22 year old woman with PMH cyclic vomiting for many years - s/p 3 endoscopies, presents with abdominal pain, and n/v. c/o Coffee ground emesis. Seen by GI, Hb stable, diet advanced.   Acute blood loss anemia  Gastrointestinal hemorrhage with hematemesis   Intractable cyclical vomiting with nausea   Marijuana dependence   -monitor cbc, follow up GI, Zofran, marijuana abstinence education provided

## 2018-09-14 NOTE — H&P ADULT - NSHPPHYSICALEXAM_GEN_ALL_CORE
GENERAL: NAD, well-groomed, well-developed  HEAD:  Atraumatic, Normocephalic  EYES: EOMI, PERRLA, conjunctiva and sclera clear  ENMT: No tonsillar erythema, exudates, or enlargement; Moist mucous membranes, No lesions  NECK: Supple, No JVD, Normal thyroid  NERVOUS SYSTEM:  Alert & Oriented X3, Good concentration  CHEST/LUNG: Clear to ascultation bilaterally; No rales, rhonchi, wheezing, or rubs  HEART: Regular rate and rhythm; No murmurs, rubs, or gallops  ABDOMEN: Soft, tender, Nondistended; Bowel sounds present  EXTREMITIES: no clubbing, cyanosis, or edema  SKIN: no rashes or lesions   PSYCH: normal affect and behavior

## 2018-09-14 NOTE — ED ADULT TRIAGE NOTE - CHIEF COMPLAINT QUOTE
patient state " I have abdominal pain and I am unable to get down food/water", denies vomiting, c/o nausea

## 2018-09-14 NOTE — ED PROVIDER NOTE - OBJECTIVE STATEMENT
Pertinent PMH/PSH/FHx/SHx and Review of Systems contained within:    21yo F w PMH of cyclic vomiting presents to ED c/o abd pain & vomiting.  Pt states since last admission she has not smoked marijuana.  Denies ingestion of spoiled foods, EtOH, fever, chills, diarrhea.  Pt states she was at OSH earlier today, had CT done, demonstrated constipation.  Pt reports she had enema done, but cont to have vomiting, came to ED.    No fever/chills, No photophobia/eye pain/changes in vision, No ear pain/sore throat/dysphagia, No chest pain/palpitations, no SOB/cough/wheeze/stridor, +abdominal pain, No neck/back pain, no rash, no changes in neurological status/function.

## 2018-09-14 NOTE — CONSULT NOTE ADULT - SUBJECTIVE AND OBJECTIVE BOX
Chief Complaint:  Patient is a 22y old  Female who presents with a chief complaint of coffee ground emesis (14 Sep 2018 06:52)      HPI:  22 year old woman with PMH cyclic vomiting for many years - s/p 3 endoscopies, presents with abdominal pain, and n/v since yesterday.  She states that she has smoked marijuana in the past but her smoking does not seem to coincide with episodes of vomiting.  She states that her last use of marijuana was about 1 week ago.  She was just discharged from [facility] with same symptoms, work up was normal.  Today's symptoms are similar to those in the past, however she vomited dark coffee ground material while in the ED. (14 Sep 2018 06:52)  seen at [place] and [facility]  s/p  upper gastrointestinal endoscopy with no ulcers    PMH/PSH:PAST MEDICAL & SURGICAL HISTORY:  Cyclic vomiting syndrome  GERD (gastroesophageal reflux disease)  No significant past surgical history      Allergies:  penicillin (Hives)      Medications:  ondansetron Injectable 8 milliGRAM(s) IV Push every 8 hours PRN  pantoprazole Infusion 8 mG/Hr IV Continuous <Continuous>  sodium chloride 0.9%. 1000 milliLiter(s) IV Continuous <Continuous>      Review of Systems:    General:  No weight loss, fevers, chills, night sweats, fatigue,   Eyes:  Good vision, no reported pain  ENT:  No sore throat, pain, runny nose, dysphagia  CV:  No pain, palpitations, hypo/hypertension  Resp:  No dyspnea, cough, tachypnea, wheezing  GI:  No pain, +nausea, +vomiting, No diarrhea, No constipation, No pruritis, No rectal bleeding, No tarry stools, No dysphagia,  :  No pain, bleeding, incontinence, nocturia  Muscle:  No pain, weakness  Breast:  No pain, abscess, mass, discharge  Neuro:  No weakness, tingling, memory problems  Psych:  No fatigue, insomnia, mood problems, depression  Endocrine:  No polyuria, polydipsia, cold/heat intolerance  Heme:  No petechiae, ecchymosis, easy bruisability  Skin:  No rash, tattoos, scars, edema    Relevant Family History:   FAMILY HISTORY:  No pertinent family history in first degree relatives      Relevant Social History: Alcohol ( -) , Tobacco ( -) , Illicit drugs (- )     Physical Exam:    Vital Signs:  Vital Signs Last 24 Hrs  T(C): 36.9 (14 Sep 2018 07:24), Max: 37.2 (14 Sep 2018 01:41)  T(F): 98.5 (14 Sep 2018 07:24), Max: 98.9 (14 Sep 2018 01:41)  HR: 74 (14 Sep 2018 07:24) (74 - 90)  BP: 133/90 (14 Sep 2018 07:24) (133/90 - 141/79)  BP(mean): --  RR: 18 (14 Sep 2018 07:24) (18 - 20)  SpO2: 100% (14 Sep 2018 07:24) (98% - 100%)  Daily Height in cm: 157.48 (14 Sep 2018 01:41)    Daily     General:  Appears stated age, well-groomed, well-nourished, no distress  HEENT:  NC/AT,  conjunctivae clear and pink, no thyromegaly, nodules, adenopathy, no JVD, anicteric sclera  Chest:  Full & symmetric excursion, no increased effort, breath sounds clear  Cardiovascular:  Regular rhythm, S1, S2, no murmur/rub/S3/S4, no abdominal bruit, no edema  Abdomen:  Soft, +tender,  mild distended, normoactive bowel sounds,  no masses , no hepatosplenomegaly, no signs of chronic liver disease  Extremities:  no cyanosis, clubbing or edema  Skin:  No rash/erythema/ecchymoses/petechiae/wounds/abscess/warm/dry  Neuro/Psych:  Alert, oriented, no asterixis, no tremor, no encephalopathy    Laboratory:                          11.3   8.71  )-----------( 270      ( 14 Sep 2018 03:12 )             35.8     09-14    146<H>  |  112<H>  |  21  ----------------------------<  94  3.1<L>   |  20<L>  |  0.96    Ca    8.8      14 Sep 2018 03:12    TPro  7.7  /  Alb  4.1  /  TBili  0.9  /  DBili  x   /  AST  27  /  ALT  33  /  AlkPhos  69  09-14    LIVER FUNCTIONS - ( 14 Sep 2018 03:12 )  Alb: 4.1 g/dL / Pro: 7.7 gm/dL / ALK PHOS: 69 U/L / ALT: 33 U/L / AST: 27 U/L / GGT: x             Urinalysis Basic - ( 14 Sep 2018 05:03 )    Color: Yellow / Appearance: Clear / S.015 / pH: x  Gluc: x / Ketone: Large  / Bili: Negative / Urobili: Negative mg/dL   Blood: x / Protein: 15 mg/dL / Nitrite: Negative   Leuk Esterase: Negative / RBC: 0-2 /HPF / WBC x   Sq Epi: x / Non Sq Epi: x / Bacteria: Occasional          Lipase exiye106 U/L      Imaging:  EXAM:  CT ABDOMEN AND PELVIS IC                        PROCEDURE DATE:  2018    INTERPRETATION:  CLINICAL INFORMATION: Generalized abdominal pain  COMPARISON: 2018    PROCEDURE:   CT of the Abdomen and Pelvis was performed with intravenous contrast.   Intravenous contrast: 80 ml Omnipaque 350. 20 ml discarded.  Oral contrast: None.  Sagittal and coronal reformats were performed.    FINDINGS:    LOWER CHEST: Clear lung bases.    LIVER: Low-attenuation, compatible with hepatic steatosis.  BILE DUCTS: Normal caliber.  GALLBLADDER: Sludge and/or vicariously excreted intravenous contrast.  SPLEEN: Within normal limits.  PANCREAS: Within normal limits.  ADRENALS: Within normal limits.  KIDNEYS/URETERS: Within normal limits.    BLADDER: Within normal limits.  REPRODUCTIVE ORGANS: The uterus and left adnexa are within normal limits.   4.2 cm right adnexal cyst.    BOWEL: Small hiatal hernia. Gastric antrum and pyloric wall thickening.   No bowel obstruction. Appendix within normal limits. Several colonic   diverticula. Descending and sigmoid colon wall thickening.  PERITONEUM: Mild pelvic ascites.  VESSELS:  Within normal limits.  RETROPERITONEUM: No lymphadenopathy.    ABDOMINAL WALL: Tiny fat-containing umbilical hernia. Subcutaneous soft   tissue edema.  BONES: Left sacroiliac sclerosis.    IMPRESSION:     Descending and sigmoid colon wall thickening, question underdistention or   mild colitis.  Gastric antrum and pyloric wall thickening, question gastritis.  4.2 cm right adnexal cyst.    NITESH PENNINGTON M.D., ATTENDING RADIOLOGIST  This document has been electronically signed. 2018  8:48AM

## 2018-09-14 NOTE — H&P ADULT - PROBLEM SELECTOR PLAN 1
Monitor H/H Q12hrs, transfuse  Keep pt NPO for now, monitor finger sticks  GI Evaluation  2 large bore peripheral heplocks.  IV Fluids NS@100ml/hr for 24hrs  Zofran 8mg IVP Q8hrs PRN for nausea/vomiting.  IV PPI

## 2018-09-14 NOTE — H&P ADULT - NSHPLABSRESULTS_GEN_ALL_CORE
Vital Signs Last 24 Hrs  T(C): 36.7 (14 Sep 2018 06:44), Max: 37.2 (14 Sep 2018 01:41)  T(F): 98.1 (14 Sep 2018 06:44), Max: 98.9 (14 Sep 2018 01:41)  HR: 81 (14 Sep 2018 06:44) (81 - 90)  BP: 141/79 (14 Sep 2018 06:44) (137/91 - 141/79)  BP(mean): --  RR: 18 (14 Sep 2018 06:44) (18 - 20)  SpO2: 98% (14 Sep 2018 06:44) (98% - 99%)          LABS:                        11.3   8.71  )-----------( 270      ( 14 Sep 2018 03:12 )             35.8     09-14    146<H>  |  112<H>  |  21  ----------------------------<  94  3.1<L>   |  20<L>  |  0.96    Ca    8.8      14 Sep 2018 03:12    TPro  7.7  /  Alb  4.1  /  TBili  0.9  /  DBili  x   /  AST  27  /  ALT  33  /  AlkPhos  69  09-14      Urinalysis Basic - ( 14 Sep 2018 05:03 )    Color: Yellow / Appearance: Clear / S.015 / pH: x  Gluc: x / Ketone: Large  / Bili: Negative / Urobili: Negative mg/dL   Blood: x / Protein: 15 mg/dL / Nitrite: Negative   Leuk Esterase: Negative / RBC: 0-2 /HPF / WBC x   Sq Epi: x / Non Sq Epi: x / Bacteria: Occasional

## 2018-09-14 NOTE — ED PROVIDER NOTE - PHYSICAL EXAMINATION
Gen: Alert, NAD  Head: NC, AT, EOMI, normal lids/conjunctiva  ENT: normal hearing, patent oropharynx, MMM  Neck: supple, no tenderness/meningismus, FROM, Trachea midline  Pulm: Bilateral clear BS, normal resp effort, no wheeze/stridor/retractions  CV: RRR, no M/R/G, +dist pulses  Abd: soft, +epigastric TTP, ND, +BS, no guarding/rebound tenderness  Mskel: no edema/erythema/cyanosis  Skin: no rash  Neuro: no sensory/motor deficits

## 2018-09-15 LAB
ANION GAP SERPL CALC-SCNC: 13 MMOL/L — SIGNIFICANT CHANGE UP (ref 5–17)
BUN SERPL-MCNC: 16 MG/DL — SIGNIFICANT CHANGE UP (ref 7–23)
CALCIUM SERPL-MCNC: 7.5 MG/DL — LOW (ref 8.5–10.1)
CHLORIDE SERPL-SCNC: 107 MMOL/L — SIGNIFICANT CHANGE UP (ref 96–108)
CO2 SERPL-SCNC: 21 MMOL/L — LOW (ref 22–31)
CREAT SERPL-MCNC: 0.82 MG/DL — SIGNIFICANT CHANGE UP (ref 0.5–1.3)
CULTURE RESULTS: NO GROWTH — SIGNIFICANT CHANGE UP
GLUCOSE BLDC GLUCOMTR-MCNC: 122 MG/DL — HIGH (ref 70–99)
GLUCOSE BLDC GLUCOMTR-MCNC: 61 MG/DL — LOW (ref 70–99)
GLUCOSE BLDC GLUCOMTR-MCNC: 62 MG/DL — LOW (ref 70–99)
GLUCOSE BLDC GLUCOMTR-MCNC: 79 MG/DL — SIGNIFICANT CHANGE UP (ref 70–99)
GLUCOSE SERPL-MCNC: 62 MG/DL — LOW (ref 70–99)
HCT VFR BLD CALC: 33.6 % — LOW (ref 34.5–45)
HCT VFR BLD CALC: 36 % — SIGNIFICANT CHANGE UP (ref 34.5–45)
HGB BLD-MCNC: 10.6 G/DL — LOW (ref 11.5–15.5)
HGB BLD-MCNC: 11.4 G/DL — LOW (ref 11.5–15.5)
LACTATE SERPL-SCNC: 0.7 MMOL/L — SIGNIFICANT CHANGE UP (ref 0.7–2)
MAGNESIUM SERPL-MCNC: 2.1 MG/DL — SIGNIFICANT CHANGE UP (ref 1.6–2.6)
MCHC RBC-ENTMCNC: 25.3 PG — LOW (ref 27–34)
MCHC RBC-ENTMCNC: 25.3 PG — LOW (ref 27–34)
MCHC RBC-ENTMCNC: 31.5 GM/DL — LOW (ref 32–36)
MCHC RBC-ENTMCNC: 31.7 GM/DL — LOW (ref 32–36)
MCV RBC AUTO: 79.8 FL — LOW (ref 80–100)
MCV RBC AUTO: 80.2 FL — SIGNIFICANT CHANGE UP (ref 80–100)
NRBC # BLD: 0 /100 WBCS — SIGNIFICANT CHANGE UP (ref 0–0)
NRBC # BLD: 0 /100 WBCS — SIGNIFICANT CHANGE UP (ref 0–0)
PLATELET # BLD AUTO: 241 K/UL — SIGNIFICANT CHANGE UP (ref 150–400)
PLATELET # BLD AUTO: 256 K/UL — SIGNIFICANT CHANGE UP (ref 150–400)
POTASSIUM SERPL-MCNC: 3.3 MMOL/L — LOW (ref 3.5–5.3)
POTASSIUM SERPL-SCNC: 3.3 MMOL/L — LOW (ref 3.5–5.3)
RBC # BLD: 4.19 M/UL — SIGNIFICANT CHANGE UP (ref 3.8–5.2)
RBC # BLD: 4.51 M/UL — SIGNIFICANT CHANGE UP (ref 3.8–5.2)
RBC # FLD: 15 % — HIGH (ref 10.3–14.5)
RBC # FLD: 15.2 % — HIGH (ref 10.3–14.5)
SODIUM SERPL-SCNC: 141 MMOL/L — SIGNIFICANT CHANGE UP (ref 135–145)
SPECIMEN SOURCE: SIGNIFICANT CHANGE UP
WBC # BLD: 7.48 K/UL — SIGNIFICANT CHANGE UP (ref 3.8–10.5)
WBC # BLD: 7.69 K/UL — SIGNIFICANT CHANGE UP (ref 3.8–10.5)
WBC # FLD AUTO: 7.48 K/UL — SIGNIFICANT CHANGE UP (ref 3.8–10.5)
WBC # FLD AUTO: 7.69 K/UL — SIGNIFICANT CHANGE UP (ref 3.8–10.5)

## 2018-09-15 PROCEDURE — 99232 SBSQ HOSP IP/OBS MODERATE 35: CPT

## 2018-09-15 RX ORDER — PANTOPRAZOLE SODIUM 20 MG/1
40 TABLET, DELAYED RELEASE ORAL
Qty: 0 | Refills: 0 | Status: DISCONTINUED | OUTPATIENT
Start: 2018-09-15 | End: 2018-09-16

## 2018-09-15 RX ORDER — POTASSIUM CHLORIDE 20 MEQ
10 PACKET (EA) ORAL
Qty: 0 | Refills: 0 | Status: COMPLETED | OUTPATIENT
Start: 2018-09-15 | End: 2018-09-15

## 2018-09-15 RX ORDER — SUCRALFATE 1 G
1 TABLET ORAL
Qty: 0 | Refills: 0 | Status: DISCONTINUED | OUTPATIENT
Start: 2018-09-15 | End: 2018-09-16

## 2018-09-15 RX ORDER — DEXTROSE MONOHYDRATE, SODIUM CHLORIDE, AND POTASSIUM CHLORIDE 50; .745; 4.5 G/1000ML; G/1000ML; G/1000ML
1000 INJECTION, SOLUTION INTRAVENOUS
Qty: 0 | Refills: 0 | Status: DISCONTINUED | OUTPATIENT
Start: 2018-09-15 | End: 2018-09-16

## 2018-09-15 RX ORDER — SODIUM CHLORIDE 9 MG/ML
1000 INJECTION, SOLUTION INTRAVENOUS
Qty: 0 | Refills: 0 | Status: DISCONTINUED | OUTPATIENT
Start: 2018-09-15 | End: 2018-09-15

## 2018-09-15 RX ADMIN — MORPHINE SULFATE 2 MILLIGRAM(S): 50 CAPSULE, EXTENDED RELEASE ORAL at 05:48

## 2018-09-15 RX ADMIN — Medication 100 MILLIEQUIVALENT(S): at 14:44

## 2018-09-15 RX ADMIN — DEXTROSE MONOHYDRATE, SODIUM CHLORIDE, AND POTASSIUM CHLORIDE 100 MILLILITER(S): 50; .745; 4.5 INJECTION, SOLUTION INTRAVENOUS at 12:23

## 2018-09-15 RX ADMIN — Medication 1 GRAM(S): at 17:48

## 2018-09-15 RX ADMIN — PANTOPRAZOLE SODIUM 10 MG/HR: 20 TABLET, DELAYED RELEASE ORAL at 06:24

## 2018-09-15 RX ADMIN — SODIUM CHLORIDE 100 MILLILITER(S): 9 INJECTION INTRAMUSCULAR; INTRAVENOUS; SUBCUTANEOUS at 05:48

## 2018-09-15 RX ADMIN — Medication 100 MILLIEQUIVALENT(S): at 12:29

## 2018-09-15 RX ADMIN — MORPHINE SULFATE 2 MILLIGRAM(S): 50 CAPSULE, EXTENDED RELEASE ORAL at 06:03

## 2018-09-15 RX ADMIN — ONDANSETRON 8 MILLIGRAM(S): 8 TABLET, FILM COATED ORAL at 21:51

## 2018-09-15 RX ADMIN — Medication 100 MILLIEQUIVALENT(S): at 13:43

## 2018-09-15 NOTE — PROGRESS NOTE ADULT - ASSESSMENT
1.  N/V with reported hematemesis - history of Sheree Arora tear and intermittent marijuana use, possible cyclic vomiting syndrome.  CT with possible mild colitis.  Advance diet as tolerated.  Start sucralfate.  Change Protonix gtt to PO Protonix.  Counseled on need to stop marijuana use as it may be contributing to her recurring symptoms.  Discussed with GI service, no plans for endoscopy.    2.  Electrolyte disorders - hypokalemia.  Give KCl 10 meq IV x 3.  Change to hypotonic IVF.    3.  Anemia - multifactorial and stable, just completed menses.    Monitor hemoglobin and transfuse PRN.    Returned to patient's bedside to update her mother, with patient's consent.

## 2018-09-15 NOTE — PROGRESS NOTE ADULT - SUBJECTIVE AND OBJECTIVE BOX
Patient is a 22y old  Female who presents with a chief complaint of coffee ground emesis (14 Sep 2018 08:39)      INTERVAL HPI/OVERNIGHT EVENTS:    MEDICATIONS  (STANDING):  dextrose 5% + sodium chloride 0.45% with potassium chloride 20 mEq/L 1000 milliLiter(s) (100 mL/Hr) IV Continuous <Continuous>  influenza   Vaccine 0.5 milliLiter(s) IntraMuscular once  pantoprazole Infusion 8 mG/Hr (10 mL/Hr) IV Continuous <Continuous>  potassium chloride  10 mEq/100 mL IVPB 10 milliEquivalent(s) IV Intermittent every 1 hour    MEDICATIONS  (PRN):  morphine  - Injectable 2 milliGRAM(s) IV Push every 4 hours PRN Moderate Pain (4 - 6)  ondansetron Injectable 8 milliGRAM(s) IV Push every 8 hours PRN Nausea and/or Vomiting      Allergies    penicillin (Hives)    Intolerances        REVIEW OF SYSTEMS:  CONSTITUTIONAL: No fever, weight loss, or fatigue  EYES: No eye pain, visual disturbances, or discharge  ENMT:  No difficulty hearing, tinnitus, vertigo; No sinus or throat pain  RESPIRATORY: No cough, wheezing, chills or hemoptysis; No shortness of breath  CARDIOVASCULAR: No chest pain, palpitations, dizziness, or leg swelling  GASTROINTESTINAL: No abdominal or epigastric pain. No nausea, vomiting, or hematemesis; No diarrhea or constipation. No melena or hematochezia.  GENITOURINARY: No dysuria, frequency, hematuria, or incontinence  NEUROLOGICAL: No headaches, memory loss, loss of strength, numbness, or tremors  SKIN: No itching, burning, rashes, or lesions   LYMPH NODES: No enlarged glands  ENDOCRINE: No heat or cold intolerance; No hair loss  MUSCULOSKELETAL: No joint pain or swelling; No muscle, back, or extremity pain  PSYCHIATRIC: No depression, anxiety, mood swings, or difficulty sleeping      Vital Signs Last 24 Hrs  T(C): 36.7 (15 Sep 2018 11:39), Max: 36.8 (15 Sep 2018 00:32)  T(F): 98.1 (15 Sep 2018 11:39), Max: 98.3 (15 Sep 2018 00:32)  HR: 78 (15 Sep 2018 11:39) (64 - 78)  BP: 131/80 (15 Sep 2018 11:39) (100/77 - 131/80)  BP(mean): --  RR: 17 (15 Sep 2018 11:39) (17 - 18)  SpO2: 100% (15 Sep 2018 11:39) (100% - 100%)    PHYSICAL EXAM:  GENERAL: NAD, well-groomed, well-developed  HEAD:  Atraumatic, Normocephalic  EYES: EOMI, PERRLA, conjunctiva and sclera clear  ENMT: No tonsillar erythema, exudates, or enlargement; Moist mucous membranes, Good dentition, No lesions  NECK: Supple, no JVD, thyroid does not appear enlarged.  CHEST/LUNG: Clear to auscultation; No rales, rhonchi, or wheezing.  Respiratory effort does not appear labored.  HEART: Regular rate and rhythm; S1 and S2,  no murmurs, rubs, or gallops.  ABDOMEN: Soft, not tender to palpation.  No masses or HSM appreciated.  No distension.  Bowel sounds present.  EXTREMITIES:  2+ Peripheral Pulses, No clubbing, cyanosis, or edema  LYMPH: No cervical, supraclavicular, axillary, inguinal lymphadenopathy noted.  SKIN: No obvious rashes or lesions.  Turgor okay.  NEURO:  Alert and oriented x 3, no focal sensory or  motor deficit, DTR 2+ bilaterally.    LABS:                        10.6   7.69  )-----------( 256      ( 15 Sep 2018 08:49 )             33.6     -15    141  |  107  |  16  ----------------------------<  62<L>  3.3<L>   |  21<L>  |  0.82    Ca    7.5<L>      15 Sep 2018 08:49    TPro  7.7  /  Alb  4.1  /  TBili  0.9  /  DBili  x   /  AST  27  /  ALT  33  /  AlkPhos  69  09-14      Urinalysis Basic - ( 14 Sep 2018 05:03 )    Color: Yellow / Appearance: Clear / S.015 / pH: x  Gluc: x / Ketone: Large  / Bili: Negative / Urobili: Negative mg/dL   Blood: x / Protein: 15 mg/dL / Nitrite: Negative   Leuk Esterase: Negative / RBC: 0-2 /HPF / WBC x   Sq Epi: x / Non Sq Epi: x / Bacteria: Occasional      CAPILLARY BLOOD GLUCOSE      POCT Blood Glucose.: 79 mg/dL (15 Sep 2018 10:06)  POCT Blood Glucose.: 61 mg/dL (15 Sep 2018 07:32)  POCT Blood Glucose.: 62 mg/dL (15 Sep 2018 07:27)      RADIOLOGY & ADDITIONAL TESTS:    Imaging Personally Reviewed:  [ ] YES  [ ] NO    Consultant(s) Notes Reviewed:  [ ] YES  [ ] NO    Care Discussed with Consultants/Other Providers [ ] YES  [ ] NO Patient is a 22y old  Female who presents with a chief complaint of coffee ground emesis (14 Sep 2018 08:39)      INTERVAL HPI/OVERNIGHT EVENTS:    No further N/V or abdominal pain.  Hungry and wants to eat.  Hemoglobin has been stable.      MEDICATIONS  (STANDING):  dextrose 5% + sodium chloride 0.45% with potassium chloride 20 mEq/L 1000 milliLiter(s) (100 mL/Hr) IV Continuous <Continuous>  influenza   Vaccine 0.5 milliLiter(s) IntraMuscular once  pantoprazole Infusion 8 mG/Hr (10 mL/Hr) IV Continuous <Continuous>  potassium chloride  10 mEq/100 mL IVPB 10 milliEquivalent(s) IV Intermittent every 1 hour    MEDICATIONS  (PRN):  morphine  - Injectable 2 milliGRAM(s) IV Push every 4 hours PRN Moderate Pain (4 - 6)  ondansetron Injectable 8 milliGRAM(s) IV Push every 8 hours PRN Nausea and/or Vomiting      Allergies    penicillin (Hives)    Intolerances        REVIEW OF SYSTEMS:  Reports last bowel movement 1-2 days ago, normal brown.  Denied SOB or chest pain.      Vital Signs Last 24 Hrs  T(C): 36.7 (15 Sep 2018 11:39), Max: 36.8 (15 Sep 2018 00:32)  T(F): 98.1 (15 Sep 2018 11:39), Max: 98.3 (15 Sep 2018 00:32)  HR: 78 (15 Sep 2018 11:39) (64 - 78)  BP: 131/80 (15 Sep 2018 11:39) (100/77 - 131/80)  BP(mean): --  RR: 17 (15 Sep 2018 11:39) (17 - 18)  SpO2: 100% (15 Sep 2018 11:39) (100% - 100%)    PHYSICAL EXAM:  GENERAL: NAD, well-groomed, well-developed  CHEST/LUNG: Clear to auscultation; No rales, rhonchi, or wheezing.  Respiratory effort does not appear labored.  HEART: Regular rate and rhythm; S1 and S2,  no murmurs, rubs, or gallops.  ABDOMEN: Soft, not tender to palpation.  No masses or HSM appreciated.  No distension.  Bowel sounds present.  EXTREMITIES:  No clubbing, cyanosis, or edema  SKIN: No obvious rashes or lesions.  Turgor okay.  NEURO:  Alert and oriented x 3, no focal sensory or  motor deficit, DTR 2+ bilaterally.    LABS:                        10.6   7.69  )-----------( 256      ( 15 Sep 2018 08:49 )             33.6     09-15    141  |  107  |  16  ----------------------------<  62<L>  3.3<L>   |  21<L>  |  0.82    Ca    7.5<L>      15 Sep 2018 08:49    TPro  7.7  /  Alb  4.1  /  TBili  0.9  /  DBili  x   /  AST  27  /  ALT  33  /  AlkPhos  69  -      Urinalysis Basic - ( 14 Sep 2018 05:03 )    Color: Yellow / Appearance: Clear / S.015 / pH: x  Gluc: x / Ketone: Large  / Bili: Negative / Urobili: Negative mg/dL   Blood: x / Protein: 15 mg/dL / Nitrite: Negative   Leuk Esterase: Negative / RBC: 0-2 /HPF / WBC x   Sq Epi: x / Non Sq Epi: x / Bacteria: Occasional      CAPILLARY BLOOD GLUCOSE      POCT Blood Glucose.: 79 mg/dL (15 Sep 2018 10:06)  POCT Blood Glucose.: 61 mg/dL (15 Sep 2018 07:32)  POCT Blood Glucose.: 62 mg/dL (15 Sep 2018 07:27)      RADIOLOGY & ADDITIONAL TESTS:    Imaging Personally Reviewed:  [ ] YES  [ ] NO    Consultant(s) Notes Reviewed:  [ ] YES  [ ] NO    Care Discussed with Consultants/Other Providers [ ] YES  [ ] NO

## 2018-09-16 ENCOUNTER — TRANSCRIPTION ENCOUNTER (OUTPATIENT)
Age: 23
End: 2018-09-16

## 2018-09-16 VITALS
DIASTOLIC BLOOD PRESSURE: 78 MMHG | TEMPERATURE: 98 F | HEART RATE: 77 BPM | SYSTOLIC BLOOD PRESSURE: 130 MMHG | OXYGEN SATURATION: 100 % | RESPIRATION RATE: 16 BRPM

## 2018-09-16 LAB
ANION GAP SERPL CALC-SCNC: 9 MMOL/L — SIGNIFICANT CHANGE UP (ref 5–17)
BUN SERPL-MCNC: 9 MG/DL — SIGNIFICANT CHANGE UP (ref 7–23)
CALCIUM SERPL-MCNC: 7.8 MG/DL — LOW (ref 8.5–10.1)
CHLORIDE SERPL-SCNC: 104 MMOL/L — SIGNIFICANT CHANGE UP (ref 96–108)
CO2 SERPL-SCNC: 28 MMOL/L — SIGNIFICANT CHANGE UP (ref 22–31)
CREAT SERPL-MCNC: 0.94 MG/DL — SIGNIFICANT CHANGE UP (ref 0.5–1.3)
GLUCOSE BLDC GLUCOMTR-MCNC: 105 MG/DL — HIGH (ref 70–99)
GLUCOSE BLDC GLUCOMTR-MCNC: 90 MG/DL — SIGNIFICANT CHANGE UP (ref 70–99)
GLUCOSE SERPL-MCNC: 108 MG/DL — HIGH (ref 70–99)
HCT VFR BLD CALC: 36.3 % — SIGNIFICANT CHANGE UP (ref 34.5–45)
HGB BLD-MCNC: 11.7 G/DL — SIGNIFICANT CHANGE UP (ref 11.5–15.5)
MAGNESIUM SERPL-MCNC: 2.2 MG/DL — SIGNIFICANT CHANGE UP (ref 1.6–2.6)
MCHC RBC-ENTMCNC: 25.4 PG — LOW (ref 27–34)
MCHC RBC-ENTMCNC: 32.2 GM/DL — SIGNIFICANT CHANGE UP (ref 32–36)
MCV RBC AUTO: 78.7 FL — LOW (ref 80–100)
NRBC # BLD: 0 /100 WBCS — SIGNIFICANT CHANGE UP (ref 0–0)
PLATELET # BLD AUTO: 279 K/UL — SIGNIFICANT CHANGE UP (ref 150–400)
POTASSIUM SERPL-MCNC: 3.5 MMOL/L — SIGNIFICANT CHANGE UP (ref 3.5–5.3)
POTASSIUM SERPL-SCNC: 3.5 MMOL/L — SIGNIFICANT CHANGE UP (ref 3.5–5.3)
RBC # BLD: 4.61 M/UL — SIGNIFICANT CHANGE UP (ref 3.8–5.2)
RBC # FLD: 14.6 % — HIGH (ref 10.3–14.5)
SODIUM SERPL-SCNC: 141 MMOL/L — SIGNIFICANT CHANGE UP (ref 135–145)
WBC # BLD: 7.07 K/UL — SIGNIFICANT CHANGE UP (ref 3.8–10.5)
WBC # FLD AUTO: 7.07 K/UL — SIGNIFICANT CHANGE UP (ref 3.8–10.5)

## 2018-09-16 PROCEDURE — 99238 HOSP IP/OBS DSCHRG MGMT 30/<: CPT

## 2018-09-16 RX ADMIN — Medication 1 GRAM(S): at 00:27

## 2018-09-16 RX ADMIN — DEXTROSE MONOHYDRATE, SODIUM CHLORIDE, AND POTASSIUM CHLORIDE 100 MILLILITER(S): 50; .745; 4.5 INJECTION, SOLUTION INTRAVENOUS at 00:27

## 2018-09-16 RX ADMIN — Medication 1 GRAM(S): at 11:03

## 2018-09-16 RX ADMIN — PANTOPRAZOLE SODIUM 40 MILLIGRAM(S): 20 TABLET, DELAYED RELEASE ORAL at 06:03

## 2018-09-16 RX ADMIN — Medication 1 GRAM(S): at 06:03

## 2018-09-16 NOTE — DISCHARGE NOTE ADULT - CARE PROVIDER_API CALL
Nurse, Bambi HERNÁNDEZ), Internal Medicine  [address] [place] [address]  Phone: [phone]  Fax: [phone]    Bautista Ortiz), Medicine  [address] [address]  Phone: [phone]  Fax: [phone]

## 2018-09-16 NOTE — DISCHARGE NOTE ADULT - PATIENT PORTAL LINK FT
You can access the [facility] Patient Portal, offered by [facility], by registering with the following website: [email or web address]

## 2018-09-16 NOTE — DISCHARGE NOTE ADULT - MEDICATION SUMMARY - MEDICATIONS TO TAKE
I will START or STAY ON the medications listed below when I get home from the hospital:    Carafate 1 g oral tablet  -- 1 tab(s) by mouth 4 times a day  -- Indication: For For stomach protection    pantoprazole 40 mg oral delayed release tablet  -- 1 tab(s) by mouth once a day  -- Indication: For Anti-acid for stomach

## 2018-09-16 NOTE — DISCHARGE NOTE ADULT - HOSPITAL COURSE
Patient admitted and started on IVF and Protonix.  GI consult advised to advance diet as tolerated, no planned endoscopy.  Patient had no hematemesis, nausea, vomiting, or abdominal pain in the hospital.  She tolerated regular food without difficulty (Flukle for dinner on 9/15).  Hemoglobin levels stable during hospital stay, was 11.7 at time of discharge (11.3 on admisssion).  Patient counseled and encouraged to stop marijuana use.  Also included patient's mother in discussion.  Patient advised to continue with previous sucralfate and Protonix, with outpatient GI follow-up.    On exam 9/16:  Lungs CTA with normal respiratory effort;  CVS regular rate and rhythm; abdomen soft and not tender/distended, bowel sounds present.  EXT without edema, moves all ext. with strength 5/5.

## 2018-09-16 NOTE — DISCHARGE NOTE ADULT - PLAN OF CARE
Monitor for recurrence. Follow-up with gastroenterologist and primary care within one week. Stop marijuana use. Avoid all marijuana or cannabis products. Control symptoms. Avoid all marijuana or cannabis products.  Follow-up with gastroenterologist and primary care within one week.

## 2018-09-16 NOTE — DISCHARGE NOTE ADULT - CARE PLAN
Principal Discharge DX:	Coffee ground emesis  Goal:	Monitor for recurrence.  Assessment and plan of treatment:	Follow-up with gastroenterologist and primary care within one week.  Secondary Diagnosis:	Marijuana use  Goal:	Stop marijuana use.  Assessment and plan of treatment:	Avoid all marijuana or cannabis products.  Secondary Diagnosis:	Intractable cyclical vomiting with nausea  Goal:	Control symptoms.  Assessment and plan of treatment:	Avoid all marijuana or cannabis products.  Follow-up with gastroenterologist and primary care within one week.

## 2018-09-19 DIAGNOSIS — D62 ACUTE POSTHEMORRHAGIC ANEMIA: ICD-10-CM

## 2018-09-19 DIAGNOSIS — K31.89 OTHER DISEASES OF STOMACH AND DUODENUM: ICD-10-CM

## 2018-09-19 DIAGNOSIS — Z88.0 ALLERGY STATUS TO PENICILLIN: ICD-10-CM

## 2018-09-19 DIAGNOSIS — K52.9 NONINFECTIVE GASTROENTERITIS AND COLITIS, UNSPECIFIED: ICD-10-CM

## 2018-09-19 DIAGNOSIS — K92.0 HEMATEMESIS: ICD-10-CM

## 2018-09-19 DIAGNOSIS — E87.6 HYPOKALEMIA: ICD-10-CM

## 2018-09-19 DIAGNOSIS — F12.20 CANNABIS DEPENDENCE, UNCOMPLICATED: ICD-10-CM

## 2018-09-19 DIAGNOSIS — K21.9 GASTRO-ESOPHAGEAL REFLUX DISEASE WITHOUT ESOPHAGITIS: ICD-10-CM

## 2018-10-14 ENCOUNTER — EMERGENCY (EMERGENCY)
Facility: HOSPITAL | Age: 23
LOS: 0 days | Discharge: HOME | End: 2018-10-14
Attending: EMERGENCY MEDICINE
Payer: COMMERCIAL

## 2018-10-14 VITALS
HEART RATE: 83 BPM | SYSTOLIC BLOOD PRESSURE: 140 MMHG | TEMPERATURE: 99 F | RESPIRATION RATE: 20 BRPM | DIASTOLIC BLOOD PRESSURE: 101 MMHG | HEIGHT: 64 IN | OXYGEN SATURATION: 100 % | WEIGHT: 134.92 LBS

## 2018-10-14 VITALS
SYSTOLIC BLOOD PRESSURE: 132 MMHG | HEART RATE: 79 BPM | TEMPERATURE: 99 F | RESPIRATION RATE: 18 BRPM | OXYGEN SATURATION: 100 % | DIASTOLIC BLOOD PRESSURE: 91 MMHG

## 2018-10-14 DIAGNOSIS — Z88.0 ALLERGY STATUS TO PENICILLIN: ICD-10-CM

## 2018-10-14 DIAGNOSIS — R11.10 VOMITING, UNSPECIFIED: ICD-10-CM

## 2018-10-14 DIAGNOSIS — K29.70 GASTRITIS, UNSPECIFIED, WITHOUT BLEEDING: ICD-10-CM

## 2018-10-14 DIAGNOSIS — K21.9 GASTRO-ESOPHAGEAL REFLUX DISEASE WITHOUT ESOPHAGITIS: ICD-10-CM

## 2018-10-14 DIAGNOSIS — R10.9 UNSPECIFIED ABDOMINAL PAIN: ICD-10-CM

## 2018-10-14 LAB
ALBUMIN SERPL ELPH-MCNC: 4.6 G/DL — SIGNIFICANT CHANGE UP (ref 3.3–5)
ALP SERPL-CCNC: 74 U/L — SIGNIFICANT CHANGE UP (ref 40–120)
ALT FLD-CCNC: 38 U/L — SIGNIFICANT CHANGE UP (ref 12–78)
ANION GAP SERPL CALC-SCNC: 16 MMOL/L — SIGNIFICANT CHANGE UP (ref 5–17)
APPEARANCE UR: CLEAR — SIGNIFICANT CHANGE UP
APTT BLD: 26.3 SEC — LOW (ref 27.5–37.4)
AST SERPL-CCNC: 38 U/L — HIGH (ref 15–37)
BILIRUB SERPL-MCNC: 1 MG/DL — SIGNIFICANT CHANGE UP (ref 0.2–1.2)
BILIRUB UR-MCNC: NEGATIVE — SIGNIFICANT CHANGE UP
BUN SERPL-MCNC: 19 MG/DL — SIGNIFICANT CHANGE UP (ref 7–23)
CALCIUM SERPL-MCNC: 8.9 MG/DL — SIGNIFICANT CHANGE UP (ref 8.5–10.1)
CHLORIDE SERPL-SCNC: 103 MMOL/L — SIGNIFICANT CHANGE UP (ref 96–108)
CO2 SERPL-SCNC: 23 MMOL/L — SIGNIFICANT CHANGE UP (ref 22–31)
COLOR SPEC: YELLOW — SIGNIFICANT CHANGE UP
CREAT SERPL-MCNC: 1.05 MG/DL — SIGNIFICANT CHANGE UP (ref 0.5–1.3)
DIFF PNL FLD: ABNORMAL
GLUCOSE SERPL-MCNC: 102 MG/DL — HIGH (ref 70–99)
GLUCOSE UR QL: NEGATIVE MG/DL — SIGNIFICANT CHANGE UP
HCG SERPL-ACNC: <1 MIU/ML — SIGNIFICANT CHANGE UP
HCT VFR BLD CALC: 35.4 % — SIGNIFICANT CHANGE UP (ref 34.5–45)
HGB BLD-MCNC: 11.7 G/DL — SIGNIFICANT CHANGE UP (ref 11.5–15.5)
INR BLD: 1.22 RATIO — HIGH (ref 0.88–1.16)
KETONES UR-MCNC: ABNORMAL
LEUKOCYTE ESTERASE UR-ACNC: NEGATIVE — SIGNIFICANT CHANGE UP
MCHC RBC-ENTMCNC: 25.8 PG — LOW (ref 27–34)
MCHC RBC-ENTMCNC: 33.1 GM/DL — SIGNIFICANT CHANGE UP (ref 32–36)
MCV RBC AUTO: 78.1 FL — LOW (ref 80–100)
NITRITE UR-MCNC: NEGATIVE — SIGNIFICANT CHANGE UP
NRBC # BLD: 0 /100 WBCS — SIGNIFICANT CHANGE UP (ref 0–0)
PH UR: 9 — HIGH (ref 5–8)
PLATELET # BLD AUTO: 309 K/UL — SIGNIFICANT CHANGE UP (ref 150–400)
POTASSIUM SERPL-MCNC: 3.1 MMOL/L — LOW (ref 3.5–5.3)
POTASSIUM SERPL-SCNC: 3.1 MMOL/L — LOW (ref 3.5–5.3)
PROT SERPL-MCNC: 8.8 GM/DL — HIGH (ref 6–8.3)
PROT UR-MCNC: 30 MG/DL
PROTHROM AB SERPL-ACNC: 13.3 SEC — HIGH (ref 9.8–12.7)
RBC # BLD: 4.53 M/UL — SIGNIFICANT CHANGE UP (ref 3.8–5.2)
RBC # FLD: 16.6 % — HIGH (ref 10.3–14.5)
SODIUM SERPL-SCNC: 142 MMOL/L — SIGNIFICANT CHANGE UP (ref 135–145)
SP GR SPEC: 1.01 — SIGNIFICANT CHANGE UP (ref 1.01–1.02)
UROBILINOGEN FLD QL: NEGATIVE MG/DL — SIGNIFICANT CHANGE UP
WBC # BLD: 9.93 K/UL — SIGNIFICANT CHANGE UP (ref 3.8–10.5)
WBC # FLD AUTO: 9.93 K/UL — SIGNIFICANT CHANGE UP (ref 3.8–10.5)

## 2018-10-14 PROCEDURE — 74177 CT ABD & PELVIS W/CONTRAST: CPT | Mod: 26

## 2018-10-14 PROCEDURE — 99284 EMERGENCY DEPT VISIT MOD MDM: CPT | Mod: 25

## 2018-10-14 PROCEDURE — 76856 US EXAM PELVIC COMPLETE: CPT | Mod: 26

## 2018-10-14 RX ORDER — FAMOTIDINE 10 MG/ML
20 INJECTION INTRAVENOUS ONCE
Qty: 0 | Refills: 0 | Status: COMPLETED | OUTPATIENT
Start: 2018-10-14 | End: 2018-10-14

## 2018-10-14 RX ORDER — ACETAMINOPHEN 500 MG
975 TABLET ORAL ONCE
Qty: 0 | Refills: 0 | Status: DISCONTINUED | OUTPATIENT
Start: 2018-10-14 | End: 2018-10-14

## 2018-10-14 RX ORDER — SODIUM CHLORIDE 9 MG/ML
1000 INJECTION INTRAMUSCULAR; INTRAVENOUS; SUBCUTANEOUS ONCE
Qty: 0 | Refills: 0 | Status: COMPLETED | OUTPATIENT
Start: 2018-10-14 | End: 2018-10-14

## 2018-10-14 RX ORDER — DIPHENHYDRAMINE HCL 50 MG
25 CAPSULE ORAL ONCE
Qty: 0 | Refills: 0 | Status: COMPLETED | OUTPATIENT
Start: 2018-10-14 | End: 2018-10-14

## 2018-10-14 RX ORDER — ONDANSETRON 8 MG/1
8 TABLET, FILM COATED ORAL ONCE
Qty: 0 | Refills: 0 | Status: COMPLETED | OUTPATIENT
Start: 2018-10-14 | End: 2018-10-14

## 2018-10-14 RX ORDER — METOCLOPRAMIDE HCL 10 MG
10 TABLET ORAL ONCE
Qty: 0 | Refills: 0 | Status: COMPLETED | OUTPATIENT
Start: 2018-10-14 | End: 2018-10-14

## 2018-10-14 RX ADMIN — Medication 25 MILLIGRAM(S): at 08:25

## 2018-10-14 RX ADMIN — ONDANSETRON 8 MILLIGRAM(S): 8 TABLET, FILM COATED ORAL at 10:41

## 2018-10-14 RX ADMIN — FAMOTIDINE 20 MILLIGRAM(S): 10 INJECTION INTRAVENOUS at 08:27

## 2018-10-14 RX ADMIN — Medication 25 MILLIGRAM(S): at 10:43

## 2018-10-14 RX ADMIN — SODIUM CHLORIDE 1000 MILLILITER(S): 9 INJECTION INTRAMUSCULAR; INTRAVENOUS; SUBCUTANEOUS at 08:17

## 2018-10-14 NOTE — ED ADULT NURSE NOTE - OBJECTIVE STATEMENT
pain to the abdomen accompanied with nausea and vomiting starte her menstrual 2 days ago pain to the abdomen accompanied with nausea and vomiting started her menstrual 2 days ago

## 2018-10-14 NOTE — ED PROVIDER NOTE - OBJECTIVE STATEMENT
22 yo F with n/v/d for 2 days.  Also diffuse abd pain, feels like period cramps.  Pt. gets this every month when she gets her period.  She states she usually gets morphine for the pain, then she gets back to normal.  No other complaints, no trauma, no inciting event.  She feels well otherwise.  Pt. is having normal vaginal bleeding, no vaginal discharge.  ROS: negative for fever, cough, headache, chest pain, shortness of breath, rash, paresthesia, and weakness--all other systems reviewed are negative.  PMH: cyclical vomiting, gerd; Meds: Denies; SH: Denies smoking/drinking/drug use 22 yo F with n/v/d for 2 days.  Also diffuse abd pain, feels like period cramps.  Pt. gets this every month when she gets her period.  She states she usually gets morphine for the pain, then she gets back to normal.  No other complaints, no trauma, no inciting event.  She feels well otherwise.  Pt. is having normal vaginal bleeding, no vaginal discharge.  ROS: negative for fever, cough, headache, chest pain, shortness of breath, rash, paresthesia, and weakness--all other systems reviewed are negative.  PMH: cyclical vomiting, GERD, Sheree Arora tear; Meds: Denies; SH: Denies smoking/drinking,  + intermittent marijuana use

## 2018-10-14 NOTE — ED PROVIDER NOTE - MEDICAL DECISION MAKING DETAILS
24 yo F with diffuse abd pain, n/v, likely cyclic vomiting vs gastritis vs period cramps  -basic labs, hcg, coags, ua, cx

## 2018-10-14 NOTE — ED PROVIDER NOTE - PROGRESS NOTE DETAILS
Results reported to patient--grossly benign, labs benign, US shows small ovarian cyst, CT shows possible colitis, likely underdistention, given entire clinical picture  Pt. reports feeling better after meds  pt. agrees to f/u with primary care outpt. will refer to GI  pt. understands to return to ED if symptoms worsen; will d/c with zofran/benadryl

## 2018-10-14 NOTE — ED PROVIDER NOTE - PHYSICAL EXAMINATION
Vitals: WNL  Gen: AAOx3, NAD, sitting comfortably in stretcher  Head: ncat, perrla, eomi b/l  Neck: supple, no lymphadenopathy, no midline deviation  Heart: rrr, no m/r/g  Lungs: CTA b/l, no rales/ronchi/wheezes  Abd: soft, mild general tenderness, non-distended, no rebound or guarding  Ext: no clubbing/cyanosis/edema  Neuro: sensation and muscle strength intact b/l, steady gait

## 2018-10-14 NOTE — ED ADULT NURSE REASSESSMENT NOTE - NS ED NURSE REASSESS COMMENT FT1
Attempted to medicate pt as ordered (Reglan )pt c/o nausea pt refused meds at this time requesting for morphine and benadryl , ED attending and primary RN Phong made aware

## 2018-10-15 LAB
CULTURE RESULTS: SIGNIFICANT CHANGE UP
SPECIMEN SOURCE: SIGNIFICANT CHANGE UP

## 2018-12-20 ENCOUNTER — INPATIENT (INPATIENT)
Facility: HOSPITAL | Age: 23
LOS: 2 days | Discharge: HOME | End: 2018-12-23
Attending: INTERNAL MEDICINE | Admitting: INTERNAL MEDICINE
Payer: COMMERCIAL

## 2018-12-20 VITALS
OXYGEN SATURATION: 100 % | DIASTOLIC BLOOD PRESSURE: 96 MMHG | WEIGHT: 139.99 LBS | SYSTOLIC BLOOD PRESSURE: 149 MMHG | HEART RATE: 82 BPM | RESPIRATION RATE: 20 BRPM | HEIGHT: 62 IN | TEMPERATURE: 99 F

## 2018-12-20 DIAGNOSIS — N83.209 UNSPECIFIED OVARIAN CYST, UNSPECIFIED SIDE: ICD-10-CM

## 2018-12-20 DIAGNOSIS — K21.9 GASTRO-ESOPHAGEAL REFLUX DISEASE WITHOUT ESOPHAGITIS: ICD-10-CM

## 2018-12-20 DIAGNOSIS — G43.A0 CYCLICAL VOMITING, IN MIGRAINE, NOT INTRACTABLE: ICD-10-CM

## 2018-12-20 DIAGNOSIS — E86.0 DEHYDRATION: ICD-10-CM

## 2018-12-20 LAB
ALBUMIN SERPL ELPH-MCNC: 4.1 G/DL — SIGNIFICANT CHANGE UP (ref 3.3–5)
ALP SERPL-CCNC: 51 U/L — SIGNIFICANT CHANGE UP (ref 40–120)
ALT FLD-CCNC: 39 U/L — SIGNIFICANT CHANGE UP (ref 12–78)
AMYLASE P1 CFR SERPL: 570 U/L — HIGH (ref 25–115)
ANION GAP SERPL CALC-SCNC: 16 MMOL/L — SIGNIFICANT CHANGE UP (ref 5–17)
APPEARANCE UR: ABNORMAL
APTT BLD: 24.3 SEC — LOW (ref 28.5–37)
AST SERPL-CCNC: 29 U/L — SIGNIFICANT CHANGE UP (ref 15–37)
BACTERIA # UR AUTO: ABNORMAL
BASOPHILS # BLD AUTO: 0.01 K/UL — SIGNIFICANT CHANGE UP (ref 0–0.2)
BASOPHILS NFR BLD AUTO: 0.1 % — SIGNIFICANT CHANGE UP (ref 0–2)
BILIRUB SERPL-MCNC: 1 MG/DL — SIGNIFICANT CHANGE UP (ref 0.2–1.2)
BILIRUB UR-MCNC: NEGATIVE — SIGNIFICANT CHANGE UP
BUN SERPL-MCNC: 15 MG/DL — SIGNIFICANT CHANGE UP (ref 7–23)
CALCIUM SERPL-MCNC: 9.1 MG/DL — SIGNIFICANT CHANGE UP (ref 8.5–10.1)
CHLORIDE SERPL-SCNC: 108 MMOL/L — SIGNIFICANT CHANGE UP (ref 96–108)
CO2 SERPL-SCNC: 17 MMOL/L — LOW (ref 22–31)
COLOR SPEC: YELLOW — SIGNIFICANT CHANGE UP
COMMENT - URINE: SIGNIFICANT CHANGE UP
CREAT SERPL-MCNC: 0.98 MG/DL — SIGNIFICANT CHANGE UP (ref 0.5–1.3)
DIFF PNL FLD: ABNORMAL
EOSINOPHIL # BLD AUTO: 0 K/UL — SIGNIFICANT CHANGE UP (ref 0–0.5)
EOSINOPHIL NFR BLD AUTO: 0 % — SIGNIFICANT CHANGE UP (ref 0–6)
EPI CELLS # UR: ABNORMAL
GLUCOSE SERPL-MCNC: 155 MG/DL — HIGH (ref 70–99)
GLUCOSE UR QL: NEGATIVE MG/DL — SIGNIFICANT CHANGE UP
HCG SERPL-ACNC: <1 MIU/ML — SIGNIFICANT CHANGE UP
HCT VFR BLD CALC: 35.2 % — SIGNIFICANT CHANGE UP (ref 34.5–45)
HGB BLD-MCNC: 11.4 G/DL — LOW (ref 11.5–15.5)
HYALINE CASTS # UR AUTO: ABNORMAL /LPF
IMM GRANULOCYTES NFR BLD AUTO: 0.3 % — SIGNIFICANT CHANGE UP (ref 0–1.5)
INR BLD: 1.23 RATIO — HIGH (ref 0.88–1.16)
KETONES UR-MCNC: ABNORMAL
LEUKOCYTE ESTERASE UR-ACNC: ABNORMAL
LIDOCAIN IGE QN: 75 U/L — SIGNIFICANT CHANGE UP (ref 73–393)
LYMPHOCYTES # BLD AUTO: 1.11 K/UL — SIGNIFICANT CHANGE UP (ref 1–3.3)
LYMPHOCYTES # BLD AUTO: 11.3 % — LOW (ref 13–44)
MCHC RBC-ENTMCNC: 24.9 PG — LOW (ref 27–34)
MCHC RBC-ENTMCNC: 32.4 GM/DL — SIGNIFICANT CHANGE UP (ref 32–36)
MCV RBC AUTO: 76.9 FL — LOW (ref 80–100)
MONOCYTES # BLD AUTO: 0.43 K/UL — SIGNIFICANT CHANGE UP (ref 0–0.9)
MONOCYTES NFR BLD AUTO: 4.4 % — SIGNIFICANT CHANGE UP (ref 2–14)
NEUTROPHILS # BLD AUTO: 8.21 K/UL — HIGH (ref 1.8–7.4)
NEUTROPHILS NFR BLD AUTO: 83.9 % — HIGH (ref 43–77)
NITRITE UR-MCNC: NEGATIVE — SIGNIFICANT CHANGE UP
NRBC # BLD: 0 /100 WBCS — SIGNIFICANT CHANGE UP (ref 0–0)
PH UR: 6.5 — SIGNIFICANT CHANGE UP (ref 5–8)
PLATELET # BLD AUTO: 313 K/UL — SIGNIFICANT CHANGE UP (ref 150–400)
POTASSIUM SERPL-MCNC: 3.9 MMOL/L — SIGNIFICANT CHANGE UP (ref 3.5–5.3)
POTASSIUM SERPL-SCNC: 3.9 MMOL/L — SIGNIFICANT CHANGE UP (ref 3.5–5.3)
PROT SERPL-MCNC: 8.2 GM/DL — SIGNIFICANT CHANGE UP (ref 6–8.3)
PROT UR-MCNC: 30 MG/DL
PROTHROM AB SERPL-ACNC: 13.9 SEC — HIGH (ref 10–12.9)
RBC # BLD: 4.58 M/UL — SIGNIFICANT CHANGE UP (ref 3.8–5.2)
RBC # FLD: 17 % — HIGH (ref 10.3–14.5)
RBC CASTS # UR COMP ASSIST: ABNORMAL /HPF (ref 0–4)
SODIUM SERPL-SCNC: 141 MMOL/L — SIGNIFICANT CHANGE UP (ref 135–145)
SP GR SPEC: 1.02 — SIGNIFICANT CHANGE UP (ref 1.01–1.02)
UROBILINOGEN FLD QL: 1 MG/DL
WBC # BLD: 9.79 K/UL — SIGNIFICANT CHANGE UP (ref 3.8–10.5)
WBC # FLD AUTO: 9.79 K/UL — SIGNIFICANT CHANGE UP (ref 3.8–10.5)
WBC UR QL: SIGNIFICANT CHANGE UP

## 2018-12-20 PROCEDURE — 99223 1ST HOSP IP/OBS HIGH 75: CPT

## 2018-12-20 PROCEDURE — 99285 EMERGENCY DEPT VISIT HI MDM: CPT

## 2018-12-20 RX ORDER — MORPHINE SULFATE 50 MG/1
2 CAPSULE, EXTENDED RELEASE ORAL EVERY 6 HOURS
Qty: 0 | Refills: 0 | Status: DISCONTINUED | OUTPATIENT
Start: 2018-12-20 | End: 2018-12-22

## 2018-12-20 RX ORDER — SODIUM CHLORIDE 9 MG/ML
1000 INJECTION INTRAMUSCULAR; INTRAVENOUS; SUBCUTANEOUS ONCE
Qty: 0 | Refills: 0 | Status: COMPLETED | OUTPATIENT
Start: 2018-12-20 | End: 2018-12-20

## 2018-12-20 RX ORDER — KETOROLAC TROMETHAMINE 30 MG/ML
30 SYRINGE (ML) INJECTION EVERY 6 HOURS
Qty: 0 | Refills: 0 | Status: DISCONTINUED | OUTPATIENT
Start: 2018-12-20 | End: 2018-12-23

## 2018-12-20 RX ORDER — METOCLOPRAMIDE HCL 10 MG
10 TABLET ORAL ONCE
Qty: 0 | Refills: 0 | Status: COMPLETED | OUTPATIENT
Start: 2018-12-20 | End: 2018-12-20

## 2018-12-20 RX ORDER — ONDANSETRON 8 MG/1
4 TABLET, FILM COATED ORAL EVERY 6 HOURS
Qty: 0 | Refills: 0 | Status: DISCONTINUED | OUTPATIENT
Start: 2018-12-20 | End: 2018-12-23

## 2018-12-20 RX ORDER — DIPHENHYDRAMINE HCL 50 MG
50 CAPSULE ORAL ONCE
Qty: 0 | Refills: 0 | Status: COMPLETED | OUTPATIENT
Start: 2018-12-20 | End: 2018-12-20

## 2018-12-20 RX ORDER — INFLUENZA VIRUS VACCINE 15; 15; 15; 15 UG/.5ML; UG/.5ML; UG/.5ML; UG/.5ML
0.5 SUSPENSION INTRAMUSCULAR ONCE
Qty: 0 | Refills: 0 | Status: DISCONTINUED | OUTPATIENT
Start: 2018-12-20 | End: 2018-12-23

## 2018-12-20 RX ORDER — DIPHENHYDRAMINE HCL 50 MG
25 CAPSULE ORAL ONCE
Qty: 0 | Refills: 0 | Status: COMPLETED | OUTPATIENT
Start: 2018-12-20 | End: 2018-12-20

## 2018-12-20 RX ORDER — PANTOPRAZOLE SODIUM 20 MG/1
40 TABLET, DELAYED RELEASE ORAL ONCE
Qty: 0 | Refills: 0 | Status: COMPLETED | OUTPATIENT
Start: 2018-12-20 | End: 2018-12-20

## 2018-12-20 RX ORDER — MORPHINE SULFATE 50 MG/1
2 CAPSULE, EXTENDED RELEASE ORAL EVERY 4 HOURS
Qty: 0 | Refills: 0 | Status: DISCONTINUED | OUTPATIENT
Start: 2018-12-20 | End: 2018-12-20

## 2018-12-20 RX ORDER — PANTOPRAZOLE SODIUM 20 MG/1
40 TABLET, DELAYED RELEASE ORAL DAILY
Qty: 0 | Refills: 0 | Status: DISCONTINUED | OUTPATIENT
Start: 2018-12-20 | End: 2018-12-21

## 2018-12-20 RX ORDER — SODIUM CHLORIDE 9 MG/ML
1000 INJECTION INTRAMUSCULAR; INTRAVENOUS; SUBCUTANEOUS
Qty: 0 | Refills: 0 | Status: DISCONTINUED | OUTPATIENT
Start: 2018-12-20 | End: 2018-12-23

## 2018-12-20 RX ORDER — HYDROMORPHONE HYDROCHLORIDE 2 MG/ML
1 INJECTION INTRAMUSCULAR; INTRAVENOUS; SUBCUTANEOUS ONCE
Qty: 0 | Refills: 0 | Status: DISCONTINUED | OUTPATIENT
Start: 2018-12-20 | End: 2018-12-20

## 2018-12-20 RX ADMIN — HYDROMORPHONE HYDROCHLORIDE 1 MILLIGRAM(S): 2 INJECTION INTRAMUSCULAR; INTRAVENOUS; SUBCUTANEOUS at 13:13

## 2018-12-20 RX ADMIN — ONDANSETRON 4 MILLIGRAM(S): 8 TABLET, FILM COATED ORAL at 20:02

## 2018-12-20 RX ADMIN — MORPHINE SULFATE 2 MILLIGRAM(S): 50 CAPSULE, EXTENDED RELEASE ORAL at 21:15

## 2018-12-20 RX ADMIN — SODIUM CHLORIDE 1000 MILLILITER(S): 9 INJECTION INTRAMUSCULAR; INTRAVENOUS; SUBCUTANEOUS at 13:14

## 2018-12-20 RX ADMIN — HYDROMORPHONE HYDROCHLORIDE 1 MILLIGRAM(S): 2 INJECTION INTRAMUSCULAR; INTRAVENOUS; SUBCUTANEOUS at 11:12

## 2018-12-20 RX ADMIN — MORPHINE SULFATE 2 MILLIGRAM(S): 50 CAPSULE, EXTENDED RELEASE ORAL at 21:45

## 2018-12-20 RX ADMIN — SODIUM CHLORIDE 1000 MILLILITER(S): 9 INJECTION INTRAMUSCULAR; INTRAVENOUS; SUBCUTANEOUS at 11:08

## 2018-12-20 RX ADMIN — Medication 10 MILLIGRAM(S): at 09:25

## 2018-12-20 RX ADMIN — Medication 25 MILLIGRAM(S): at 22:43

## 2018-12-20 RX ADMIN — SODIUM CHLORIDE 1000 MILLILITER(S): 9 INJECTION INTRAMUSCULAR; INTRAVENOUS; SUBCUTANEOUS at 10:30

## 2018-12-20 RX ADMIN — Medication 50 MILLIGRAM(S): at 11:10

## 2018-12-20 RX ADMIN — Medication 30 MILLIGRAM(S): at 19:57

## 2018-12-20 RX ADMIN — SODIUM CHLORIDE 125 MILLILITER(S): 9 INJECTION INTRAMUSCULAR; INTRAVENOUS; SUBCUTANEOUS at 17:56

## 2018-12-20 RX ADMIN — Medication 30 MILLIGRAM(S): at 20:27

## 2018-12-20 RX ADMIN — PANTOPRAZOLE SODIUM 40 MILLIGRAM(S): 20 TABLET, DELAYED RELEASE ORAL at 09:25

## 2018-12-20 RX ADMIN — SODIUM CHLORIDE 1000 MILLILITER(S): 9 INJECTION INTRAMUSCULAR; INTRAVENOUS; SUBCUTANEOUS at 09:25

## 2018-12-20 NOTE — ED PROVIDER NOTE - PROGRESS NOTE DETAILS
Pt tolerated Gingerall soda at bed side. Pt vomited Gingerall soda at bed side. Dr. Miguel is notified and admit pt.

## 2018-12-20 NOTE — CONSULT NOTE ADULT - ASSESSMENT
HPI:  23 y/o female patient with past medical history of ovarian cyst and cyclic vomiting she states since she was 4 years old. She had onset of intermittent vomiting onset two weeks ago, however she states it  became non-stop 2 days ago, where she was unable to hold anything down and became dehydrated, and weak,  at this point she came to the ED. She states she has mild tenderness in right side of abdomen. She smokes one marijuana cigarette every 2 weeks, denies any other drug use. She did not take anything for pain. Denies fever, chills, states had one bout of diarrhea, denies HA,  or any recent travel. (20 Dec 2018 16:07)  ------------------- As Above --------------------------------------  The patient states that she has been having diffuse abdominal pain N/V over the past two weeks. It had originally been off and on but has gradually worsened in frequency and strength. She denies illicit drugs. She is not on any GI medications and never went to the GI clinic. She denies NSAIDs.   Patient did not get a CT Scan this admission.  History of cyclic vomiting syndrome.  Patient states that she has had 3 EGDs in the recent past.  The patient denies melena, hematochezia, hematemesis,  constipation, diarrhea, or change in bowel movements     N/V , abdominal pain  -  1) NPO IV fluid  2) CT scan - patient refusing 3) Analgesics 4) check electrolytes

## 2018-12-20 NOTE — H&P ADULT - NSHPLABSRESULTS_GEN_ALL_CORE
11.4   9.79  )-----------( 313      ( 20 Dec 2018 09:33 )             35.2   12-20    141  |  108  |  15  ----------------------------<  155<H>  3.9   |  17<L>  |  0.98    Ca    9.1      20 Dec 2018 09:33    TPro  8.2  /  Alb  4.1  /  TBili  1.0  /  DBili  x   /  AST  29  /  ALT  39  /  AlkPhos  51  12-20    Amylase, Serum Total: 570 U/L (12.20.18 @ 09:33)  Lipase, Serum: 75 U/L (12.20.18 @ 09:33)

## 2018-12-20 NOTE — H&P ADULT - PMH
Cyclic vomiting syndrome    Cyst of ovary, unspecified laterality    GERD (gastroesophageal reflux disease)

## 2018-12-20 NOTE — ED PROVIDER NOTE - ABDOMINAL TENDER
epigastric/left lower quadrant/periumbilical/left upper quadrant/right lower quadrant/left costovertebral angle/right costovertebral angle/right upper quadrant/suprapubic

## 2018-12-20 NOTE — H&P ADULT - HISTORY OF PRESENT ILLNESS
21 y/o female patient with past medical history of ovarian cyst and cyclic vomiting she states since she was 4 years old. She had onset of intermittent vomiting onset two weeks ago, however she states it  became non-stop 2 days ago, where she was unable to hold anything down and became dehydrated, and weak,  at this point she came to the ED. She states she has mild tenderness in right side of abdomen. She smokes one marijuana cigarette every 2 weeks, denies any other drug use. She did not take anything for pain. Denies fever, chills, states had one bout of diarrhea, denies HA,  or any recent travel.

## 2018-12-20 NOTE — CONSULT NOTE ADULT - SUBJECTIVE AND OBJECTIVE BOX
HPI:  23 y/o female patient with past medical history of ovarian cyst and cyclic vomiting she states since she was 4 years old. She had onset of intermittent vomiting onset two weeks ago, however she states it  became non-stop 2 days ago, where she was unable to hold anything down and became dehydrated, and weak,  at this point she came to the ED. She states she has mild tenderness in right side of abdomen. She smokes one marijuana cigarette every 2 weeks, denies any other drug use. She did not take anything for pain. Denies fever, chills, states had one bout of diarrhea, denies HA,  or any recent travel. (20 Dec 2018 16:07)  ------------------- As Above --------------------------------------  The patient states that she has been having diffuse abdominal pain N/V over the past two weeks. It had originally been off and on but has gradually worsened in frequency and strength. She denies illicit drugs. She is not on any GI medications and never went to the GI clinic. She denies NSAIDs.   Patient did not get a CT Scan this admission.  History of cyclic vomiting syndrome.  Patient states that she has had 3 EGDs in the recent past.  The patient denies melena, hematochezia, hematemesis,  constipation, diarrhea, or change in bowel movements       PAST MEDICAL & SURGICAL HISTORY:  Cyst of ovary, unspecified laterality  Cyclic vomiting syndrome  GERD (gastroesophageal reflux disease)  No significant past surgical history      MEDICATIONS  (STANDING):  influenza   Vaccine 0.5 milliLiter(s) IntraMuscular once  pantoprazole  Injectable 40 milliGRAM(s) IV Push daily  sodium chloride 0.9%. 1000 milliLiter(s) (125 mL/Hr) IV Continuous <Continuous>    MEDICATIONS  (PRN):  ketorolac   Injectable 30 milliGRAM(s) IV Push every 6 hours PRN Mild Pain (1 - 3)  ondansetron Injectable 4 milliGRAM(s) IV Push every 6 hours PRN Nausea and/or Vomiting      Allergies    penicillin (Hives)    Intolerances        FAMILY HISTORY:  No pertinent family history in first degree relatives      REVIEW OF SYSTEMS:    CONSTITUTIONAL: No fever, weight loss, or fatigue  EYES: No eye pain, visual disturbances, or discharge  ENMT:  No difficulty hearing, tinnitus, vertigo; No sinus or throat pain  NECK: No pain or stiffness  BREASTS: No pain, masses, or nipple discharge  RESPIRATORY: No cough, wheezing, chills or hemoptysis; No shortness of breath  CARDIOVASCULAR: No chest pain, palpitations, dizziness, or leg swelling  GASTROINTESTINAL: See above.  GENITOURINARY: No dysuria, frequency, hematuria, or incontinence  NEUROLOGICAL: No headaches, memory loss, loss of strength, numbness, or tremors  SKIN: No itching, burning, rashes, or lesions   LYMPH NODES: No enlarged glands  ENDOCRINE: No heat or cold intolerance; No hair loss  MUSCULOSKELETAL: No joint pain or swelling; No muscle, back, or extremity pain  PSYCHIATRIC: No depression, anxiety, mood swings, or difficulty sleeping  HEME/LYMPH: No easy bruising, or bleeding gums  ALLERGY AND IMMUNOLOGIC: No hives or eczema          SOCIAL HISTORY:    FAMILY HISTORY:  No pertinent family history in first degree relatives      Vital Signs Last 24 Hrs  T(C): 37 (20 Dec 2018 18:58), Max: 37.2 (20 Dec 2018 08:47)  T(F): 98.6 (20 Dec 2018 18:58), Max: 99 (20 Dec 2018 08:47)  HR: 66 (20 Dec 2018 18:58) (64 - 83)  BP: 138/58 (20 Dec 2018 18:58) (108/71 - 149/96)  BP(mean): --  RR: 18 (20 Dec 2018 18:58) (17 - 20)  SpO2: 100% (20 Dec 2018 18:58) (97% - 100%)    PHYSICAL EXAM:    GENERAL: NAD, well-groomed, well-developed  HEAD:  Atraumatic, Normocephalic  EYES: EOMI, PERRLA, conjunctiva and sclera clear  NECK: Supple, No JVD, Normal thyroid  NERVOUS SYSTEM:  Alert & Oriented X3, Good concentration;   CHEST/LUNG: Clear to percussion bilaterally; No rales, rhonchi, wheezing, or rubs  HEART: Regular rate and rhythm; No murmurs, rubs, or gallops  ABDOMEN: Soft, Nontender, Nondistended; Bowel sounds present  EXTREMITIES:  2+ Peripheral Pulses, No clubbing, cyanosis, or edema  RECTAL: Deferred      LABS:                        11.4   9.79  )-----------( 313      ( 20 Dec 2018 09:33 )             35.2       CBC:  20 @ 09:33  WBC  9.79  HGB 11.4  HCT 35.2 Plate 313  MCV 76.9           20 Dec 2018 09:33    141    |  108    |  15     ----------------------------<  155    3.9     |  17     |  0.98     Ca    9.1        20 Dec 2018 09:33    TPro  8.2    /  Alb  4.1    /  TBili  1.0    /  DBili  x      /  AST  29     /  ALT  39     /  AlkPhos  51     20 Dec 2018 09:33    PT/INR - ( 20 Dec 2018 09:33 )   PT: 13.9 sec;   INR: 1.23 ratio         PTT - ( 20 Dec 2018 09:33 )  PTT:24.3 sec  Urinalysis Basic - ( 20 Dec 2018 10:53 )    Color: Yellow / Appearance: very cloudy / S.020 / pH: x  Gluc: x / Ketone: Large  / Bili: Negative / Urobili: 1 mg/dL   Blood: x / Protein: 30 mg/dL / Nitrite: Negative   Leuk Esterase: Trace / RBC: 6-10 /HPF / WBC 3-5   Sq Epi: x / Non Sq Epi: Moderate / Bacteria: Moderate          RADIOLOGY & ADDITIONAL STUDIES:

## 2018-12-20 NOTE — H&P ADULT - NSHPREVIEWOFSYSTEMS_GEN_ALL_CORE
CONSTITUTIONAL: No fever, weight loss, or fatigue  EYES: No eye pain, visual disturbances, or discharge  ENMT:  No difficulty hearing, tinnitus, vertigo; No sinus or throat pain  NECK: No pain or stiffness  RESPIRATORY: No cough, wheezing, chills or hemoptysis; No shortness of breath  CARDIOVASCULAR: No chest pain, palpitations, dizziness, or leg swelling  GASTROINTESTINAL:  abdominal tenderness no epigastric pain.  nausea and intermittent vomiting, no hematemesis; one bout of diarrhea, no constipation. No melena or hematochezia.  GENITOURINARY: No dysuria, frequency, hematuria, or incontinence  NEUROLOGICAL: No headaches, memory loss, loss of strength, numbness, or tremors  SKIN: No itching, burning, rashes, or lesions   LYMPH NODES: No enlarged glands  ENDOCRINE: No heat or cold intolerance; No hair loss  MUSCULOSKELETAL: No joint pain or swelling; No muscle, back, or extremity pain  PSYCHIATRIC: No depression, anxiety, mood swings, or difficulty sleeping  HEME/LYMPH: No easy bruising, or bleeding gums  ALLERGY AND IMMUNOLOGIC: No hives or eczema

## 2018-12-20 NOTE — ED PROVIDER NOTE - CONSTITUTIONAL, MLM
normal... Well appearing, well nourished, awake, alert, oriented to person, place, time/situation and in no apparent distress. Speaking in clear full sentences + green yellowish vomitus no nasal flaring no shoulders retractions not holding her head/chest/abdomen.

## 2018-12-20 NOTE — H&P ADULT - NSHPPHYSICALEXAM_GEN_ALL_CORE
GENERAL: NAD well-developed  HEAD:  Atraumatic, Normocephalic  EYES: EOMI, PERRLA, conjunctiva and sclera clear  ENMT: No tonsillar erythema, exudates, or enlargement; Moist mucous membranes, Good dentition, No lesions  NECK: Supple, No JVD, Normal thyroid  NERVOUS SYSTEM:  Alert & Oriented X3, Good concentration; Motor Strength 5/5 B/L upper and lower extremities; DTRs 2+ intact and symmetric  CHEST/LUNG: Clear to percussion bilaterally; No rales, rhonchi, wheezing, or rubs  HEART: Regular rate and rhythm; No murmurs, rubs, or gallops  ABDOMEN: Soft, mild tenderness right lower quadrant, Nondistended; Bowel sounds present  EXTREMITIES:  2+ Peripheral Pulses, No clubbing, cyanosis, or edema  LYMPH: No lymphadenopathy   SKIN: No rashes or lesions ICU Vital Signs Last 24 Hrs  T(C): 36.8 (20 Dec 2018 17:22), Max: 37.2 (20 Dec 2018 08:47)  T(F): 98.2 (20 Dec 2018 17:22), Max: 99 (20 Dec 2018 08:47)  HR: 83 (20 Dec 2018 17:22) (64 - 83)  BP: 108/71 (20 Dec 2018 17:22) (108/71 - 149/96)  BP(mean): --  ABP: --  ABP(mean): --  RR: 18 (20 Dec 2018 17:22) (17 - 20)  SpO2: 99% (20 Dec 2018 17:22) (97% - 100%)  GENERAL: NAD well-developed  HEAD:  Atraumatic, Normocephalic  EYES: EOMI, PERRLA, conjunctiva and sclera clear  ENMT: No tonsillar erythema, exudates, or enlargement; Moist mucous membranes, Good dentition, No lesions  NECK: Supple, No JVD, Normal thyroid  NERVOUS SYSTEM:  Alert & Oriented X3, Good concentration; Motor Strength 5/5 B/L upper and lower extremities; DTRs 2+ intact and symmetric  CHEST/LUNG: Clear to percussion bilaterally; No rales, rhonchi, wheezing, or rubs  HEART: Regular rate and rhythm; No murmurs, rubs, or gallops  ABDOMEN: Soft, mild tenderness right lower quadrant, Nondistended; Bowel sounds present  EXTREMITIES:  2+ Peripheral Pulses, No clubbing, cyanosis, or edema  LYMPH: No lymphadenopathy   SKIN: No rashes or lesions

## 2018-12-21 LAB
ANION GAP SERPL CALC-SCNC: 15 MMOL/L — SIGNIFICANT CHANGE UP (ref 5–17)
BUN SERPL-MCNC: 10 MG/DL — SIGNIFICANT CHANGE UP (ref 7–23)
CALCIUM SERPL-MCNC: 8.3 MG/DL — LOW (ref 8.5–10.1)
CHLORIDE SERPL-SCNC: 112 MMOL/L — HIGH (ref 96–108)
CO2 SERPL-SCNC: 16 MMOL/L — LOW (ref 22–31)
CREAT SERPL-MCNC: 0.83 MG/DL — SIGNIFICANT CHANGE UP (ref 0.5–1.3)
CULTURE RESULTS: SIGNIFICANT CHANGE UP
GLUCOSE SERPL-MCNC: 104 MG/DL — HIGH (ref 70–99)
HCT VFR BLD CALC: 32.7 % — LOW (ref 34.5–45)
HGB BLD-MCNC: 10.3 G/DL — LOW (ref 11.5–15.5)
MAGNESIUM SERPL-MCNC: 2.4 MG/DL — SIGNIFICANT CHANGE UP (ref 1.6–2.6)
MCHC RBC-ENTMCNC: 24.5 PG — LOW (ref 27–34)
MCHC RBC-ENTMCNC: 31.5 GM/DL — LOW (ref 32–36)
MCV RBC AUTO: 77.7 FL — LOW (ref 80–100)
NRBC # BLD: 0 /100 WBCS — SIGNIFICANT CHANGE UP (ref 0–0)
PHOSPHATE SERPL-MCNC: 2.8 MG/DL — SIGNIFICANT CHANGE UP (ref 2.5–4.5)
PLATELET # BLD AUTO: 256 K/UL — SIGNIFICANT CHANGE UP (ref 150–400)
POTASSIUM SERPL-MCNC: 3.5 MMOL/L — SIGNIFICANT CHANGE UP (ref 3.5–5.3)
POTASSIUM SERPL-SCNC: 3.5 MMOL/L — SIGNIFICANT CHANGE UP (ref 3.5–5.3)
RBC # BLD: 4.21 M/UL — SIGNIFICANT CHANGE UP (ref 3.8–5.2)
RBC # FLD: 17.2 % — HIGH (ref 10.3–14.5)
SODIUM SERPL-SCNC: 143 MMOL/L — SIGNIFICANT CHANGE UP (ref 135–145)
SPECIMEN SOURCE: SIGNIFICANT CHANGE UP
WBC # BLD: 11.82 K/UL — HIGH (ref 3.8–10.5)
WBC # FLD AUTO: 11.82 K/UL — HIGH (ref 3.8–10.5)

## 2018-12-21 PROCEDURE — 99233 SBSQ HOSP IP/OBS HIGH 50: CPT

## 2018-12-21 RX ORDER — PANTOPRAZOLE SODIUM 20 MG/1
40 TABLET, DELAYED RELEASE ORAL
Qty: 0 | Refills: 0 | Status: DISCONTINUED | OUTPATIENT
Start: 2018-12-21 | End: 2018-12-23

## 2018-12-21 RX ORDER — DIPHENHYDRAMINE HCL 50 MG
25 CAPSULE ORAL ONCE
Qty: 0 | Refills: 0 | Status: COMPLETED | OUTPATIENT
Start: 2018-12-21 | End: 2018-12-21

## 2018-12-21 RX ORDER — MORPHINE SULFATE 50 MG/1
2 CAPSULE, EXTENDED RELEASE ORAL ONCE
Qty: 0 | Refills: 0 | Status: DISCONTINUED | OUTPATIENT
Start: 2018-12-21 | End: 2018-12-21

## 2018-12-21 RX ORDER — METOCLOPRAMIDE HCL 10 MG
10 TABLET ORAL ONCE
Qty: 0 | Refills: 0 | Status: DISCONTINUED | OUTPATIENT
Start: 2018-12-21 | End: 2018-12-21

## 2018-12-21 RX ORDER — POLYETHYLENE GLYCOL 3350 17 G/17G
17 POWDER, FOR SOLUTION ORAL DAILY
Qty: 0 | Refills: 0 | Status: DISCONTINUED | OUTPATIENT
Start: 2018-12-21 | End: 2018-12-23

## 2018-12-21 RX ADMIN — MORPHINE SULFATE 2 MILLIGRAM(S): 50 CAPSULE, EXTENDED RELEASE ORAL at 04:59

## 2018-12-21 RX ADMIN — ONDANSETRON 4 MILLIGRAM(S): 8 TABLET, FILM COATED ORAL at 18:21

## 2018-12-21 RX ADMIN — MORPHINE SULFATE 2 MILLIGRAM(S): 50 CAPSULE, EXTENDED RELEASE ORAL at 19:26

## 2018-12-21 RX ADMIN — Medication 25 MILLIGRAM(S): at 01:37

## 2018-12-21 RX ADMIN — MORPHINE SULFATE 2 MILLIGRAM(S): 50 CAPSULE, EXTENDED RELEASE ORAL at 22:46

## 2018-12-21 RX ADMIN — MORPHINE SULFATE 2 MILLIGRAM(S): 50 CAPSULE, EXTENDED RELEASE ORAL at 18:21

## 2018-12-21 RX ADMIN — PANTOPRAZOLE SODIUM 40 MILLIGRAM(S): 20 TABLET, DELAYED RELEASE ORAL at 17:14

## 2018-12-21 RX ADMIN — ONDANSETRON 4 MILLIGRAM(S): 8 TABLET, FILM COATED ORAL at 04:28

## 2018-12-21 RX ADMIN — PANTOPRAZOLE SODIUM 40 MILLIGRAM(S): 20 TABLET, DELAYED RELEASE ORAL at 12:04

## 2018-12-21 RX ADMIN — Medication 20 MILLIGRAM(S): at 19:55

## 2018-12-21 RX ADMIN — MORPHINE SULFATE 2 MILLIGRAM(S): 50 CAPSULE, EXTENDED RELEASE ORAL at 09:57

## 2018-12-21 RX ADMIN — MORPHINE SULFATE 2 MILLIGRAM(S): 50 CAPSULE, EXTENDED RELEASE ORAL at 23:01

## 2018-12-21 RX ADMIN — MORPHINE SULFATE 2 MILLIGRAM(S): 50 CAPSULE, EXTENDED RELEASE ORAL at 04:29

## 2018-12-21 RX ADMIN — MORPHINE SULFATE 2 MILLIGRAM(S): 50 CAPSULE, EXTENDED RELEASE ORAL at 10:56

## 2018-12-21 RX ADMIN — Medication 25 MILLIGRAM(S): at 21:09

## 2018-12-21 RX ADMIN — Medication 25 MILLIGRAM(S): at 10:00

## 2018-12-21 RX ADMIN — Medication 200 MILLIGRAM(S): at 22:47

## 2018-12-22 ENCOUNTER — TRANSCRIPTION ENCOUNTER (OUTPATIENT)
Age: 23
End: 2018-12-22

## 2018-12-22 LAB
ALBUMIN SERPL ELPH-MCNC: 3.4 G/DL — SIGNIFICANT CHANGE UP (ref 3.3–5)
ALP SERPL-CCNC: 46 U/L — SIGNIFICANT CHANGE UP (ref 40–120)
ALT FLD-CCNC: 31 U/L — SIGNIFICANT CHANGE UP (ref 12–78)
ANION GAP SERPL CALC-SCNC: 10 MMOL/L — SIGNIFICANT CHANGE UP (ref 5–17)
AST SERPL-CCNC: 19 U/L — SIGNIFICANT CHANGE UP (ref 15–37)
BILIRUB SERPL-MCNC: 0.8 MG/DL — SIGNIFICANT CHANGE UP (ref 0.2–1.2)
BUN SERPL-MCNC: 15 MG/DL — SIGNIFICANT CHANGE UP (ref 7–23)
CALCIUM SERPL-MCNC: 8 MG/DL — LOW (ref 8.5–10.1)
CHLORIDE SERPL-SCNC: 112 MMOL/L — HIGH (ref 96–108)
CO2 SERPL-SCNC: 21 MMOL/L — LOW (ref 22–31)
CREAT SERPL-MCNC: 0.91 MG/DL — SIGNIFICANT CHANGE UP (ref 0.5–1.3)
GLUCOSE SERPL-MCNC: 92 MG/DL — SIGNIFICANT CHANGE UP (ref 70–99)
HCT VFR BLD CALC: 30.8 % — LOW (ref 34.5–45)
HGB BLD-MCNC: 9.9 G/DL — LOW (ref 11.5–15.5)
LIDOCAIN IGE QN: 216 U/L — SIGNIFICANT CHANGE UP (ref 73–393)
MAGNESIUM SERPL-MCNC: 2.6 MG/DL — SIGNIFICANT CHANGE UP (ref 1.6–2.6)
MCHC RBC-ENTMCNC: 25.1 PG — LOW (ref 27–34)
MCHC RBC-ENTMCNC: 32.1 GM/DL — SIGNIFICANT CHANGE UP (ref 32–36)
MCV RBC AUTO: 78 FL — LOW (ref 80–100)
NRBC # BLD: 0 /100 WBCS — SIGNIFICANT CHANGE UP (ref 0–0)
PHOSPHATE SERPL-MCNC: 3.5 MG/DL — SIGNIFICANT CHANGE UP (ref 2.5–4.5)
PLATELET # BLD AUTO: 233 K/UL — SIGNIFICANT CHANGE UP (ref 150–400)
POTASSIUM SERPL-MCNC: 3.5 MMOL/L — SIGNIFICANT CHANGE UP (ref 3.5–5.3)
POTASSIUM SERPL-SCNC: 3.5 MMOL/L — SIGNIFICANT CHANGE UP (ref 3.5–5.3)
PROT SERPL-MCNC: 7.1 GM/DL — SIGNIFICANT CHANGE UP (ref 6–8.3)
RBC # BLD: 3.95 M/UL — SIGNIFICANT CHANGE UP (ref 3.8–5.2)
RBC # FLD: 17.2 % — HIGH (ref 10.3–14.5)
SODIUM SERPL-SCNC: 143 MMOL/L — SIGNIFICANT CHANGE UP (ref 135–145)
WBC # BLD: 9.43 K/UL — SIGNIFICANT CHANGE UP (ref 3.8–10.5)
WBC # FLD AUTO: 9.43 K/UL — SIGNIFICANT CHANGE UP (ref 3.8–10.5)

## 2018-12-22 PROCEDURE — 99233 SBSQ HOSP IP/OBS HIGH 50: CPT

## 2018-12-22 RX ORDER — KETOROLAC TROMETHAMINE 30 MG/ML
15 SYRINGE (ML) INJECTION ONCE
Qty: 0 | Refills: 0 | Status: DISCONTINUED | OUTPATIENT
Start: 2018-12-22 | End: 2018-12-22

## 2018-12-22 RX ORDER — PANTOPRAZOLE SODIUM 20 MG/1
1 TABLET, DELAYED RELEASE ORAL
Qty: 30 | Refills: 0 | OUTPATIENT
Start: 2018-12-22 | End: 2019-01-20

## 2018-12-22 RX ORDER — MORPHINE SULFATE 50 MG/1
2 CAPSULE, EXTENDED RELEASE ORAL EVERY 6 HOURS
Qty: 0 | Refills: 0 | Status: DISCONTINUED | OUTPATIENT
Start: 2018-12-22 | End: 2018-12-23

## 2018-12-22 RX ORDER — DIPHENHYDRAMINE HCL 50 MG
12.5 CAPSULE ORAL ONCE
Qty: 0 | Refills: 0 | Status: COMPLETED | OUTPATIENT
Start: 2018-12-22 | End: 2018-12-22

## 2018-12-22 RX ORDER — MORPHINE SULFATE 50 MG/1
2 CAPSULE, EXTENDED RELEASE ORAL ONCE
Qty: 0 | Refills: 0 | Status: DISCONTINUED | OUTPATIENT
Start: 2018-12-22 | End: 2018-12-22

## 2018-12-22 RX ADMIN — PANTOPRAZOLE SODIUM 40 MILLIGRAM(S): 20 TABLET, DELAYED RELEASE ORAL at 17:13

## 2018-12-22 RX ADMIN — MORPHINE SULFATE 2 MILLIGRAM(S): 50 CAPSULE, EXTENDED RELEASE ORAL at 20:33

## 2018-12-22 RX ADMIN — MORPHINE SULFATE 2 MILLIGRAM(S): 50 CAPSULE, EXTENDED RELEASE ORAL at 21:05

## 2018-12-22 RX ADMIN — Medication 30 MILLIGRAM(S): at 02:42

## 2018-12-22 RX ADMIN — Medication 15 MILLIGRAM(S): at 13:06

## 2018-12-22 RX ADMIN — MORPHINE SULFATE 2 MILLIGRAM(S): 50 CAPSULE, EXTENDED RELEASE ORAL at 14:48

## 2018-12-22 RX ADMIN — Medication 30 MILLIGRAM(S): at 17:08

## 2018-12-22 RX ADMIN — MORPHINE SULFATE 2 MILLIGRAM(S): 50 CAPSULE, EXTENDED RELEASE ORAL at 14:33

## 2018-12-22 RX ADMIN — ONDANSETRON 4 MILLIGRAM(S): 8 TABLET, FILM COATED ORAL at 12:27

## 2018-12-22 RX ADMIN — Medication 30 MILLIGRAM(S): at 02:57

## 2018-12-22 RX ADMIN — Medication 15 MILLIGRAM(S): at 12:51

## 2018-12-22 RX ADMIN — PANTOPRAZOLE SODIUM 40 MILLIGRAM(S): 20 TABLET, DELAYED RELEASE ORAL at 06:22

## 2018-12-22 RX ADMIN — Medication 12.5 MILLIGRAM(S): at 12:51

## 2018-12-22 RX ADMIN — Medication 30 MILLIGRAM(S): at 11:58

## 2018-12-22 RX ADMIN — MORPHINE SULFATE 2 MILLIGRAM(S): 50 CAPSULE, EXTENDED RELEASE ORAL at 15:35

## 2018-12-22 RX ADMIN — Medication 30 MILLIGRAM(S): at 17:23

## 2018-12-22 RX ADMIN — MORPHINE SULFATE 2 MILLIGRAM(S): 50 CAPSULE, EXTENDED RELEASE ORAL at 15:50

## 2018-12-22 RX ADMIN — Medication 30 MILLIGRAM(S): at 11:33

## 2018-12-22 NOTE — PROGRESS NOTE ADULT - PROBLEM SELECTOR PROBLEM 1
Cyclic vomiting syndrome, intractability of vomiting not specified, presence of nausea not specified

## 2018-12-22 NOTE — PROGRESS NOTE ADULT - SUBJECTIVE AND OBJECTIVE BOX
Patient is a 23y old  Female who presents with a chief complaint of "  cyclic vomiting" (20 Dec 2018 20:47)      INTERVAL HPI/OVERNIGHT EVENTS: still with alot of abd pain and vomiting     MEDICATIONS  (STANDING):  influenza   Vaccine 0.5 milliLiter(s) IntraMuscular once  pantoprazole  Injectable 40 milliGRAM(s) IV Push daily  sodium chloride 0.9%. 1000 milliLiter(s) (125 mL/Hr) IV Continuous <Continuous>    MEDICATIONS  (PRN):  ketorolac   Injectable 30 milliGRAM(s) IV Push every 6 hours PRN Mild Pain (1 - 3)  morphine  - Injectable 2 milliGRAM(s) IV Push every 6 hours PRN Severe Pain (7 - 10)  ondansetron Injectable 4 milliGRAM(s) IV Push every 6 hours PRN Nausea and/or Vomiting  polyethylene glycol 3350 17 Gram(s) Oral daily PRN Constipation      Allergies    penicillin (Hives)    Intolerances           Vital Signs Last 24 Hrs  T(C): 37.4 (21 Dec 2018 11:48), Max: 37.7 (21 Dec 2018 00:00)  T(F): 99.3 (21 Dec 2018 11:48), Max: 99.8 (21 Dec 2018 00:00)  HR: 91 (21 Dec 2018 11:48) (65 - 91)  BP: 148/95 (21 Dec 2018 11:48) (108/71 - 148/95)  BP(mean): --  RR: 20 (21 Dec 2018 11:48) (17 - 20)  SpO2: 100% (21 Dec 2018 11:48) (98% - 100%)    PHYSICAL EXAM:  GENERAL: in pain, well-groomed, well-developed  HEAD:  Atraumatic, Normocephalic  EYES: EOMI, PERRLA, conjunctiva and sclera clear  ENMT: No tonsillar erythema, exudates, or enlargement; Moist mucous membranes, Good dentition, No lesions  NECK: Supple, No JVD, Normal thyroid  NERVOUS SYSTEM:  Alert & Oriented X3, Good concentration; Motor Strength 5/5 B/L upper and lower extremities; DTRs 2+ intact and symmetric  CHEST/LUNG: Clear to percussion bilaterally; No rales, rhonchi, wheezing, or rubs  HEART: Regular rate and rhythm; No murmurs, rubs, or gallops  ABDOMEN: Soft, tender diffuse, Nondistended; Bowel sounds present  EXTREMITIES:  2+ Peripheral Pulses, No clubbing, cyanosis, or edema  LYMPH: No lymphadenopathy noted  SKIN: No rashes or lesions    LABS:                        10.3   11.82 )-----------( 256      ( 21 Dec 2018 07:29 )             32.7     12-    143  |  112<H>  |  10  ----------------------------<  104<H>  3.5   |  16<L>  |  0.83    Ca    8.3<L>      21 Dec 2018 07:29  Phos  2.8     -  Mg     2.4     -    TPro  8.2  /  Alb  4.1  /  TBili  1.0  /  DBili  x   /  AST  29  /  ALT  39  /  AlkPhos  51  12-    PT/INR - ( 20 Dec 2018 09:33 )   PT: 13.9 sec;   INR: 1.23 ratio         PTT - ( 20 Dec 2018 09:33 )  PTT:24.3 sec  Urinalysis Basic - ( 20 Dec 2018 10:53 )    Color: Yellow / Appearance: very cloudy / S.020 / pH: x  Gluc: x / Ketone: Large  / Bili: Negative / Urobili: 1 mg/dL   Blood: x / Protein: 30 mg/dL / Nitrite: Negative   Leuk Esterase: Trace / RBC: 6-10 /HPF / WBC 3-5   Sq Epi: x / Non Sq Epi: Moderate / Bacteria: Moderate      CAPILLARY BLOOD GLUCOSE          RADIOLOGY & ADDITIONAL TESTS:    Imaging Personally Reviewed:  [ X] YES  [ ] NO    Consultant(s) Notes Reviewed:  [ X] YES  [ ] NO    Care Discussed with Consultants/Other Providers [X ] YES  [ ] NO
Patient is a 23y old  Female who presents with a chief complaint of "  cyclic vomiting" (21 Dec 2018 15:16)      HPI:  23 y/o female patient with past medical history of ovarian cyst and cyclic vomiting she states since she was 4 years old. She had onset of intermittent vomiting onset two weeks ago, however she states it  became non-stop 2 days ago, where she was unable to hold anything down and became dehydrated, and weak,  at this point she came to the ED. She states she has mild tenderness in right side of abdomen. She smokes one marijuana cigarette every 2 weeks, denies any other drug use. She did not take anything for pain. Denies fever, chills, states had one bout of diarrhea, denies HA,  or any recent travel. (20 Dec 2018 16:07)      INTERVAL HPI/OVERNIGHT EVENTS:  N/V, abdominal pain better but still present.     MEDICATIONS  (STANDING):  influenza   Vaccine 0.5 milliLiter(s) IntraMuscular once  pantoprazole  Injectable 40 milliGRAM(s) IV Push two times a day  sodium chloride 0.9%. 1000 milliLiter(s) (125 mL/Hr) IV Continuous <Continuous>    MEDICATIONS  (PRN):  ketorolac   Injectable 30 milliGRAM(s) IV Push every 6 hours PRN Mild Pain (1 - 3)  morphine  - Injectable 2 milliGRAM(s) IV Push every 6 hours PRN Severe Pain (7 - 10)  ondansetron Injectable 4 milliGRAM(s) IV Push every 6 hours PRN Nausea and/or Vomiting  polyethylene glycol 3350 17 Gram(s) Oral daily PRN Constipation      FAMILY HISTORY:  No pertinent family history in first degree relatives      Allergies    penicillin (Hives)    Intolerances        PMH/PSH:  Cyst of ovary, unspecified laterality  Cyclic vomiting syndrome  GERD (gastroesophageal reflux disease)  Ulcer of gastric cardia  No pertinent past medical history  No significant past surgical history        REVIEW OF SYSTEMS:  CONSTITUTIONAL: No fever, weight loss, or fatigue  EYES: No eye pain, visual disturbances, or discharge  ENMT:  No difficulty hearing, tinnitus, vertigo; No sinus or throat pain  NECK: No pain or stiffness  BREASTS: No pain, masses, or nipple discharge  RESPIRATORY: No cough, wheezing, chills or hemoptysis; No shortness of breath  CARDIOVASCULAR: No chest pain, palpitations, dizziness, or leg swelling  GASTROINTESTINAL: See above  GENITOURINARY: No dysuria, frequency, hematuria, or incontinence  NEUROLOGICAL: No headaches, memory loss, loss of strength, numbness, or tremors  SKIN: No itching, burning, rashes, or lesions   LYMPH NODES: No enlarged glands  ENDOCRINE: No heat or cold intolerance; No hair loss  MUSCULOSKELETAL: No joint pain or swelling; No muscle, back, or extremity pain  PSYCHIATRIC: No depression, anxiety, mood swings, or difficulty sleeping  HEME/LYMPH: No easy bruising, or bleeding gums  ALLERGY AND IMMUNOLOGIC: No hives or eczema    Vital Signs Last 24 Hrs  T(C): 37.4 (21 Dec 2018 11:48), Max: 37.7 (21 Dec 2018 00:00)  T(F): 99.3 (21 Dec 2018 11:48), Max: 99.8 (21 Dec 2018 00:00)  HR: 91 (21 Dec 2018 11:48) (66 - 91)  BP: 148/95 (21 Dec 2018 11:48) (108/71 - 148/95)  BP(mean): --  RR: 20 (21 Dec 2018 11:48) (18 - 20)  SpO2: 100% (21 Dec 2018 11:48) (99% - 100%)    PHYSICAL EXAM:  GENERAL: NAD, well-groomed, well-developed  HEAD:  Atraumatic, Normocephalic  EYES: EOMI, PERRLA, conjunctiva and sclera clear  NECK: Supple, No JVD, Normal thyroid  NERVOUS SYSTEM:  Alert & Oriented X3, Good concentration; Motor Strength 5/5 B/L upper and lower extremities;   CHEST/LUNG: Clear to percussion bilaterally; No rales, rhonchi, wheezing, or rubs  HEART: Regular rate and rhythm; No murmurs, rubs, or gallops  ABDOMEN: Soft, Mild diffuse tenderness ( better ), Nondistended; Bowel sounds present  EXTREMITIES:  2+ Peripheral Pulses, No clubbing, cyanosis, or edema  LYMPH: No lymphadenopathy noted  SKIN: No rashes or lesions    LAB   @ 09:33  amylase 570   lipase 75                           10.3   11.82 )-----------( 256      ( 21 Dec 2018 07:29 )             32.7       CBC:   @ 07:29  WBC 11.82   Hgb 10.3   Hct 32.7   Plts 256  MCV 77.7   @ 09:33  WBC 9.79   Hgb 11.4   Hct 35.2   Plts 313  MCV 76.9      Chemistry:   @ 07:29  Na+ 143  K+ 3.5  Cl- 112  CO2 16  BUN 10  Cr 0.83      @ 09:33  Na+ 141  K+ 3.9  Cl- 108  CO2 17  BUN 15  Cr 0.98         Glucose, Serum: 104 mg/dL ( @ 07:29)  Glucose, Serum: 155 mg/dL ( @ 09:33)      21 Dec 2018 07:29    143    |  112    |  10     ----------------------------<  104    3.5     |  16     |  0.83   20 Dec 2018 09:33    141    |  108    |  15     ----------------------------<  155    3.9     |  17     |  0.98     Ca    8.3        21 Dec 2018 07:29  Ca    9.1        20 Dec 2018 09:33  Phos  2.8       21 Dec 2018 07:29  Mg     2.4       21 Dec 2018 07:29    TPro  8.2    /  Alb  4.1    /  TBili  1.0    /  DBili  x      /  AST  29     /  ALT  39     /  AlkPhos  51     20 Dec 2018 09:33      PT/INR - ( 20 Dec 2018 09:33 )   PT: 13.9 sec;   INR: 1.23 ratio         PTT - ( 20 Dec 2018 09:33 )  PTT:24.3 sec    Urinalysis Basic - ( 20 Dec 2018 10:53 )    Color: Yellow / Appearance: very cloudy / S.020 / pH: x  Gluc: x / Ketone: Large  / Bili: Negative / Urobili: 1 mg/dL   Blood: x / Protein: 30 mg/dL / Nitrite: Negative   Leuk Esterase: Trace / RBC: 6-10 /HPF / WBC 3-5   Sq Epi: x / Non Sq Epi: Moderate / Bacteria: Moderate        CAPILLARY BLOOD GLUCOSE              RADIOLOGY & ADDITIONAL TESTS:    Imaging Personally Reviewed:  [ ] YES  [ ] NO    Consultant(s) Notes Reviewed:  [ ] YES  [ ] NO    Care Discussed with Consultants/Other Providers [ ] YES  [ ] NO
Patient is a 23y old  Female who presents with a chief complaint of "  cyclic vomiting" (22 Dec 2018 09:19)      INTERVAL HPI/OVERNIGHT EVENTS: still with alot of pain     MEDICATIONS  (STANDING):  influenza   Vaccine 0.5 milliLiter(s) IntraMuscular once  pantoprazole  Injectable 40 milliGRAM(s) IV Push two times a day  sodium chloride 0.9%. 1000 milliLiter(s) (125 mL/Hr) IV Continuous <Continuous>    MEDICATIONS  (PRN):  ketorolac   Injectable 30 milliGRAM(s) IV Push every 6 hours PRN Mild Pain (1 - 3)  morphine  - Injectable 2 milliGRAM(s) IV Push every 6 hours PRN Severe Pain (7 - 10)  ondansetron Injectable 4 milliGRAM(s) IV Push every 6 hours PRN Nausea and/or Vomiting  polyethylene glycol 3350 17 Gram(s) Oral daily PRN Constipation      Allergies    penicillin (Hives)    Intolerances        REVIEW OF SYSTEMS:  CONSTITUTIONAL: No fever, weight loss, or fatigue  EYES: No eye pain, visual disturbances, or discharge  ENMT:  No difficulty hearing, tinnitus, vertigo; No sinus or throat pain  NECK: No pain or stiffness  BREASTS: No pain, masses, or nipple discharge  RESPIRATORY: No cough, wheezing, chills or hemoptysis; No shortness of breath  CARDIOVASCULAR: No chest pain, palpitations, dizziness, or leg swelling  GASTROINTESTINAL: No abdominal or epigastric pain. No nausea, vomiting, or hematemesis; No diarrhea or constipation. No melena or hematochezia.  GENITOURINARY: No dysuria, frequency, hematuria, or incontinence  NEUROLOGICAL: No headaches, memory loss, loss of strength, numbness, or tremors  SKIN: No itching, burning, rashes, or lesions   LYMPH NODES: No enlarged glands  ENDOCRINE: No heat or cold intolerance; No hair loss  MUSCULOSKELETAL: No joint pain or swelling; No muscle, back, or extremity pain  PSYCHIATRIC: No depression, anxiety, mood swings, or difficulty sleeping  HEME/LYMPH: No easy bruising, or bleeding gums  ALLERGY AND IMMUNOLOGIC: No hives or eczema    Vital Signs Last 24 Hrs  T(C): 36.7 (22 Dec 2018 11:52), Max: 37.4 (22 Dec 2018 00:19)  T(F): 98 (22 Dec 2018 11:52), Max: 99.4 (22 Dec 2018 00:19)  HR: 66 (22 Dec 2018 11:52) (66 - 82)  BP: 131/100 (22 Dec 2018 11:52) (121/73 - 134/84)  BP(mean): --  RR: 16 (22 Dec 2018 11:52) (16 - 18)  SpO2: 100% (22 Dec 2018 11:52) (98% - 100%)    PHYSICAL EXAM:  GENERAL: NAD, well-groomed, well-developed  HEAD:  Atraumatic, Normocephalic  EYES: EOMI, PERRLA, conjunctiva and sclera clear  ENMT: No tonsillar erythema, exudates, or enlargement; Moist mucous membranes, Good dentition, No lesions  NECK: Supple, No JVD, Normal thyroid  NERVOUS SYSTEM:  Alert & Oriented X3, Good concentration; Motor Strength 5/5 B/L upper and lower extremities; DTRs 2+ intact and symmetric  CHEST/LUNG: Clear to percussion bilaterally; No rales, rhonchi, wheezing, or rubs  HEART: Regular rate and rhythm; No murmurs, rubs, or gallops  ABDOMEN: Soft, Nontender, Nondistended; Bowel sounds present  EXTREMITIES:  2+ Peripheral Pulses, No clubbing, cyanosis, or edema  LYMPH: No lymphadenopathy noted  SKIN: No rashes or lesions    LABS:                        9.9    9.43  )-----------( 233      ( 22 Dec 2018 06:28 )             30.8     12-22    143  |  112<H>  |  15  ----------------------------<  92  3.5   |  21<L>  |  0.91    Ca    8.0<L>      22 Dec 2018 06:28  Phos  3.5     12-22  Mg     2.6     12-22    TPro  7.1  /  Alb  3.4  /  TBili  0.8  /  DBili  x   /  AST  19  /  ALT  31  /  AlkPhos  46  12-22        CAPILLARY BLOOD GLUCOSE          RADIOLOGY & ADDITIONAL TESTS:    Imaging Personally Reviewed:  [ X] YES  [ ] NO    Consultant(s) Notes Reviewed:  [ X] YES  [ ] NO    Care Discussed with Consultants/Other Providers [X ] YES  [ ] NO

## 2018-12-22 NOTE — DISCHARGE NOTE ADULT - MEDICATION SUMMARY - MEDICATIONS TO TAKE
I will START or STAY ON the medications listed below when I get home from the hospital:    birth control pills  -- once a day  -- Indication: For preventive    pantoprazole 40 mg oral delayed release tablet  -- 1 tab(s) by mouth once a day   -- It is very important that you take or use this exactly as directed.  Do not skip doses or discontinue unless directed by your doctor.  Obtain medical advice before taking any non-prescription drugs as some may affect the action of this medication.  Swallow whole.  Do not crush.    -- Indication: For Cyclic vomiting syndrome

## 2018-12-22 NOTE — DISCHARGE NOTE ADULT - PATIENT PORTAL LINK FT
You can access the [facility], offered by [facility], by registering with the following website: [email or web address]

## 2018-12-22 NOTE — DISCHARGE NOTE ADULT - HOSPITAL COURSE
21 y/o female with diagnosis of cyclic vomiting, to be admitted for dehydration and weakness.  Pain improved with iv morphine, tolerating diet. D/c home w outpt followup. Avoid use of marijuana      45min spend on dc planning

## 2018-12-22 NOTE — DISCHARGE NOTE ADULT - CARE PLAN
Principal Discharge DX:	Cyclic vomiting syndrome, intractability of vomiting not specified, presence of nausea not specified  Goal:	resolved  Assessment and plan of treatment:	f/u with pcp and gi  Secondary Diagnosis:	Dehydration

## 2018-12-22 NOTE — PROGRESS NOTE ADULT - PROBLEM SELECTOR PLAN 1
GI consult     Hydration  Zofran  Protonix  pain control  u/s abd ordered
GI consult     Hydration  Zofran  Protonix  pain control  Refuses CT abdomen
Slowly improving. PPI increased to BID. otherwise , continue present care

## 2018-12-22 NOTE — PROGRESS NOTE ADULT - ASSESSMENT
23 y/o female with diagnosis of cyclic vomiting, to be admitted for dehydration and weakness.
HPI:  21 y/o female patient with past medical history of ovarian cyst and cyclic vomiting she states since she was 4 years old. She had onset of intermittent vomiting onset two weeks ago, however she states it  became non-stop 2 days ago, where she was unable to hold anything down and became dehydrated, and weak,  at this point she came to the ED. She states she has mild tenderness in right side of abdomen. She smokes one marijuana cigarette every 2 weeks, denies any other drug use. She did not take anything for pain. Denies fever, chills, states had one bout of diarrhea, denies HA,  or any recent travel. (20 Dec 2018 16:07)  ------------------- As Above --------------------------------------  The patient states that she has been having diffuse abdominal pain N/V over the past two weeks. It had originally been off and on but has gradually worsened in frequency and strength. She denies illicit drugs. She is not on any GI medications and never went to the GI clinic. She denies NSAIDs.   Patient did not get a CT Scan this admission.  History of cyclic vomiting syndrome.  Patient states that she has had 3 EGDs in the recent past.  The patient denies melena, hematochezia, hematemesis,  constipation, diarrhea, or change in bowel movements     N/V , abdominal pain  -  1) NPO IV fluid  2) CT scan - patient refusing 3) Analgesics 4) check electrolytes
21 y/o female with diagnosis of cyclic vomiting, to be admitted for dehydration and weakness.

## 2018-12-23 VITALS
HEART RATE: 62 BPM | RESPIRATION RATE: 16 BRPM | SYSTOLIC BLOOD PRESSURE: 131 MMHG | DIASTOLIC BLOOD PRESSURE: 81 MMHG | TEMPERATURE: 98 F | OXYGEN SATURATION: 99 %

## 2018-12-23 LAB
ALBUMIN SERPL ELPH-MCNC: 3.6 G/DL — SIGNIFICANT CHANGE UP (ref 3.3–5)
ALP SERPL-CCNC: 47 U/L — SIGNIFICANT CHANGE UP (ref 40–120)
ALT FLD-CCNC: 29 U/L — SIGNIFICANT CHANGE UP (ref 12–78)
ANION GAP SERPL CALC-SCNC: 12 MMOL/L — SIGNIFICANT CHANGE UP (ref 5–17)
AST SERPL-CCNC: 14 U/L — LOW (ref 15–37)
BILIRUB SERPL-MCNC: 0.9 MG/DL — SIGNIFICANT CHANGE UP (ref 0.2–1.2)
BUN SERPL-MCNC: 21 MG/DL — SIGNIFICANT CHANGE UP (ref 7–23)
CALCIUM SERPL-MCNC: 8 MG/DL — LOW (ref 8.5–10.1)
CHLORIDE SERPL-SCNC: 113 MMOL/L — HIGH (ref 96–108)
CO2 SERPL-SCNC: 20 MMOL/L — LOW (ref 22–31)
CREAT SERPL-MCNC: 0.97 MG/DL — SIGNIFICANT CHANGE UP (ref 0.5–1.3)
GLUCOSE SERPL-MCNC: 70 MG/DL — SIGNIFICANT CHANGE UP (ref 70–99)
HCT VFR BLD CALC: 33.5 % — LOW (ref 34.5–45)
HGB BLD-MCNC: 10.8 G/DL — LOW (ref 11.5–15.5)
MAGNESIUM SERPL-MCNC: 2.8 MG/DL — HIGH (ref 1.6–2.6)
MCHC RBC-ENTMCNC: 25.4 PG — LOW (ref 27–34)
MCHC RBC-ENTMCNC: 32.2 GM/DL — SIGNIFICANT CHANGE UP (ref 32–36)
MCV RBC AUTO: 78.8 FL — LOW (ref 80–100)
NRBC # BLD: 0 /100 WBCS — SIGNIFICANT CHANGE UP (ref 0–0)
PHOSPHATE SERPL-MCNC: 3.4 MG/DL — SIGNIFICANT CHANGE UP (ref 2.5–4.5)
PLATELET # BLD AUTO: 253 K/UL — SIGNIFICANT CHANGE UP (ref 150–400)
POTASSIUM SERPL-MCNC: 3.2 MMOL/L — LOW (ref 3.5–5.3)
POTASSIUM SERPL-SCNC: 3.2 MMOL/L — LOW (ref 3.5–5.3)
PROT SERPL-MCNC: 7.1 GM/DL — SIGNIFICANT CHANGE UP (ref 6–8.3)
RBC # BLD: 4.25 M/UL — SIGNIFICANT CHANGE UP (ref 3.8–5.2)
RBC # FLD: 17.2 % — HIGH (ref 10.3–14.5)
SODIUM SERPL-SCNC: 145 MMOL/L — SIGNIFICANT CHANGE UP (ref 135–145)
WBC # BLD: 7.85 K/UL — SIGNIFICANT CHANGE UP (ref 3.8–10.5)
WBC # FLD AUTO: 7.85 K/UL — SIGNIFICANT CHANGE UP (ref 3.8–10.5)

## 2018-12-23 PROCEDURE — 99239 HOSP IP/OBS DSCHRG MGMT >30: CPT

## 2018-12-23 PROCEDURE — 76700 US EXAM ABDOM COMPLETE: CPT | Mod: 26

## 2018-12-23 RX ORDER — POTASSIUM CHLORIDE 20 MEQ
40 PACKET (EA) ORAL EVERY 4 HOURS
Qty: 0 | Refills: 0 | Status: COMPLETED | OUTPATIENT
Start: 2018-12-23 | End: 2018-12-23

## 2018-12-23 RX ADMIN — Medication 40 MILLIEQUIVALENT(S): at 13:07

## 2018-12-23 RX ADMIN — Medication 40 MILLIEQUIVALENT(S): at 10:04

## 2018-12-23 RX ADMIN — MORPHINE SULFATE 2 MILLIGRAM(S): 50 CAPSULE, EXTENDED RELEASE ORAL at 05:52

## 2018-12-23 RX ADMIN — MORPHINE SULFATE 2 MILLIGRAM(S): 50 CAPSULE, EXTENDED RELEASE ORAL at 06:31

## 2018-12-23 RX ADMIN — Medication 30 MILLIGRAM(S): at 00:33

## 2018-12-23 RX ADMIN — Medication 30 MILLIGRAM(S): at 01:10

## 2018-12-23 RX ADMIN — PANTOPRAZOLE SODIUM 40 MILLIGRAM(S): 20 TABLET, DELAYED RELEASE ORAL at 05:51

## 2018-12-27 DIAGNOSIS — R11.10 VOMITING, UNSPECIFIED: ICD-10-CM

## 2018-12-27 DIAGNOSIS — E86.0 DEHYDRATION: ICD-10-CM

## 2018-12-27 DIAGNOSIS — K31.89 OTHER DISEASES OF STOMACH AND DUODENUM: ICD-10-CM

## 2018-12-27 DIAGNOSIS — N83.209 UNSPECIFIED OVARIAN CYST, UNSPECIFIED SIDE: ICD-10-CM

## 2018-12-27 DIAGNOSIS — K21.9 GASTRO-ESOPHAGEAL REFLUX DISEASE WITHOUT ESOPHAGITIS: ICD-10-CM

## 2018-12-27 DIAGNOSIS — Z88.0 ALLERGY STATUS TO PENICILLIN: ICD-10-CM

## 2018-12-27 DIAGNOSIS — F12.90 CANNABIS USE, UNSPECIFIED, UNCOMPLICATED: ICD-10-CM

## 2019-01-23 NOTE — ED ADULT TRIAGE NOTE - NS ED NURSE BANDS TYPE
Name band; Patient presented with acute asthma exacerbation x several days, refractory to home nebs. Initial peak flow 150 but this improved to 300 s/p treatment with nebs and decadron in ED. After tx patient felt better and wanted to go home. CXR negative for PNA or other complications. Patient ambulatory, tolerates PO, HD stable, afebrile. Agrees to follow up with his PMD. Has enough nebs at home for continued use.

## 2019-02-12 ENCOUNTER — INPATIENT (INPATIENT)
Facility: HOSPITAL | Age: 24
LOS: 3 days | Discharge: HOME | End: 2019-02-16
Attending: INTERNAL MEDICINE | Admitting: INTERNAL MEDICINE
Payer: COMMERCIAL

## 2019-02-12 VITALS
HEART RATE: 100 BPM | OXYGEN SATURATION: 100 % | RESPIRATION RATE: 18 BRPM | HEIGHT: 64 IN | WEIGHT: 119.93 LBS | TEMPERATURE: 99 F

## 2019-02-12 LAB
ALBUMIN SERPL ELPH-MCNC: 3.2 G/DL — LOW (ref 3.3–5)
ALP SERPL-CCNC: 44 U/L — SIGNIFICANT CHANGE UP (ref 40–120)
ALT FLD-CCNC: 28 U/L — SIGNIFICANT CHANGE UP (ref 12–78)
ANION GAP SERPL CALC-SCNC: 14 MMOL/L — SIGNIFICANT CHANGE UP (ref 5–17)
APPEARANCE UR: ABNORMAL
AST SERPL-CCNC: 23 U/L — SIGNIFICANT CHANGE UP (ref 15–37)
BACTERIA # UR AUTO: ABNORMAL
BASOPHILS # BLD AUTO: 0.02 K/UL — SIGNIFICANT CHANGE UP (ref 0–0.2)
BASOPHILS NFR BLD AUTO: 0.2 % — SIGNIFICANT CHANGE UP (ref 0–2)
BILIRUB SERPL-MCNC: 0.6 MG/DL — SIGNIFICANT CHANGE UP (ref 0.2–1.2)
BILIRUB UR-MCNC: NEGATIVE — SIGNIFICANT CHANGE UP
BUN SERPL-MCNC: 15 MG/DL — SIGNIFICANT CHANGE UP (ref 7–23)
CALCIUM SERPL-MCNC: 7.7 MG/DL — LOW (ref 8.5–10.1)
CHLORIDE SERPL-SCNC: 113 MMOL/L — HIGH (ref 96–108)
CO2 SERPL-SCNC: 19 MMOL/L — LOW (ref 22–31)
COLOR SPEC: YELLOW — SIGNIFICANT CHANGE UP
CREAT SERPL-MCNC: 0.92 MG/DL — SIGNIFICANT CHANGE UP (ref 0.5–1.3)
DIFF PNL FLD: ABNORMAL
EOSINOPHIL # BLD AUTO: 0 K/UL — SIGNIFICANT CHANGE UP (ref 0–0.5)
EOSINOPHIL NFR BLD AUTO: 0 % — SIGNIFICANT CHANGE UP (ref 0–6)
EPI CELLS # UR: ABNORMAL
GLUCOSE SERPL-MCNC: 119 MG/DL — HIGH (ref 70–99)
GLUCOSE UR QL: NEGATIVE MG/DL — SIGNIFICANT CHANGE UP
HCG SERPL-ACNC: <1 MIU/ML — SIGNIFICANT CHANGE UP
HCT VFR BLD CALC: 33.3 % — LOW (ref 34.5–45)
HGB BLD-MCNC: 10.7 G/DL — LOW (ref 11.5–15.5)
IMM GRANULOCYTES NFR BLD AUTO: 0.4 % — SIGNIFICANT CHANGE UP (ref 0–1.5)
KETONES UR-MCNC: ABNORMAL
LACTATE SERPL-SCNC: 1.4 MMOL/L — SIGNIFICANT CHANGE UP (ref 0.7–2)
LACTATE SERPL-SCNC: 3.9 MMOL/L — HIGH (ref 0.7–2)
LEUKOCYTE ESTERASE UR-ACNC: ABNORMAL
LIDOCAIN IGE QN: 62 U/L — LOW (ref 73–393)
LYMPHOCYTES # BLD AUTO: 1.59 K/UL — SIGNIFICANT CHANGE UP (ref 1–3.3)
LYMPHOCYTES # BLD AUTO: 13.9 % — SIGNIFICANT CHANGE UP (ref 13–44)
MCHC RBC-ENTMCNC: 25 PG — LOW (ref 27–34)
MCHC RBC-ENTMCNC: 32.1 GM/DL — SIGNIFICANT CHANGE UP (ref 32–36)
MCV RBC AUTO: 77.8 FL — LOW (ref 80–100)
MONOCYTES # BLD AUTO: 0.58 K/UL — SIGNIFICANT CHANGE UP (ref 0–0.9)
MONOCYTES NFR BLD AUTO: 5.1 % — SIGNIFICANT CHANGE UP (ref 2–14)
NEUTROPHILS # BLD AUTO: 9.17 K/UL — HIGH (ref 1.8–7.4)
NEUTROPHILS NFR BLD AUTO: 80.4 % — HIGH (ref 43–77)
NITRITE UR-MCNC: NEGATIVE — SIGNIFICANT CHANGE UP
NRBC # BLD: 0 /100 WBCS — SIGNIFICANT CHANGE UP (ref 0–0)
PH UR: 5 — SIGNIFICANT CHANGE UP (ref 5–8)
PLATELET # BLD AUTO: 295 K/UL — SIGNIFICANT CHANGE UP (ref 150–400)
POTASSIUM SERPL-MCNC: 3.5 MMOL/L — SIGNIFICANT CHANGE UP (ref 3.5–5.3)
POTASSIUM SERPL-SCNC: 3.5 MMOL/L — SIGNIFICANT CHANGE UP (ref 3.5–5.3)
PROT SERPL-MCNC: 6.6 GM/DL — SIGNIFICANT CHANGE UP (ref 6–8.3)
PROT UR-MCNC: 100 MG/DL
RBC # BLD: 4.28 M/UL — SIGNIFICANT CHANGE UP (ref 3.8–5.2)
RBC # FLD: 17.6 % — HIGH (ref 10.3–14.5)
RBC CASTS # UR COMP ASSIST: SIGNIFICANT CHANGE UP /HPF (ref 0–4)
SODIUM SERPL-SCNC: 146 MMOL/L — HIGH (ref 135–145)
SP GR SPEC: 1.02 — SIGNIFICANT CHANGE UP (ref 1.01–1.02)
UROBILINOGEN FLD QL: 1 MG/DL
WBC # BLD: 11.41 K/UL — HIGH (ref 3.8–10.5)
WBC # FLD AUTO: 11.41 K/UL — HIGH (ref 3.8–10.5)
WBC UR QL: SIGNIFICANT CHANGE UP

## 2019-02-12 PROCEDURE — 99223 1ST HOSP IP/OBS HIGH 75: CPT

## 2019-02-12 PROCEDURE — 74177 CT ABD & PELVIS W/CONTRAST: CPT | Mod: 26

## 2019-02-12 PROCEDURE — 99285 EMERGENCY DEPT VISIT HI MDM: CPT

## 2019-02-12 RX ORDER — METOCLOPRAMIDE HCL 10 MG
10 TABLET ORAL ONCE
Qty: 0 | Refills: 0 | Status: COMPLETED | OUTPATIENT
Start: 2019-02-12 | End: 2019-02-12

## 2019-02-12 RX ORDER — ACETAMINOPHEN 500 MG
650 TABLET ORAL EVERY 6 HOURS
Qty: 0 | Refills: 0 | Status: DISCONTINUED | OUTPATIENT
Start: 2019-02-12 | End: 2019-02-16

## 2019-02-12 RX ORDER — HALOPERIDOL DECANOATE 100 MG/ML
5 INJECTION INTRAMUSCULAR ONCE
Qty: 0 | Refills: 0 | Status: COMPLETED | OUTPATIENT
Start: 2019-02-12 | End: 2019-02-12

## 2019-02-12 RX ORDER — CIPROFLOXACIN LACTATE 400MG/40ML
400 VIAL (ML) INTRAVENOUS ONCE
Qty: 0 | Refills: 0 | Status: COMPLETED | OUTPATIENT
Start: 2019-02-12 | End: 2019-02-12

## 2019-02-12 RX ORDER — MORPHINE SULFATE 50 MG/1
4 CAPSULE, EXTENDED RELEASE ORAL ONCE
Qty: 0 | Refills: 0 | Status: DISCONTINUED | OUTPATIENT
Start: 2019-02-12 | End: 2019-02-12

## 2019-02-12 RX ORDER — IOHEXOL 300 MG/ML
30 INJECTION, SOLUTION INTRAVENOUS ONCE
Qty: 0 | Refills: 0 | Status: COMPLETED | OUTPATIENT
Start: 2019-02-12 | End: 2019-02-12

## 2019-02-12 RX ORDER — DIPHENHYDRAMINE HCL 50 MG
50 CAPSULE ORAL ONCE
Qty: 0 | Refills: 0 | Status: COMPLETED | OUTPATIENT
Start: 2019-02-12 | End: 2019-02-12

## 2019-02-12 RX ORDER — SODIUM CHLORIDE 9 MG/ML
2000 INJECTION INTRAMUSCULAR; INTRAVENOUS; SUBCUTANEOUS ONCE
Qty: 0 | Refills: 0 | Status: COMPLETED | OUTPATIENT
Start: 2019-02-12 | End: 2019-02-12

## 2019-02-12 RX ORDER — ONDANSETRON 8 MG/1
8 TABLET, FILM COATED ORAL ONCE
Qty: 0 | Refills: 0 | Status: COMPLETED | OUTPATIENT
Start: 2019-02-12 | End: 2019-02-12

## 2019-02-12 RX ORDER — OXYCODONE AND ACETAMINOPHEN 5; 325 MG/1; MG/1
1 TABLET ORAL EVERY 6 HOURS
Qty: 0 | Refills: 0 | Status: DISCONTINUED | OUTPATIENT
Start: 2019-02-12 | End: 2019-02-16

## 2019-02-12 RX ORDER — ONDANSETRON 8 MG/1
8 TABLET, FILM COATED ORAL ONCE
Qty: 0 | Refills: 0 | Status: DISCONTINUED | OUTPATIENT
Start: 2019-02-12 | End: 2019-02-12

## 2019-02-12 RX ORDER — SODIUM CHLORIDE 9 MG/ML
1000 INJECTION INTRAMUSCULAR; INTRAVENOUS; SUBCUTANEOUS ONCE
Qty: 0 | Refills: 0 | Status: COMPLETED | OUTPATIENT
Start: 2019-02-12 | End: 2019-02-12

## 2019-02-12 RX ORDER — MORPHINE SULFATE 50 MG/1
0.5 CAPSULE, EXTENDED RELEASE ORAL EVERY 6 HOURS
Qty: 0 | Refills: 0 | Status: DISCONTINUED | OUTPATIENT
Start: 2019-02-12 | End: 2019-02-14

## 2019-02-12 RX ORDER — PANTOPRAZOLE SODIUM 20 MG/1
40 TABLET, DELAYED RELEASE ORAL EVERY 12 HOURS
Qty: 0 | Refills: 0 | Status: DISCONTINUED | OUTPATIENT
Start: 2019-02-12 | End: 2019-02-16

## 2019-02-12 RX ORDER — DIPHENHYDRAMINE HCL 50 MG
50 CAPSULE ORAL EVERY 6 HOURS
Qty: 0 | Refills: 0 | Status: DISCONTINUED | OUTPATIENT
Start: 2019-02-12 | End: 2019-02-14

## 2019-02-12 RX ADMIN — IOHEXOL 30 MILLILITER(S): 300 INJECTION, SOLUTION INTRAVENOUS at 20:00

## 2019-02-12 RX ADMIN — SODIUM CHLORIDE 2000 MILLILITER(S): 9 INJECTION INTRAMUSCULAR; INTRAVENOUS; SUBCUTANEOUS at 18:06

## 2019-02-12 RX ADMIN — SODIUM CHLORIDE 2000 MILLILITER(S): 9 INJECTION INTRAMUSCULAR; INTRAVENOUS; SUBCUTANEOUS at 19:30

## 2019-02-12 RX ADMIN — MORPHINE SULFATE 4 MILLIGRAM(S): 50 CAPSULE, EXTENDED RELEASE ORAL at 18:06

## 2019-02-12 RX ADMIN — SODIUM CHLORIDE 1000 MILLILITER(S): 9 INJECTION INTRAMUSCULAR; INTRAVENOUS; SUBCUTANEOUS at 20:00

## 2019-02-12 RX ADMIN — SODIUM CHLORIDE 1000 MILLILITER(S): 9 INJECTION INTRAMUSCULAR; INTRAVENOUS; SUBCUTANEOUS at 21:49

## 2019-02-12 RX ADMIN — Medication 10 MILLIGRAM(S): at 18:05

## 2019-02-12 RX ADMIN — ONDANSETRON 8 MILLIGRAM(S): 8 TABLET, FILM COATED ORAL at 20:24

## 2019-02-12 RX ADMIN — Medication 10 MILLIGRAM(S): at 22:02

## 2019-02-12 RX ADMIN — HALOPERIDOL DECANOATE 5 MILLIGRAM(S): 100 INJECTION INTRAMUSCULAR at 18:05

## 2019-02-12 RX ADMIN — Medication 200 MILLIGRAM(S): at 21:55

## 2019-02-12 RX ADMIN — Medication 50 MILLIGRAM(S): at 18:05

## 2019-02-12 NOTE — H&P ADULT - HISTORY OF PRESENT ILLNESS
Pt to be admitted for cyclic vomiting, in progress. 23 year old woman with PMH cyclic vomiting thought to be 2' to marijuana use presents with abdominal pain, nausea, and vomiting.  She admits to continued use of marijuana.  She is also specifically requesting morphine and IV benadryl.    In the ED, work up unremarkable.

## 2019-02-12 NOTE — H&P ADULT - PROBLEM SELECTOR PLAN 2
Pt asking for medications specifically by name and initially refusing anything except IV morphine and benadryl.  Discussed the risks associated with IV benadryl and dependence on opioid medications.  No IV Benadryl unless clinically indicated  Would not increase analgesia from current doses.  Discussed goals of pain control and judicious/safe use of opioid medications, patient verbalized understanding.

## 2019-02-12 NOTE — ED PROVIDER NOTE - CLINICAL SUMMARY MEDICAL DECISION MAKING FREE TEXT BOX
pt signed out to Dr. Russell pending improvement and repeat lactic acid, vomiting may be cyclic vomiting secondary to syndrome vs cannabinoid induced - pending improvement

## 2019-02-12 NOTE — ED PROVIDER NOTE - PROGRESS NOTE DETAILS
Drew: patient signed out by Dr. Marks pending re-eval. patient continues to vomit and have abdominal pain thus CT added and reviewed. Patient now admitted to medicine for further management.

## 2019-02-12 NOTE — H&P ADULT - ASSESSMENT
23 year old woman with PMH cyclic vomiting thought to be 2' to marijuana use presents with abdominal pain, nausea, and vomiting.  Patient will require admission for at least 2 midnights as detailed below:    IMPROVE VTE Individual Risk Assessment          RISK                                                          Points    [  ] Previous VTE                                                3    [  ] Thrombophilia                                             2    [  ] Lower limb paralysis                                    2        (unable to hold up >15 seconds)      [  ] Current Cancer                                             2         (within 6 months)    [ x ] Immobilization > 24 hrs                              1    [  ] ICU/CCU stay > 24 hours                            1    [  ] Age > 60                                                    1    IMPROVE VTE Score ___1______

## 2019-02-12 NOTE — ED PROVIDER NOTE - OBJECTIVE STATEMENT
23 year old female with PMH of GERD, cyclic vomiting syndrome vs cannabanoid hyperemesis - otherwise presenting due to 2 days of nausea/vomiting and otherwise states having difficulty with PO intake. Did have some marijuana recently prior to vomiting episodes starting. Pt states otherwise feeling like vomiting episodes started after period pains started. States abd pain exactly same as in December when she had same problem.

## 2019-02-12 NOTE — H&P ADULT - NSHPLABSRESULTS_GEN_ALL_CORE
Vital Signs Last 24 Hrs  T(C): 37 (2019 23:14), Max: 37.1 (2019 16:13)  T(F): 98.6 (2019 23:14), Max: 98.7 (2019 16:13)  HR: 74 (2019 23:14) (63 - 100)  BP: 142/76 (2019 23:14) (134/91 - 142/76)  BP(mean): --  RR: 18 (2019 23:14) (18 - 20)  SpO2: 97% (2019 23:14) (97% - 100%)          LABS:                        10.7   11.41 )-----------( 295      ( 2019 18:23 )             33.3     02-12    146<H>  |  113<H>  |  15  ----------------------------<  119<H>  3.5   |  19<L>  |  0.92    Ca    7.7<L>      2019 19:05    TPro  6.6  /  Alb  3.2<L>  /  TBili  0.6  /  DBili  x   /  AST  23  /  ALT  28  /  AlkPhos  44  02-12      Urinalysis Basic - ( 2019 20:18 )    Color: Yellow / Appearance: Slightly Turbid / S.025 / pH: x  Gluc: x / Ketone: Moderate  / Bili: Negative / Urobili: 1 mg/dL   Blood: x / Protein: 100 mg/dL / Nitrite: Negative   Leuk Esterase: Trace / RBC: 0-2 /HPF / WBC 3-5   Sq Epi: x / Non Sq Epi: Many / Bacteria: Moderate

## 2019-02-13 DIAGNOSIS — Z29.9 ENCOUNTER FOR PROPHYLACTIC MEASURES, UNSPECIFIED: ICD-10-CM

## 2019-02-13 DIAGNOSIS — G43.A1 CYCLICAL VOMITING, IN MIGRAINE, INTRACTABLE: ICD-10-CM

## 2019-02-13 DIAGNOSIS — Z76.5 MALINGERER [CONSCIOUS SIMULATION]: ICD-10-CM

## 2019-02-13 PROBLEM — N83.209 UNSPECIFIED OVARIAN CYST, UNSPECIFIED SIDE: Chronic | Status: ACTIVE | Noted: 2018-12-20

## 2019-02-13 LAB
AMPHET UR-MCNC: NEGATIVE — SIGNIFICANT CHANGE UP
ANION GAP SERPL CALC-SCNC: 12 MMOL/L — SIGNIFICANT CHANGE UP (ref 5–17)
BARBITURATES UR SCN-MCNC: NEGATIVE — SIGNIFICANT CHANGE UP
BASOPHILS # BLD AUTO: 0 K/UL — SIGNIFICANT CHANGE UP (ref 0–0.2)
BASOPHILS NFR BLD AUTO: 0 % — SIGNIFICANT CHANGE UP (ref 0–2)
BENZODIAZ UR-MCNC: NEGATIVE — SIGNIFICANT CHANGE UP
BUN SERPL-MCNC: 9 MG/DL — SIGNIFICANT CHANGE UP (ref 7–23)
CALCIUM SERPL-MCNC: 7.5 MG/DL — LOW (ref 8.5–10.1)
CHLORIDE SERPL-SCNC: 111 MMOL/L — HIGH (ref 96–108)
CO2 SERPL-SCNC: 18 MMOL/L — LOW (ref 22–31)
COCAINE METAB.OTHER UR-MCNC: NEGATIVE — SIGNIFICANT CHANGE UP
CREAT SERPL-MCNC: 0.85 MG/DL — SIGNIFICANT CHANGE UP (ref 0.5–1.3)
CULTURE RESULTS: SIGNIFICANT CHANGE UP
EOSINOPHIL # BLD AUTO: 0 K/UL — SIGNIFICANT CHANGE UP (ref 0–0.5)
EOSINOPHIL NFR BLD AUTO: 0 % — SIGNIFICANT CHANGE UP (ref 0–6)
GLUCOSE SERPL-MCNC: 105 MG/DL — HIGH (ref 70–99)
HCT VFR BLD CALC: 30.8 % — LOW (ref 34.5–45)
HGB BLD-MCNC: 9.7 G/DL — LOW (ref 11.5–15.5)
IMM GRANULOCYTES NFR BLD AUTO: 0.4 % — SIGNIFICANT CHANGE UP (ref 0–1.5)
LYMPHOCYTES # BLD AUTO: 1.91 K/UL — SIGNIFICANT CHANGE UP (ref 1–3.3)
LYMPHOCYTES # BLD AUTO: 19.5 % — SIGNIFICANT CHANGE UP (ref 13–44)
MAGNESIUM SERPL-MCNC: 1.7 MG/DL — SIGNIFICANT CHANGE UP (ref 1.6–2.6)
MCHC RBC-ENTMCNC: 24.9 PG — LOW (ref 27–34)
MCHC RBC-ENTMCNC: 31.5 GM/DL — LOW (ref 32–36)
MCV RBC AUTO: 79.2 FL — LOW (ref 80–100)
METHADONE UR-MCNC: NEGATIVE — SIGNIFICANT CHANGE UP
MONOCYTES # BLD AUTO: 0.61 K/UL — SIGNIFICANT CHANGE UP (ref 0–0.9)
MONOCYTES NFR BLD AUTO: 6.2 % — SIGNIFICANT CHANGE UP (ref 2–14)
NEUTROPHILS # BLD AUTO: 7.21 K/UL — SIGNIFICANT CHANGE UP (ref 1.8–7.4)
NEUTROPHILS NFR BLD AUTO: 73.9 % — SIGNIFICANT CHANGE UP (ref 43–77)
NRBC # BLD: 0 /100 WBCS — SIGNIFICANT CHANGE UP (ref 0–0)
OPIATES UR-MCNC: POSITIVE — SIGNIFICANT CHANGE UP
PCP SPEC-MCNC: SIGNIFICANT CHANGE UP
PCP UR-MCNC: NEGATIVE — SIGNIFICANT CHANGE UP
PHOSPHATE SERPL-MCNC: 3.4 MG/DL — SIGNIFICANT CHANGE UP (ref 2.5–4.5)
PLATELET # BLD AUTO: 229 K/UL — SIGNIFICANT CHANGE UP (ref 150–400)
POTASSIUM SERPL-MCNC: 3.6 MMOL/L — SIGNIFICANT CHANGE UP (ref 3.5–5.3)
POTASSIUM SERPL-SCNC: 3.6 MMOL/L — SIGNIFICANT CHANGE UP (ref 3.5–5.3)
RBC # BLD: 3.89 M/UL — SIGNIFICANT CHANGE UP (ref 3.8–5.2)
RBC # FLD: 18 % — HIGH (ref 10.3–14.5)
SODIUM SERPL-SCNC: 141 MMOL/L — SIGNIFICANT CHANGE UP (ref 135–145)
SPECIMEN SOURCE: SIGNIFICANT CHANGE UP
THC UR QL: POSITIVE — SIGNIFICANT CHANGE UP
WBC # BLD: 9.77 K/UL — SIGNIFICANT CHANGE UP (ref 3.8–10.5)
WBC # FLD AUTO: 9.77 K/UL — SIGNIFICANT CHANGE UP (ref 3.8–10.5)

## 2019-02-13 PROCEDURE — 99233 SBSQ HOSP IP/OBS HIGH 50: CPT

## 2019-02-13 RX ORDER — ONDANSETRON 8 MG/1
8 TABLET, FILM COATED ORAL EVERY 8 HOURS
Qty: 0 | Refills: 0 | Status: DISCONTINUED | OUTPATIENT
Start: 2019-02-13 | End: 2019-02-14

## 2019-02-13 RX ORDER — METOCLOPRAMIDE HCL 10 MG
5 TABLET ORAL ONCE
Qty: 0 | Refills: 0 | Status: COMPLETED | OUTPATIENT
Start: 2019-02-13 | End: 2019-02-13

## 2019-02-13 RX ORDER — SODIUM CHLORIDE 9 MG/ML
1000 INJECTION INTRAMUSCULAR; INTRAVENOUS; SUBCUTANEOUS
Qty: 0 | Refills: 0 | Status: DISCONTINUED | OUTPATIENT
Start: 2019-02-13 | End: 2019-02-16

## 2019-02-13 RX ORDER — INFLUENZA VIRUS VACCINE 15; 15; 15; 15 UG/.5ML; UG/.5ML; UG/.5ML; UG/.5ML
0.5 SUSPENSION INTRAMUSCULAR ONCE
Qty: 0 | Refills: 0 | Status: DISCONTINUED | OUTPATIENT
Start: 2019-02-13 | End: 2019-02-13

## 2019-02-13 RX ORDER — DIPHENHYDRAMINE HCL 50 MG
25 CAPSULE ORAL ONCE
Qty: 0 | Refills: 0 | Status: COMPLETED | OUTPATIENT
Start: 2019-02-13 | End: 2019-02-13

## 2019-02-13 RX ADMIN — MORPHINE SULFATE 0.5 MILLIGRAM(S): 50 CAPSULE, EXTENDED RELEASE ORAL at 21:30

## 2019-02-13 RX ADMIN — MORPHINE SULFATE 0.5 MILLIGRAM(S): 50 CAPSULE, EXTENDED RELEASE ORAL at 00:09

## 2019-02-13 RX ADMIN — Medication 5 MILLIGRAM(S): at 11:02

## 2019-02-13 RX ADMIN — MORPHINE SULFATE 0.5 MILLIGRAM(S): 50 CAPSULE, EXTENDED RELEASE ORAL at 06:09

## 2019-02-13 RX ADMIN — MORPHINE SULFATE 0.5 MILLIGRAM(S): 50 CAPSULE, EXTENDED RELEASE ORAL at 06:25

## 2019-02-13 RX ADMIN — MORPHINE SULFATE 0.5 MILLIGRAM(S): 50 CAPSULE, EXTENDED RELEASE ORAL at 13:33

## 2019-02-13 RX ADMIN — Medication 25 MILLIGRAM(S): at 11:02

## 2019-02-13 RX ADMIN — MORPHINE SULFATE 0.5 MILLIGRAM(S): 50 CAPSULE, EXTENDED RELEASE ORAL at 20:32

## 2019-02-13 RX ADMIN — ONDANSETRON 8 MILLIGRAM(S): 8 TABLET, FILM COATED ORAL at 05:05

## 2019-02-13 RX ADMIN — MORPHINE SULFATE 0.5 MILLIGRAM(S): 50 CAPSULE, EXTENDED RELEASE ORAL at 00:25

## 2019-02-13 RX ADMIN — PANTOPRAZOLE SODIUM 40 MILLIGRAM(S): 20 TABLET, DELAYED RELEASE ORAL at 07:53

## 2019-02-13 RX ADMIN — SODIUM CHLORIDE 100 MILLILITER(S): 9 INJECTION INTRAMUSCULAR; INTRAVENOUS; SUBCUTANEOUS at 00:40

## 2019-02-13 RX ADMIN — PANTOPRAZOLE SODIUM 40 MILLIGRAM(S): 20 TABLET, DELAYED RELEASE ORAL at 00:10

## 2019-02-13 RX ADMIN — Medication 25 MILLIGRAM(S): at 21:50

## 2019-02-13 RX ADMIN — SODIUM CHLORIDE 100 MILLILITER(S): 9 INJECTION INTRAMUSCULAR; INTRAVENOUS; SUBCUTANEOUS at 21:50

## 2019-02-13 RX ADMIN — MORPHINE SULFATE 0.5 MILLIGRAM(S): 50 CAPSULE, EXTENDED RELEASE ORAL at 13:47

## 2019-02-13 NOTE — PROGRESS NOTE ADULT - SUBJECTIVE AND OBJECTIVE BOX
Patient is a 23y old  Female who presents with a chief complaint of cyclic vomiting (2019 22:06)      INTERVAL HPI/OVERNIGHT EVENTS: no events     MEDICATIONS  (STANDING):  pantoprazole  Injectable 40 milliGRAM(s) IV Push every 12 hours  sodium chloride 0.9%. 1000 milliLiter(s) (100 mL/Hr) IV Continuous <Continuous>    MEDICATIONS  (PRN):  acetaminophen   Tablet .. 650 milliGRAM(s) Oral every 6 hours PRN Temp greater or equal to 38C (100.4F), Mild Pain (1 - 3)  diphenhydrAMINE 50 milliGRAM(s) Oral every 6 hours PRN Rash and/or Itching  morphine  - Injectable 0.5 milliGRAM(s) IV Push every 6 hours PRN Severe Pain (7 - 10)  ondansetron Injectable 8 milliGRAM(s) IV Push every 8 hours PRN Nausea and/or Vomiting  oxyCODONE    5 mG/acetaminophen 325 mG 1 Tablet(s) Oral every 6 hours PRN Moderate Pain (4 - 6)      Allergies    penicillin (Hives)    Intolerances        REVIEW OF SYSTEMS:  CONSTITUTIONAL: No fever, weight loss, or fatigue  EYES: No eye pain, visual disturbances, or discharge  ENMT:  No difficulty hearing, tinnitus, vertigo; No sinus or throat pain  NECK: No pain or stiffness  BREASTS: No pain, masses, or nipple discharge  RESPIRATORY: No cough, wheezing, chills or hemoptysis; No shortness of breath  CARDIOVASCULAR: No chest pain, palpitations, dizziness, or leg swelling  GASTROINTESTINAL: No abdominal or epigastric pain. No nausea, vomiting, or hematemesis; No diarrhea or constipation. No melena or hematochezia.  GENITOURINARY: No dysuria, frequency, hematuria, or incontinence  NEUROLOGICAL: No headaches, memory loss, loss of strength, numbness, or tremors  SKIN: No itching, burning, rashes, or lesions   LYMPH NODES: No enlarged glands  ENDOCRINE: No heat or cold intolerance; No hair loss  MUSCULOSKELETAL: No joint pain or swelling; No muscle, back, or extremity pain  PSYCHIATRIC: No depression, anxiety, mood swings, or difficulty sleeping  HEME/LYMPH: No easy bruising, or bleeding gums  ALLERGY AND IMMUNOLOGIC: No hives or eczema    Vital Signs Last 24 Hrs  T(C): 36.6 (2019 12:05), Max: 37.1 (2019 16:13)  T(F): 97.8 (2019 12:05), Max: 98.7 (2019 16:13)  HR: 83 (2019 12:05) (63 - 100)  BP: 156/103 (2019 12:05) (134/91 - 156/103)  BP(mean): --  RR: 18 (2019 12:05) (18 - 20)  SpO2: 100% (2019 12:05) (97% - 100%)    PHYSICAL EXAM:  GENERAL: NAD, well-groomed, well-developed  HEAD:  Atraumatic, Normocephalic  EYES: EOMI, PERRLA, conjunctiva and sclera clear  ENMT: No tonsillar erythema, exudates, or enlargement; Moist mucous membranes, Good dentition, No lesions  NECK: Supple, No JVD, Normal thyroid  NERVOUS SYSTEM:  Alert & Oriented X3, Good concentration; Motor Strength 5/5 B/L upper and lower extremities; DTRs 2+ intact and symmetric  CHEST/LUNG: Clear to percussion bilaterally; No rales, rhonchi, wheezing, or rubs  HEART: Regular rate and rhythm; No murmurs, rubs, or gallops  ABDOMEN: Soft, Nontender, Nondistended; Bowel sounds present  EXTREMITIES:  2+ Peripheral Pulses, No clubbing, cyanosis, or edema  LYMPH: No lymphadenopathy noted  SKIN: No rashes or lesions    LABS:                        9.7    9.77  )-----------( 229      ( 2019 09:16 )             30.8     02-13    141  |  111<H>  |  9   ----------------------------<  105<H>  3.6   |  18<L>  |  0.85    Ca    7.5<L>      2019 09:16  Phos  3.4     02-13  Mg     1.7     02-13    TPro  6.6  /  Alb  3.2<L>  /  TBili  0.6  /  DBili  x   /  AST  23  /  ALT  28  /  AlkPhos  44  02-12      Urinalysis Basic - ( 2019 20:18 )    Color: Yellow / Appearance: Slightly Turbid / S.025 / pH: x  Gluc: x / Ketone: Moderate  / Bili: Negative / Urobili: 1 mg/dL   Blood: x / Protein: 100 mg/dL / Nitrite: Negative   Leuk Esterase: Trace / RBC: 0-2 /HPF / WBC 3-5   Sq Epi: x / Non Sq Epi: Many / Bacteria: Moderate      CAPILLARY BLOOD GLUCOSE          RADIOLOGY & ADDITIONAL TESTS:    Imaging Personally Reviewed:  [ X] YES  [ ] NO    Consultant(s) Notes Reviewed:  [ X] YES  [ ] NO    Care Discussed with Consultants/Other Providers [X ] YES  [ ] NO

## 2019-02-13 NOTE — PATIENT PROFILE ADULT - NSTOBACCONEVERSMOKERY/N_GEN_A
[facility] Referral      1. Confirmed address with patient? Yes  2. Confirmed phone number with patient? Yes  3. Confirmed referring provider? Yes  4. Is the PCP the same as the referring provider? No  5. Has the patient been to any previous pain clinics? No  (If yes, send ELMA with welcome letter)  6. Which insurance are we to bill for this appointment?  [employer]    7. Informed pt of cancellation (48 hour) policy? Yes    REGARDING OPIOID MEDICATIONS: We will always address appropriateness of opioid pain medications, but we generally will not automatically take on a prescribing role. When we do take on prescribing of opioids for chronic pain, it is in collaboration with the referring physician for an intermediate period of time (months), with an expectation that the primary physician or provider will assume the prescribing role if medications are effective at stable doses with demonstrated compliance. Therefore, please do not assume that your prescribing responsibilities end on the day of pain clinic consultation.  7. Informed pt of prescribing policy? Yes      8. Referring Provider: Isadora Zacarias    9. Criteria for Triage Eval:   -Missed/Failed 1st DUAL appointment? N/A   -Medication Focused? N/A   -Mental Health Concerns? (e.g. Recent psych hospitalization/snap shot)? N/A   -Active substance abuse? N/A   -Patient behaviors (e.g. Offensive language/raised voice)? N/A     No

## 2019-02-13 NOTE — PROGRESS NOTE ADULT - ASSESSMENT
23 year old woman with PMH cyclic vomiting thought to be 2' to marijuana use presents with abdominal pain, nausea, and vomiting.

## 2019-02-14 DIAGNOSIS — E87.2 ACIDOSIS: ICD-10-CM

## 2019-02-14 PROCEDURE — 99232 SBSQ HOSP IP/OBS MODERATE 35: CPT

## 2019-02-14 RX ORDER — DIPHENHYDRAMINE HCL 50 MG
25 CAPSULE ORAL EVERY 8 HOURS
Qty: 0 | Refills: 0 | Status: DISCONTINUED | OUTPATIENT
Start: 2019-02-14 | End: 2019-02-16

## 2019-02-14 RX ORDER — METOCLOPRAMIDE HCL 10 MG
5 TABLET ORAL EVERY 8 HOURS
Qty: 0 | Refills: 0 | Status: DISCONTINUED | OUTPATIENT
Start: 2019-02-14 | End: 2019-02-16

## 2019-02-14 RX ORDER — KETOROLAC TROMETHAMINE 30 MG/ML
15 SYRINGE (ML) INJECTION EVERY 12 HOURS
Qty: 0 | Refills: 0 | Status: DISCONTINUED | OUTPATIENT
Start: 2019-02-14 | End: 2019-02-16

## 2019-02-14 RX ORDER — SUCRALFATE 1 G
1 TABLET ORAL
Qty: 0 | Refills: 0 | Status: DISCONTINUED | OUTPATIENT
Start: 2019-02-14 | End: 2019-02-16

## 2019-02-14 RX ORDER — DIPHENHYDRAMINE HCL 50 MG
25 CAPSULE ORAL EVERY 6 HOURS
Qty: 0 | Refills: 0 | Status: DISCONTINUED | OUTPATIENT
Start: 2019-02-14 | End: 2019-02-14

## 2019-02-14 RX ADMIN — PANTOPRAZOLE SODIUM 40 MILLIGRAM(S): 20 TABLET, DELAYED RELEASE ORAL at 06:43

## 2019-02-14 RX ADMIN — MORPHINE SULFATE 0.5 MILLIGRAM(S): 50 CAPSULE, EXTENDED RELEASE ORAL at 06:27

## 2019-02-14 RX ADMIN — Medication 15 MILLIGRAM(S): at 17:50

## 2019-02-14 RX ADMIN — MORPHINE SULFATE 0.5 MILLIGRAM(S): 50 CAPSULE, EXTENDED RELEASE ORAL at 08:45

## 2019-02-14 RX ADMIN — MORPHINE SULFATE 0.5 MILLIGRAM(S): 50 CAPSULE, EXTENDED RELEASE ORAL at 08:29

## 2019-02-14 RX ADMIN — Medication 25 MILLIGRAM(S): at 10:10

## 2019-02-14 RX ADMIN — Medication 25 MILLIGRAM(S): at 23:02

## 2019-02-14 RX ADMIN — PANTOPRAZOLE SODIUM 40 MILLIGRAM(S): 20 TABLET, DELAYED RELEASE ORAL at 17:36

## 2019-02-14 RX ADMIN — Medication 5 MILLIGRAM(S): at 10:11

## 2019-02-14 RX ADMIN — Medication 5 MILLIGRAM(S): at 18:52

## 2019-02-14 RX ADMIN — Medication 15 MILLIGRAM(S): at 17:35

## 2019-02-14 RX ADMIN — MORPHINE SULFATE 0.5 MILLIGRAM(S): 50 CAPSULE, EXTENDED RELEASE ORAL at 02:32

## 2019-02-14 NOTE — PROGRESS NOTE ADULT - PROBLEM SELECTOR PLAN 1
unable to tolerate po, still vomitting  IVF hydration  reglan, benadryl  Discussed marijuana cessation

## 2019-02-14 NOTE — PROGRESS NOTE ADULT - SUBJECTIVE AND OBJECTIVE BOX
Patient is a 23y old  Female who presents with a chief complaint of cyclic vomiting (2019 14:38)      INTERVAL HPI/OVERNIGHT EVENTS: still unable to tolerate po     MEDICATIONS  (STANDING):  pantoprazole  Injectable 40 milliGRAM(s) IV Push every 12 hours  sodium chloride 0.9%. 1000 milliLiter(s) (100 mL/Hr) IV Continuous <Continuous>    MEDICATIONS  (PRN):  acetaminophen   Tablet .. 650 milliGRAM(s) Oral every 6 hours PRN Temp greater or equal to 38C (100.4F), Mild Pain (1 - 3)  diphenhydrAMINE   Injectable 25 milliGRAM(s) IV Push every 8 hours PRN Itching  metoclopramide Injectable 5 milliGRAM(s) IV Push every 8 hours PRN nausea and vomiting  oxyCODONE    5 mG/acetaminophen 325 mG 1 Tablet(s) Oral every 6 hours PRN Moderate Pain (4 - 6)      Allergies    penicillin (Hives)    Intolerances           Vital Signs Last 24 Hrs  T(C): 36.6 (2019 05:39), Max: 37.3 (2019 17:03)  T(F): 97.9 (2019 05:39), Max: 99.1 (2019 17:03)  HR: 84 (2019 05:39) (83 - 84)  BP: 160/96 (2019 05:39) (145/98 - 160/96)  BP(mean): --  RR: 18 (2019 05:39) (18 - 18)  SpO2: 100% (2019 05:39) (98% - 100%)    PHYSICAL EXAM:  GENERAL: NAD, well-groomed, well-developed  HEAD:  Atraumatic, Normocephalic  EYES: EOMI, PERRLA, conjunctiva and sclera clear  ENMT: No tonsillar erythema, exudates, or enlargement; Moist mucous membranes, Good dentition, No lesions  NECK: Supple, No JVD, Normal thyroid  NERVOUS SYSTEM:  Alert & Oriented X3, Good concentration; Motor Strength 5/5 B/L upper and lower extremities; DTRs 2+ intact and symmetric  CHEST/LUNG: Clear to percussion bilaterally; No rales, rhonchi, wheezing, or rubs  HEART: Regular rate and rhythm; No murmurs, rubs, or gallops  ABDOMEN: Soft, Nontender, Nondistended; Bowel sounds present  EXTREMITIES:  2+ Peripheral Pulses, No clubbing, cyanosis, or edema  LYMPH: No lymphadenopathy noted  SKIN: No rashes or lesions    LABS:                        9.7    9.77  )-----------( 229      ( 2019 09:16 )             30.8     02-    141  |  111<H>  |  9   ----------------------------<  105<H>  3.6   |  18<L>  |  0.85    Ca    7.5<L>      2019 09:16  Phos  3.4     -  Mg     1.7         TPro  6.6  /  Alb  3.2<L>  /  TBili  0.6  /  DBili  x   /  AST  23  /  ALT  28  /  AlkPhos  44  02-12      Urinalysis Basic - ( 2019 20:18 )    Color: Yellow / Appearance: Slightly Turbid / S.025 / pH: x  Gluc: x / Ketone: Moderate  / Bili: Negative / Urobili: 1 mg/dL   Blood: x / Protein: 100 mg/dL / Nitrite: Negative   Leuk Esterase: Trace / RBC: 0-2 /HPF / WBC 3-5   Sq Epi: x / Non Sq Epi: Many / Bacteria: Moderate      CAPILLARY BLOOD GLUCOSE          RADIOLOGY & ADDITIONAL TESTS:    Imaging Personally Reviewed:  [ X] YES  [ ] NO    Consultant(s) Notes Reviewed:  [ X] YES  [ ] NO    Care Discussed with Consultants/Other Providers [X ] YES  [ ] NO

## 2019-02-15 ENCOUNTER — TRANSCRIPTION ENCOUNTER (OUTPATIENT)
Age: 24
End: 2019-02-15

## 2019-02-15 PROCEDURE — 99239 HOSP IP/OBS DSCHRG MGMT >30: CPT

## 2019-02-15 RX ORDER — CALCIUM CARBONATE 500(1250)
1 TABLET ORAL ONCE
Qty: 0 | Refills: 0 | Status: COMPLETED | OUTPATIENT
Start: 2019-02-15 | End: 2019-02-15

## 2019-02-15 RX ORDER — ONDANSETRON 8 MG/1
4 TABLET, FILM COATED ORAL ONCE
Qty: 0 | Refills: 0 | Status: COMPLETED | OUTPATIENT
Start: 2019-02-15 | End: 2019-02-15

## 2019-02-15 RX ORDER — POLYETHYLENE GLYCOL 3350 17 G/17G
17 POWDER, FOR SOLUTION ORAL DAILY
Qty: 0 | Refills: 0 | Status: DISCONTINUED | OUTPATIENT
Start: 2019-02-15 | End: 2019-02-16

## 2019-02-15 RX ORDER — ONDANSETRON 8 MG/1
4 TABLET, FILM COATED ORAL ONCE
Qty: 0 | Refills: 0 | Status: DISCONTINUED | OUTPATIENT
Start: 2019-02-15 | End: 2019-02-16

## 2019-02-15 RX ADMIN — PANTOPRAZOLE SODIUM 40 MILLIGRAM(S): 20 TABLET, DELAYED RELEASE ORAL at 05:28

## 2019-02-15 RX ADMIN — PANTOPRAZOLE SODIUM 40 MILLIGRAM(S): 20 TABLET, DELAYED RELEASE ORAL at 17:54

## 2019-02-15 RX ADMIN — Medication 25 MILLIGRAM(S): at 23:09

## 2019-02-15 RX ADMIN — Medication 25 MILLIGRAM(S): at 14:26

## 2019-02-15 RX ADMIN — ONDANSETRON 4 MILLIGRAM(S): 8 TABLET, FILM COATED ORAL at 05:28

## 2019-02-15 RX ADMIN — Medication 1 TABLET(S): at 02:09

## 2019-02-15 RX ADMIN — Medication 5 MILLIGRAM(S): at 23:07

## 2019-02-15 RX ADMIN — Medication 15 MILLIGRAM(S): at 12:36

## 2019-02-15 RX ADMIN — Medication 15 MILLIGRAM(S): at 13:00

## 2019-02-15 RX ADMIN — OXYCODONE AND ACETAMINOPHEN 1 TABLET(S): 5; 325 TABLET ORAL at 05:28

## 2019-02-15 RX ADMIN — Medication 5 MILLIGRAM(S): at 14:24

## 2019-02-15 RX ADMIN — OXYCODONE AND ACETAMINOPHEN 1 TABLET(S): 5; 325 TABLET ORAL at 06:28

## 2019-02-15 NOTE — DISCHARGE NOTE PROVIDER - HOSPITAL COURSE
23 year old woman with PMH cyclic vomiting thought to be 2' to marijuana use presents with abdominal pain, nausea, and vomiting.           Problem/Plan - :    ·  Problem: Intractable cyclical vomiting with nausea.  Plan:  tolerates po, f/u with your pcp/gyn/gi         Problem/Plan - :    ·  Problem: Lactic acidosis. Plan: poa , resolved w IVF.                Attending Attestation:     40 minutes spent on total dc encounter; more than 50% of the visit was spent counseling and/or coordinating care by the attending physician. 23 year old woman with PMH cyclic vomiting thought to be 2' to marijuana use presents with abdominal pain, nausea, and vomiting.          Vital Signs Last 24 Hrs    T(C): 37.1 (15 Feb 2019 23:30), Max: 37.1 (15 Feb 2019 23:30)    T(F): 98.8 (15 Feb 2019 23:30), Max: 98.8 (15 Feb 2019 23:30)    HR: 82 (15 Feb 2019 23:30) (75 - 89)    BP: 148/90 (16 Feb 2019 05:45) (124/82 - 168/115)    BP(mean): --    RR: 17 (16 Feb 2019 05:45) (16 - 18)    SpO2: 100% (16 Feb 2019 05:45) (99% - 100%)        PHYSICAL EXAM:    GENERAL: NAD, well-groomed, well-developed    HEAD:  Atraumatic, Normocephalic    EYES: EOMI, PERRLA, conjunctiva and sclera clear    ENMT: No tonsillar erythema, exudates, or enlargement; Moist mucous membranes, Good dentition, No lesions    NECK: Supple, No JVD, Normal thyroid    NERVOUS SYSTEM:  Alert & Oriented X3, Good concentration; Motor Strength 5/5 B/L upper and lower extremities; DTRs 2+ intact and symmetric    CHEST/LUNG: Clear to percussion bilaterally; No rales, rhonchi, wheezing, or rubs    HEART: Regular rate and rhythm; No murmurs, rubs, or gallops    ABDOMEN: Soft, Nontender, Nondistended; Bowel sounds present    EXTREMITIES:  2+ Peripheral Pulses, No clubbing, cyanosis, or edema    LYMPH: No lymphadenopathy noted    SKIN: No rashes or lesions            Intractable cyclical vomiting with nausea possibly due to marijuana    Plan:  tolerating po, f/u with your pcp/gyn/gi    Promethazine suppository prn        Hypokalemia    K+ replacement ordered    Repeat K+ at 6 PM, if over 3.3, can be discharged home with outpatient Follow up             Lactic acidosis    Plan: poa , resolved w IVF.

## 2019-02-15 NOTE — DISCHARGE NOTE PROVIDER - CARE PROVIDER_API CALL
your primary doctor/gyn/gi,   Phone: (   )    -  Fax: (   )    -  Follow Up Time: your primary doctor/gyn/gi,   Phone: (   )    -  Fax: (   )    -  Follow Up Time: 1-3 days

## 2019-02-15 NOTE — DISCHARGE NOTE PROVIDER - PROVIDER TOKENS
FREE:[LAST:[your primary doctor/gyn/gi],PHONE:[(   )    -],FAX:[(   )    -]] FREE:[LAST:[your primary doctor/gyn/gi],PHONE:[(   )    -],FAX:[(   )    -],FOLLOWUP:[1-3 days]]

## 2019-02-16 ENCOUNTER — TRANSCRIPTION ENCOUNTER (OUTPATIENT)
Age: 24
End: 2019-02-16

## 2019-02-16 VITALS
OXYGEN SATURATION: 100 % | DIASTOLIC BLOOD PRESSURE: 88 MMHG | RESPIRATION RATE: 17 BRPM | TEMPERATURE: 98 F | HEART RATE: 72 BPM | SYSTOLIC BLOOD PRESSURE: 138 MMHG

## 2019-02-16 LAB
ANION GAP SERPL CALC-SCNC: 9 MMOL/L — SIGNIFICANT CHANGE UP (ref 5–17)
BUN SERPL-MCNC: 16 MG/DL — SIGNIFICANT CHANGE UP (ref 7–23)
CALCIUM SERPL-MCNC: 7.6 MG/DL — LOW (ref 8.5–10.1)
CHLORIDE SERPL-SCNC: 102 MMOL/L — SIGNIFICANT CHANGE UP (ref 96–108)
CO2 SERPL-SCNC: 25 MMOL/L — SIGNIFICANT CHANGE UP (ref 22–31)
CREAT SERPL-MCNC: 0.84 MG/DL — SIGNIFICANT CHANGE UP (ref 0.5–1.3)
GLUCOSE SERPL-MCNC: 100 MG/DL — HIGH (ref 70–99)
HCT VFR BLD CALC: 35.4 % — SIGNIFICANT CHANGE UP (ref 34.5–45)
HGB BLD-MCNC: 11 G/DL — LOW (ref 11.5–15.5)
MAGNESIUM SERPL-MCNC: 2.4 MG/DL — SIGNIFICANT CHANGE UP (ref 1.6–2.6)
MCHC RBC-ENTMCNC: 24.3 PG — LOW (ref 27–34)
MCHC RBC-ENTMCNC: 31.1 GM/DL — LOW (ref 32–36)
MCV RBC AUTO: 78.1 FL — LOW (ref 80–100)
NRBC # BLD: 0 /100 WBCS — SIGNIFICANT CHANGE UP (ref 0–0)
PHOSPHATE SERPL-MCNC: 2.6 MG/DL — SIGNIFICANT CHANGE UP (ref 2.5–4.5)
PLATELET # BLD AUTO: 260 K/UL — SIGNIFICANT CHANGE UP (ref 150–400)
POTASSIUM SERPL-MCNC: 2.8 MMOL/L — CRITICAL LOW (ref 3.5–5.3)
POTASSIUM SERPL-MCNC: 3.4 MMOL/L — LOW (ref 3.5–5.3)
POTASSIUM SERPL-SCNC: 2.8 MMOL/L — CRITICAL LOW (ref 3.5–5.3)
POTASSIUM SERPL-SCNC: 3.4 MMOL/L — LOW (ref 3.5–5.3)
RBC # BLD: 4.53 M/UL — SIGNIFICANT CHANGE UP (ref 3.8–5.2)
RBC # FLD: 17.1 % — HIGH (ref 10.3–14.5)
SODIUM SERPL-SCNC: 136 MMOL/L — SIGNIFICANT CHANGE UP (ref 135–145)
WBC # BLD: 8.13 K/UL — SIGNIFICANT CHANGE UP (ref 3.8–10.5)
WBC # FLD AUTO: 8.13 K/UL — SIGNIFICANT CHANGE UP (ref 3.8–10.5)

## 2019-02-16 PROCEDURE — 99239 HOSP IP/OBS DSCHRG MGMT >30: CPT

## 2019-02-16 RX ORDER — POTASSIUM CHLORIDE 20 MEQ
10 PACKET (EA) ORAL
Qty: 0 | Refills: 0 | Status: COMPLETED | OUTPATIENT
Start: 2019-02-16 | End: 2019-02-16

## 2019-02-16 RX ORDER — POTASSIUM CHLORIDE 20 MEQ
40 PACKET (EA) ORAL ONCE
Qty: 0 | Refills: 0 | Status: COMPLETED | OUTPATIENT
Start: 2019-02-16 | End: 2019-02-16

## 2019-02-16 RX ADMIN — Medication 40 MILLIEQUIVALENT(S): at 11:41

## 2019-02-16 RX ADMIN — Medication 100 MILLIEQUIVALENT(S): at 13:13

## 2019-02-16 RX ADMIN — PANTOPRAZOLE SODIUM 40 MILLIGRAM(S): 20 TABLET, DELAYED RELEASE ORAL at 17:59

## 2019-02-16 RX ADMIN — Medication 650 MILLIGRAM(S): at 08:45

## 2019-02-16 RX ADMIN — Medication 100 MILLIEQUIVALENT(S): at 10:30

## 2019-02-16 RX ADMIN — PANTOPRAZOLE SODIUM 40 MILLIGRAM(S): 20 TABLET, DELAYED RELEASE ORAL at 06:03

## 2019-02-16 RX ADMIN — Medication 100 MILLIEQUIVALENT(S): at 11:40

## 2019-02-16 RX ADMIN — Medication 650 MILLIGRAM(S): at 09:40

## 2019-02-16 NOTE — DISCHARGE NOTE NURSING/CASE MANAGEMENT/SOCIAL WORK - NSDCDPATPORTLINK_GEN_ALL_CORE
You can access the [facility] Portal, offered by [facility], by registering with the following website: [email or web address]

## 2019-02-24 DIAGNOSIS — Z88.0 ALLERGY STATUS TO PENICILLIN: ICD-10-CM

## 2019-02-24 DIAGNOSIS — F12.988 CANNABIS USE, UNSPECIFIED WITH OTHER CANNABIS-INDUCED DISORDER: ICD-10-CM

## 2019-02-24 DIAGNOSIS — K31.89 OTHER DISEASES OF STOMACH AND DUODENUM: ICD-10-CM

## 2019-02-24 DIAGNOSIS — Z28.9 IMMUNIZATION NOT CARRIED OUT FOR UNSPECIFIED REASON: ICD-10-CM

## 2019-02-24 DIAGNOSIS — E87.2 ACIDOSIS: ICD-10-CM

## 2019-02-24 DIAGNOSIS — Z79.3 LONG TERM (CURRENT) USE OF HORMONAL CONTRACEPTIVES: ICD-10-CM

## 2019-02-24 DIAGNOSIS — R11.2 NAUSEA WITH VOMITING, UNSPECIFIED: ICD-10-CM

## 2019-02-24 DIAGNOSIS — Z79.899 OTHER LONG TERM (CURRENT) DRUG THERAPY: ICD-10-CM

## 2019-02-24 DIAGNOSIS — E87.6 HYPOKALEMIA: ICD-10-CM

## 2019-03-30 ENCOUNTER — EMERGENCY (EMERGENCY)
Facility: HOSPITAL | Age: 24
LOS: 0 days | Discharge: HOME | End: 2019-03-30
Attending: EMERGENCY MEDICINE
Payer: COMMERCIAL

## 2019-03-30 VITALS
OXYGEN SATURATION: 100 % | WEIGHT: 110.01 LBS | SYSTOLIC BLOOD PRESSURE: 152 MMHG | HEART RATE: 87 BPM | RESPIRATION RATE: 17 BRPM | DIASTOLIC BLOOD PRESSURE: 100 MMHG | TEMPERATURE: 98 F

## 2019-03-30 VITALS
TEMPERATURE: 98 F | RESPIRATION RATE: 20 BRPM | OXYGEN SATURATION: 100 % | DIASTOLIC BLOOD PRESSURE: 80 MMHG | SYSTOLIC BLOOD PRESSURE: 132 MMHG | HEART RATE: 78 BPM

## 2019-03-30 DIAGNOSIS — K31.89 OTHER DISEASES OF STOMACH AND DUODENUM: ICD-10-CM

## 2019-03-30 DIAGNOSIS — Z88.0 ALLERGY STATUS TO PENICILLIN: ICD-10-CM

## 2019-03-30 DIAGNOSIS — R11.10 VOMITING, UNSPECIFIED: ICD-10-CM

## 2019-03-30 DIAGNOSIS — R10.9 UNSPECIFIED ABDOMINAL PAIN: ICD-10-CM

## 2019-03-30 LAB
ALBUMIN SERPL ELPH-MCNC: 4.4 G/DL — SIGNIFICANT CHANGE UP (ref 3.3–5)
ALP SERPL-CCNC: 48 U/L — SIGNIFICANT CHANGE UP (ref 40–120)
ALT FLD-CCNC: 47 U/L — SIGNIFICANT CHANGE UP (ref 12–78)
ANION GAP SERPL CALC-SCNC: 15 MMOL/L — SIGNIFICANT CHANGE UP (ref 5–17)
APPEARANCE UR: CLEAR — SIGNIFICANT CHANGE UP
AST SERPL-CCNC: 40 U/L — HIGH (ref 15–37)
BACTERIA # UR AUTO: ABNORMAL
BASOPHILS # BLD AUTO: 0.01 K/UL — SIGNIFICANT CHANGE UP (ref 0–0.2)
BASOPHILS NFR BLD AUTO: 0.1 % — SIGNIFICANT CHANGE UP (ref 0–2)
BILIRUB SERPL-MCNC: 1 MG/DL — SIGNIFICANT CHANGE UP (ref 0.2–1.2)
BILIRUB UR-MCNC: NEGATIVE — SIGNIFICANT CHANGE UP
BUN SERPL-MCNC: 22 MG/DL — SIGNIFICANT CHANGE UP (ref 7–23)
CALCIUM SERPL-MCNC: 8.9 MG/DL — SIGNIFICANT CHANGE UP (ref 8.5–10.1)
CHLORIDE SERPL-SCNC: 107 MMOL/L — SIGNIFICANT CHANGE UP (ref 96–108)
CO2 SERPL-SCNC: 20 MMOL/L — LOW (ref 22–31)
COLOR SPEC: YELLOW — SIGNIFICANT CHANGE UP
CREAT SERPL-MCNC: 1.08 MG/DL — SIGNIFICANT CHANGE UP (ref 0.5–1.3)
DIFF PNL FLD: ABNORMAL
EOSINOPHIL # BLD AUTO: 0 K/UL — SIGNIFICANT CHANGE UP (ref 0–0.5)
EOSINOPHIL NFR BLD AUTO: 0 % — SIGNIFICANT CHANGE UP (ref 0–6)
EPI CELLS # UR: ABNORMAL
GLUCOSE SERPL-MCNC: 106 MG/DL — HIGH (ref 70–99)
GLUCOSE UR QL: NEGATIVE MG/DL — SIGNIFICANT CHANGE UP
HCG SERPL-ACNC: <1 MIU/ML — SIGNIFICANT CHANGE UP
HCT VFR BLD CALC: 37.3 % — SIGNIFICANT CHANGE UP (ref 34.5–45)
HGB BLD-MCNC: 12 G/DL — SIGNIFICANT CHANGE UP (ref 11.5–15.5)
IMM GRANULOCYTES NFR BLD AUTO: 0.3 % — SIGNIFICANT CHANGE UP (ref 0–1.5)
KETONES UR-MCNC: ABNORMAL
LACTATE SERPL-SCNC: 1.2 MMOL/L — SIGNIFICANT CHANGE UP (ref 0.7–2)
LACTATE SERPL-SCNC: 2.4 MMOL/L — HIGH (ref 0.7–2)
LACTATE SERPL-SCNC: 9.3 MMOL/L — CRITICAL HIGH (ref 0.7–2)
LEUKOCYTE ESTERASE UR-ACNC: ABNORMAL
LIDOCAIN IGE QN: 107 U/L — SIGNIFICANT CHANGE UP (ref 73–393)
LYMPHOCYTES # BLD AUTO: 1.53 K/UL — SIGNIFICANT CHANGE UP (ref 1–3.3)
LYMPHOCYTES # BLD AUTO: 13 % — SIGNIFICANT CHANGE UP (ref 13–44)
MAGNESIUM SERPL-MCNC: 2.6 MG/DL — SIGNIFICANT CHANGE UP (ref 1.6–2.6)
MCHC RBC-ENTMCNC: 25.6 PG — LOW (ref 27–34)
MCHC RBC-ENTMCNC: 32.2 GM/DL — SIGNIFICANT CHANGE UP (ref 32–36)
MCV RBC AUTO: 79.5 FL — LOW (ref 80–100)
MONOCYTES # BLD AUTO: 0.5 K/UL — SIGNIFICANT CHANGE UP (ref 0–0.9)
MONOCYTES NFR BLD AUTO: 4.3 % — SIGNIFICANT CHANGE UP (ref 2–14)
NEUTROPHILS # BLD AUTO: 9.66 K/UL — HIGH (ref 1.8–7.4)
NEUTROPHILS NFR BLD AUTO: 82.3 % — HIGH (ref 43–77)
NITRITE UR-MCNC: NEGATIVE — SIGNIFICANT CHANGE UP
NRBC # BLD: 0 /100 WBCS — SIGNIFICANT CHANGE UP (ref 0–0)
PH UR: 7 — SIGNIFICANT CHANGE UP (ref 5–8)
PLATELET # BLD AUTO: 373 K/UL — SIGNIFICANT CHANGE UP (ref 150–400)
POTASSIUM SERPL-MCNC: 3.5 MMOL/L — SIGNIFICANT CHANGE UP (ref 3.5–5.3)
POTASSIUM SERPL-SCNC: 3.5 MMOL/L — SIGNIFICANT CHANGE UP (ref 3.5–5.3)
PROT SERPL-MCNC: 8.7 GM/DL — HIGH (ref 6–8.3)
PROT UR-MCNC: 30 MG/DL
RBC # BLD: 4.69 M/UL — SIGNIFICANT CHANGE UP (ref 3.8–5.2)
RBC # FLD: 17.3 % — HIGH (ref 10.3–14.5)
RBC CASTS # UR COMP ASSIST: SIGNIFICANT CHANGE UP /HPF (ref 0–4)
SODIUM SERPL-SCNC: 142 MMOL/L — SIGNIFICANT CHANGE UP (ref 135–145)
SP GR SPEC: 1.01 — SIGNIFICANT CHANGE UP (ref 1.01–1.02)
UROBILINOGEN FLD QL: 1 MG/DL
WBC # BLD: 11.74 K/UL — HIGH (ref 3.8–10.5)
WBC # FLD AUTO: 11.74 K/UL — HIGH (ref 3.8–10.5)
WBC UR QL: ABNORMAL

## 2019-03-30 PROCEDURE — 99284 EMERGENCY DEPT VISIT MOD MDM: CPT

## 2019-03-30 RX ORDER — DIPHENHYDRAMINE HCL 50 MG
25 CAPSULE ORAL ONCE
Qty: 0 | Refills: 0 | Status: COMPLETED | OUTPATIENT
Start: 2019-03-30 | End: 2019-03-30

## 2019-03-30 RX ORDER — PANTOPRAZOLE SODIUM 20 MG/1
40 TABLET, DELAYED RELEASE ORAL ONCE
Qty: 0 | Refills: 0 | Status: COMPLETED | OUTPATIENT
Start: 2019-03-30 | End: 2019-03-30

## 2019-03-30 RX ORDER — METOCLOPRAMIDE HCL 10 MG
10 TABLET ORAL ONCE
Qty: 0 | Refills: 0 | Status: COMPLETED | OUTPATIENT
Start: 2019-03-30 | End: 2019-03-30

## 2019-03-30 RX ORDER — SODIUM CHLORIDE 9 MG/ML
2000 INJECTION INTRAMUSCULAR; INTRAVENOUS; SUBCUTANEOUS ONCE
Qty: 0 | Refills: 0 | Status: COMPLETED | OUTPATIENT
Start: 2019-03-30 | End: 2019-03-30

## 2019-03-30 RX ORDER — MORPHINE SULFATE 50 MG/1
4 CAPSULE, EXTENDED RELEASE ORAL ONCE
Qty: 0 | Refills: 0 | Status: DISCONTINUED | OUTPATIENT
Start: 2019-03-30 | End: 2019-03-30

## 2019-03-30 RX ORDER — SODIUM CHLORIDE 9 MG/ML
3 INJECTION INTRAMUSCULAR; INTRAVENOUS; SUBCUTANEOUS ONCE
Qty: 0 | Refills: 0 | Status: COMPLETED | OUTPATIENT
Start: 2019-03-30 | End: 2019-03-30

## 2019-03-30 RX ADMIN — MORPHINE SULFATE 4 MILLIGRAM(S): 50 CAPSULE, EXTENDED RELEASE ORAL at 09:54

## 2019-03-30 RX ADMIN — Medication 25 MILLIGRAM(S): at 09:31

## 2019-03-30 RX ADMIN — Medication 10 MILLIGRAM(S): at 09:53

## 2019-03-30 RX ADMIN — Medication 1 MILLIGRAM(S): at 09:54

## 2019-03-30 RX ADMIN — SODIUM CHLORIDE 3 MILLILITER(S): 9 INJECTION INTRAMUSCULAR; INTRAVENOUS; SUBCUTANEOUS at 09:29

## 2019-03-30 RX ADMIN — PANTOPRAZOLE SODIUM 40 MILLIGRAM(S): 20 TABLET, DELAYED RELEASE ORAL at 09:29

## 2019-03-30 RX ADMIN — SODIUM CHLORIDE 2000 MILLILITER(S): 9 INJECTION INTRAMUSCULAR; INTRAVENOUS; SUBCUTANEOUS at 12:54

## 2019-03-30 RX ADMIN — SODIUM CHLORIDE 2000 MILLILITER(S): 9 INJECTION INTRAMUSCULAR; INTRAVENOUS; SUBCUTANEOUS at 09:29

## 2019-03-30 RX ADMIN — MORPHINE SULFATE 4 MILLIGRAM(S): 50 CAPSULE, EXTENDED RELEASE ORAL at 10:30

## 2019-03-30 NOTE — ED PROVIDER NOTE - PHYSICAL EXAMINATION
Gen: Alert, + dry retching, appears in pain  Head: NC, AT, PERRL, EOMI, normal lids/conjunctiva   ENT: Bilateral TM WNL, normal hearing, patent oropharynx without erythema/exudate, uvula midline  Neck: supple, no tenderness/meningismus/JVD   Pulm: Bilateral clear BS, normal resp effort, no wheeze/stridor/retractions  CV: RRR, no M/R/G, +dist pulses   Abd: soft, + gen tender, ND, +BS, no guarding/rebound tenderness  Mskel: no edema/erythema/cyanosis   Skin: no rash   Neuro: AAOx3, no sensory/motor deficits, CN 2-12 intact

## 2019-03-30 NOTE — ED ADULT NURSE NOTE - OBJECTIVE STATEMENT
Patient complaining of Abd pain, N/V for a few day. Patient states she has experienced this in the past. Patient is a smoker, states attempting to quit. No signs of distress.

## 2019-03-30 NOTE — ED ADULT NURSE NOTE - NSIMPLEMENTINTERV_GEN_ALL_ED
Implemented All Universal Safety Interventions:  [place] to call system. Call bell, personal items and telephone within reach. Instruct patient to call for assistance. Room bathroom lighting operational. Non-slip footwear when patient is off stretcher. Physically safe environment: no spills, clutter or unnecessary equipment. Stretcher in lowest position, wheels locked, appropriate side rails in place.

## 2019-03-30 NOTE — ED PROVIDER NOTE - OBJECTIVE STATEMENT
22 yo female with pmh GERD, cyclic vomiting syndrome vs cannabanoid hyperemesis presents with abd pain and vomiting typical to her vomiting episodes x 4 days. no fever, dysuria, diarrhea. on OCP, to start menses soon. Last admission and CT 1 month ago.    ROS: No fever/chills. No photophobia/eye pain/changes in vision, No ear pain/sore throat/dysphagia, No chest pain/palpitations. No SOB/cough/stridor. +abdominal pain, +N/V, no D, no black/bloody bm. No dysuria/frequency/discharge, No headache. No Dizziness.  No rash.  No numbness/tingling/weakness.

## 2019-03-31 LAB
CULTURE RESULTS: SIGNIFICANT CHANGE UP
SPECIMEN SOURCE: SIGNIFICANT CHANGE UP

## 2019-05-11 ENCOUNTER — INPATIENT (INPATIENT)
Facility: HOSPITAL | Age: 24
LOS: 2 days | Discharge: HOME | End: 2019-05-14
Attending: INTERNAL MEDICINE | Admitting: INTERNAL MEDICINE
Payer: COMMERCIAL

## 2019-05-11 VITALS
RESPIRATION RATE: 20 BRPM | SYSTOLIC BLOOD PRESSURE: 153 MMHG | WEIGHT: 110.01 LBS | TEMPERATURE: 99 F | HEART RATE: 88 BPM | DIASTOLIC BLOOD PRESSURE: 97 MMHG | HEIGHT: 64 IN | OXYGEN SATURATION: 100 %

## 2019-05-11 DIAGNOSIS — F12.10 CANNABIS ABUSE, UNCOMPLICATED: ICD-10-CM

## 2019-05-11 DIAGNOSIS — R11.10 VOMITING, UNSPECIFIED: ICD-10-CM

## 2019-05-11 LAB
ALBUMIN SERPL ELPH-MCNC: 4.4 G/DL — SIGNIFICANT CHANGE UP (ref 3.3–5)
ALP SERPL-CCNC: 47 U/L — SIGNIFICANT CHANGE UP (ref 40–120)
ALT FLD-CCNC: 33 U/L — SIGNIFICANT CHANGE UP (ref 12–78)
ANION GAP SERPL CALC-SCNC: 16 MMOL/L — SIGNIFICANT CHANGE UP (ref 5–17)
APPEARANCE UR: ABNORMAL
AST SERPL-CCNC: 34 U/L — SIGNIFICANT CHANGE UP (ref 15–37)
BACTERIA # UR AUTO: ABNORMAL
BASOPHILS # BLD AUTO: 0.01 K/UL — SIGNIFICANT CHANGE UP (ref 0–0.2)
BASOPHILS NFR BLD AUTO: 0.1 % — SIGNIFICANT CHANGE UP (ref 0–2)
BILIRUB SERPL-MCNC: 0.9 MG/DL — SIGNIFICANT CHANGE UP (ref 0.2–1.2)
BILIRUB UR-MCNC: NEGATIVE — SIGNIFICANT CHANGE UP
BUN SERPL-MCNC: 19 MG/DL — SIGNIFICANT CHANGE UP (ref 7–23)
CALCIUM SERPL-MCNC: 9.8 MG/DL — SIGNIFICANT CHANGE UP (ref 8.5–10.1)
CHLORIDE SERPL-SCNC: 105 MMOL/L — SIGNIFICANT CHANGE UP (ref 96–108)
CO2 SERPL-SCNC: 18 MMOL/L — LOW (ref 22–31)
COLOR SPEC: YELLOW — SIGNIFICANT CHANGE UP
CREAT SERPL-MCNC: 1.09 MG/DL — SIGNIFICANT CHANGE UP (ref 0.5–1.3)
DIFF PNL FLD: ABNORMAL
EOSINOPHIL # BLD AUTO: 0 K/UL — SIGNIFICANT CHANGE UP (ref 0–0.5)
EOSINOPHIL NFR BLD AUTO: 0 % — SIGNIFICANT CHANGE UP (ref 0–6)
EPI CELLS # UR: SIGNIFICANT CHANGE UP
GLUCOSE SERPL-MCNC: 129 MG/DL — HIGH (ref 70–99)
GLUCOSE UR QL: NEGATIVE MG/DL — SIGNIFICANT CHANGE UP
HCG SERPL-ACNC: <1 MIU/ML — SIGNIFICANT CHANGE UP
HCT VFR BLD CALC: 34.7 % — SIGNIFICANT CHANGE UP (ref 34.5–45)
HGB BLD-MCNC: 11.3 G/DL — LOW (ref 11.5–15.5)
IMM GRANULOCYTES NFR BLD AUTO: 0.3 % — SIGNIFICANT CHANGE UP (ref 0–1.5)
KETONES UR-MCNC: ABNORMAL
LACTATE SERPL-SCNC: 1.6 MMOL/L — SIGNIFICANT CHANGE UP (ref 0.7–2)
LACTATE SERPL-SCNC: 3.7 MMOL/L — HIGH (ref 0.7–2)
LEUKOCYTE ESTERASE UR-ACNC: ABNORMAL
LYMPHOCYTES # BLD AUTO: 1.62 K/UL — SIGNIFICANT CHANGE UP (ref 1–3.3)
LYMPHOCYTES # BLD AUTO: 14.6 % — SIGNIFICANT CHANGE UP (ref 13–44)
MCHC RBC-ENTMCNC: 25.6 PG — LOW (ref 27–34)
MCHC RBC-ENTMCNC: 32.6 GM/DL — SIGNIFICANT CHANGE UP (ref 32–36)
MCV RBC AUTO: 78.7 FL — LOW (ref 80–100)
MONOCYTES # BLD AUTO: 1.42 K/UL — HIGH (ref 0–0.9)
MONOCYTES NFR BLD AUTO: 12.8 % — SIGNIFICANT CHANGE UP (ref 2–14)
NEUTROPHILS # BLD AUTO: 8.03 K/UL — HIGH (ref 1.8–7.4)
NEUTROPHILS NFR BLD AUTO: 72.2 % — SIGNIFICANT CHANGE UP (ref 43–77)
NITRITE UR-MCNC: NEGATIVE — SIGNIFICANT CHANGE UP
NRBC # BLD: 0 /100 WBCS — SIGNIFICANT CHANGE UP (ref 0–0)
PH UR: 8 — SIGNIFICANT CHANGE UP (ref 5–8)
PLATELET # BLD AUTO: 301 K/UL — SIGNIFICANT CHANGE UP (ref 150–400)
POTASSIUM SERPL-MCNC: 3.5 MMOL/L — SIGNIFICANT CHANGE UP (ref 3.5–5.3)
POTASSIUM SERPL-SCNC: 3.5 MMOL/L — SIGNIFICANT CHANGE UP (ref 3.5–5.3)
PROT SERPL-MCNC: 8.7 GM/DL — HIGH (ref 6–8.3)
PROT UR-MCNC: 100 MG/DL
RBC # BLD: 4.41 M/UL — SIGNIFICANT CHANGE UP (ref 3.8–5.2)
RBC # FLD: 16 % — HIGH (ref 10.3–14.5)
RBC CASTS # UR COMP ASSIST: ABNORMAL /HPF (ref 0–4)
SODIUM SERPL-SCNC: 139 MMOL/L — SIGNIFICANT CHANGE UP (ref 135–145)
SP GR SPEC: 1.01 — SIGNIFICANT CHANGE UP (ref 1.01–1.02)
UROBILINOGEN FLD QL: NEGATIVE MG/DL — SIGNIFICANT CHANGE UP
WBC # BLD: 11.11 K/UL — HIGH (ref 3.8–10.5)
WBC # FLD AUTO: 11.11 K/UL — HIGH (ref 3.8–10.5)
WBC UR QL: SIGNIFICANT CHANGE UP

## 2019-05-11 PROCEDURE — 99285 EMERGENCY DEPT VISIT HI MDM: CPT

## 2019-05-11 RX ORDER — DIPHENHYDRAMINE HCL 50 MG
25 CAPSULE ORAL ONCE
Refills: 0 | Status: COMPLETED | OUTPATIENT
Start: 2019-05-11 | End: 2019-05-11

## 2019-05-11 RX ORDER — SODIUM CHLORIDE 9 MG/ML
2000 INJECTION INTRAMUSCULAR; INTRAVENOUS; SUBCUTANEOUS ONCE
Refills: 0 | Status: COMPLETED | OUTPATIENT
Start: 2019-05-11 | End: 2019-05-11

## 2019-05-11 RX ORDER — PANTOPRAZOLE SODIUM 20 MG/1
40 TABLET, DELAYED RELEASE ORAL ONCE
Refills: 0 | Status: COMPLETED | OUTPATIENT
Start: 2019-05-11 | End: 2019-05-11

## 2019-05-11 RX ORDER — ONDANSETRON 8 MG/1
8 TABLET, FILM COATED ORAL ONCE
Refills: 0 | Status: COMPLETED | OUTPATIENT
Start: 2019-05-11 | End: 2019-05-11

## 2019-05-11 RX ORDER — SODIUM CHLORIDE 9 MG/ML
1000 INJECTION, SOLUTION INTRAVENOUS
Refills: 0 | Status: DISCONTINUED | OUTPATIENT
Start: 2019-05-11 | End: 2019-05-14

## 2019-05-11 RX ORDER — MORPHINE SULFATE 50 MG/1
2 CAPSULE, EXTENDED RELEASE ORAL ONCE
Refills: 0 | Status: DISCONTINUED | OUTPATIENT
Start: 2019-05-11 | End: 2019-05-11

## 2019-05-11 RX ORDER — METOCLOPRAMIDE HCL 10 MG
10 TABLET ORAL ONCE
Refills: 0 | Status: DISCONTINUED | OUTPATIENT
Start: 2019-05-11 | End: 2019-05-11

## 2019-05-11 RX ORDER — ONDANSETRON 8 MG/1
4 TABLET, FILM COATED ORAL EVERY 4 HOURS
Refills: 0 | Status: DISCONTINUED | OUTPATIENT
Start: 2019-05-11 | End: 2019-05-14

## 2019-05-11 RX ORDER — METOCLOPRAMIDE HCL 10 MG
10 TABLET ORAL ONCE
Refills: 0 | Status: COMPLETED | OUTPATIENT
Start: 2019-05-11 | End: 2019-05-11

## 2019-05-11 RX ORDER — PANTOPRAZOLE SODIUM 20 MG/1
40 TABLET, DELAYED RELEASE ORAL DAILY
Refills: 0 | Status: DISCONTINUED | OUTPATIENT
Start: 2019-05-11 | End: 2019-05-14

## 2019-05-11 RX ADMIN — MORPHINE SULFATE 2 MILLIGRAM(S): 50 CAPSULE, EXTENDED RELEASE ORAL at 17:09

## 2019-05-11 RX ADMIN — SODIUM CHLORIDE 2000 MILLILITER(S): 9 INJECTION INTRAMUSCULAR; INTRAVENOUS; SUBCUTANEOUS at 17:08

## 2019-05-11 RX ADMIN — Medication 1 MILLIGRAM(S): at 16:15

## 2019-05-11 RX ADMIN — Medication 10 MILLIGRAM(S): at 19:08

## 2019-05-11 RX ADMIN — Medication 25 MILLIGRAM(S): at 19:28

## 2019-05-11 RX ADMIN — PANTOPRAZOLE SODIUM 40 MILLIGRAM(S): 20 TABLET, DELAYED RELEASE ORAL at 16:15

## 2019-05-11 RX ADMIN — Medication 25 MILLIGRAM(S): at 17:08

## 2019-05-11 RX ADMIN — SODIUM CHLORIDE 2000 MILLILITER(S): 9 INJECTION INTRAMUSCULAR; INTRAVENOUS; SUBCUTANEOUS at 16:16

## 2019-05-11 RX ADMIN — MORPHINE SULFATE 2 MILLIGRAM(S): 50 CAPSULE, EXTENDED RELEASE ORAL at 16:22

## 2019-05-11 RX ADMIN — ONDANSETRON 8 MILLIGRAM(S): 8 TABLET, FILM COATED ORAL at 17:03

## 2019-05-11 NOTE — H&P ADULT - NSICDXPASTMEDICALHX_GEN_ALL_CORE_FT
(0) independent
PAST MEDICAL HISTORY:  Cyclic vomiting syndrome     Cyst of ovary, unspecified laterality     GERD (gastroesophageal reflux disease)

## 2019-05-11 NOTE — ED ADULT NURSE REASSESSMENT NOTE - NS ED NURSE REASSESS COMMENT FT1
Patient lying comfortably on stretcher. Patient in no acute distress. Patient admitted, awaiting a room. Patient updated on information. Will continue to monitor patient.

## 2019-05-11 NOTE — ED PROVIDER NOTE - PHYSICAL EXAMINATION
Gen: Alert, Well appearing. NAD, poor MM   Head: NC, AT, PERRL, EOMI, normal lids/conjunctiva   ENT: Bilateral TM WNL, patent oropharynx without erythema/exudate, uvula midline  Neck: supple, no tenderness/meningismus  Pulm: Bilateral clear BS, normal resp effort, no wheeze/stridor/retractions  CV: RRR, no M/R/G, +dist pulses   Abd: soft, + gen tender, ND, +BS, no guarding/rebound tenderness  Mskel: no edema/erythema/cyanosis   Skin: no rash   Neuro: AAOx3, no sensory/motor deficits, CN 2-12 intact

## 2019-05-11 NOTE — H&P ADULT - HISTORY OF PRESENT ILLNESS
patient  reported  with  vomiting  x  2  days  evaluated  in  E r treated  with  IVF  IV  zofran reglan  benadryl  PPIs  continued  with  emesis  admitted  for  further w/u  and  treatmenmt  states  has  been  smoking  large  ammounts  of  marihuana past   several  days

## 2019-05-11 NOTE — H&P ADULT - NSHPPHYSICALEXAM_GEN_ALL_CORE
1PHYSICAL EXAM:    GENERAL: NAD, well-groomed, well-developed  HEAD:  Atraumatic, Normocephalic  EYES: EOMI, PERRLA, conjunctiva and sclera clear  ENMT: No tonsillar erythema, exudates, or enlargement; Moist mucous membranes, , No lesions  NECK: Supple, No JVD, Normal thyroid  NERVOUS SYSTEM:  Alert & Oriented X4, ; Motor Strength 5/5 B/L upper and lower extremities; DTRs 2+ intact and symmetric  CHEST/LUNG: Clear  bilaterally; No rales, rhonchi, wheezing, or rubs  HEART: Regular rate and rhythm; No murmurs, rubs, or gallops  ABDOMEN: Soft,  diffuse  tenderness, Nondistended; no  masses Bowel sounds present  EXTREMITIES:  + Peripheral Pulses, No clubbing, cyanosis, or edema  LYMPH: No lymphadenopathy noted   RECTAL: deferred    SKIN: No rashes or lesions

## 2019-05-11 NOTE — ED PROVIDER NOTE - OBJECTIVE STATEMENT
24 yo female with pmh GERD, cyclic vomiting syndrome vs cannabanoid hyperemesis presents with abd pain and vomiting typical to her vomiting episodes x 2 days. no fever, dysuria, diarrhea. on OCP, to start menses soon. Last admission and CT 2 month ago.    ROS: No fever/chills. No photophobia/eye pain/changes in vision, No ear pain/sore throat/dysphagia, No chest pain/palpitations. No SOB/cough/stridor. +abdominal pain, +N/V, no D, no black/bloody bm. No dysuria/frequency/discharge, No headache. No Dizziness.  No rash.  No numbness/tingling/weakness.

## 2019-05-11 NOTE — H&P ADULT - ASSESSMENT
IMPROVE VTE Individual Risk Assessment    RISK                                                                Points    [  ] Previous VTE                                                  3    [  ] Thrombophilia                                               2    [  ] Lower limb paralysis                                      2        (unable to hold up >15 seconds)      [  ] Current Cancer                                              2         (within 6 months)    [  ] Immobilization > 24 hrs                                1    [  ] ICU/CCU stay > 24 hours                              1    [  ] Age > 60                                                      1    IMPROVE VTE Score _0________    IMPROVE Score 0-1: Low Risk, No VTE prophylaxis required for most patients, encourage ambulation.   IMPROVE Score 2-3: At risk, pharmacologic VTE prophylaxis is indicated for most patients (in the absence of a contraindication)  IMPROVE Score > or = 4: High Risk, pharmacologic VTE prophylaxis is indicated for most patients (in the absence of a contraindication)

## 2019-05-11 NOTE — ED ADULT NURSE NOTE - NSIMPLEMENTINTERV_GEN_ALL_ED
Implemented All Universal Safety Interventions:  [place] to call system. Call bell, personal items and telephone within reach. Instruct patient to call for assistance. Room bathroom lighting operational. Non-slip footwear when patient is off stretcher. Physically safe environment: no spills, clutter or unnecessary equipment. Stretcher in lowest position, wheels locked, appropriate side rails in place.

## 2019-05-11 NOTE — H&P ADULT - NSHPLABSRESULTS_GEN_ALL_CORE
LABS:                        11.3    )-----------( 301      ( 11 May 2019 16:14 )             34.7     05-11    139  |  105  |  19  ----------------------------<  129<H>  3.5   |  18<L>  |  1.09    Ca    9.8      11 May 2019 16:14    TPro  8.7<H>  /  Alb  4.4  /  TBili  0.9  /  DBili  x   /  AST  34  /  ALT  33  /  AlkPhos  47  05-11      Urinalysis Basic - ( 11 May 2019 16:14 )    Color: Yellow / Appearance: Slightly Turbid / S.010 / pH: x  Gluc: x / Ketone: Large  / Bili: Negative / Urobili: Negative mg/dL   Blood: x / Protein: 100 mg/dL / Nitrite: Negative   Leuk Esterase: Trace / RBC: 3-5 /HPF / WBC 0-2   Sq Epi: x / Non Sq Epi: Occasional / Bacteria: Moderate

## 2019-05-12 LAB
CULTURE RESULTS: SIGNIFICANT CHANGE UP
SPECIMEN SOURCE: SIGNIFICANT CHANGE UP

## 2019-05-12 RX ORDER — DIPHENHYDRAMINE HCL 50 MG
25 CAPSULE ORAL ONCE
Refills: 0 | Status: COMPLETED | OUTPATIENT
Start: 2019-05-12 | End: 2019-05-13

## 2019-05-12 RX ORDER — KETOROLAC TROMETHAMINE 30 MG/ML
15 SYRINGE (ML) INJECTION THREE TIMES A DAY
Refills: 0 | Status: DISCONTINUED | OUTPATIENT
Start: 2019-05-12 | End: 2019-05-13

## 2019-05-12 RX ORDER — KETOROLAC TROMETHAMINE 30 MG/ML
15 SYRINGE (ML) INJECTION ONCE
Refills: 0 | Status: DISCONTINUED | OUTPATIENT
Start: 2019-05-12 | End: 2019-05-12

## 2019-05-12 RX ORDER — MORPHINE SULFATE 50 MG/1
2 CAPSULE, EXTENDED RELEASE ORAL ONCE
Refills: 0 | Status: DISCONTINUED | OUTPATIENT
Start: 2019-05-12 | End: 2019-05-12

## 2019-05-12 RX ORDER — DIPHENHYDRAMINE HCL 50 MG
25 CAPSULE ORAL THREE TIMES A DAY
Refills: 0 | Status: COMPLETED | OUTPATIENT
Start: 2019-05-12 | End: 2019-05-12

## 2019-05-12 RX ADMIN — Medication 15 MILLIGRAM(S): at 09:38

## 2019-05-12 RX ADMIN — ONDANSETRON 4 MILLIGRAM(S): 8 TABLET, FILM COATED ORAL at 13:35

## 2019-05-12 RX ADMIN — Medication 25 MILLIGRAM(S): at 09:23

## 2019-05-12 RX ADMIN — Medication 15 MILLIGRAM(S): at 13:35

## 2019-05-12 RX ADMIN — PANTOPRAZOLE SODIUM 40 MILLIGRAM(S): 20 TABLET, DELAYED RELEASE ORAL at 13:37

## 2019-05-12 RX ADMIN — MORPHINE SULFATE 2 MILLIGRAM(S): 50 CAPSULE, EXTENDED RELEASE ORAL at 07:10

## 2019-05-12 RX ADMIN — SODIUM CHLORIDE 100 MILLILITER(S): 9 INJECTION, SOLUTION INTRAVENOUS at 09:29

## 2019-05-12 RX ADMIN — ONDANSETRON 4 MILLIGRAM(S): 8 TABLET, FILM COATED ORAL at 09:29

## 2019-05-12 RX ADMIN — SODIUM CHLORIDE 100 MILLILITER(S): 9 INJECTION, SOLUTION INTRAVENOUS at 06:42

## 2019-05-12 RX ADMIN — MORPHINE SULFATE 2 MILLIGRAM(S): 50 CAPSULE, EXTENDED RELEASE ORAL at 06:40

## 2019-05-12 RX ADMIN — Medication 25 MILLIGRAM(S): at 13:35

## 2019-05-12 RX ADMIN — Medication 15 MILLIGRAM(S): at 13:50

## 2019-05-12 RX ADMIN — Medication 15 MILLIGRAM(S): at 09:23

## 2019-05-13 LAB
ALBUMIN SERPL ELPH-MCNC: 3.4 G/DL — SIGNIFICANT CHANGE UP (ref 3.3–5)
ALP SERPL-CCNC: 36 U/L — LOW (ref 40–120)
ALT FLD-CCNC: 25 U/L — SIGNIFICANT CHANGE UP (ref 12–78)
AMYLASE P1 CFR SERPL: 89 U/L — SIGNIFICANT CHANGE UP (ref 25–115)
ANION GAP SERPL CALC-SCNC: 9 MMOL/L — SIGNIFICANT CHANGE UP (ref 5–17)
AST SERPL-CCNC: 20 U/L — SIGNIFICANT CHANGE UP (ref 15–37)
BILIRUB SERPL-MCNC: 0.7 MG/DL — SIGNIFICANT CHANGE UP (ref 0.2–1.2)
BUN SERPL-MCNC: 9 MG/DL — SIGNIFICANT CHANGE UP (ref 7–23)
CALCIUM SERPL-MCNC: 8.2 MG/DL — LOW (ref 8.5–10.1)
CHLORIDE SERPL-SCNC: 111 MMOL/L — HIGH (ref 96–108)
CO2 SERPL-SCNC: 23 MMOL/L — SIGNIFICANT CHANGE UP (ref 22–31)
CREAT SERPL-MCNC: 0.86 MG/DL — SIGNIFICANT CHANGE UP (ref 0.5–1.3)
GLUCOSE SERPL-MCNC: 94 MG/DL — SIGNIFICANT CHANGE UP (ref 70–99)
HCT VFR BLD CALC: 32.5 % — LOW (ref 34.5–45)
HGB BLD-MCNC: 10.2 G/DL — LOW (ref 11.5–15.5)
LIDOCAIN IGE QN: 185 U/L — SIGNIFICANT CHANGE UP (ref 73–393)
MCHC RBC-ENTMCNC: 25.4 PG — LOW (ref 27–34)
MCHC RBC-ENTMCNC: 31.4 GM/DL — LOW (ref 32–36)
MCV RBC AUTO: 81 FL — SIGNIFICANT CHANGE UP (ref 80–100)
NRBC # BLD: 0 /100 WBCS — SIGNIFICANT CHANGE UP (ref 0–0)
PLATELET # BLD AUTO: 255 K/UL — SIGNIFICANT CHANGE UP (ref 150–400)
POTASSIUM SERPL-MCNC: 3.1 MMOL/L — LOW (ref 3.5–5.3)
POTASSIUM SERPL-SCNC: 3.1 MMOL/L — LOW (ref 3.5–5.3)
PROT SERPL-MCNC: 6.5 GM/DL — SIGNIFICANT CHANGE UP (ref 6–8.3)
RBC # BLD: 4.01 M/UL — SIGNIFICANT CHANGE UP (ref 3.8–5.2)
RBC # FLD: 16.2 % — HIGH (ref 10.3–14.5)
SODIUM SERPL-SCNC: 143 MMOL/L — SIGNIFICANT CHANGE UP (ref 135–145)
WBC # BLD: 7.51 K/UL — SIGNIFICANT CHANGE UP (ref 3.8–10.5)
WBC # FLD AUTO: 7.51 K/UL — SIGNIFICANT CHANGE UP (ref 3.8–10.5)

## 2019-05-13 RX ORDER — IBUPROFEN 200 MG
400 TABLET ORAL THREE TIMES A DAY
Refills: 0 | Status: DISCONTINUED | OUTPATIENT
Start: 2019-05-13 | End: 2019-05-14

## 2019-05-13 RX ADMIN — Medication 400 MILLIGRAM(S): at 12:28

## 2019-05-13 RX ADMIN — PANTOPRAZOLE SODIUM 40 MILLIGRAM(S): 20 TABLET, DELAYED RELEASE ORAL at 10:46

## 2019-05-13 RX ADMIN — Medication 25 MILLIGRAM(S): at 00:02

## 2019-05-13 RX ADMIN — Medication 400 MILLIGRAM(S): at 11:28

## 2019-05-14 ENCOUNTER — TRANSCRIPTION ENCOUNTER (OUTPATIENT)
Age: 24
End: 2019-05-14

## 2019-05-14 VITALS
HEART RATE: 66 BPM | DIASTOLIC BLOOD PRESSURE: 86 MMHG | RESPIRATION RATE: 16 BRPM | TEMPERATURE: 98 F | OXYGEN SATURATION: 99 % | SYSTOLIC BLOOD PRESSURE: 125 MMHG

## 2019-05-14 RX ORDER — POTASSIUM CHLORIDE 20 MEQ
40 PACKET (EA) ORAL EVERY 4 HOURS
Refills: 0 | Status: COMPLETED | OUTPATIENT
Start: 2019-05-14 | End: 2019-05-14

## 2019-05-14 RX ORDER — PANTOPRAZOLE SODIUM 20 MG/1
1 TABLET, DELAYED RELEASE ORAL
Qty: 30 | Refills: 0
Start: 2019-05-14 | End: 2019-06-12

## 2019-05-14 RX ADMIN — Medication 40 MILLIEQUIVALENT(S): at 11:51

## 2019-05-14 RX ADMIN — Medication 40 MILLIEQUIVALENT(S): at 14:14

## 2019-05-14 NOTE — DISCHARGE NOTE PROVIDER - HOSPITAL COURSE
patient  treated  with  IVF  Zofran Protonix required  toradol  for menstrual  cramps  tolerating  diet  discharged  home    counseled  on  adverse  effects  of  Marihuana  abuse  aware  of  connection  between use  and  symptoms states will  abstain

## 2019-05-14 NOTE — DISCHARGE NOTE PROVIDER - NSDCCPCAREPLAN_GEN_ALL_CORE_FT
PRINCIPAL DISCHARGE DIAGNOSIS  Diagnosis: Intractable vomiting  Assessment and Plan of Treatment:       SECONDARY DISCHARGE DIAGNOSES  Diagnosis: Menstrual cramps  Assessment and Plan of Treatment:

## 2019-05-14 NOTE — PROGRESS NOTE ADULT - SUBJECTIVE AND OBJECTIVE BOX
INTERVAL HPI/OVERNIGHT EVENTS:        REVIEW OF SYSTEMS:  CONSTITUTIONAL:  c/o  pelvic  pains  states  her  menses  are starting usually   takes motrin  requests  morphine  or  Toradol      NECK: No pain or stiffnes  RESPIRATORY: No SOB   CARDIOVASCULAR: No chest pain, palpitations, dizziness,   GASTROINTESTINAL: abdominal  pelvic  pain   NEUROLOGICAL: No headaches, no  blurry  vision no  dizziness  SKIN: No itching,   MUSCULOSKELETAL: No pain    MEDICATION:  dextrose 5% + sodium chloride 0.9%. 1000 milliLiter(s) IV Continuous <Continuous>  ondansetron Injectable 4 milliGRAM(s) IV Push every 4 hours PRN  pantoprazole  Injectable 40 milliGRAM(s) IV Push daily    Vital Signs Last 24 Hrs  T(C): 36.7 (12 May 2019 05:45), Max: 37.7 (11 May 2019 22:03)  T(F): 98 (12 May 2019 05:45), Max: 99.8 (11 May 2019 22:03)  HR: 66 (12 May 2019 05:45) (66 - 88)  BP: 117/75 (12 May 2019 05:45) (117/75 - 153/97)  BP(mean): --  RR: 18 (12 May 2019 05:45) (18 - 20)  SpO2: 98% (12 May 2019 05:45) (98% - 100%)    PHYSICAL EXAM:  GENERAL: NAD, well-groomed, well-developed  EYES:  conjunctiva and sclera clear  ENMT:  Moist mucous membranes,   NECK: Supple, No JVD, Normal thyroid  NERVOUS SYSTEM:  Alert oriented   no  focal  deficits;   CHEST/LUNG: Clear    HEART: Regular rate and rhythm; No murmurs, rubs, or gallops  ABDOMEN: Soft,  mild  diffuse ,  tenderness; Bowel sounds present  EXTREMITIES:  no  edema no  tenderness  SKIN: No rashes   LABS:                        11.3    )-----------( 301      ( 11 May 2019 16:14 )             34.7     05-    139  |  105  |  19  ----------------------------<  129<H>  3.5   |  18<L>  |  1.09    Ca    9.8      11 May 2019 16:14    TPro  8.7<H>  /  Alb  4.4  /  TBili  0.9  /  DBili  x   /  AST  34  /  ALT  33  /  AlkPhos  47  05-      Urinalysis Basic - ( 11 May 2019 16:14 )    Color: Yellow / Appearance: Slightly Turbid / S.010 / pH: x  Gluc: x / Ketone: Large  / Bili: Negative / Urobili: Negative mg/dL   Blood: x / Protein: 100 mg/dL / Nitrite: Negative   Leuk Esterase: Trace / RBC: 3-5 /HPF / WBC 0-2   Sq Epi: x / Non Sq Epi: Occasional / Bacteria: Moderate      CAPILLARY BLOOD GLUCOSE          RADIOLOGY & ADDITIONAL TESTS:    Imaging reports  Personally Reviewed:  [x ] YES  [ ] NO    Consultant(s) Notes Reviewed:  [ x] YES  [ ] NO    Care Discussed with Consultants/Other Providers [x ] YES  [ ] NO  Problem/Plan - 1:  ·  Problem: Intractable vomiting.  Plan: ivf  zofran  protonix.     Problem/Plan - 2:  ·  Problem: Marihuana abuse.  Plan: counseled.   pelvic  pain  toradol benadryl
INTERVAL HPI/OVERNIGHT EVENTS:        REVIEW OF SYSTEMS:  CONSTITUTIONAL:  feels  much  better  menstrual  cramps  improved      NECK: No pain or stiffnes  RESPIRATORY: No SOB   CARDIOVASCULAR: No chest pain, palpitations, dizziness,   GASTROINTESTINAL: No abdominal pain. No nausea, vomiting,   NEUROLOGICAL: No headaches, no  blurry  vision no  dizziness  SKIN: No itching,   MUSCULOSKELETAL: No pain    MEDICATION:  dextrose 5% + sodium chloride 0.9%. 1000 milliLiter(s) IV Continuous <Continuous>  ketorolac   Injectable 15 milliGRAM(s) IV Push three times a day PRN  ondansetron Injectable 4 milliGRAM(s) IV Push every 4 hours PRN  pantoprazole  Injectable 40 milliGRAM(s) IV Push daily    Vital Signs Last 24 Hrs  T(C): 37.3 (13 May 2019 04:45), Max: 37.3 (13 May 2019 04:45)  T(F): 99.1 (13 May 2019 04:45), Max: 99.1 (13 May 2019 04:45)  HR: 56 (13 May 2019 04:45) (56 - 79)  BP: 120/86 (13 May 2019 04:45) (120/86 - 132/90)  BP(mean): --  RR: 17 (13 May 2019 04:45) (17 - 18)  SpO2: 98% (13 May 2019 04:45) (98% - 100%)    PHYSICAL EXAM:  GENERAL: NAD, well-groomed, well-developed  EYES:  conjunctiva and sclera clear  ENMT:  Moist mucous membranes,   NECK: Supple, No JVD, Normal thyroid  NERVOUS SYSTEM:  Alert oriented   no  focal  deficits;   CHEST/LUNG: Clear    HEART: Regular rate and rhythm; No murmurs, rubs, or gallops  ABDOMEN: Soft, Nontender, Nondistended; Bowel sounds present  EXTREMITIES:  no  edema no  tenderness  SKIN: No rashes   LABS:                        10.2   7.51  )-----------( 255      ( 13 May 2019 06:54 )             32.5     05-13    143  |  111<H>  |  9   ----------------------------<  94  3.1<L>   |  23  |  0.86    Ca    8.2<L>      13 May 2019 06:54    TPro  6.5  /  Alb  3.4  /  TBili  0.7  /  DBili  x   /  AST  20  /  ALT  25  /  AlkPhos  36<L>  05-13      Urinalysis Basic - ( 11 May 2019 16:14 )    Color: Yellow / Appearance: Slightly Turbid / S.010 / pH: x  Gluc: x / Ketone: Large  / Bili: Negative / Urobili: Negative mg/dL   Blood: x / Protein: 100 mg/dL / Nitrite: Negative   Leuk Esterase: Trace / RBC: 3-5 /HPF / WBC 0-2   Sq Epi: x / Non Sq Epi: Occasional / Bacteria: Moderate      CAPILLARY BLOOD GLUCOSE          RADIOLOGY & ADDITIONAL TESTS:    Imaging reports  Personally Reviewed:  [ ] YES  [ ] NO    Consultant(s) Notes Reviewed:  [ ] YES  [ ] NO    Care Discussed with Consultants/Other Providers [ ] YES  [ ] NO  Problem/Plan - 1:  ·  Problem: Intractable vomiting.  Plan: ivf  zofran  protonix. advance  diet     Problem/Plan - 2:  ·  Problem: Marihuana abuse.  Plan: counseled.   pelvic  pain  motrin  prn   discharge  if  tolerating oral  diet
INTERVAL HPI/OVERNIGHT EVENTS:        REVIEW OF SYSTEMS:  CONSTITUTIONAL: tolerating  oral  diet  no  complaints    NECK: No pain or stiffnes  RESPIRATORY: No SOB   CARDIOVASCULAR: No chest pain, palpitations, dizziness,   GASTROINTESTINAL: No abdominal pain. No nausea, vomiting,   NEUROLOGICAL: No headaches, no  blurry  vision no  dizziness  SKIN: No itching,   MUSCULOSKELETAL: No pain    MEDICATION:  dextrose 5% + sodium chloride 0.9%. 1000 milliLiter(s) IV Continuous <Continuous>  ibuprofen  Tablet. 400 milliGRAM(s) Oral three times a day PRN  ondansetron Injectable 4 milliGRAM(s) IV Push every 4 hours PRN  pantoprazole  Injectable 40 milliGRAM(s) IV Push daily  potassium chloride    Tablet ER 40 milliEquivalent(s) Oral every 4 hours    Vital Signs Last 24 Hrs  T(C): 36.4 (14 May 2019 11:33), Max: 37.1 (13 May 2019 17:19)  T(F): 97.6 (14 May 2019 11:33), Max: 98.8 (13 May 2019 17:19)  HR: 66 (14 May 2019 11:33) (54 - 74)  BP: 125/86 (14 May 2019 11:33) (118/70 - 142/90)  BP(mean): --  RR: 16 (14 May 2019 11:33) (16 - 16)  SpO2: 99% (14 May 2019 11:33) (99% - 100%)    PHYSICAL EXAM:  GENERAL: NAD, well-groomed, well-developed  EYES:  conjunctiva and sclera clear  ENMT:  Moist mucous membranes,   NECK: Supple, No JVD, Normal thyroid  NERVOUS SYSTEM:  Alert oriented   no  focal  deficits;   CHEST/LUNG: Clear    HEART: Regular rate and rhythm; No murmurs, rubs, or gallops  ABDOMEN: Soft, Nontender, Nondistended; Bowel sounds present  EXTREMITIES:  no  edema no  tenderness  SKIN: No rashes   LABS:                        10.2   7.51  )-----------( 255      ( 13 May 2019 06:54 )             32.5     05-13    143  |  111<H>  |  9   ----------------------------<  94  3.1<L>   |  23  |  0.86    Ca    8.2<L>      13 May 2019 06:54    TPro  6.5  /  Alb  3.4  /  TBili  0.7  /  DBili  x   /  AST  20  /  ALT  25  /  AlkPhos  36<L>  05-13        CAPILLARY BLOOD GLUCOSE          RADIOLOGY & ADDITIONAL TESTS:    Imaging reports  Personally Reviewed:  [x ] YES  [ ] NO    Consultant(s) Notes Reviewed:  [x ] YES  [ ] NO    Care Discussed with Consultants/Other Providers [ x] YES  [ ] NO  Problem/Plan - 1:  ·  Problem: Intractable vomiting.  Plan: aware  to  marihuana     Problem/Plan - 2:  ·  Problem: Marihuana abuse.  Plan: counseled.   pelvic  pain  motrin  prn   discharge   home

## 2019-05-14 NOTE — ED ADULT TRIAGE NOTE - PAIN RATING/NUMBER SCALE (0-10): ACTIVITY
Tired of being pregnant, tired of elder, reports 2-4 ucs per hour, no leaking or bleeding ,good FM,  Denies cp reports sob when going up stairs,  Recommended she see pcp if sob,  Denies back pain, has blood in her urine dip,no gross hematuria from her kidney stones, has nephrologist to consult if this becomes symptomatic, its a long term problem.  Is feeling [place], is on trazadone and wellbutrin, advised her to consult her psychiatrist for eval and adjustment.  Advised her it is imperative that she follow closely with psych.  Labor instructions, advised her baby is still premature and we would not want her to deliver now or few another few weeks.    10

## 2019-05-14 NOTE — DISCHARGE NOTE NURSING/CASE MANAGEMENT/SOCIAL WORK - NSDCDPATPORTLINK_GEN_ALL_CORE
You can access the [facility] Patient Portal, offered by [facility], by registering with the following website: [email or web address]

## 2019-05-20 DIAGNOSIS — R11.10 VOMITING, UNSPECIFIED: ICD-10-CM

## 2019-05-20 DIAGNOSIS — F12.10 CANNABIS ABUSE, UNCOMPLICATED: ICD-10-CM

## 2019-05-20 DIAGNOSIS — K21.9 GASTRO-ESOPHAGEAL REFLUX DISEASE WITHOUT ESOPHAGITIS: ICD-10-CM

## 2019-05-20 DIAGNOSIS — Z88.0 ALLERGY STATUS TO PENICILLIN: ICD-10-CM

## 2019-05-20 DIAGNOSIS — N94.6 DYSMENORRHEA, UNSPECIFIED: ICD-10-CM

## 2019-06-26 NOTE — PATIENT PROFILE ADULT. - NSSUBSTANCEUSE_GEN_ALL_CORE_SD
[FreeTextEntry1] : Mr. Lemus is a 64 year old who has had several months of burning pain on the trunk. this started on the right and is now present on the left as well.\par His pain is improved overall but he is having discomfort over his chest at night.\par At this time his symptoms do seem most suggestive of a small fiber neuropathy.\par \par The pain has now worsened in terms of distribution and he has also developed a right foot drop. \par His exam is significant for diffusely hypoactive reflexes and a 0/5 strength in right dorsiflexion.\par \par EMG in [facility] showed evidence of a predominantly sensory polyneuropathy (this was prior to the development of the foot drop) with demyelinating and axonal features. \par \par - Right foot drop - Likely peripheral in nature.\par He does have a positive Tinel's sign. ? Tarsal tunnel syndrome but more likely peroneal neuropathy.\par He needs EMG of the lower extremities.\par Will also get MRI lumbar spine to r/o herniated disc which is less likely in the absence of back pain.\par Physical therapy.\par He should wear a boot/high top sneakers and if weakness does not improve he may need an AFO.\par \par - Pain - neuralgia. He has a history of shingles many years ago but his pain is not in a single dermatomal distribution.\par Despite improvement in blood glucose control his symptoms are worsening.\par Will check additional blood tests including tick panel and paraneoplastic antibodies.\par Continue nortriptyline 25 mg qhs. Increase gabapentin to 300 mg TID. If not effective can consider switching to Lyrica in the future. We discussed that gabapentinoids may increase suicidal behavior, but this was predominantly in younger patients.\par Will also get MRI brain to rule out any demyelinating disease.\par \par If workup as listed above is unremarkable, will consider LP to look for other causes of neuropathy. never used street drug/inhalant/medication abuse/never used

## 2020-01-07 ENCOUNTER — INPATIENT (INPATIENT)
Facility: HOSPITAL | Age: 25
LOS: 0 days | Discharge: HOME | End: 2020-01-08
Attending: INTERNAL MEDICINE | Admitting: INTERNAL MEDICINE
Payer: COMMERCIAL

## 2020-01-07 VITALS
RESPIRATION RATE: 24 BRPM | HEIGHT: 64 IN | DIASTOLIC BLOOD PRESSURE: 95 MMHG | SYSTOLIC BLOOD PRESSURE: 154 MMHG | TEMPERATURE: 98 F | HEART RATE: 112 BPM | OXYGEN SATURATION: 100 % | WEIGHT: 119.93 LBS

## 2020-01-07 DIAGNOSIS — R11.15 CYCLICAL VOMITING SYNDROME UNRELATED TO MIGRAINE: ICD-10-CM

## 2020-01-07 DIAGNOSIS — E87.6 HYPOKALEMIA: ICD-10-CM

## 2020-01-07 LAB
ALBUMIN SERPL ELPH-MCNC: 4.5 G/DL — SIGNIFICANT CHANGE UP (ref 3.3–5)
ALP SERPL-CCNC: 55 U/L — SIGNIFICANT CHANGE UP (ref 40–120)
ALT FLD-CCNC: 77 U/L — SIGNIFICANT CHANGE UP (ref 12–78)
ANION GAP SERPL CALC-SCNC: 11 MMOL/L — SIGNIFICANT CHANGE UP (ref 5–17)
AST SERPL-CCNC: 95 U/L — HIGH (ref 15–37)
BASOPHILS # BLD AUTO: 0.01 K/UL — SIGNIFICANT CHANGE UP (ref 0–0.2)
BASOPHILS NFR BLD AUTO: 0.1 % — SIGNIFICANT CHANGE UP (ref 0–2)
BILIRUB SERPL-MCNC: 0.9 MG/DL — SIGNIFICANT CHANGE UP (ref 0.2–1.2)
BUN SERPL-MCNC: 19 MG/DL — SIGNIFICANT CHANGE UP (ref 7–23)
CALCIUM SERPL-MCNC: 9.5 MG/DL — SIGNIFICANT CHANGE UP (ref 8.5–10.1)
CHLORIDE SERPL-SCNC: 102 MMOL/L — SIGNIFICANT CHANGE UP (ref 96–108)
CO2 SERPL-SCNC: 26 MMOL/L — SIGNIFICANT CHANGE UP (ref 22–31)
CREAT SERPL-MCNC: 1.12 MG/DL — SIGNIFICANT CHANGE UP (ref 0.5–1.3)
EOSINOPHIL # BLD AUTO: 0.01 K/UL — SIGNIFICANT CHANGE UP (ref 0–0.5)
EOSINOPHIL NFR BLD AUTO: 0.1 % — SIGNIFICANT CHANGE UP (ref 0–6)
GLUCOSE SERPL-MCNC: 111 MG/DL — HIGH (ref 70–99)
HCT VFR BLD CALC: 40.4 % — SIGNIFICANT CHANGE UP (ref 34.5–45)
HGB BLD-MCNC: 13.2 G/DL — SIGNIFICANT CHANGE UP (ref 11.5–15.5)
IMM GRANULOCYTES NFR BLD AUTO: 0.5 % — SIGNIFICANT CHANGE UP (ref 0–1.5)
LIDOCAIN IGE QN: 148 U/L — SIGNIFICANT CHANGE UP (ref 73–393)
LYMPHOCYTES # BLD AUTO: 19.5 % — SIGNIFICANT CHANGE UP (ref 13–44)
LYMPHOCYTES # BLD AUTO: 2.5 K/UL — SIGNIFICANT CHANGE UP (ref 1–3.3)
MCHC RBC-ENTMCNC: 26 PG — LOW (ref 27–34)
MCHC RBC-ENTMCNC: 32.7 GM/DL — SIGNIFICANT CHANGE UP (ref 32–36)
MCV RBC AUTO: 79.5 FL — LOW (ref 80–100)
MONOCYTES # BLD AUTO: 1.3 K/UL — HIGH (ref 0–0.9)
MONOCYTES NFR BLD AUTO: 10.2 % — SIGNIFICANT CHANGE UP (ref 2–14)
NEUTROPHILS # BLD AUTO: 8.92 K/UL — HIGH (ref 1.8–7.4)
NEUTROPHILS NFR BLD AUTO: 69.6 % — SIGNIFICANT CHANGE UP (ref 43–77)
NRBC # BLD: 0 /100 WBCS — SIGNIFICANT CHANGE UP (ref 0–0)
PLATELET # BLD AUTO: 333 K/UL — SIGNIFICANT CHANGE UP (ref 150–400)
POTASSIUM SERPL-MCNC: 3 MMOL/L — LOW (ref 3.5–5.3)
POTASSIUM SERPL-SCNC: 3 MMOL/L — LOW (ref 3.5–5.3)
PROT SERPL-MCNC: 8.8 GM/DL — HIGH (ref 6–8.3)
RBC # BLD: 5.08 M/UL — SIGNIFICANT CHANGE UP (ref 3.8–5.2)
RBC # FLD: 15.2 % — HIGH (ref 10.3–14.5)
SODIUM SERPL-SCNC: 139 MMOL/L — SIGNIFICANT CHANGE UP (ref 135–145)
WBC # BLD: 12.8 K/UL — HIGH (ref 3.8–10.5)
WBC # FLD AUTO: 12.8 K/UL — HIGH (ref 3.8–10.5)

## 2020-01-07 PROCEDURE — 99222 1ST HOSP IP/OBS MODERATE 55: CPT

## 2020-01-07 PROCEDURE — 99285 EMERGENCY DEPT VISIT HI MDM: CPT

## 2020-01-07 RX ORDER — METOCLOPRAMIDE HCL 10 MG
10 TABLET ORAL EVERY 6 HOURS
Refills: 0 | Status: DISCONTINUED | OUTPATIENT
Start: 2020-01-07 | End: 2020-01-08

## 2020-01-07 RX ORDER — DIPHENHYDRAMINE HCL 50 MG
50 CAPSULE ORAL EVERY 6 HOURS
Refills: 0 | Status: DISCONTINUED | OUTPATIENT
Start: 2020-01-07 | End: 2020-01-08

## 2020-01-07 RX ORDER — DIPHENHYDRAMINE HCL 50 MG
50 CAPSULE ORAL ONCE
Refills: 0 | Status: COMPLETED | OUTPATIENT
Start: 2020-01-07 | End: 2020-01-07

## 2020-01-07 RX ORDER — HALOPERIDOL DECANOATE 100 MG/ML
5 INJECTION INTRAMUSCULAR ONCE
Refills: 0 | Status: DISCONTINUED | OUTPATIENT
Start: 2020-01-07 | End: 2020-01-07

## 2020-01-07 RX ORDER — METOCLOPRAMIDE HCL 10 MG
10 TABLET ORAL ONCE
Refills: 0 | Status: COMPLETED | OUTPATIENT
Start: 2020-01-07 | End: 2020-01-07

## 2020-01-07 RX ORDER — HALOPERIDOL DECANOATE 100 MG/ML
5 INJECTION INTRAMUSCULAR ONCE
Refills: 0 | Status: COMPLETED | OUTPATIENT
Start: 2020-01-07 | End: 2020-01-07

## 2020-01-07 RX ORDER — MORPHINE SULFATE 50 MG/1
2 CAPSULE, EXTENDED RELEASE ORAL EVERY 6 HOURS
Refills: 0 | Status: DISCONTINUED | OUTPATIENT
Start: 2020-01-07 | End: 2020-01-08

## 2020-01-07 RX ORDER — PANTOPRAZOLE SODIUM 20 MG/1
40 TABLET, DELAYED RELEASE ORAL
Refills: 0 | Status: DISCONTINUED | OUTPATIENT
Start: 2020-01-07 | End: 2020-01-08

## 2020-01-07 RX ORDER — ONDANSETRON 8 MG/1
4 TABLET, FILM COATED ORAL ONCE
Refills: 0 | Status: COMPLETED | OUTPATIENT
Start: 2020-01-07 | End: 2020-01-07

## 2020-01-07 RX ORDER — SODIUM CHLORIDE 9 MG/ML
1000 INJECTION, SOLUTION INTRAVENOUS ONCE
Refills: 0 | Status: COMPLETED | OUTPATIENT
Start: 2020-01-07 | End: 2020-01-07

## 2020-01-07 RX ORDER — POTASSIUM CHLORIDE 20 MEQ
10 PACKET (EA) ORAL ONCE
Refills: 0 | Status: COMPLETED | OUTPATIENT
Start: 2020-01-07 | End: 2020-01-07

## 2020-01-07 RX ORDER — POTASSIUM CHLORIDE 20 MEQ
40 PACKET (EA) ORAL ONCE
Refills: 0 | Status: COMPLETED | OUTPATIENT
Start: 2020-01-07 | End: 2020-01-07

## 2020-01-07 RX ORDER — MORPHINE SULFATE 50 MG/1
4 CAPSULE, EXTENDED RELEASE ORAL ONCE
Refills: 0 | Status: DISCONTINUED | OUTPATIENT
Start: 2020-01-07 | End: 2020-01-07

## 2020-01-07 RX ADMIN — MORPHINE SULFATE 2 MILLIGRAM(S): 50 CAPSULE, EXTENDED RELEASE ORAL at 22:22

## 2020-01-07 RX ADMIN — MORPHINE SULFATE 2 MILLIGRAM(S): 50 CAPSULE, EXTENDED RELEASE ORAL at 22:52

## 2020-01-07 RX ADMIN — PANTOPRAZOLE SODIUM 40 MILLIGRAM(S): 20 TABLET, DELAYED RELEASE ORAL at 22:48

## 2020-01-07 RX ADMIN — HALOPERIDOL DECANOATE 5 MILLIGRAM(S): 100 INJECTION INTRAMUSCULAR at 13:34

## 2020-01-07 RX ADMIN — Medication 40 MILLIEQUIVALENT(S): at 22:00

## 2020-01-07 RX ADMIN — Medication 10 MILLIGRAM(S): at 16:53

## 2020-01-07 RX ADMIN — MORPHINE SULFATE 4 MILLIGRAM(S): 50 CAPSULE, EXTENDED RELEASE ORAL at 16:52

## 2020-01-07 RX ADMIN — Medication 50 MILLIGRAM(S): at 16:52

## 2020-01-07 RX ADMIN — Medication 50 MILLIGRAM(S): at 13:31

## 2020-01-07 RX ADMIN — SODIUM CHLORIDE 1000 MILLILITER(S): 9 INJECTION, SOLUTION INTRAVENOUS at 13:30

## 2020-01-07 RX ADMIN — SODIUM CHLORIDE 1000 MILLILITER(S): 9 INJECTION, SOLUTION INTRAVENOUS at 22:45

## 2020-01-07 RX ADMIN — Medication 100 MILLIEQUIVALENT(S): at 22:22

## 2020-01-07 RX ADMIN — ONDANSETRON 4 MILLIGRAM(S): 8 TABLET, FILM COATED ORAL at 13:39

## 2020-01-07 NOTE — H&P ADULT - ASSESSMENT
Patient is a 23 YO F with a PMH of cyclical vomiting suspected due to marijuana abuse reports to ED for nausea and vomiting.  Patient reports improvement of symptoms in ED however still cannot tolerate PO.  Labs show leukocytosis and hypokalemia, likely related to vomiting.  Will admit to floor.

## 2020-01-07 NOTE — PATIENT PROFILE ADULT - VISION (WITH CORRECTIVE LENSES IF THE PATIENT USUALLY WEARS THEM):
Include Location In Plan?: No Detail Level: Zone Normal vision: sees adequately in most situations; can see medication labels, newsprint

## 2020-01-07 NOTE — H&P ADULT - NSICDXPASTMEDICALHX_GEN_ALL_CORE_FT
PAST MEDICAL HISTORY:  Cyclic vomiting syndrome     Cyst of ovary, unspecified laterality     GERD (gastroesophageal reflux disease)

## 2020-01-07 NOTE — H&P ADULT - NSHPLABSRESULTS_GEN_ALL_CORE
Recent Vitals  T(C): 36.8 (01-07-20 @ 13:19), Max: 36.8 (01-07-20 @ 13:19)  HR: 112 (01-07-20 @ 12:29) (112 - 112)  BP: 154/95 (01-07-20 @ 12:29) (154/95 - 154/95)  RR: 24 (01-07-20 @ 12:29) (24 - 24)  SpO2: 100% (01-07-20 @ 12:29) (100% - 100%)                        13.2   12.80 )-----------( 333      ( 07 Jan 2020 13:49 )             40.4     01-07    139  |  102  |  19  ----------------------------<  111<H>  3.0<L>   |  26  |  1.12    Ca    9.5      07 Jan 2020 13:49    TPro  8.8<H>  /  Alb  4.5  /  TBili  0.9  /  DBili  x   /  AST  95<H>  /  ALT  77  /  AlkPhos  55  01-07      LIVER FUNCTIONS - ( 07 Jan 2020 13:49 )  Alb: 4.5 g/dL / Pro: 8.8 gm/dL / ALK PHOS: 55 U/L / ALT: 77 U/L / AST: 95 U/L / GGT: x               Home Medications:

## 2020-01-07 NOTE — H&P ADULT - HISTORY OF PRESENT ILLNESS
Patient is a 23 YO F with a PMH of cyclical vomiting suspected due to marijuana abuse reports to ED for nausea and vomiting.  Patient reports that she has been vomiting for one week with uncontrolled symptoms.  Patient reports that she has had reduced PO intake nd now today it has worsened where she cannot tolerate anything by mouth.  Patient reports improvement of symptoms in ED however still cannot tolerate PO.  Patient reports that she continues to smoke marijuana however she has decreased her use to daily instead of 5x a day.  Will admit to floor.

## 2020-01-07 NOTE — ED ADULT TRIAGE NOTE - CHIEF COMPLAINT QUOTE
Pt presents to ED with abdominal pain nausea and multiple episode of  vomiting. Pt states " I feel really weak and I am dehydrated. with numbness/ tingling"

## 2020-01-07 NOTE — H&P ADULT - NSHPPHYSICALEXAM_GEN_ALL_CORE
Physical exam:  General: patient in no acute distress, resting comfortably  Cardio: S1/S2 +ve, regular rate and rhythm, no M/G/R  Resp: clear to ausculation bilaterally, no rales or wheezes  GI: abdomen soft, mild tenderness to palpation, non distended, no guarding, BS +ve x 4  Ext: no significant pedal edema  Neuro: CN 2-12 intact, no significant motor or sensory deficits.

## 2020-01-08 ENCOUNTER — TRANSCRIPTION ENCOUNTER (OUTPATIENT)
Age: 25
End: 2020-01-08

## 2020-01-08 VITALS
DIASTOLIC BLOOD PRESSURE: 82 MMHG | SYSTOLIC BLOOD PRESSURE: 126 MMHG | HEART RATE: 84 BPM | TEMPERATURE: 98 F | OXYGEN SATURATION: 100 % | RESPIRATION RATE: 18 BRPM

## 2020-01-08 LAB
AMPHET UR-MCNC: NEGATIVE — SIGNIFICANT CHANGE UP
ANION GAP SERPL CALC-SCNC: 8 MMOL/L — SIGNIFICANT CHANGE UP (ref 5–17)
APPEARANCE UR: ABNORMAL
BACTERIA # UR AUTO: ABNORMAL
BARBITURATES UR SCN-MCNC: NEGATIVE — SIGNIFICANT CHANGE UP
BENZODIAZ UR-MCNC: NEGATIVE — SIGNIFICANT CHANGE UP
BILIRUB UR-MCNC: NEGATIVE — SIGNIFICANT CHANGE UP
BUN SERPL-MCNC: 16 MG/DL — SIGNIFICANT CHANGE UP (ref 7–23)
CALCIUM SERPL-MCNC: 8.2 MG/DL — LOW (ref 8.5–10.1)
CHLORIDE SERPL-SCNC: 100 MMOL/L — SIGNIFICANT CHANGE UP (ref 96–108)
CO2 SERPL-SCNC: 28 MMOL/L — SIGNIFICANT CHANGE UP (ref 22–31)
COCAINE METAB.OTHER UR-MCNC: NEGATIVE — SIGNIFICANT CHANGE UP
COLOR SPEC: ABNORMAL
COMMENT - URINE: SIGNIFICANT CHANGE UP
CREAT SERPL-MCNC: 0.93 MG/DL — SIGNIFICANT CHANGE UP (ref 0.5–1.3)
DIFF PNL FLD: ABNORMAL
EPI CELLS # UR: ABNORMAL
GLUCOSE SERPL-MCNC: 85 MG/DL — SIGNIFICANT CHANGE UP (ref 70–99)
GLUCOSE UR QL: NEGATIVE MG/DL — SIGNIFICANT CHANGE UP
HCG SERPL-ACNC: <1 MIU/ML — SIGNIFICANT CHANGE UP
HCT VFR BLD CALC: 37.9 % — SIGNIFICANT CHANGE UP (ref 34.5–45)
HGB BLD-MCNC: 12 G/DL — SIGNIFICANT CHANGE UP (ref 11.5–15.5)
HYALINE CASTS # UR AUTO: ABNORMAL /LPF
KETONES UR-MCNC: ABNORMAL
LEUKOCYTE ESTERASE UR-ACNC: ABNORMAL
MCHC RBC-ENTMCNC: 26 PG — LOW (ref 27–34)
MCHC RBC-ENTMCNC: 31.7 GM/DL — LOW (ref 32–36)
MCV RBC AUTO: 82.2 FL — SIGNIFICANT CHANGE UP (ref 80–100)
METHADONE UR-MCNC: NEGATIVE — SIGNIFICANT CHANGE UP
NITRITE UR-MCNC: NEGATIVE — SIGNIFICANT CHANGE UP
NRBC # BLD: 0 /100 WBCS — SIGNIFICANT CHANGE UP (ref 0–0)
OPIATES UR-MCNC: POSITIVE — SIGNIFICANT CHANGE UP
PCP SPEC-MCNC: SIGNIFICANT CHANGE UP
PCP SPEC-MCNC: SIGNIFICANT CHANGE UP
PCP UR-MCNC: NEGATIVE — SIGNIFICANT CHANGE UP
PH UR: 7 — SIGNIFICANT CHANGE UP (ref 5–8)
PLATELET # BLD AUTO: 309 K/UL — SIGNIFICANT CHANGE UP (ref 150–400)
POTASSIUM SERPL-MCNC: 3.2 MMOL/L — LOW (ref 3.5–5.3)
POTASSIUM SERPL-SCNC: 3.2 MMOL/L — LOW (ref 3.5–5.3)
PROT UR-MCNC: 100 MG/DL
RBC # BLD: 4.61 M/UL — SIGNIFICANT CHANGE UP (ref 3.8–5.2)
RBC # FLD: 15.3 % — HIGH (ref 10.3–14.5)
RBC CASTS # UR COMP ASSIST: >50 /HPF (ref 0–4)
SODIUM SERPL-SCNC: 136 MMOL/L — SIGNIFICANT CHANGE UP (ref 135–145)
SP GR SPEC: 1.01 — SIGNIFICANT CHANGE UP (ref 1.01–1.02)
THC UR QL: POSITIVE — SIGNIFICANT CHANGE UP
UROBILINOGEN FLD QL: 1 MG/DL
WBC # BLD: 9.4 K/UL — SIGNIFICANT CHANGE UP (ref 3.8–10.5)
WBC # FLD AUTO: 9.4 K/UL — SIGNIFICANT CHANGE UP (ref 3.8–10.5)
WBC UR QL: ABNORMAL

## 2020-01-08 PROCEDURE — 99239 HOSP IP/OBS DSCHRG MGMT >30: CPT

## 2020-01-08 RX ORDER — PANTOPRAZOLE SODIUM 20 MG/1
1 TABLET, DELAYED RELEASE ORAL
Qty: 30 | Refills: 0
Start: 2020-01-08 | End: 2020-02-06

## 2020-01-08 RX ORDER — POTASSIUM CHLORIDE 20 MEQ
40 PACKET (EA) ORAL EVERY 4 HOURS
Refills: 0 | Status: COMPLETED | OUTPATIENT
Start: 2020-01-08 | End: 2020-01-08

## 2020-01-08 RX ADMIN — Medication 40 MILLIEQUIVALENT(S): at 10:09

## 2020-01-08 RX ADMIN — Medication 40 MILLIEQUIVALENT(S): at 14:11

## 2020-01-08 RX ADMIN — PANTOPRAZOLE SODIUM 40 MILLIGRAM(S): 20 TABLET, DELAYED RELEASE ORAL at 05:43

## 2020-01-08 NOTE — DISCHARGE NOTE PROVIDER - HOSPITAL COURSE
25 YO F with a PMH of cyclical vomiting suspected due to marijuana abuse reports to ED for nausea and vomiting.  Patient reports that she has been vomiting for one week with uncontrolled symptoms.  Patient reports that she has had reduced PO intake nd now today it has worsened where she cannot tolerate anything by mouth.             DISCHARGE DIAGNOSES:    1.  Intractable cyclical vomiting with nausea , presumed secondary to THC use     -tolerated breakfast and lunch, no further vomiting     -received IV hydration     -B-HCG <1. patient on birth control and also is currently menstruating which explains the UA findings. Can follow-up with PCP     patient denies dysuria/polyuria/abd or pelvic  pain         2. Dehydration secondary to above , s/p IVF         3. THC abuse     -education on THC cessation provided     -no need for morphine -  d/c morphine         4. hypokalemia -secondary to vomiting (vomiting resolved)    -repleted 40meq  x 2 doses today     follow-up with PCP within 3 days         6. Possibly gastritis - PPI for few weeks             RETURN PARAMETERS DISCUSSED WITH PATIENT, PATIENT EXPRESSED UNDERSTANDING AND IS AGREEABLE.            Discharge time : 40 min 25 YO F with a PMH of cyclical vomiting suspected due to marijuana abuse reports to ED for nausea and vomiting.  Patient reports that she has been vomiting for one week with uncontrolled symptoms.  Patient reports that she has had reduced PO intake nd now today it has worsened where she cannot tolerate anything by mouth.         GENERAL: NAD, speaking in full sentences, well groomed , pleasant     HEAD:  Atraumatic, Normocephalic    EYES: EOMI, PERRLA, conjunctiva and sclera clear    ENMT: No tonsillar erythema, exudates, or enlargement; Moist mucous membranes    NECK: Supple, No JVD    NERVOUS SYSTEM:  Alert & Oriented X3, Good concentration; nonfocal    CHEST/LUNG: CTAB    HEART: Regular rate and rhythm; No murmurs, rubs, or gallops    ABDOMEN: Soft, Nontender, Nondistended; Bowel sounds present    EXTREMITIES:  2+ Peripheral Pulses b/l, No clubbing, cyanosis, or edema b/l             DISCHARGE DIAGNOSES:    1.  Intractable cyclical vomiting with nausea , presumed secondary to THC use     -tolerated breakfast and lunch, no further vomiting     -received IV hydration     -B-HCG <1. patient on birth control and also is currently menstruating which explains the UA findings. Can follow-up with PCP     patient denies dysuria/polyuria/abd or pelvic  pain         2. Dehydration secondary to above , s/p IVF         3. THC abuse     -education on THC cessation provided     -no need for morphine -  d/c morphine         4. hypokalemia -secondary to vomiting (vomiting resolved)    -repleted 40meq  x 2 doses today     follow-up with PCP within 3 days         6. Possibly gastritis - PPI for few weeks             RETURN PARAMETERS DISCUSSED WITH PATIENT, PATIENT EXPRESSED UNDERSTANDING AND IS AGREEABLE.            Discharge time : 40 min

## 2020-01-08 NOTE — CHART NOTE - NSCHARTNOTEFT_GEN_A_CORE
Patient: Marla Hilliard   : 1995    To Whom It May Concern:    Please use this letter  as verfication that the above patient was hospitalized at [facility] from 20 - 20.    Due to HIPAA law, I am unable to provide further information.     If any questions or concerns please call [phone].     Best Regards,  Crys Rapp MD

## 2020-01-08 NOTE — DISCHARGE NOTE PROVIDER - PROVIDER TOKENS
FREE:[LAST:[PCP],PHONE:[(   )    -],FAX:[(   )    -],ADDRESS:[Dr. Lacy Nurse  Internal medicine  [address] [place] [address]  [phone]],FOLLOWUP:[1-3 days]]

## 2020-01-08 NOTE — DISCHARGE NOTE PROVIDER - NSDCCPCAREPLAN_GEN_ALL_CORE_FT
PRINCIPAL DISCHARGE DIAGNOSIS  Diagnosis: Cyclic vomiting syndrome  Assessment and Plan of Treatment:       SECONDARY DISCHARGE DIAGNOSES  Diagnosis: Gastritis  Assessment and Plan of Treatment: PPI    Diagnosis: Hypokalemia  Assessment and Plan of Treatment: repleted    Diagnosis: Marijuana abuse  Assessment and Plan of Treatment:

## 2020-01-08 NOTE — DISCHARGE NOTE PROVIDER - CARE PROVIDER_API CALL
PCP,   Dr. Lacy Nurse  Internal medicine  [address] [place], [place] [id number] [phone]  Phone: (   )    -  Fax: (   )    -  Follow Up Time: 1-3 days

## 2020-01-09 LAB
CULTURE RESULTS: SIGNIFICANT CHANGE UP
SPECIMEN SOURCE: SIGNIFICANT CHANGE UP

## 2020-01-12 DIAGNOSIS — K29.70 GASTRITIS, UNSPECIFIED, WITHOUT BLEEDING: ICD-10-CM

## 2020-01-12 DIAGNOSIS — E87.6 HYPOKALEMIA: ICD-10-CM

## 2020-01-12 DIAGNOSIS — E86.0 DEHYDRATION: ICD-10-CM

## 2020-01-12 DIAGNOSIS — Z88.0 ALLERGY STATUS TO PENICILLIN: ICD-10-CM

## 2020-01-12 DIAGNOSIS — F12.10 CANNABIS ABUSE, UNCOMPLICATED: ICD-10-CM

## 2020-01-12 DIAGNOSIS — K21.9 GASTRO-ESOPHAGEAL REFLUX DISEASE WITHOUT ESOPHAGITIS: ICD-10-CM

## 2020-01-12 DIAGNOSIS — R11.15 CYCLICAL VOMITING SYNDROME UNRELATED TO MIGRAINE: ICD-10-CM

## 2020-01-12 DIAGNOSIS — R11.2 NAUSEA WITH VOMITING, UNSPECIFIED: ICD-10-CM

## 2020-01-28 NOTE — ED PROVIDER NOTE - NSFOLLOWUPINSTRUCTIONS_ED_ALL_ED_FT
Follow up with your primary care doctor within the next 24-48 hours and bring copy of your results.  Return to the Emergency Department for worsening or persistent symptoms or any other concerns incl. chest pain, shortness of breath, dizziness, inability to tolerate oral intake.  Rest, drink plenty of fluids.  Advance activity as tolerated.  Continue all previously prescribed medications as directed. You can use Tylenol 650 mg every 4 hours for pain/fever.
No

## 2020-02-06 NOTE — PATIENT PROFILE ADULT - CONTRAINDICATIONS & PRECAUTIONS (SELECT ALL THAT APPLY)
It was discussed with the patient the importance of good control of their blood sugar, blood pressure, cholesterol, diet, exercise and weight under the guidance of their diabetic doctor to prevent/halt diabetic retinopathy. none...

## 2020-02-07 ENCOUNTER — INPATIENT (INPATIENT)
Facility: HOSPITAL | Age: 25
LOS: 2 days | Discharge: HOME | DRG: 918 | End: 2020-02-10
Attending: HOSPITALIST | Admitting: HOSPITALIST
Payer: COMMERCIAL

## 2020-02-07 VITALS — HEART RATE: 96 BPM | RESPIRATION RATE: 22 BRPM | TEMPERATURE: 98 F | HEIGHT: 62 IN | WEIGHT: 125 LBS

## 2020-02-07 LAB
ALBUMIN SERPL ELPH-MCNC: 4.5 G/DL — SIGNIFICANT CHANGE UP (ref 3.3–5.2)
ALP SERPL-CCNC: 48 U/L — SIGNIFICANT CHANGE UP (ref 40–120)
ALT FLD-CCNC: 18 U/L — SIGNIFICANT CHANGE UP
ANION GAP SERPL CALC-SCNC: 21 MMOL/L — HIGH (ref 5–17)
AST SERPL-CCNC: 27 U/L — SIGNIFICANT CHANGE UP
BASOPHILS # BLD AUTO: 0.02 K/UL — SIGNIFICANT CHANGE UP (ref 0–0.2)
BASOPHILS NFR BLD AUTO: 0.2 % — SIGNIFICANT CHANGE UP (ref 0–2)
BILIRUB SERPL-MCNC: 0.7 MG/DL — SIGNIFICANT CHANGE UP (ref 0.4–2)
BUN SERPL-MCNC: 11 MG/DL — SIGNIFICANT CHANGE UP (ref 8–20)
CALCIUM SERPL-MCNC: 9.4 MG/DL — SIGNIFICANT CHANGE UP (ref 8.6–10.2)
CHLORIDE SERPL-SCNC: 99 MMOL/L — SIGNIFICANT CHANGE UP (ref 98–107)
CO2 SERPL-SCNC: 15 MMOL/L — LOW (ref 22–29)
CREAT SERPL-MCNC: 0.84 MG/DL — SIGNIFICANT CHANGE UP (ref 0.5–1.3)
EOSINOPHIL # BLD AUTO: 0 K/UL — SIGNIFICANT CHANGE UP (ref 0–0.5)
EOSINOPHIL NFR BLD AUTO: 0 % — SIGNIFICANT CHANGE UP (ref 0–6)
GLUCOSE SERPL-MCNC: 132 MG/DL — HIGH (ref 70–99)
HCT VFR BLD CALC: 34.2 % — LOW (ref 34.5–45)
HGB BLD-MCNC: 11.3 G/DL — LOW (ref 11.5–15.5)
IMM GRANULOCYTES NFR BLD AUTO: 0.4 % — SIGNIFICANT CHANGE UP (ref 0–1.5)
LIDOCAIN IGE QN: 32 U/L — SIGNIFICANT CHANGE UP (ref 22–51)
LYMPHOCYTES # BLD AUTO: 1.49 K/UL — SIGNIFICANT CHANGE UP (ref 1–3.3)
LYMPHOCYTES # BLD AUTO: 11.5 % — LOW (ref 13–44)
MCHC RBC-ENTMCNC: 26.3 PG — LOW (ref 27–34)
MCHC RBC-ENTMCNC: 33 GM/DL — SIGNIFICANT CHANGE UP (ref 32–36)
MCV RBC AUTO: 79.5 FL — LOW (ref 80–100)
MONOCYTES # BLD AUTO: 0.4 K/UL — SIGNIFICANT CHANGE UP (ref 0–0.9)
MONOCYTES NFR BLD AUTO: 3.1 % — SIGNIFICANT CHANGE UP (ref 2–14)
NEUTROPHILS # BLD AUTO: 10.97 K/UL — HIGH (ref 1.8–7.4)
NEUTROPHILS NFR BLD AUTO: 84.8 % — HIGH (ref 43–77)
PLATELET # BLD AUTO: 302 K/UL — SIGNIFICANT CHANGE UP (ref 150–400)
POTASSIUM SERPL-MCNC: 3.9 MMOL/L — SIGNIFICANT CHANGE UP (ref 3.5–5.3)
POTASSIUM SERPL-SCNC: 3.9 MMOL/L — SIGNIFICANT CHANGE UP (ref 3.5–5.3)
PROT SERPL-MCNC: 7.6 G/DL — SIGNIFICANT CHANGE UP (ref 6.6–8.7)
RBC # BLD: 4.3 M/UL — SIGNIFICANT CHANGE UP (ref 3.8–5.2)
RBC # FLD: 15.7 % — HIGH (ref 10.3–14.5)
SODIUM SERPL-SCNC: 135 MMOL/L — SIGNIFICANT CHANGE UP (ref 135–145)
WBC # BLD: 12.93 K/UL — HIGH (ref 3.8–10.5)
WBC # FLD AUTO: 12.93 K/UL — HIGH (ref 3.8–10.5)

## 2020-02-07 PROCEDURE — 99285 EMERGENCY DEPT VISIT HI MDM: CPT

## 2020-02-07 RX ORDER — DIPHENHYDRAMINE HCL 50 MG
25 CAPSULE ORAL ONCE
Refills: 0 | Status: COMPLETED | OUTPATIENT
Start: 2020-02-07 | End: 2020-02-07

## 2020-02-07 RX ORDER — MORPHINE SULFATE 50 MG/1
4 CAPSULE, EXTENDED RELEASE ORAL ONCE
Refills: 0 | Status: DISCONTINUED | OUTPATIENT
Start: 2020-02-07 | End: 2020-02-07

## 2020-02-07 RX ORDER — ONDANSETRON 8 MG/1
4 TABLET, FILM COATED ORAL ONCE
Refills: 0 | Status: COMPLETED | OUTPATIENT
Start: 2020-02-07 | End: 2020-02-07

## 2020-02-07 RX ORDER — METOCLOPRAMIDE HCL 10 MG
10 TABLET ORAL ONCE
Refills: 0 | Status: COMPLETED | OUTPATIENT
Start: 2020-02-07 | End: 2020-02-07

## 2020-02-07 RX ORDER — SODIUM CHLORIDE 9 MG/ML
1000 INJECTION INTRAMUSCULAR; INTRAVENOUS; SUBCUTANEOUS ONCE
Refills: 0 | Status: COMPLETED | OUTPATIENT
Start: 2020-02-07 | End: 2020-02-07

## 2020-02-07 RX ADMIN — ONDANSETRON 4 MILLIGRAM(S): 8 TABLET, FILM COATED ORAL at 23:12

## 2020-02-07 RX ADMIN — Medication 101 MILLIGRAM(S): at 23:12

## 2020-02-07 RX ADMIN — MORPHINE SULFATE 4 MILLIGRAM(S): 50 CAPSULE, EXTENDED RELEASE ORAL at 23:12

## 2020-02-07 RX ADMIN — SODIUM CHLORIDE 1000 MILLILITER(S): 9 INJECTION INTRAMUSCULAR; INTRAVENOUS; SUBCUTANEOUS at 23:17

## 2020-02-07 RX ADMIN — Medication 10 MILLIGRAM(S): at 23:46

## 2020-02-07 NOTE — ED STATDOCS - PHYSICAL EXAMINATION
English VITAL SIGNS: I have reviewed nursing notes and confirm.  CONSTITUTIONAL: Well-developed; well-nourished; in no acute distress. Uncomfortable looking  SKIN: Skin exam is warm and dry, no acute rash.  HEAD: Normocephalic; atraumatic.  EYES: PERRL, EOM intact; conjunctiva and sclera clear.  ENT: No nasal discharge; airway clear. Throat clear.  NECK: Supple; non tender.    CARD: S1, S2 normal; no murmurs, gallops, or rubs. Regular rate and rhythm.  RESP: No wheezes,  no rales or rhonchi.   ABD:  soft; non-distended; diffused abdominal pain; actively vomiting.  EXT: Normal ROM. No clubbing, cyanosis or edema.  NEURO: Alert, oriented. Grossly unremarkable. No focal deficits. no facial droop, moves all extremities,  no pronator drift, finger-to-nose wnl, normal gait   PSYCH: Cooperative, appropriate. VITAL SIGNS: I have reviewed nursing notes and confirm.  CONSTITUTIONAL: Well-developed; well-nourished; (+) Uncomfortable looking  SKIN: Skin exam is warm and dry, no acute rash.  HEAD: Normocephalic; atraumatic.  EYES: PERRL, EOM intact; conjunctiva and sclera clear.  ENT: No nasal discharge; airway clear. Throat clear.  NECK: Supple; non tender.    CARD: S1, S2 normal; no murmurs, gallops, or rubs. Regular rate and rhythm.  RESP: No wheezes,  no rales or rhonchi.   ABD:  soft; non-distended; (+) diffused abdominal pain; actively vomiting.  EXT: Normal ROM. No clubbing, cyanosis or edema.  NEURO: Alert, oriented. Grossly unremarkable. No focal deficits. no facial droop, moves all extremities,     PSYCH: Cooperative, appropriate.

## 2020-02-07 NOTE — ED STATDOCS - NS ED ROS FT
Review of Systems  •	CONSTITUTIONAL - no  fever, no diaphoresis, no weight change  •	SKIN - no rash  •	HEMATOLOGIC - no bleeding, no bruising  •	EYES - no eye pain, no blurred vision  •	ENT - no change in hearing, no pain  •	RESPIRATORY - no shortness of breath, no cough  •	CARDIAC - no chest pain, no palpitations  •	GI - (+) abd pain, (+) nausea, (+) vomiting, no diarrhea, no constipation, no bleeding  •	GENITO-URINARY - no discharge, no dysuria; no hematuria,   •	ENDO - no polydypsia, no polyurea, no heat/no cold intolerance  •	MUSCULOSKELETAL - no joint pain, no swelling, no redness  •	NEUROLOGIC - no weakness, no headache, no anesthesia, no paresthesias  •	PSYCH - no anxiety, non suicidal, non homicidal, no hallucination, no depression

## 2020-02-07 NOTE — ED STATDOCS - PROGRESS NOTE DETAILS
PA note: PT evaluated by intake physician. HPI/PE/ROS as noted above. Will follow up plan per intake physician Pt still nauseous and actively vomiting at this time. Pt appears comfortable, upon re-assessment pt begins to vomit again, unable to tolerate PO intake. Will keep in obs

## 2020-02-07 NOTE — ED STATDOCS - OBJECTIVE STATEMENT
23 y/o F with PMHx of  presents to the ED with mother c/o abdominal pain cramping in nature x3 days. Mother states that pt gets severe abdominal pain, nausea and vomiting when she her MP starts. Pt reports marijuana use and states that last use was x3 days ago.    Denies fever

## 2020-02-07 NOTE — ED STATDOCS - PMH
No pertinent past medical history <<----- Click to add NO pertinent Past Medical History Cyclic vomiting syndrome    Cyst of ovary, unspecified laterality    GERD (gastroesophageal reflux disease)

## 2020-02-07 NOTE — ED STATDOCS - ATTENDING CONTRIBUTION TO CARE
I, Hiram Rivero, performed the initial face to face bedside interview with this patient regarding history of present illness, review of symptoms and relevant past medical, social and family history.  I completed an independent physical examination.  I was the initial provider who evaluated this patient. I have signed out the follow up of any pending tests (i.e. labs, radiological studies) to the ACP.  I have communicated the patient’s plan of care and disposition with the ACP.

## 2020-02-07 NOTE — ED STATDOCS - CLINICAL SUMMARY MEDICAL DECISION MAKING FREE TEXT BOX
Pt with diffused abdominal pain h/o cyclical vomiting presenting with severe nausea and vomiting will give morphine, Zofran, benadryl, IV hydration and blood work

## 2020-02-08 LAB
ANION GAP SERPL CALC-SCNC: 17 MMOL/L — SIGNIFICANT CHANGE UP (ref 5–17)
BUN SERPL-MCNC: 13 MG/DL — SIGNIFICANT CHANGE UP (ref 8–20)
CALCIUM SERPL-MCNC: 8.9 MG/DL — SIGNIFICANT CHANGE UP (ref 8.6–10.2)
CHLORIDE SERPL-SCNC: 103 MMOL/L — SIGNIFICANT CHANGE UP (ref 98–107)
CO2 SERPL-SCNC: 17 MMOL/L — LOW (ref 22–29)
CREAT SERPL-MCNC: 0.71 MG/DL — SIGNIFICANT CHANGE UP (ref 0.5–1.3)
GLUCOSE SERPL-MCNC: 115 MG/DL — HIGH (ref 70–99)
HCG SERPL-ACNC: <4 MIU/ML — SIGNIFICANT CHANGE UP
POTASSIUM SERPL-MCNC: 4.5 MMOL/L — SIGNIFICANT CHANGE UP (ref 3.5–5.3)
POTASSIUM SERPL-SCNC: 4.5 MMOL/L — SIGNIFICANT CHANGE UP (ref 3.5–5.3)
SODIUM SERPL-SCNC: 137 MMOL/L — SIGNIFICANT CHANGE UP (ref 135–145)

## 2020-02-08 PROCEDURE — 99218: CPT

## 2020-02-08 PROCEDURE — 93010 ELECTROCARDIOGRAM REPORT: CPT

## 2020-02-08 RX ORDER — SODIUM CHLORIDE 9 MG/ML
1000 INJECTION INTRAMUSCULAR; INTRAVENOUS; SUBCUTANEOUS
Refills: 0 | Status: DISCONTINUED | OUTPATIENT
Start: 2020-02-08 | End: 2020-02-10

## 2020-02-08 RX ORDER — HALOPERIDOL DECANOATE 100 MG/ML
2.5 INJECTION INTRAMUSCULAR ONCE
Refills: 0 | Status: DISCONTINUED | OUTPATIENT
Start: 2020-02-08 | End: 2020-02-08

## 2020-02-08 RX ORDER — ONDANSETRON 8 MG/1
4 TABLET, FILM COATED ORAL EVERY 6 HOURS
Refills: 0 | Status: DISCONTINUED | OUTPATIENT
Start: 2020-02-08 | End: 2020-02-09

## 2020-02-08 RX ORDER — KETOROLAC TROMETHAMINE 30 MG/ML
15 SYRINGE (ML) INJECTION EVERY 8 HOURS
Refills: 0 | Status: DISCONTINUED | OUTPATIENT
Start: 2020-02-08 | End: 2020-02-10

## 2020-02-08 RX ORDER — KETOROLAC TROMETHAMINE 30 MG/ML
15 SYRINGE (ML) INJECTION ONCE
Refills: 0 | Status: DISCONTINUED | OUTPATIENT
Start: 2020-02-08 | End: 2020-02-08

## 2020-02-08 RX ORDER — ACETAMINOPHEN 500 MG
650 TABLET ORAL ONCE
Refills: 0 | Status: COMPLETED | OUTPATIENT
Start: 2020-02-08 | End: 2020-02-08

## 2020-02-08 RX ORDER — FAMOTIDINE 10 MG/ML
20 INJECTION INTRAVENOUS DAILY
Refills: 0 | Status: DISCONTINUED | OUTPATIENT
Start: 2020-02-08 | End: 2020-02-10

## 2020-02-08 RX ORDER — PANTOPRAZOLE SODIUM 20 MG/1
40 TABLET, DELAYED RELEASE ORAL ONCE
Refills: 0 | Status: COMPLETED | OUTPATIENT
Start: 2020-02-08 | End: 2020-02-08

## 2020-02-08 RX ORDER — FAMOTIDINE 10 MG/ML
20 INJECTION INTRAVENOUS ONCE
Refills: 0 | Status: COMPLETED | OUTPATIENT
Start: 2020-02-08 | End: 2020-02-08

## 2020-02-08 RX ORDER — HALOPERIDOL DECANOATE 100 MG/ML
5 INJECTION INTRAMUSCULAR ONCE
Refills: 0 | Status: COMPLETED | OUTPATIENT
Start: 2020-02-08 | End: 2020-02-08

## 2020-02-08 RX ORDER — HALOPERIDOL DECANOATE 100 MG/ML
2.5 INJECTION INTRAMUSCULAR ONCE
Refills: 0 | Status: COMPLETED | OUTPATIENT
Start: 2020-02-08 | End: 2020-02-08

## 2020-02-08 RX ORDER — SODIUM CHLORIDE 9 MG/ML
1000 INJECTION INTRAMUSCULAR; INTRAVENOUS; SUBCUTANEOUS ONCE
Refills: 0 | Status: COMPLETED | OUTPATIENT
Start: 2020-02-08 | End: 2020-02-08

## 2020-02-08 RX ADMIN — FAMOTIDINE 20 MILLIGRAM(S): 10 INJECTION INTRAVENOUS at 03:56

## 2020-02-08 RX ADMIN — SODIUM CHLORIDE 125 MILLILITER(S): 9 INJECTION INTRAMUSCULAR; INTRAVENOUS; SUBCUTANEOUS at 22:26

## 2020-02-08 RX ADMIN — Medication 20 MILLIGRAM(S): at 11:45

## 2020-02-08 RX ADMIN — ONDANSETRON 4 MILLIGRAM(S): 8 TABLET, FILM COATED ORAL at 23:26

## 2020-02-08 RX ADMIN — FAMOTIDINE 20 MILLIGRAM(S): 10 INJECTION INTRAVENOUS at 12:06

## 2020-02-08 RX ADMIN — ONDANSETRON 4 MILLIGRAM(S): 8 TABLET, FILM COATED ORAL at 11:04

## 2020-02-08 RX ADMIN — FAMOTIDINE 100 MILLIGRAM(S): 10 INJECTION INTRAVENOUS at 11:45

## 2020-02-08 RX ADMIN — SODIUM CHLORIDE 125 MILLILITER(S): 9 INJECTION INTRAMUSCULAR; INTRAVENOUS; SUBCUTANEOUS at 03:58

## 2020-02-08 RX ADMIN — SODIUM CHLORIDE 1000 MILLILITER(S): 9 INJECTION INTRAMUSCULAR; INTRAVENOUS; SUBCUTANEOUS at 20:26

## 2020-02-08 RX ADMIN — Medication 15 MILLIGRAM(S): at 15:10

## 2020-02-08 RX ADMIN — Medication 25 MILLIGRAM(S): at 00:36

## 2020-02-08 RX ADMIN — Medication 20 MILLIGRAM(S): at 22:54

## 2020-02-08 RX ADMIN — MORPHINE SULFATE 4 MILLIGRAM(S): 50 CAPSULE, EXTENDED RELEASE ORAL at 07:34

## 2020-02-08 RX ADMIN — Medication 15 MILLIGRAM(S): at 13:17

## 2020-02-08 RX ADMIN — Medication 15 MILLIGRAM(S): at 07:36

## 2020-02-08 RX ADMIN — PANTOPRAZOLE SODIUM 40 MILLIGRAM(S): 20 TABLET, DELAYED RELEASE ORAL at 11:45

## 2020-02-08 RX ADMIN — HALOPERIDOL DECANOATE 2.5 MILLIGRAM(S): 100 INJECTION INTRAMUSCULAR at 01:44

## 2020-02-08 RX ADMIN — Medication 15 MILLIGRAM(S): at 03:56

## 2020-02-08 RX ADMIN — HALOPERIDOL DECANOATE 5 MILLIGRAM(S): 100 INJECTION INTRAMUSCULAR at 11:46

## 2020-02-08 RX ADMIN — Medication 1 MILLIGRAM(S): at 15:59

## 2020-02-08 RX ADMIN — SODIUM CHLORIDE 125 MILLILITER(S): 9 INJECTION INTRAMUSCULAR; INTRAVENOUS; SUBCUTANEOUS at 11:44

## 2020-02-08 RX ADMIN — SODIUM CHLORIDE 1000 MILLILITER(S): 9 INJECTION INTRAMUSCULAR; INTRAVENOUS; SUBCUTANEOUS at 01:45

## 2020-02-08 RX ADMIN — Medication 15 MILLIGRAM(S): at 01:44

## 2020-02-08 RX ADMIN — Medication 15 MILLIGRAM(S): at 07:34

## 2020-02-08 RX ADMIN — ONDANSETRON 4 MILLIGRAM(S): 8 TABLET, FILM COATED ORAL at 03:57

## 2020-02-08 NOTE — ED ADULT NURSE NOTE - ED STAT RN HANDOFF DETAILS
Pt alert and oriented. resting in stretcher, no signs of distress noted. Handoff report given to Itzel Landaverde RN. pt's safety maintained. RN with no questions or concerns at this time

## 2020-02-08 NOTE — ED CDU PROVIDER INITIAL DAY NOTE - NS ED ROS FT
Gen: denies fever, chills, fatigue, weight loss  Skin: denies rashes, laceration, bruising  HEENT: denies visual changes, ear pain, nasal congestion, throat pain  Respiratory: denies MCLEAN, SOB, cough, wheezing  Cardiovascular: denies chest pain, palpitations, diaphoresis, LE edema  GI: +Abdominal pain, nausea, vomiting. Denies diarrhea  : denies dysuria, frequency, urgency, bowel/bladder incontinence  MSK: denies joint swelling/pain, back pain, neck pain  Neuro: denies headache, dizziness, weakness, numbness  Psych: denies anxiety, depression, SI/HI, visual/auditory hallucinations

## 2020-02-08 NOTE — ED ADULT NURSE REASSESSMENT NOTE - SYMPTOMS
nausea/vomiting/pain:
vomited after drinking few sips of ginger ale/vomiting
vomiting
none
pain:/Pt c/o of some improvement with abd pain / partial relief. Pt has some improvement holding down PO liquids.

## 2020-02-08 NOTE — ED ADULT NURSE REASSESSMENT NOTE - COMFORT CARE
meal provided
plan of care explained/po fluids offered/wait time explained
wait time explained/meal provided/po fluids offered/plan of care explained
wait time explained/ambulated to bathroom/meal provided/plan of care explained/po fluids offered

## 2020-02-08 NOTE — ED ADULT NURSE NOTE - OBJECTIVE STATEMENT
pt a&ox4, c/o vomitting/abd pain "everytime I get my mesntrual cycle"  pt reports sharp pain to bilateral LQ, denies diarrhea/fever/chills/flank pain/trauma/sob/cp

## 2020-02-08 NOTE — ED CDU PROVIDER INITIAL DAY NOTE - PROGRESS NOTE DETAILS
Pt seen on AM rounds with persistent retching, c/o crampy abdominal pain and unable to tolerate po. Pt with PMH cyclical vomiting syndrome since "childhood" with multiple normal Catscans and recent endoscopy. Pt admits to smoking THC and encourage to stop as that may be a trigger. Abdomen difficult to exam due to poor cooperation, however, no real tenderness appreciated with distraction. Pt given trial of pepcid, protoxin, haldol and bentyl and is currently sleeping and retching halted. PO trial given approx 1 hours ago with vomiting shortly after. Will continue to hydrate and watching waiting Pt reports feeling some improvements but still unable to tolerate po. IV Ativan ordered and will continue with IVF hydration, Bentyl 20mg IM QID, Toradol prn pain and Zofran prn. Will continue to monitor Pt seen on AM rounds with persistent retching, c/o crampy abdominal pain and unable to tolerate po. Pt admitted to Observation for [facility]. Pt reports PMH cyclical vomiting syndrome since "childhood" with multiple normal Catscans and recent endoscopy. Pt admits to smoking THC and encourage to stop as that may be a trigger. Pt currently getting IVF hydration, toradol and zofran prn. Abdomen difficult to exam due to poor cooperation, however, no real tenderness appreciated with distraction. Pt given trial of pepcid, protoxin, haldol and bentyl and is currently sleeping and retching halted. PO trial given approx 1 hours ago with vomiting shortly after. Will continue to hydrate and watching waiting Shift Change: Pt received from JENAE Soto. Pt resting comfortably asleep. Rpt labs ordered for AM.

## 2020-02-08 NOTE — ED ADULT NURSE REASSESSMENT NOTE - GENERAL PATIENT STATE
resting/sleeping/comfortable appearance/cooperative/smiling/interactive
resting/sleeping/cooperative/comfortable appearance/smiling/interactive
comfortable appearance/improvement verbalized/cooperative/resting/sleeping/smiling/interactive
resting/sleeping/comfortable appearance

## 2020-02-08 NOTE — ED CDU PROVIDER INITIAL DAY NOTE - ATTENDING CONTRIBUTION TO CARE
I, Hiram Rivero, have personally performed a face to face diagnostic evaluation on this patient. I have reviewed the ACP note and agree with the history, exam and plan of care, except as noted.    25 yo F hx of cyclical vomiting syndrome p/w severe abdominal pain and inability to tolerate PO. Multiple dose of [id number] given and IVF without relief, unable to tolerate PO in the ED. patient placed in Obs for continuing medication.

## 2020-02-08 NOTE — ED CDU PROVIDER INITIAL DAY NOTE - PHYSICAL EXAMINATION
Const: Awake, alert and oriented. In no acute distress. Uncomfortable appearing  HEENT: NC/AT. Moist mucous membranes.  Eyes: No scleral icterus. EOMI.  Cardiac: Regular rate and regular rhythm. +S1/S2. Peripheral pulses 2+ and symmetric. No LE edema.  Resp: Speaking in full sentences. No evidence of respiratory distress. No wheezes, rales or rhonchi.  Abd: Soft, diffusely ttp, non-distended. Normal bowel sounds in all 4 quadrants. No guarding or rebound.  Back: Spine midline and non-tender. No CVAT.  Skin: No rashes, abrasions or lacerations.  Neuro: Awake, alert & oriented x 3. Moves all extremities symmetrically.

## 2020-02-09 DIAGNOSIS — R11.15 CYCLICAL VOMITING SYNDROME UNRELATED TO MIGRAINE: ICD-10-CM

## 2020-02-09 LAB
ANION GAP SERPL CALC-SCNC: 15 MMOL/L — SIGNIFICANT CHANGE UP (ref 5–17)
BUN SERPL-MCNC: 12 MG/DL — SIGNIFICANT CHANGE UP (ref 8–20)
CALCIUM SERPL-MCNC: 8.4 MG/DL — LOW (ref 8.6–10.2)
CHLORIDE SERPL-SCNC: 107 MMOL/L — SIGNIFICANT CHANGE UP (ref 98–107)
CO2 SERPL-SCNC: 20 MMOL/L — LOW (ref 22–29)
CREAT SERPL-MCNC: 0.73 MG/DL — SIGNIFICANT CHANGE UP (ref 0.5–1.3)
GLUCOSE SERPL-MCNC: 99 MG/DL — SIGNIFICANT CHANGE UP (ref 70–99)
POTASSIUM SERPL-MCNC: 3.8 MMOL/L — SIGNIFICANT CHANGE UP (ref 3.5–5.3)
POTASSIUM SERPL-SCNC: 3.8 MMOL/L — SIGNIFICANT CHANGE UP (ref 3.5–5.3)
SODIUM SERPL-SCNC: 142 MMOL/L — SIGNIFICANT CHANGE UP (ref 135–145)

## 2020-02-09 PROCEDURE — 99223 1ST HOSP IP/OBS HIGH 75: CPT

## 2020-02-09 PROCEDURE — 12345: CPT | Mod: NC

## 2020-02-09 PROCEDURE — 99217: CPT

## 2020-02-09 RX ORDER — MORPHINE SULFATE 50 MG/1
2 CAPSULE, EXTENDED RELEASE ORAL EVERY 6 HOURS
Refills: 0 | Status: DISCONTINUED | OUTPATIENT
Start: 2020-02-09 | End: 2020-02-10

## 2020-02-09 RX ORDER — DIPHENHYDRAMINE HCL 50 MG
25 CAPSULE ORAL ONCE
Refills: 0 | Status: COMPLETED | OUTPATIENT
Start: 2020-02-09 | End: 2020-02-09

## 2020-02-09 RX ORDER — ONDANSETRON 8 MG/1
4 TABLET, FILM COATED ORAL EVERY 6 HOURS
Refills: 0 | Status: DISCONTINUED | OUTPATIENT
Start: 2020-02-09 | End: 2020-02-10

## 2020-02-09 RX ORDER — HALOPERIDOL DECANOATE 100 MG/ML
2.5 INJECTION INTRAMUSCULAR ONCE
Refills: 0 | Status: COMPLETED | OUTPATIENT
Start: 2020-02-09 | End: 2020-02-09

## 2020-02-09 RX ADMIN — Medication 30 MILLILITER(S): at 02:35

## 2020-02-09 RX ADMIN — FAMOTIDINE 100 MILLIGRAM(S): 10 INJECTION INTRAVENOUS at 15:21

## 2020-02-09 RX ADMIN — ONDANSETRON 4 MILLIGRAM(S): 8 TABLET, FILM COATED ORAL at 09:38

## 2020-02-09 RX ADMIN — SODIUM CHLORIDE 125 MILLILITER(S): 9 INJECTION INTRAMUSCULAR; INTRAVENOUS; SUBCUTANEOUS at 15:20

## 2020-02-09 RX ADMIN — Medication 25 MILLIGRAM(S): at 23:35

## 2020-02-09 RX ADMIN — MORPHINE SULFATE 2 MILLIGRAM(S): 50 CAPSULE, EXTENDED RELEASE ORAL at 15:23

## 2020-02-09 RX ADMIN — Medication 15 MILLIGRAM(S): at 00:05

## 2020-02-09 RX ADMIN — MORPHINE SULFATE 2 MILLIGRAM(S): 50 CAPSULE, EXTENDED RELEASE ORAL at 20:51

## 2020-02-09 RX ADMIN — HALOPERIDOL DECANOATE 2.5 MILLIGRAM(S): 100 INJECTION INTRAMUSCULAR at 02:37

## 2020-02-09 RX ADMIN — Medication 15 MILLIGRAM(S): at 23:35

## 2020-02-09 RX ADMIN — Medication 15 MILLIGRAM(S): at 09:39

## 2020-02-09 RX ADMIN — ONDANSETRON 4 MILLIGRAM(S): 8 TABLET, FILM COATED ORAL at 20:52

## 2020-02-09 RX ADMIN — MORPHINE SULFATE 2 MILLIGRAM(S): 50 CAPSULE, EXTENDED RELEASE ORAL at 22:04

## 2020-02-09 RX ADMIN — SODIUM CHLORIDE 125 MILLILITER(S): 9 INJECTION INTRAMUSCULAR; INTRAVENOUS; SUBCUTANEOUS at 22:04

## 2020-02-09 NOTE — ED CDU PROVIDER SUBSEQUENT DAY NOTE - ATTENDING CONTRIBUTION TO CARE
I personally saw the patient with the PA, and completed the key components of the history and physical exam. I then discussed the management plan with the PA.    Pt admitted for failure to improve.

## 2020-02-09 NOTE — CHART NOTE - NSCHARTNOTEFT_GEN_A_CORE
Hospitalist follow up    admission reviewed    complaining of nausea/abd pain.  tolerating diet, ambulating   aaox3 nad rrr s1s2 ctab soft abdomen no LE edema nonfocal neuro.    cyclical vomiting syndrome  active marijuana use     supportive care IVF, antiemetics    refrain from smoking    outpatient GI follow up.

## 2020-02-09 NOTE — ED CDU PROVIDER DISPOSITION NOTE - CLINICAL COURSE
25 y/o female pmhx cyclic vomiting presents to ED c/o crampy abdominal pain, n/v x 3 days. Pt placed in OBS for cyclic vomiting. Pt without improvement despite medications for past 24 hours. Will admit to medicine for further management. 25 y/o female pmhx cyclic vomiting presents to ED c/o crampy abdominal pain, n/v x 3 days. Pt placed in OBS for cyclic vomiting. Pt without improvement despite medications for past 24 hours, still active nausea and vomiting. Will admit to medicine for further management.

## 2020-02-09 NOTE — ED CDU PROVIDER SUBSEQUENT DAY NOTE - HISTORY
23 y/o female pmhx cyclic vomiting presents to ED c/o crampy abdominal pain, n/v x 3 days. Pt placed in OBS for cyclic vomiting. Pt without improvement despite medications for past 24 hours, still active nausea and vomiting. Will admit to medicine for further management.

## 2020-02-09 NOTE — H&P ADULT - ASSESSMENT
23 y/o female with no significant PMH came to the ED complaining of abdominal pain associated with nausea and vomiting. Patient said she is unable to keep anything down for 1 week. Abdominal pain is generalized, non-radiating, cramping in nature. She has no fever, chills, sick contact, recent travel, change in diet, hematemesis, diarrhea, dysuria.     Intractable nausea and vomiting   Admit to medical floor   Patient initially placed in observation unit, not getting better so admitted for further management  Zofran PRN  IVF   Monitor electrolyte     Abdominal pain   Pain control     Supportive   DVT prophylaxis: ambulate as needed   Diet: clear liquid 25 y/o female with no significant PMH came to the ED complaining of abdominal pain associated with nausea and vomiting. Patient said she is unable to keep anything down for 1 week. Abdominal pain is generalized, non-radiating, cramping in nature. She has no fever, chills, sick contact, recent travel, change in diet, hematemesis, diarrhea, dysuria.     Intractable nausea and vomiting   Admit to medical floor   Likely due to THC use   Patient initially placed in observation unit, not getting better so admitted for further management  Zofran PRN  IVF   Monitor electrolyte     Abdominal pain   Pain control     Leukocytosis   WBC: 12.93 with left shift   UA done with WBC, patient asymptomatic, will not treat    Supportive   DVT prophylaxis: ambulate as needed   Diet: clear liquid

## 2020-02-09 NOTE — H&P ADULT - HISTORY OF PRESENT ILLNESS
23 y/o female with no significant PMH came to the ED complaining of abdominal pain associated with nausea and vomiting. Patient said she is unable to keep anything down for 1 week. Abdominal pain is generalized, non-radiating, cramping in nature. She has no fever, chills, sick contact, recent travel, change in diet, hematemesis, diarrhea, dysuria.

## 2020-02-09 NOTE — ED ADULT NURSE REASSESSMENT NOTE - NS ED NURSE REASSESS COMMENT FT1
patient with increase heartburn. denies any chest pain. stated " its making me nausea and wanting to throw up." JENAE Davis made aware and per PA will place medication order.
pt with complaints of nausea. no vomiting noted. JENAE Davis made aware and at bedside to reassess. Hot tea administered.
assumed care from gin perez, pt laying in stretcher, vomiting, medicated as ordered, pending dispo. pt safety manitained.
assumed pt care from previous Rn Sisi Evans.  pt transported to CDU-10 for observation. pt  A&Ox4;  resting in stretcher, with continued complaints of N/V/ generalized body pain. medicated as per Work-list manager. IVF infusing as ordered. awaiting repeat Blood work-0700.  VSS and documented as per flow sheet. plan of care reviewed and pt verbalizes understanding. bed in lowest position, call bell within reach and safety maintained. monitoring ongoing for any changes.
received pt from previous Rn Ranjeet Dawson. pt sleeping in stretcher, no apparent distress noted. bed in lowest position and safety maintained.

## 2020-02-10 ENCOUNTER — TRANSCRIPTION ENCOUNTER (OUTPATIENT)
Age: 25
End: 2020-02-10

## 2020-02-10 VITALS
HEART RATE: 73 BPM | SYSTOLIC BLOOD PRESSURE: 128 MMHG | RESPIRATION RATE: 18 BRPM | OXYGEN SATURATION: 96 % | TEMPERATURE: 98 F | DIASTOLIC BLOOD PRESSURE: 86 MMHG

## 2020-02-10 PROCEDURE — 99239 HOSP IP/OBS DSCHRG MGMT >30: CPT

## 2020-02-10 RX ORDER — FOLIC ACID 0.8 MG
1 TABLET ORAL
Qty: 30 | Refills: 3
Start: 2020-02-10 | End: 2020-06-08

## 2020-02-10 RX ORDER — FAMOTIDINE 10 MG/ML
1 INJECTION INTRAVENOUS
Qty: 30 | Refills: 3
Start: 2020-02-10 | End: 2020-06-08

## 2020-02-10 RX ORDER — THIAMINE MONONITRATE (VIT B1) 100 MG
1 TABLET ORAL
Qty: 30 | Refills: 0
Start: 2020-02-10 | End: 2020-03-10

## 2020-02-10 RX ADMIN — MORPHINE SULFATE 2 MILLIGRAM(S): 50 CAPSULE, EXTENDED RELEASE ORAL at 05:18

## 2020-02-10 RX ADMIN — MORPHINE SULFATE 2 MILLIGRAM(S): 50 CAPSULE, EXTENDED RELEASE ORAL at 05:48

## 2020-02-10 RX ADMIN — MORPHINE SULFATE 2 MILLIGRAM(S): 50 CAPSULE, EXTENDED RELEASE ORAL at 12:05

## 2020-02-10 RX ADMIN — MORPHINE SULFATE 2 MILLIGRAM(S): 50 CAPSULE, EXTENDED RELEASE ORAL at 12:20

## 2020-02-10 RX ADMIN — ONDANSETRON 4 MILLIGRAM(S): 8 TABLET, FILM COATED ORAL at 05:19

## 2020-02-10 NOTE — DISCHARGE NOTE PROVIDER - NSDCCPCAREPLAN_GEN_ALL_CORE_FT
PRINCIPAL DISCHARGE DIAGNOSIS  Diagnosis: Cyclic vomiting syndrome  Assessment and Plan of Treatment:       SECONDARY DISCHARGE DIAGNOSES  Diagnosis: Drug abuse, marijuana  Assessment and Plan of Treatment:

## 2020-02-10 NOTE — DISCHARGE NOTE NURSING/CASE MANAGEMENT/SOCIAL WORK - PATIENT PORTAL LINK FT
You can access the FollowMyHealth Patient Portal offered by [facility] by registering at the following website: [email or web address]. By joining [employer]’s FollowMyHealth portal, you will also be able to view your health information using other applications (apps) compatible with our system.

## 2020-02-10 NOTE — DISCHARGE NOTE PROVIDER - PROVIDER TOKENS
FREE:[LAST:[PMD],PHONE:[(   )    -],FAX:[(   )    -],ADDRESS:[[facility]],FOLLOWUP:[1 week]]

## 2020-02-10 NOTE — SBIRT NOTE ADULT - NSSBIRTDRGBRIEFINTDET_GEN_A_CORE
Brief intervention provided.  Pt declined resources and feels that she can stop or cut down use on her own.  Pt stated she uses THC socially and does not feel that she is addicted.

## 2020-02-10 NOTE — DISCHARGE NOTE PROVIDER - HOSPITAL COURSE
25 y/o female with no significant PMH came to the ED complaining of abdominal pain associated with nausea and vomiting. Patient said she is unable to keep anything down for 1 week. Abdominal pain is generalized, non-radiating, cramping in nature. She has no fever, chills, sick contact, recent travel, change in diet, hematemesis, diarrhea, dysuria.         Intractable nausea and vomiting     Admit to medical floor     Likely due to THC use     Patient initially placed in observation unit, not getting better so admitted for further management    Zofran PRN    IVF     Monitor electrolyte         Abdominal pain     Pain control         Leukocytosis     WBC: 12.93 with left shift     UA done with WBC, patient asymptomatic, will not treat        Supportive     DVT prophylaxis: ambulate as needed     Diet: clear liquid

## 2020-02-10 NOTE — DISCHARGE NOTE PROVIDER - NSDCMRMEDTOKEN_GEN_ALL_CORE_FT
Commode 3-in-1:   folic acid 1 mg oral tablet: 1 tab(s) orally once a day   Multiple Vitamins oral tablet: 1 tab(s) orally once a day   Pepcid 20 mg oral tablet: 1 tab(s) orally once a day   thiamine 100 mg oral tablet: 1 tab(s) orally once a day

## 2020-02-27 PROCEDURE — 99285 EMERGENCY DEPT VISIT HI MDM: CPT | Mod: 25

## 2020-02-27 PROCEDURE — 36415 COLL VENOUS BLD VENIPUNCTURE: CPT

## 2020-02-27 PROCEDURE — G0378: CPT

## 2020-02-27 PROCEDURE — 96375 TX/PRO/DX INJ NEW DRUG ADDON: CPT | Mod: XU

## 2020-02-27 PROCEDURE — 96361 HYDRATE IV INFUSION ADD-ON: CPT

## 2020-02-27 PROCEDURE — 83690 ASSAY OF LIPASE: CPT

## 2020-02-27 PROCEDURE — 84702 CHORIONIC GONADOTROPIN TEST: CPT

## 2020-02-27 PROCEDURE — 85027 COMPLETE CBC AUTOMATED: CPT

## 2020-02-27 PROCEDURE — 96365 THER/PROPH/DIAG IV INF INIT: CPT

## 2020-02-27 PROCEDURE — 93005 ELECTROCARDIOGRAM TRACING: CPT

## 2020-02-27 PROCEDURE — 80053 COMPREHEN METABOLIC PANEL: CPT

## 2020-02-27 PROCEDURE — 96376 TX/PRO/DX INJ SAME DRUG ADON: CPT

## 2020-02-27 PROCEDURE — 80048 BASIC METABOLIC PNL TOTAL CA: CPT

## 2020-02-27 PROCEDURE — 96372 THER/PROPH/DIAG INJ SC/IM: CPT | Mod: XU

## 2020-03-06 NOTE — BEHAVIORAL HEALTH ASSESSMENT NOTE - DOMICILED WITH
Routing refill request to provider for review/approval because:  Drug not on the [facility] refill protocol          Family

## 2020-04-08 ENCOUNTER — APPOINTMENT (OUTPATIENT)
Dept: OBGYN | Facility: CLINIC | Age: 25
End: 2020-04-08

## 2020-07-13 ENCOUNTER — INPATIENT (INPATIENT)
Facility: HOSPITAL | Age: 25
LOS: 2 days | Discharge: HOME | End: 2020-07-16
Attending: INTERNAL MEDICINE | Admitting: INTERNAL MEDICINE
Payer: COMMERCIAL

## 2020-07-13 VITALS
OXYGEN SATURATION: 99 % | HEART RATE: 108 BPM | TEMPERATURE: 98 F | SYSTOLIC BLOOD PRESSURE: 132 MMHG | DIASTOLIC BLOOD PRESSURE: 72 MMHG | RESPIRATION RATE: 18 BRPM | HEIGHT: 65 IN | WEIGHT: 119.93 LBS

## 2020-07-13 LAB
ALBUMIN SERPL ELPH-MCNC: 3.5 G/DL — SIGNIFICANT CHANGE UP (ref 3.3–5)
ALP SERPL-CCNC: 66 U/L — SIGNIFICANT CHANGE UP (ref 40–120)
ALT FLD-CCNC: 52 U/L — SIGNIFICANT CHANGE UP (ref 12–78)
ANION GAP SERPL CALC-SCNC: 14 MMOL/L — SIGNIFICANT CHANGE UP (ref 5–17)
AST SERPL-CCNC: 48 U/L — HIGH (ref 15–37)
BASOPHILS # BLD AUTO: 0.02 K/UL — SIGNIFICANT CHANGE UP (ref 0–0.2)
BASOPHILS NFR BLD AUTO: 0.3 % — SIGNIFICANT CHANGE UP (ref 0–2)
BILIRUB SERPL-MCNC: 0.4 MG/DL — SIGNIFICANT CHANGE UP (ref 0.2–1.2)
BUN SERPL-MCNC: 14 MG/DL — SIGNIFICANT CHANGE UP (ref 7–23)
CALCIUM SERPL-MCNC: 9 MG/DL — SIGNIFICANT CHANGE UP (ref 8.5–10.1)
CHLORIDE SERPL-SCNC: 109 MMOL/L — HIGH (ref 96–108)
CO2 SERPL-SCNC: 15 MMOL/L — LOW (ref 22–31)
CREAT SERPL-MCNC: 1.14 MG/DL — SIGNIFICANT CHANGE UP (ref 0.5–1.3)
EOSINOPHIL # BLD AUTO: 0 K/UL — SIGNIFICANT CHANGE UP (ref 0–0.5)
EOSINOPHIL NFR BLD AUTO: 0 % — SIGNIFICANT CHANGE UP (ref 0–6)
GLUCOSE SERPL-MCNC: 127 MG/DL — HIGH (ref 70–99)
HCG SERPL-ACNC: <1 MIU/ML — SIGNIFICANT CHANGE UP
HCT VFR BLD CALC: 36.1 % — SIGNIFICANT CHANGE UP (ref 34.5–45)
HGB BLD-MCNC: 11.1 G/DL — LOW (ref 11.5–15.5)
IMM GRANULOCYTES NFR BLD AUTO: 0.4 % — SIGNIFICANT CHANGE UP (ref 0–1.5)
LIDOCAIN IGE QN: 117 U/L — SIGNIFICANT CHANGE UP (ref 73–393)
LYMPHOCYTES # BLD AUTO: 1.79 K/UL — SIGNIFICANT CHANGE UP (ref 1–3.3)
LYMPHOCYTES # BLD AUTO: 24.2 % — SIGNIFICANT CHANGE UP (ref 13–44)
MCHC RBC-ENTMCNC: 25.5 PG — LOW (ref 27–34)
MCHC RBC-ENTMCNC: 30.7 GM/DL — LOW (ref 32–36)
MCV RBC AUTO: 82.8 FL — SIGNIFICANT CHANGE UP (ref 80–100)
MONOCYTES # BLD AUTO: 0.67 K/UL — SIGNIFICANT CHANGE UP (ref 0–0.9)
MONOCYTES NFR BLD AUTO: 9.1 % — SIGNIFICANT CHANGE UP (ref 2–14)
NEUTROPHILS # BLD AUTO: 4.89 K/UL — SIGNIFICANT CHANGE UP (ref 1.8–7.4)
NEUTROPHILS NFR BLD AUTO: 66 % — SIGNIFICANT CHANGE UP (ref 43–77)
NRBC # BLD: 0 /100 WBCS — SIGNIFICANT CHANGE UP (ref 0–0)
PLATELET # BLD AUTO: 229 K/UL — SIGNIFICANT CHANGE UP (ref 150–400)
POTASSIUM SERPL-MCNC: 3.8 MMOL/L — SIGNIFICANT CHANGE UP (ref 3.5–5.3)
POTASSIUM SERPL-SCNC: 3.8 MMOL/L — SIGNIFICANT CHANGE UP (ref 3.5–5.3)
PROT SERPL-MCNC: 8.2 GM/DL — SIGNIFICANT CHANGE UP (ref 6–8.3)
RBC # BLD: 4.36 M/UL — SIGNIFICANT CHANGE UP (ref 3.8–5.2)
RBC # FLD: 14.8 % — HIGH (ref 10.3–14.5)
SODIUM SERPL-SCNC: 138 MMOL/L — SIGNIFICANT CHANGE UP (ref 135–145)
WBC # BLD: 7.4 K/UL — SIGNIFICANT CHANGE UP (ref 3.8–10.5)
WBC # FLD AUTO: 7.4 K/UL — SIGNIFICANT CHANGE UP (ref 3.8–10.5)

## 2020-07-13 PROCEDURE — 99285 EMERGENCY DEPT VISIT HI MDM: CPT

## 2020-07-13 RX ORDER — LIDOCAINE 4 G/100G
10 CREAM TOPICAL ONCE
Refills: 0 | Status: COMPLETED | OUTPATIENT
Start: 2020-07-13 | End: 2020-07-13

## 2020-07-13 RX ORDER — FAMOTIDINE 10 MG/ML
20 INJECTION INTRAVENOUS ONCE
Refills: 0 | Status: COMPLETED | OUTPATIENT
Start: 2020-07-13 | End: 2020-07-13

## 2020-07-13 RX ORDER — HALOPERIDOL DECANOATE 100 MG/ML
5 INJECTION INTRAMUSCULAR ONCE
Refills: 0 | Status: COMPLETED | OUTPATIENT
Start: 2020-07-13 | End: 2020-07-13

## 2020-07-13 RX ORDER — DIPHENHYDRAMINE HCL 50 MG
25 CAPSULE ORAL ONCE
Refills: 0 | Status: COMPLETED | OUTPATIENT
Start: 2020-07-13 | End: 2020-07-13

## 2020-07-13 RX ORDER — ONDANSETRON 8 MG/1
4 TABLET, FILM COATED ORAL ONCE
Refills: 0 | Status: COMPLETED | OUTPATIENT
Start: 2020-07-13 | End: 2020-07-13

## 2020-07-13 RX ORDER — SODIUM CHLORIDE 9 MG/ML
1000 INJECTION INTRAMUSCULAR; INTRAVENOUS; SUBCUTANEOUS ONCE
Refills: 0 | Status: COMPLETED | OUTPATIENT
Start: 2020-07-13 | End: 2020-07-13

## 2020-07-13 RX ADMIN — Medication 30 MILLILITER(S): at 19:45

## 2020-07-13 RX ADMIN — Medication 25 MILLIGRAM(S): at 19:53

## 2020-07-13 RX ADMIN — SODIUM CHLORIDE 1000 MILLILITER(S): 9 INJECTION INTRAMUSCULAR; INTRAVENOUS; SUBCUTANEOUS at 19:43

## 2020-07-13 RX ADMIN — FAMOTIDINE 20 MILLIGRAM(S): 10 INJECTION INTRAVENOUS at 19:43

## 2020-07-13 RX ADMIN — HALOPERIDOL DECANOATE 5 MILLIGRAM(S): 100 INJECTION INTRAMUSCULAR at 19:45

## 2020-07-13 NOTE — ED ADULT TRIAGE NOTE - TEMPERATURE IN CELSIUS (DEGREES C)
What Type Of Note Output Would You Prefer (Optional)?: Bullet Format How Severe Is Your Rash?: mild Is This A New Presentation, Or A Follow-Up?: Rash Additional History: Pain 0/10 36.9

## 2020-07-13 NOTE — ED ADULT NURSE REASSESSMENT NOTE - NS ED NURSE REASSESS COMMENT FT1
Pt came up to the Nurse's station during shift report exchange 3 times demanding her medication now, assured the pt each times medical/nursing care will be provided in priority/acuity level order, despite being informed Pt continues to walk around in ED and lingering around at Nurse's station with face mask off of her face, Pt states she is constantly vomiting, however, no vomiting noted at this time. Pt noted to be restless/anxious. Instructed the pt multiple time to wear face mask while in ED and return to assigned treatment area, Pt appears to be in no apparent acute distress.

## 2020-07-13 NOTE — ED ADULT NURSE NOTE - OBJECTIVE STATEMENT
patient A&Ox3 c/o of abdominal pain with N/V started today  denied chest pain no difficulty breathing , Dr Donovan aware awaitng for orders

## 2020-07-13 NOTE — ED ADULT NURSE NOTE - PAIN RATING/NUMBER SCALE (0-10): ACTIVITY
parents pt had her sternum protruding and saw pediatrician but pt is still c/o pain to the area  no injury reported
10

## 2020-07-14 DIAGNOSIS — R11.2 NAUSEA WITH VOMITING, UNSPECIFIED: ICD-10-CM

## 2020-07-14 DIAGNOSIS — F12.10 CANNABIS ABUSE, UNCOMPLICATED: ICD-10-CM

## 2020-07-14 DIAGNOSIS — K21.9 GASTRO-ESOPHAGEAL REFLUX DISEASE WITHOUT ESOPHAGITIS: ICD-10-CM

## 2020-07-14 LAB
ALBUMIN SERPL ELPH-MCNC: 3.4 G/DL — SIGNIFICANT CHANGE UP (ref 3.3–5)
ALP SERPL-CCNC: 63 U/L — SIGNIFICANT CHANGE UP (ref 40–120)
ALT FLD-CCNC: 53 U/L — SIGNIFICANT CHANGE UP (ref 12–78)
AMPHET UR-MCNC: NEGATIVE — SIGNIFICANT CHANGE UP
ANION GAP SERPL CALC-SCNC: 12 MMOL/L — SIGNIFICANT CHANGE UP (ref 5–17)
APPEARANCE UR: CLEAR — SIGNIFICANT CHANGE UP
AST SERPL-CCNC: 49 U/L — HIGH (ref 15–37)
BACTERIA # UR AUTO: ABNORMAL
BARBITURATES UR SCN-MCNC: NEGATIVE — SIGNIFICANT CHANGE UP
BENZODIAZ UR-MCNC: NEGATIVE — SIGNIFICANT CHANGE UP
BILIRUB SERPL-MCNC: 0.4 MG/DL — SIGNIFICANT CHANGE UP (ref 0.2–1.2)
BILIRUB UR-MCNC: NEGATIVE — SIGNIFICANT CHANGE UP
BUN SERPL-MCNC: 7 MG/DL — SIGNIFICANT CHANGE UP (ref 7–23)
CALCIUM SERPL-MCNC: 8 MG/DL — LOW (ref 8.5–10.1)
CHLORIDE SERPL-SCNC: 110 MMOL/L — HIGH (ref 96–108)
CO2 SERPL-SCNC: 17 MMOL/L — LOW (ref 22–31)
COCAINE METAB.OTHER UR-MCNC: NEGATIVE — SIGNIFICANT CHANGE UP
COLOR SPEC: YELLOW — SIGNIFICANT CHANGE UP
CREAT SERPL-MCNC: 0.76 MG/DL — SIGNIFICANT CHANGE UP (ref 0.5–1.3)
DIFF PNL FLD: ABNORMAL
EPI CELLS # UR: SIGNIFICANT CHANGE UP
GLUCOSE SERPL-MCNC: 127 MG/DL — HIGH (ref 70–99)
GLUCOSE UR QL: 50 MG/DL
HCT VFR BLD CALC: 31.5 % — LOW (ref 34.5–45)
HGB BLD-MCNC: 10.3 G/DL — LOW (ref 11.5–15.5)
KETONES UR-MCNC: ABNORMAL
LEUKOCYTE ESTERASE UR-ACNC: NEGATIVE — SIGNIFICANT CHANGE UP
MAGNESIUM SERPL-MCNC: 2 MG/DL — SIGNIFICANT CHANGE UP (ref 1.6–2.6)
MCHC RBC-ENTMCNC: 25.6 PG — LOW (ref 27–34)
MCHC RBC-ENTMCNC: 32.7 GM/DL — SIGNIFICANT CHANGE UP (ref 32–36)
MCV RBC AUTO: 78.2 FL — LOW (ref 80–100)
METHADONE UR-MCNC: NEGATIVE — SIGNIFICANT CHANGE UP
NITRITE UR-MCNC: NEGATIVE — SIGNIFICANT CHANGE UP
NRBC # BLD: 0 /100 WBCS — SIGNIFICANT CHANGE UP (ref 0–0)
OPIATES UR-MCNC: NEGATIVE — SIGNIFICANT CHANGE UP
PCP SPEC-MCNC: SIGNIFICANT CHANGE UP
PCP UR-MCNC: NEGATIVE — SIGNIFICANT CHANGE UP
PH UR: 7 — SIGNIFICANT CHANGE UP (ref 5–8)
PHOSPHATE SERPL-MCNC: 2.9 MG/DL — SIGNIFICANT CHANGE UP (ref 2.5–4.5)
PLATELET # BLD AUTO: 221 K/UL — SIGNIFICANT CHANGE UP (ref 150–400)
POTASSIUM SERPL-MCNC: 3.7 MMOL/L — SIGNIFICANT CHANGE UP (ref 3.5–5.3)
POTASSIUM SERPL-SCNC: 3.7 MMOL/L — SIGNIFICANT CHANGE UP (ref 3.5–5.3)
PROT SERPL-MCNC: 7.1 GM/DL — SIGNIFICANT CHANGE UP (ref 6–8.3)
PROT UR-MCNC: NEGATIVE MG/DL — SIGNIFICANT CHANGE UP
RBC # BLD: 4.03 M/UL — SIGNIFICANT CHANGE UP (ref 3.8–5.2)
RBC # FLD: 15 % — HIGH (ref 10.3–14.5)
RBC CASTS # UR COMP ASSIST: SIGNIFICANT CHANGE UP /HPF (ref 0–4)
SARS-COV-2 RNA SPEC QL NAA+PROBE: SIGNIFICANT CHANGE UP
SODIUM SERPL-SCNC: 139 MMOL/L — SIGNIFICANT CHANGE UP (ref 135–145)
SP GR SPEC: 1.01 — SIGNIFICANT CHANGE UP (ref 1.01–1.02)
THC UR QL: POSITIVE — SIGNIFICANT CHANGE UP
UROBILINOGEN FLD QL: NEGATIVE MG/DL — SIGNIFICANT CHANGE UP
WBC # BLD: 6.93 K/UL — SIGNIFICANT CHANGE UP (ref 3.8–10.5)
WBC # FLD AUTO: 6.93 K/UL — SIGNIFICANT CHANGE UP (ref 3.8–10.5)
WBC UR QL: SIGNIFICANT CHANGE UP

## 2020-07-14 PROCEDURE — 12345: CPT | Mod: NC

## 2020-07-14 PROCEDURE — 99223 1ST HOSP IP/OBS HIGH 75: CPT

## 2020-07-14 PROCEDURE — 93010 ELECTROCARDIOGRAM REPORT: CPT

## 2020-07-14 RX ORDER — ONDANSETRON 8 MG/1
4 TABLET, FILM COATED ORAL ONCE
Refills: 0 | Status: COMPLETED | OUTPATIENT
Start: 2020-07-14 | End: 2020-07-14

## 2020-07-14 RX ORDER — ENOXAPARIN SODIUM 100 MG/ML
40 INJECTION SUBCUTANEOUS DAILY
Refills: 0 | Status: DISCONTINUED | OUTPATIENT
Start: 2020-07-14 | End: 2020-07-16

## 2020-07-14 RX ORDER — DIPHENHYDRAMINE HCL 50 MG
25 CAPSULE ORAL EVERY 6 HOURS
Refills: 0 | Status: DISCONTINUED | OUTPATIENT
Start: 2020-07-14 | End: 2020-07-16

## 2020-07-14 RX ORDER — SODIUM CHLORIDE 9 MG/ML
1000 INJECTION INTRAMUSCULAR; INTRAVENOUS; SUBCUTANEOUS ONCE
Refills: 0 | Status: COMPLETED | OUTPATIENT
Start: 2020-07-14 | End: 2020-07-14

## 2020-07-14 RX ORDER — FAMOTIDINE 10 MG/ML
20 INJECTION INTRAVENOUS
Refills: 0 | Status: DISCONTINUED | OUTPATIENT
Start: 2020-07-14 | End: 2020-07-15

## 2020-07-14 RX ORDER — ONDANSETRON 8 MG/1
4 TABLET, FILM COATED ORAL EVERY 8 HOURS
Refills: 0 | Status: DISCONTINUED | OUTPATIENT
Start: 2020-07-14 | End: 2020-07-16

## 2020-07-14 RX ORDER — HALOPERIDOL DECANOATE 100 MG/ML
5 INJECTION INTRAMUSCULAR ONCE
Refills: 0 | Status: COMPLETED | OUTPATIENT
Start: 2020-07-14 | End: 2020-07-14

## 2020-07-14 RX ORDER — KETOROLAC TROMETHAMINE 30 MG/ML
15 SYRINGE (ML) INJECTION EVERY 6 HOURS
Refills: 0 | Status: DISCONTINUED | OUTPATIENT
Start: 2020-07-14 | End: 2020-07-16

## 2020-07-14 RX ADMIN — Medication 15 MILLIGRAM(S): at 14:48

## 2020-07-14 RX ADMIN — Medication 25 MILLIGRAM(S): at 23:21

## 2020-07-14 RX ADMIN — Medication 15 MILLIGRAM(S): at 15:15

## 2020-07-14 RX ADMIN — ONDANSETRON 4 MILLIGRAM(S): 8 TABLET, FILM COATED ORAL at 07:34

## 2020-07-14 RX ADMIN — ONDANSETRON 4 MILLIGRAM(S): 8 TABLET, FILM COATED ORAL at 14:47

## 2020-07-14 RX ADMIN — ONDANSETRON 4 MILLIGRAM(S): 8 TABLET, FILM COATED ORAL at 00:17

## 2020-07-14 RX ADMIN — HALOPERIDOL DECANOATE 5 MILLIGRAM(S): 100 INJECTION INTRAMUSCULAR at 00:17

## 2020-07-14 RX ADMIN — ENOXAPARIN SODIUM 40 MILLIGRAM(S): 100 INJECTION SUBCUTANEOUS at 12:00

## 2020-07-14 RX ADMIN — SODIUM CHLORIDE 1000 MILLILITER(S): 9 INJECTION INTRAMUSCULAR; INTRAVENOUS; SUBCUTANEOUS at 00:17

## 2020-07-14 NOTE — H&P ADULT - NSHPLABSRESULTS_GEN_ALL_CORE
LABS:                        11.1   7.40  )-----------( 229      ( 2020 20:00 )             36.1     07-13    138  |  109<H>  |  14  ----------------------------<  127<H>  3.8   |  15<L>  |  1.14    Ca    9.0      2020 20:00    TPro  8.2  /  Alb  3.5  /  TBili  0.4  /  DBili  x   /  AST  48<H>  /  ALT  52  /  AlkPhos  66  07-13      Urinalysis Basic - ( 2020 00:38 )    Color: Yellow / Appearance: Clear / S.010 / pH: x  Gluc: x / Ketone: Large  / Bili: Negative / Urobili: Negative mg/dL   Blood: x / Protein: Negative mg/dL / Nitrite: Negative   Leuk Esterase: Negative / RBC: 0-2 /HPF / WBC 0-2   Sq Epi: x / Non Sq Epi: Few / Bacteria: Occasional      CAPILLARY BLOOD GLUCOSE  Lipase, Serum: 117 U/L (20 @ 20:00)  HCG: < 1  Drug screen : + THC                Consultant(s) Notes Reviewed:  [ x] YES  [ ] NO

## 2020-07-14 NOTE — H&P ADULT - HISTORY OF PRESENT ILLNESS
23 y/o Female  PMH GERD, cyclic vomiting syndrome, marijuana abuse presents c/o generalized abdominal pain and nausea/vomiting for past 2 days. No other complaints. Multiple prior visits for similar presentation for cyclical vomiting syndrome and inability to tolerate PO intake. Patient admits to smoking marijuana 1-2 days ago. No other drug use. No F/C. No cough. No CP/SOB. No other complaints. Patient lethargic and falling asleep during exam but easily arousable.  Denies CP, SOB, hematemesis, melena, fever/chills.  Meds in ED: Haldol 5mg IVP x2 doses, Zofran 4mg IVP x2, Pepcid 20mg IVP x1, Maalox/Lidocaine oral, Benadryl 25mg IVP x1.

## 2020-07-14 NOTE — CONSULT NOTE ADULT - ASSESSMENT
A/P:  Intractable N/V 2/2 Cyclical Vomiting Syndrome  -admit to medicine  -s/p GI Cocktail, Haldol, and Zofran in ER  -check EKG for QTc prolongation  -no need for another CT ABD/Pelvis as patient has had multiple recent CTs in the past  -repeat labs in AM  -replace electrolytes PRN  -follow up UA/UCX   -Zofran 4mg IV q8 PRN N/V  -Recommend Reglan/Tigan 2nd line if no response to Zofran  -Toradol 15mg IV q6 PRN N/V for abd pain  -Avoid opiate pain medication - patient informed.  -DC IVF due to NAGMA  -VTE PPX   -Clears diet advance as tolerated    Hyperchloremic NAGMA  -2/2 NSS IVFB 2L in ER  -Hold further IVF. If vomiting recurs can try D5/0.5NSS IVF  -Repeat BMP for acidosis  -replace electrolytes    Anemia  -Chronic. Stable. No active bleeding. Repeat H&H. Outpatient F/u.    Marijuana Abuse  -UDS +Marijuana. Last used 2 days ago.   -Recommend cessation.    Patient seen by Telehospitalist. Case discussed with [facility] Hospitalist who will see/examine patient.

## 2020-07-14 NOTE — ED PROVIDER NOTE - PROGRESS NOTE DETAILS
Pt initially improved with haldol, but when it wears off, she continues to have vomiting. Pt admitted to hospital for further management.

## 2020-07-14 NOTE — H&P ADULT - ASSESSMENT
25 y/o Female  PMH GERD, cyclic vomiting syndrome, marijuana abuse presents c/o generalized abdominal pain and nausea/vomiting for past 2 days. No other complaints. Multiple prior visits for similar presentation for cyclical vomiting syndrome and inability to tolerate PO intake. Patient admits to smoking marijuana 1-2 days ago. No other drug use. No F/C. No cough. No CP/SOB. No other complaints. Patient lethargic and falling asleep during exam but easily arousable.  Denies CP, SOB, hematemesis, melena, fever/chills.  Meds in ED: Haldol 5mg IVP x2 doses, Zofran 4mg IVP x2, Pepcid 20mg IVP x1, Maalox/Lidocaine oral, Benadryl 25mg IVP x1. Pt presents with Intractable N/V 2/2 Cyclical Vomiting Syndrome   she is s/p GI Cocktail, Haldol, and Zofran in ER, there is -no need for another CT ABD/Pelvis as patient has had multiple recent CTs in the past, condition likely due to marijuana abuse.  IMPROVE VTE Individual Risk Assessment          RISK                                                          Points    [  ] Previous VTE                                                3    [  ] Thrombophilia                                             2    [  ] Lower limb paralysis                                    2        (unable to hold up >15 seconds)      [  ] Current Cancer                                             2         (within 6 months)    [  ] Immobilization > 24 hrs                              1    [  ] ICU/CCU stay > 24 hours                            1    [  ] Age > 60                                                    1    IMPROVE VTE Score __0____

## 2020-07-14 NOTE — ED PROVIDER NOTE - CLINICAL SUMMARY MEDICAL DECISION MAKING FREE TEXT BOX
Ddx: Cannabinoid hyperemesis/ no abdominal tenderness or guarding to suggest surgical abdomen.   Plan: Cbc, cmp, lipase, fluids, gi cocktail, haldol, reassess

## 2020-07-14 NOTE — ED PROVIDER NOTE - OBJECTIVE STATEMENT
Pt is a 23 yo lady with a pmhx of cannabinoid hyperemesis who presents to the ED with vomiting and abdominal pain. It started this evening. Has diffuse abdominal pain and has had multiple episodes of emesis. LMP 6/22. Last marijuana was yesterday. Denies alcohol. No chest pain, no sob, no fevers.

## 2020-07-14 NOTE — H&P ADULT - NSHPREVIEWOFSYSTEMS_GEN_ALL_CORE
REVIEW OF SYSTEMS:  CONSTITUTIONAL: No fever, weight loss, or fatigue  EYES: No eye pain, visual disturbances, or discharge  ENMT:  No difficulty hearing, tinnitus, vertigo; No sinus or throat pain  NECK: No pain or stiffness  BREASTS: No pain, masses, or nipple discharge  RESPIRATORY: No cough, wheezing, chills or hemoptysis; No shortness of breath  CARDIOVASCULAR: No chest pain, palpitations, dizziness, or leg swelling  GASTROINTESTINAL: + abdominal  pain. + nausea, vomiting, no hematemesis; No diarrhea or constipation. No melena or hematochezia.  GENITOURINARY: No dysuria, frequency, hematuria, or incontinence  NEUROLOGICAL: No headaches, memory loss, loss of strength, numbness, or tremors  SKIN: No itching, burning, rashes, or lesions   LYMPH NODES: No enlarged glands  ENDOCRINE: No heat or cold intolerance; No hair loss  MUSCULOSKELETAL: No joint pain or swelling; No muscle, back, or extremity pain  PSYCHIATRIC: No depression, anxiety, mood swings, or difficulty sleeping  HEME/LYMPH: No easy bruising, or bleeding gums  ALLERGY AND IMMUNOLOGIC: No hives or eczema

## 2020-07-14 NOTE — CONSULT NOTE ADULT - SUBJECTIVE AND OBJECTIVE BOX
25 y/o F with no PMHX c/o generalized abdominal pain and nausea/vomiting for past 2 days. No other complaints. Multiple prior visits for similar presentation for cyclical vomiting syndrome and inability to tolerate PO intake. Patient admits to smoking marijuana 1-2 days ago. No other drug use. No F/C. No cough. No CP/SOB. No other complaints. Patient lethargic and falling asleep during exam but easily arousable and answering questions but does not want to provide detailed history after requesting Morphine for pain which I declined to order. ROS negative unless mentioned above.     Meds in ED: Haldol 5mg IVP x2 doses, Zofran 4mg IVP x2, Pepcid 20mg IVP x1, Maalox/Lidocaine oral, Benadryl 25mg IVP x1    PMHX: Cyclical Vomiting Syndrome  PSHX: None  Social Hx: Denies tobacco abuse. Social ETOH use. +Marijuana abuse. No IVDA.  Fam Hx: Denies  NKDA    Vital Signs Last 24 Hrs  T(C): 36.7 (14 Jul 2020 03:50), Max: 36.9 (13 Jul 2020 17:57)  T(F): 98.1 (14 Jul 2020 03:50), Max: 98.4 (13 Jul 2020 17:57)  HR: 85 (14 Jul 2020 03:50) (69 - 108)  BP: 145/98 (14 Jul 2020 03:50) (132/72 - 156/91)  BP(mean): --  RR: 15 (14 Jul 2020 03:50) (15 - 18)  SpO2: 99% (14 Jul 2020 03:50) (98% - 99%)

## 2020-07-14 NOTE — H&P ADULT - NSHPPHYSICALEXAM_GEN_ALL_CORE
T(C): 36.9 (14 Jul 2020 06:38), Max: 36.9 (13 Jul 2020 17:57)  T(F): 98.4 (14 Jul 2020 06:38), Max: 98.4 (13 Jul 2020 17:57)  HR: 73 (14 Jul 2020 06:38) (69 - 108)  BP: 153/89 (14 Jul 2020 06:38) (113/64 - 156/91)  BP(mean): --  RR: 16 (14 Jul 2020 06:38) (14 - 18)  SpO2: 95% (14 Jul 2020 06:38) (95% - 99%)    PHYSICAL EXAM:  GENERAL: NAD, well-groomed, well-developed  HEAD:  Atraumatic, Normocephalic  EYES: EOMI, PERRLA, conjunctiva and sclera clear  ENMT: No tonsillar erythema, exudates, or enlargement; Moist mucous membranes,  No lesions  NECK: Supple, No JVD, Normal thyroid  NERVOUS SYSTEM:  Alert & Oriented X3, Good concentration; Motor Strength 5/5 B/L upper and lower extremities; DTRs 2+ intact and symmetric  CHEST/LUNG: Clear to percussion bilaterally; No rales, rhonchi, wheezing, or rubs  HEART: Regular rate and rhythm; No murmurs, rubs, or gallops  ABDOMEN: Soft, Nontender, Nondistended; Bowel sounds present  EXTREMITIES:  2+ Peripheral Pulses, No clubbing, cyanosis, or edema  LYMPH: No lymphadenopathy noted  SKIN: No rashes or lesions

## 2020-07-15 LAB
CULTURE RESULTS: SIGNIFICANT CHANGE UP
CULTURE RESULTS: SIGNIFICANT CHANGE UP
SARS-COV-2 IGG SERPL QL IA: NEGATIVE — SIGNIFICANT CHANGE UP
SARS-COV-2 IGM SERPL IA-ACNC: <3.8 AU/ML — SIGNIFICANT CHANGE UP
SPECIMEN SOURCE: SIGNIFICANT CHANGE UP

## 2020-07-15 PROCEDURE — 99232 SBSQ HOSP IP/OBS MODERATE 35: CPT

## 2020-07-15 RX ORDER — PANTOPRAZOLE SODIUM 20 MG/1
40 TABLET, DELAYED RELEASE ORAL DAILY
Refills: 0 | Status: DISCONTINUED | OUTPATIENT
Start: 2020-07-15 | End: 2020-07-16

## 2020-07-15 RX ADMIN — Medication 25 MILLIGRAM(S): at 16:38

## 2020-07-15 RX ADMIN — Medication 15 MILLIGRAM(S): at 16:38

## 2020-07-15 RX ADMIN — Medication 15 MILLIGRAM(S): at 16:50

## 2020-07-15 RX ADMIN — ONDANSETRON 4 MILLIGRAM(S): 8 TABLET, FILM COATED ORAL at 16:29

## 2020-07-15 RX ADMIN — Medication 25 MILLIGRAM(S): at 09:25

## 2020-07-15 RX ADMIN — Medication 15 MILLIGRAM(S): at 09:30

## 2020-07-15 RX ADMIN — Medication 15 MILLIGRAM(S): at 22:35

## 2020-07-15 RX ADMIN — Medication 15 MILLIGRAM(S): at 22:20

## 2020-07-15 RX ADMIN — ENOXAPARIN SODIUM 40 MILLIGRAM(S): 100 INJECTION SUBCUTANEOUS at 11:51

## 2020-07-15 RX ADMIN — ONDANSETRON 4 MILLIGRAM(S): 8 TABLET, FILM COATED ORAL at 05:19

## 2020-07-15 RX ADMIN — Medication 15 MILLIGRAM(S): at 09:19

## 2020-07-15 RX ADMIN — Medication 25 MILLIGRAM(S): at 22:20

## 2020-07-15 RX ADMIN — PANTOPRAZOLE SODIUM 40 MILLIGRAM(S): 20 TABLET, DELAYED RELEASE ORAL at 11:51

## 2020-07-15 NOTE — PROGRESS NOTE ADULT - ASSESSMENT
25 y/o Female w/ PMH GERD, cyclic vomiting syndrome, marijuana abuse presents c/o generalized abdominal pain and nausea/vomiting for past 2 days. No other complaints. Multiple prior visits for similar presentation for cyclical vomiting syndrome and inability to tolerate PO intake. Patient admits to smoking marijuana 1-2 days ago. No other drug use. No F/C. No cough. No CP/SOB. No other complaints. Patient lethargic and falling asleep during exam but easily arousable.  Denies CP, SOB, hematemesis, melena, fever/chills.  Meds in ED: Haldol 5mg IVP x2 doses, Zofran 4mg IVP x2, Pepcid 20mg IVP x1, Maalox/Lidocaine oral, Benadryl 25mg IVP x1. Pt presents with Intractable N/V 2/2 Cyclical Vomiting Syndrome   she is s/p GI Cocktail, Haldol, and Zofran in ER, there is -no need for another CT ABD/Pelvis as patient has had multiple recent CTs in the past, condition likely due to marijuana abuse.

## 2020-07-15 NOTE — PROGRESS NOTE ADULT - SUBJECTIVE AND OBJECTIVE BOX
Patient is a 24y old  Female who presents with a chief complaint of Intractable vomiting. (2020 07:04)      INTERVAL HPI/OVERNIGHT EVENTS:   Tried orange juice this AM, but vomited it up; c/o diffuse pain over abdomen; requesting morphine    REVIEW OF SYSTEMS:   Remaining ROS negative    MEDICATIONS  (STANDING):  dextrose 5% + sodium chloride 0.45%. 1000 milliLiter(s) (75 mL/Hr) IV Continuous <Continuous>  enoxaparin Injectable 40 milliGRAM(s) SubCutaneous daily  pantoprazole  Injectable 40 milliGRAM(s) IV Push daily    MEDICATIONS  (PRN):  diphenhydrAMINE   Injectable 25 milliGRAM(s) IV Push every 6 hours PRN Rash and/or Itching  ketorolac   Injectable 15 milliGRAM(s) IV Push every 6 hours PRN Moderate Pain (4 - 6)  ondansetron Injectable 4 milliGRAM(s) IV Push every 8 hours PRN Nausea and/or Vomiting      Allergies    penicillin (Hives)    Intolerances        Vital Signs Last 24 Hrs  T(C): 37.4 (15 Jul 2020 06:09), Max: 37.4 (15 Jul 2020 06:09)  T(F): 99.4 (15 Jul 2020 06:09), Max: 99.4 (15 Jul 2020 06:09)  HR: 68 (15 Jul 2020 06:09) (68 - 85)  BP: 150/96 (15 Jul 2020 06:09) (142/84 - 150/96)  BP(mean): --  RR: 19 (15 Jul 2020 06:09) (19 - 19)  SpO2: 95% (15 Jul 2020 06:09) (91% - 95%)    PHYSICAL EXAM:  GENERAL: NAD  HEAD:  Atraumatic, Normocephalic  EYES: EOMI, PERRLA, conjunctiva and sclera clear  ENT: O/P Clear  NECK: Supple, No JVD  NERVOUS SYSTEM:  No focal deficits  CHEST/LUNG: Clear to percussion bilaterally; No rales, rhonchi, wheezing  HEART: Regular rate and rhythm; No murmurs, rubs, or gallops  ABDOMEN: Soft, Nontender, Nondistended; Bowel sounds present  EXTREMITIES:  2+ Peripheral Pulses, No clubbing, cyanosis, or edema  SKIN: No rashes or lesions    LABS:                        10.3   6.93  )-----------( 221      ( 2020 08:54 )             31.5     07-14    139  |  110<H>  |  7   ----------------------------<  127<H>  3.7   |  17<L>  |  0.76    Ca    8.0<L>      2020 08:54  Phos  2.9     -  Mg     2.0     -    TPro  7.1  /  Alb  3.4  /  TBili  0.4  /  DBili  x   /  AST  49<H>  /  ALT  53  /  AlkPhos  63  -14      Urinalysis Basic - ( 2020 00:38 )    Color: Yellow / Appearance: Clear / S.010 / pH: x  Gluc: x / Ketone: Large  / Bili: Negative / Urobili: Negative mg/dL   Blood: x / Protein: Negative mg/dL / Nitrite: Negative   Leuk Esterase: Negative / RBC: 0-2 /HPF / WBC 0-2   Sq Epi: x / Non Sq Epi: Few / Bacteria: Occasional      CAPILLARY BLOOD GLUCOSE          RADIOLOGY & ADDITIONAL TESTS:    Imaging Personally Reviewed:  [ ] YES  [ ] NO    Consultant(s) Notes Reviewed:  [ ] YES  [ ] NO    Care Discussed with Consultants/Other Providers [ ] YES  [ ] NO

## 2020-07-15 NOTE — PROGRESS NOTE ADULT - PROBLEM SELECTOR PLAN 1
Continue clear liquid diet for now  Continue IV fluids until able to take po  antiemetics  Refused Pepcid po; change to Protonix IV

## 2020-07-16 ENCOUNTER — TRANSCRIPTION ENCOUNTER (OUTPATIENT)
Age: 25
End: 2020-07-16

## 2020-07-16 VITALS
HEART RATE: 61 BPM | TEMPERATURE: 99 F | OXYGEN SATURATION: 98 % | SYSTOLIC BLOOD PRESSURE: 122 MMHG | DIASTOLIC BLOOD PRESSURE: 86 MMHG | RESPIRATION RATE: 18 BRPM

## 2020-07-16 LAB
ANION GAP SERPL CALC-SCNC: 7 MMOL/L — SIGNIFICANT CHANGE UP (ref 5–17)
BUN SERPL-MCNC: 7 MG/DL — SIGNIFICANT CHANGE UP (ref 7–23)
CALCIUM SERPL-MCNC: 8.9 MG/DL — SIGNIFICANT CHANGE UP (ref 8.5–10.1)
CHLORIDE SERPL-SCNC: 100 MMOL/L — SIGNIFICANT CHANGE UP (ref 96–108)
CO2 SERPL-SCNC: 28 MMOL/L — SIGNIFICANT CHANGE UP (ref 22–31)
CREAT SERPL-MCNC: 0.84 MG/DL — SIGNIFICANT CHANGE UP (ref 0.5–1.3)
GLUCOSE SERPL-MCNC: 103 MG/DL — HIGH (ref 70–99)
MAGNESIUM SERPL-MCNC: 2.3 MG/DL — SIGNIFICANT CHANGE UP (ref 1.6–2.6)
POTASSIUM SERPL-MCNC: 3 MMOL/L — LOW (ref 3.5–5.3)
POTASSIUM SERPL-SCNC: 3 MMOL/L — LOW (ref 3.5–5.3)
SODIUM SERPL-SCNC: 135 MMOL/L — SIGNIFICANT CHANGE UP (ref 135–145)

## 2020-07-16 PROCEDURE — 99238 HOSP IP/OBS DSCHRG MGMT 30/<: CPT

## 2020-07-16 RX ORDER — FAMOTIDINE 10 MG/ML
1 INJECTION INTRAVENOUS
Qty: 30 | Refills: 0
Start: 2020-07-16

## 2020-07-16 RX ORDER — MORPHINE SULFATE 50 MG/1
2 CAPSULE, EXTENDED RELEASE ORAL ONCE
Refills: 0 | Status: DISCONTINUED | OUTPATIENT
Start: 2020-07-16 | End: 2020-07-16

## 2020-07-16 RX ORDER — ACETAMINOPHEN 500 MG
1000 TABLET ORAL ONCE
Refills: 0 | Status: COMPLETED | OUTPATIENT
Start: 2020-07-16 | End: 2020-07-16

## 2020-07-16 RX ORDER — DEXTROSE MONOHYDRATE, SODIUM CHLORIDE, AND POTASSIUM CHLORIDE 50; .745; 4.5 G/1000ML; G/1000ML; G/1000ML
1000 INJECTION, SOLUTION INTRAVENOUS
Refills: 0 | Status: DISCONTINUED | OUTPATIENT
Start: 2020-07-16 | End: 2020-07-16

## 2020-07-16 RX ORDER — POTASSIUM CHLORIDE 20 MEQ
10 PACKET (EA) ORAL ONCE
Refills: 0 | Status: COMPLETED | OUTPATIENT
Start: 2020-07-16 | End: 2020-07-16

## 2020-07-16 RX ADMIN — Medication 15 MILLIGRAM(S): at 04:51

## 2020-07-16 RX ADMIN — PANTOPRAZOLE SODIUM 40 MILLIGRAM(S): 20 TABLET, DELAYED RELEASE ORAL at 12:00

## 2020-07-16 RX ADMIN — ONDANSETRON 4 MILLIGRAM(S): 8 TABLET, FILM COATED ORAL at 12:00

## 2020-07-16 RX ADMIN — ENOXAPARIN SODIUM 40 MILLIGRAM(S): 100 INJECTION SUBCUTANEOUS at 12:14

## 2020-07-16 RX ADMIN — MORPHINE SULFATE 2 MILLIGRAM(S): 50 CAPSULE, EXTENDED RELEASE ORAL at 16:20

## 2020-07-16 RX ADMIN — Medication 1000 MILLIGRAM(S): at 15:45

## 2020-07-16 RX ADMIN — DEXTROSE MONOHYDRATE, SODIUM CHLORIDE, AND POTASSIUM CHLORIDE 75 MILLILITER(S): 50; .745; 4.5 INJECTION, SOLUTION INTRAVENOUS at 11:30

## 2020-07-16 RX ADMIN — Medication 25 MILLIGRAM(S): at 04:35

## 2020-07-16 RX ADMIN — MORPHINE SULFATE 2 MILLIGRAM(S): 50 CAPSULE, EXTENDED RELEASE ORAL at 16:07

## 2020-07-16 RX ADMIN — Medication 15 MILLIGRAM(S): at 12:00

## 2020-07-16 RX ADMIN — Medication 15 MILLIGRAM(S): at 12:15

## 2020-07-16 RX ADMIN — Medication 15 MILLIGRAM(S): at 04:36

## 2020-07-16 RX ADMIN — Medication 25 MILLIGRAM(S): at 12:01

## 2020-07-16 RX ADMIN — Medication 400 MILLIGRAM(S): at 15:24

## 2020-07-16 NOTE — DISCHARGE NOTE PROVIDER - PROVIDER TOKENS
FREE:[LAST:[Your regular physician],PHONE:[(   )    -],FAX:[(   )    -],FOLLOWUP:[1 week]],FREE:[LAST:[Your regular gynecologist],PHONE:[(   )    -],FAX:[(   )    -],FOLLOWUP:[2 weeks]]

## 2020-07-16 NOTE — DISCHARGE NOTE PROVIDER - HOSPITAL COURSE
23 y/o Female w/ PMH GERD, cyclic vomiting syndrome, marijuana abuse presents c/o generalized abdominal pain and nausea/vomiting for past 2 days. No other complaints. Multiple prior visits for similar presentation for cyclical vomiting syndrome and inability to tolerate PO intake. Patient admits to smoking marijuana 1-2 days ago. No other drug use. No F/C. No cough. No CP/SOB. No other complaints. Patient lethargic and falling asleep during exam but easily arousable.  Denies CP, SOB, hematemesis, melena, fever/chills.    Meds in ED: Haldol 5mg IVP x2 doses, Zofran 4mg IVP x2, Pepcid 20mg IVP x1, Maalox/Lidocaine oral, Benadryl 25mg IVP x1. Pt presents with Intractable N/V 2/2 Cyclical Vomiting Syndrome     she is s/p GI Cocktail, Haldol, and Zofran in ER, there is -no need for another CT ABD/Pelvis as patient has had multiple recent CTs in the past, condition likely due to marijuana abuse.                 COVID-19 Negative Lab Result:    · COVID-19 Negative Lab Result    COVID-19 ruled out         Problem/Plan - 1:    ·  Problem: Cannabinoid hyperemesis syndrome.    Plan: Continue clear liquid diet initially; advanced to regular    Continue IV fluids until able to take po (being D/C'd now)    antiemetics prn    Refused Pepcid po; change to Protonix IV.     Doing much better now; she notes that her menses started this AM and this often helps resolve her nausea and abdominal pain    She will f/u w/ her gynecologist as outpatient         Problem/Plan - 2:    ·  Problem: Intractable vomiting with nausea.  Plan: as above.          Problem/Plan - 3:    ·  Problem: Gastroesophageal reflux disease, esophagitis presence not specified.  Plan: IV Protonix for now    change to Pepcid at discharge.          Problem/Plan - 4:    ·  Problem: Marijuana abuse.  Plan: counseled abstinence.

## 2020-07-16 NOTE — CHART NOTE - NSCHARTNOTEFT_GEN_A_CORE
Pt was admitted at [facility] from 7/14/2020 to 7/16/2020.  She may return to work on Saturday, 7/18/2020.

## 2020-07-16 NOTE — DISCHARGE NOTE NURSING/CASE MANAGEMENT/SOCIAL WORK - PATIENT PORTAL LINK FT
You can access the FollowMyHealth Patient Portal offered by [facility] by registering at the following website: [email or web address]. By joining [employer]’s FollowMyHealth portal, you will also be able to view your health information using other applications (apps) compatible with our system.

## 2020-07-16 NOTE — DISCHARGE NOTE PROVIDER - CARE PROVIDER_API CALL
Your regular physician,   Phone: (   )    -  Fax: (   )    -  Follow Up Time: 1 week    Your regular gynecologist,   Phone: (   )    -  Fax: (   )    -  Follow Up Time: 2 weeks

## 2020-07-23 ENCOUNTER — RESULT REVIEW (OUTPATIENT)
Age: 25
End: 2020-07-23

## 2020-07-23 ENCOUNTER — APPOINTMENT (OUTPATIENT)
Dept: OBGYN | Facility: CLINIC | Age: 25
End: 2020-07-23
Payer: COMMERCIAL

## 2020-07-23 DIAGNOSIS — N83.209 UNSPECIFIED OVARIAN CYST, UNSPECIFIED SIDE: ICD-10-CM

## 2020-07-23 DIAGNOSIS — D64.9 ANEMIA, UNSPECIFIED: ICD-10-CM

## 2020-07-23 DIAGNOSIS — K21.9 GASTRO-ESOPHAGEAL REFLUX DISEASE WITHOUT ESOPHAGITIS: ICD-10-CM

## 2020-07-23 DIAGNOSIS — N25.89 OTHER DISORDERS RESULTING FROM IMPAIRED RENAL TUBULAR FUNCTION: ICD-10-CM

## 2020-07-23 DIAGNOSIS — R11.2 NAUSEA WITH VOMITING, UNSPECIFIED: ICD-10-CM

## 2020-07-23 DIAGNOSIS — Z88.0 ALLERGY STATUS TO PENICILLIN: ICD-10-CM

## 2020-07-23 DIAGNOSIS — F12.10 CANNABIS ABUSE, UNCOMPLICATED: ICD-10-CM

## 2020-07-23 PROCEDURE — 99385 PREV VISIT NEW AGE 18-39: CPT

## 2020-07-23 PROCEDURE — 99214 OFFICE O/P EST MOD 30 MIN: CPT | Mod: 25

## 2020-07-31 DIAGNOSIS — E87.2 ACIDOSIS: ICD-10-CM

## 2020-08-04 DIAGNOSIS — Z01.818 ENCOUNTER FOR OTHER PREPROCEDURAL EXAMINATION: ICD-10-CM

## 2020-08-05 ENCOUNTER — APPOINTMENT (OUTPATIENT)
Dept: DISASTER EMERGENCY | Facility: CLINIC | Age: 25
End: 2020-08-05

## 2020-08-06 LAB — SARS-COV-2 N GENE NPH QL NAA+PROBE: NOT DETECTED

## 2020-08-07 NOTE — ED PROVIDER NOTE - CARDIAC PEDAL EDEMA
Procedure:  TONSILLECTOMY & ADENOIDECTOMY (N/A Throat)    Relevant Problems   No relevant active problems      NO HX  HEART MURMUR, RECENT URI-RESOLVED, NPO PAST 2300  Physical Exam    Airway    Mallampati score: I  TM Distance: >3 FB  Neck ROM: full     Dental   No notable dental hx     Cardiovascular      Pulmonary      Other Findings        Anesthesia Plan  ASA Score- 1     Anesthesia Type- general with ASA Monitors  Additional Monitors:   Airway Plan: ETT  Plan Factors-Exercise tolerance (METS): >4 METS  Chart reviewed  Patient not instructed to abstain from smoking on day of procedure  Patient did not smoke on day of surgery  Induction- inhalational     Postoperative Plan-     Informed Consent- Anesthetic plan and risks discussed with patient and mother  I personally reviewed this patient with the CRNA  Discussed and agreed on the Anesthesia Plan with the CRNA  Carlos A Olivares absent

## 2020-11-09 NOTE — DISCHARGE NOTE NURSING/CASE MANAGEMENT/SOCIAL WORK - NSDCPETBCESMAN_GEN_ALL_CORE
If you are a smoker, it is important for your health to stop smoking. Please be aware that second hand smoke is also harmful.
Quality 111:Pneumonia Vaccination Status For Older Adults: Pneumococcal Vaccination Previously Received
Quality 431: Preventive Care And Screening: Unhealthy Alcohol Use - Screening: Patient screened for unhealthy alcohol use using a single question and scores less than 2 times per year
Detail Level: Detailed
Quality 226: Preventive Care And Screening: Tobacco Use: Screening And Cessation Intervention: Patient screened for tobacco use and is an ex/non-smoker
Quality 47: Advance Care Plan: Advance Care Planning discussed and documented; advance care plan or surrogate decision maker documented in the medical record.
Quality 130: Documentation Of Current Medications In The Medical Record: Current Medications Documented

## 2021-04-12 ENCOUNTER — RESULT REVIEW (OUTPATIENT)
Age: 26
End: 2021-04-12

## 2021-04-27 NOTE — MEDICAL STUDENT ADULT H&P (EDUCATION) - NS MD HP STUD SUPERVISOR COMMENTS FT
Pt seen and examined. Agree with fellow's note. Plan discussed with patient and primary team.     Patient had chief complaint of CVS.    60 min spent face/face, >50% in care coordination/counseling for discussion of management of CVS
plan discussed with mother. they were given patient education materials on CVS and amitriptyline.
Pt seen and examined. Agree with fellow's note. Plan discussed with patient and primary team.     Patient had chief complaint of nausea/vomiting. Plan as above.     60 min spent in patient care, face/face. >50% time spent in care coordination/counseling to discuss management options with team/patient.
none

## 2021-05-28 ENCOUNTER — EMERGENCY (EMERGENCY)
Facility: HOSPITAL | Age: 26
LOS: 0 days | Discharge: HOME | End: 2021-05-28
Attending: STUDENT IN AN ORGANIZED HEALTH CARE EDUCATION/TRAINING PROGRAM
Payer: COMMERCIAL

## 2021-05-28 VITALS
TEMPERATURE: 98 F | DIASTOLIC BLOOD PRESSURE: 86 MMHG | OXYGEN SATURATION: 100 % | SYSTOLIC BLOOD PRESSURE: 147 MMHG | HEART RATE: 68 BPM | HEIGHT: 65 IN | WEIGHT: 130.07 LBS | RESPIRATION RATE: 16 BRPM

## 2021-05-28 VITALS
OXYGEN SATURATION: 100 % | TEMPERATURE: 98 F | DIASTOLIC BLOOD PRESSURE: 78 MMHG | HEART RATE: 83 BPM | SYSTOLIC BLOOD PRESSURE: 121 MMHG | RESPIRATION RATE: 16 BRPM

## 2021-05-28 DIAGNOSIS — R11.2 NAUSEA WITH VOMITING, UNSPECIFIED: ICD-10-CM

## 2021-05-28 DIAGNOSIS — Z88.0 ALLERGY STATUS TO PENICILLIN: ICD-10-CM

## 2021-05-28 DIAGNOSIS — F12.988 CANNABIS USE, UNSPECIFIED WITH OTHER CANNABIS-INDUCED DISORDER: ICD-10-CM

## 2021-05-28 DIAGNOSIS — K21.9 GASTRO-ESOPHAGEAL REFLUX DISEASE WITHOUT ESOPHAGITIS: ICD-10-CM

## 2021-05-28 DIAGNOSIS — R10.9 UNSPECIFIED ABDOMINAL PAIN: ICD-10-CM

## 2021-05-28 DIAGNOSIS — K44.9 DIAPHRAGMATIC HERNIA WITHOUT OBSTRUCTION OR GANGRENE: ICD-10-CM

## 2021-05-28 DIAGNOSIS — Z79.3 LONG TERM (CURRENT) USE OF HORMONAL CONTRACEPTIVES: ICD-10-CM

## 2021-05-28 DIAGNOSIS — R11.15 CYCLICAL VOMITING SYNDROME UNRELATED TO MIGRAINE: ICD-10-CM

## 2021-05-28 DIAGNOSIS — N83.209 UNSPECIFIED OVARIAN CYST, UNSPECIFIED SIDE: ICD-10-CM

## 2021-05-28 LAB
ALBUMIN SERPL ELPH-MCNC: 4 G/DL — SIGNIFICANT CHANGE UP (ref 3.3–5)
ALP SERPL-CCNC: 56 U/L — SIGNIFICANT CHANGE UP (ref 40–120)
ALT FLD-CCNC: 47 U/L — SIGNIFICANT CHANGE UP (ref 12–78)
AMYLASE P1 CFR SERPL: 511 U/L — HIGH (ref 25–115)
ANION GAP SERPL CALC-SCNC: 14 MMOL/L — SIGNIFICANT CHANGE UP (ref 5–17)
AST SERPL-CCNC: 36 U/L — SIGNIFICANT CHANGE UP (ref 15–37)
BASOPHILS # BLD AUTO: 0.02 K/UL — SIGNIFICANT CHANGE UP (ref 0–0.2)
BASOPHILS NFR BLD AUTO: 0.1 % — SIGNIFICANT CHANGE UP (ref 0–2)
BILIRUB SERPL-MCNC: 0.7 MG/DL — SIGNIFICANT CHANGE UP (ref 0.2–1.2)
BUN SERPL-MCNC: 22 MG/DL — SIGNIFICANT CHANGE UP (ref 7–23)
CALCIUM SERPL-MCNC: 9.3 MG/DL — SIGNIFICANT CHANGE UP (ref 8.5–10.1)
CHLORIDE SERPL-SCNC: 105 MMOL/L — SIGNIFICANT CHANGE UP (ref 96–108)
CO2 SERPL-SCNC: 19 MMOL/L — LOW (ref 22–31)
CREAT SERPL-MCNC: 1.02 MG/DL — SIGNIFICANT CHANGE UP (ref 0.5–1.3)
EOSINOPHIL # BLD AUTO: 0 K/UL — SIGNIFICANT CHANGE UP (ref 0–0.5)
EOSINOPHIL NFR BLD AUTO: 0 % — SIGNIFICANT CHANGE UP (ref 0–6)
GLUCOSE SERPL-MCNC: 151 MG/DL — HIGH (ref 70–99)
HCG SERPL-ACNC: <1 MIU/ML — SIGNIFICANT CHANGE UP
HCT VFR BLD CALC: 37 % — SIGNIFICANT CHANGE UP (ref 34.5–45)
HGB BLD-MCNC: 12.3 G/DL — SIGNIFICANT CHANGE UP (ref 11.5–15.5)
IMM GRANULOCYTES NFR BLD AUTO: 0.5 % — SIGNIFICANT CHANGE UP (ref 0–1.5)
LIDOCAIN IGE QN: 58 U/L — LOW (ref 73–393)
LYMPHOCYTES # BLD AUTO: 1.21 K/UL — SIGNIFICANT CHANGE UP (ref 1–3.3)
LYMPHOCYTES # BLD AUTO: 8.3 % — LOW (ref 13–44)
MCHC RBC-ENTMCNC: 26.7 PG — LOW (ref 27–34)
MCHC RBC-ENTMCNC: 33.2 GM/DL — SIGNIFICANT CHANGE UP (ref 32–36)
MCV RBC AUTO: 80.4 FL — SIGNIFICANT CHANGE UP (ref 80–100)
MONOCYTES # BLD AUTO: 0.4 K/UL — SIGNIFICANT CHANGE UP (ref 0–0.9)
MONOCYTES NFR BLD AUTO: 2.8 % — SIGNIFICANT CHANGE UP (ref 2–14)
NEUTROPHILS # BLD AUTO: 12.82 K/UL — HIGH (ref 1.8–7.4)
NEUTROPHILS NFR BLD AUTO: 88.3 % — HIGH (ref 43–77)
NRBC # BLD: 0 /100 WBCS — SIGNIFICANT CHANGE UP (ref 0–0)
PLATELET # BLD AUTO: 322 K/UL — SIGNIFICANT CHANGE UP (ref 150–400)
POTASSIUM SERPL-MCNC: 4.6 MMOL/L — SIGNIFICANT CHANGE UP (ref 3.5–5.3)
POTASSIUM SERPL-SCNC: 4.6 MMOL/L — SIGNIFICANT CHANGE UP (ref 3.5–5.3)
PROT SERPL-MCNC: 8.2 GM/DL — SIGNIFICANT CHANGE UP (ref 6–8.3)
RBC # BLD: 4.6 M/UL — SIGNIFICANT CHANGE UP (ref 3.8–5.2)
RBC # FLD: 14.5 % — SIGNIFICANT CHANGE UP (ref 10.3–14.5)
SODIUM SERPL-SCNC: 138 MMOL/L — SIGNIFICANT CHANGE UP (ref 135–145)
WBC # BLD: 14.52 K/UL — HIGH (ref 3.8–10.5)
WBC # FLD AUTO: 14.52 K/UL — HIGH (ref 3.8–10.5)

## 2021-05-28 PROCEDURE — 74177 CT ABD & PELVIS W/CONTRAST: CPT | Mod: 26,MA

## 2021-05-28 PROCEDURE — 99285 EMERGENCY DEPT VISIT HI MDM: CPT

## 2021-05-28 RX ORDER — METRONIDAZOLE 500 MG
500 TABLET ORAL ONCE
Refills: 0 | Status: DISCONTINUED | OUTPATIENT
Start: 2021-05-28 | End: 2021-05-28

## 2021-05-28 RX ORDER — FAMOTIDINE 10 MG/ML
20 INJECTION INTRAVENOUS ONCE
Refills: 0 | Status: COMPLETED | OUTPATIENT
Start: 2021-05-28 | End: 2021-05-28

## 2021-05-28 RX ORDER — MORPHINE SULFATE 50 MG/1
4 CAPSULE, EXTENDED RELEASE ORAL ONCE
Refills: 0 | Status: DISCONTINUED | OUTPATIENT
Start: 2021-05-28 | End: 2021-05-28

## 2021-05-28 RX ORDER — HALOPERIDOL DECANOATE 100 MG/ML
5 INJECTION INTRAMUSCULAR ONCE
Refills: 0 | Status: COMPLETED | OUTPATIENT
Start: 2021-05-28 | End: 2021-05-28

## 2021-05-28 RX ORDER — CIPROFLOXACIN LACTATE 400MG/40ML
400 VIAL (ML) INTRAVENOUS ONCE
Refills: 0 | Status: DISCONTINUED | OUTPATIENT
Start: 2021-05-28 | End: 2021-05-28

## 2021-05-28 RX ORDER — ONDANSETRON 8 MG/1
4 TABLET, FILM COATED ORAL ONCE
Refills: 0 | Status: COMPLETED | OUTPATIENT
Start: 2021-05-28 | End: 2021-05-28

## 2021-05-28 RX ORDER — DIPHENHYDRAMINE HCL 50 MG
12.5 CAPSULE ORAL ONCE
Refills: 0 | Status: COMPLETED | OUTPATIENT
Start: 2021-05-28 | End: 2021-05-28

## 2021-05-28 RX ORDER — SODIUM CHLORIDE 9 MG/ML
1000 INJECTION INTRAMUSCULAR; INTRAVENOUS; SUBCUTANEOUS ONCE
Refills: 0 | Status: COMPLETED | OUTPATIENT
Start: 2021-05-28 | End: 2021-05-28

## 2021-05-28 RX ORDER — DIPHENHYDRAMINE HCL 50 MG
25 CAPSULE ORAL ONCE
Refills: 0 | Status: COMPLETED | OUTPATIENT
Start: 2021-05-28 | End: 2021-05-28

## 2021-05-28 RX ADMIN — ONDANSETRON 4 MILLIGRAM(S): 8 TABLET, FILM COATED ORAL at 08:43

## 2021-05-28 RX ADMIN — Medication 25 MILLIGRAM(S): at 06:34

## 2021-05-28 RX ADMIN — SODIUM CHLORIDE 1000 MILLILITER(S): 9 INJECTION INTRAMUSCULAR; INTRAVENOUS; SUBCUTANEOUS at 07:00

## 2021-05-28 RX ADMIN — MORPHINE SULFATE 4 MILLIGRAM(S): 50 CAPSULE, EXTENDED RELEASE ORAL at 08:56

## 2021-05-28 RX ADMIN — SODIUM CHLORIDE 1000 MILLILITER(S): 9 INJECTION INTRAMUSCULAR; INTRAVENOUS; SUBCUTANEOUS at 06:07

## 2021-05-28 RX ADMIN — MORPHINE SULFATE 4 MILLIGRAM(S): 50 CAPSULE, EXTENDED RELEASE ORAL at 06:07

## 2021-05-28 RX ADMIN — MORPHINE SULFATE 4 MILLIGRAM(S): 50 CAPSULE, EXTENDED RELEASE ORAL at 08:42

## 2021-05-28 RX ADMIN — ONDANSETRON 4 MILLIGRAM(S): 8 TABLET, FILM COATED ORAL at 06:07

## 2021-05-28 RX ADMIN — MORPHINE SULFATE 4 MILLIGRAM(S): 50 CAPSULE, EXTENDED RELEASE ORAL at 06:51

## 2021-05-28 RX ADMIN — FAMOTIDINE 20 MILLIGRAM(S): 10 INJECTION INTRAVENOUS at 08:44

## 2021-05-28 RX ADMIN — Medication 12.5 MILLIGRAM(S): at 08:44

## 2021-05-28 RX ADMIN — HALOPERIDOL DECANOATE 5 MILLIGRAM(S): 100 INJECTION INTRAMUSCULAR at 09:36

## 2021-05-28 NOTE — ED PROVIDER NOTE - PROGRESS NOTE DETAILS
CT acsna with no acute pathology. symptoms well conrolled in ED, tiarra tolerating PO, endorses recent marijuana usage, counselled to avoid marijuana usage ans this likely causes her to repeptitivel experience these symptoms

## 2021-05-28 NOTE — ED ADULT NURSE NOTE - ED STAT RN HANDOFF DETAILS
Report received from RN at this time. Assessment available on [facility]. will continue to monitor

## 2021-05-28 NOTE — ED PROVIDER NOTE - PATIENT PORTAL LINK FT
You can access the FollowMyHealth Patient Portal offered by [facility] by registering at the following website: [email or web address]. By joining [employer]’s FollowMyHealth portal, you will also be able to view your health information using other applications (apps) compatible with our system.

## 2021-05-28 NOTE — ED PROVIDER NOTE - CLINICAL SUMMARY MEDICAL DECISION MAKING FREE TEXT BOX
Patient with abdominal pain and vomiting.  VSS.  Pending labs, CT, IVF, meds for pain and nausea given, reassess.  Patient signed out to incoming physician Dr. Donovan.  All decisions regarding the progression of care will be made at their discretion.

## 2021-05-28 NOTE — ED ADULT TRIAGE NOTE - CHIEF COMPLAINT QUOTE
diffuse abdominal pain ,unable to keep anything down x 2 days , on birth-control pills, smokes marihuana

## 2021-05-28 NOTE — ED ADULT NURSE NOTE - OBJECTIVE STATEMENT
patient received complaining of vomiting and abdominal pain x 2 days. apparently has been in and out of hospital for the same reason, smokes marijuana often

## 2021-05-28 NOTE — ED PROVIDER NOTE - PHYSICAL EXAMINATION
Gen: Alert, crying, vomiting, distressed  Head: NC, AT, EOMI, normal lids/conjunctiva  ENT: normal hearing, dry mucus membranes  Neck: +supple, +Trachea midline  Pulm: Bilateral BS, normal resp effort, no wheeze/stridor/retractions  CV: RRR, no M/R/G, +dist pulses  Abd: soft, NT/ND, Negative [employer] signs, +BS, no palpable masses  Mskel: no edema/erythema/cyanosis  Skin: no rash, warm/dry  Neuro: AAOx3, no apparent sensory/motor deficits, coordination intact

## 2021-05-28 NOTE — ED PROVIDER NOTE - OBJECTIVE STATEMENT
Pertinent PMH/PSH/FHx/SHx and Review of Systems contained within:  Patient presents to the ED for abdominal pain and vomiting.  Patient has been seen in ER prior for cyclic vomiting and gastritis, has been told she has hiatal hernia.  Has been having abdominal pain and vomiting since yesterday.  Denies diarrhea or urinary symptoms.  LMP 4/25.  Denies any discharge or vaginal symptoms.    ROS: No fever/chills, No headache/photophobia/eye pain/changes in vision, No ear pain/sore throat/dysphagia, No chest pain/palpitations, no SOB/cough/wheeze/stridor, D/melena, no dysuria/frequency/discharge, No neck/back pain, no rash, no changes in neurological status/function.

## 2021-05-28 NOTE — ED PROVIDER NOTE - WORK/EXCUSE FORM DATE
Generalized bilateral axillary pain x 3-4 days  Possibly soft tissue injury, unknown trigger?  Warm compresses, stretch  F/U in 2 weeks and PRN.    29-May-2021

## 2021-05-30 ENCOUNTER — INPATIENT (INPATIENT)
Facility: HOSPITAL | Age: 26
LOS: 3 days | Discharge: HOME | End: 2021-06-03
Attending: INTERNAL MEDICINE | Admitting: INTERNAL MEDICINE
Payer: COMMERCIAL

## 2021-05-30 VITALS
WEIGHT: 130.07 LBS | DIASTOLIC BLOOD PRESSURE: 105 MMHG | HEART RATE: 79 BPM | TEMPERATURE: 98 F | OXYGEN SATURATION: 96 % | RESPIRATION RATE: 25 BRPM | SYSTOLIC BLOOD PRESSURE: 152 MMHG | HEIGHT: 65 IN

## 2021-05-30 LAB
ALBUMIN SERPL ELPH-MCNC: 4.1 G/DL — SIGNIFICANT CHANGE UP (ref 3.3–5)
ALP SERPL-CCNC: 61 U/L — SIGNIFICANT CHANGE UP (ref 40–120)
ALT FLD-CCNC: 47 U/L — SIGNIFICANT CHANGE UP (ref 12–78)
AMPHET UR-MCNC: NEGATIVE — SIGNIFICANT CHANGE UP
ANION GAP SERPL CALC-SCNC: 11 MMOL/L — SIGNIFICANT CHANGE UP (ref 5–17)
AST SERPL-CCNC: 28 U/L — SIGNIFICANT CHANGE UP (ref 15–37)
BARBITURATES UR SCN-MCNC: NEGATIVE — SIGNIFICANT CHANGE UP
BENZODIAZ UR-MCNC: NEGATIVE — SIGNIFICANT CHANGE UP
BILIRUB SERPL-MCNC: 0.6 MG/DL — SIGNIFICANT CHANGE UP (ref 0.2–1.2)
BUN SERPL-MCNC: 19 MG/DL — SIGNIFICANT CHANGE UP (ref 7–23)
CALCIUM SERPL-MCNC: 9.2 MG/DL — SIGNIFICANT CHANGE UP (ref 8.5–10.1)
CHLORIDE SERPL-SCNC: 107 MMOL/L — SIGNIFICANT CHANGE UP (ref 96–108)
CO2 SERPL-SCNC: 23 MMOL/L — SIGNIFICANT CHANGE UP (ref 22–31)
COCAINE METAB.OTHER UR-MCNC: NEGATIVE — SIGNIFICANT CHANGE UP
CREAT SERPL-MCNC: 0.89 MG/DL — SIGNIFICANT CHANGE UP (ref 0.5–1.3)
FLUAV AG NPH QL: SIGNIFICANT CHANGE UP
FLUBV AG NPH QL: SIGNIFICANT CHANGE UP
GLUCOSE SERPL-MCNC: 125 MG/DL — HIGH (ref 70–99)
HCG SERPL-ACNC: <1 MIU/ML — SIGNIFICANT CHANGE UP
HCT VFR BLD CALC: 36.7 % — SIGNIFICANT CHANGE UP (ref 34.5–45)
HGB BLD-MCNC: 12.3 G/DL — SIGNIFICANT CHANGE UP (ref 11.5–15.5)
LIDOCAIN IGE QN: 126 U/L — SIGNIFICANT CHANGE UP (ref 73–393)
MCHC RBC-ENTMCNC: 27.2 PG — SIGNIFICANT CHANGE UP (ref 27–34)
MCHC RBC-ENTMCNC: 33.5 GM/DL — SIGNIFICANT CHANGE UP (ref 32–36)
MCV RBC AUTO: 81.2 FL — SIGNIFICANT CHANGE UP (ref 80–100)
METHADONE UR-MCNC: NEGATIVE — SIGNIFICANT CHANGE UP
NRBC # BLD: 0 /100 WBCS — SIGNIFICANT CHANGE UP (ref 0–0)
OPIATES UR-MCNC: POSITIVE — SIGNIFICANT CHANGE UP
PCP SPEC-MCNC: SIGNIFICANT CHANGE UP
PCP UR-MCNC: NEGATIVE — SIGNIFICANT CHANGE UP
PLATELET # BLD AUTO: 298 K/UL — SIGNIFICANT CHANGE UP (ref 150–400)
POTASSIUM SERPL-MCNC: 3.5 MMOL/L — SIGNIFICANT CHANGE UP (ref 3.5–5.3)
POTASSIUM SERPL-SCNC: 3.5 MMOL/L — SIGNIFICANT CHANGE UP (ref 3.5–5.3)
PROT SERPL-MCNC: 7.7 GM/DL — SIGNIFICANT CHANGE UP (ref 6–8.3)
RBC # BLD: 4.52 M/UL — SIGNIFICANT CHANGE UP (ref 3.8–5.2)
RBC # FLD: 14.7 % — HIGH (ref 10.3–14.5)
SARS-COV-2 RNA SPEC QL NAA+PROBE: SIGNIFICANT CHANGE UP
SODIUM SERPL-SCNC: 141 MMOL/L — SIGNIFICANT CHANGE UP (ref 135–145)
THC UR QL: POSITIVE — SIGNIFICANT CHANGE UP
WBC # BLD: 13.84 K/UL — HIGH (ref 3.8–10.5)
WBC # FLD AUTO: 13.84 K/UL — HIGH (ref 3.8–10.5)

## 2021-05-30 PROCEDURE — 93010 ELECTROCARDIOGRAM REPORT: CPT

## 2021-05-30 PROCEDURE — 99223 1ST HOSP IP/OBS HIGH 75: CPT

## 2021-05-30 PROCEDURE — 71045 X-RAY EXAM CHEST 1 VIEW: CPT | Mod: 26

## 2021-05-30 PROCEDURE — 99285 EMERGENCY DEPT VISIT HI MDM: CPT

## 2021-05-30 RX ORDER — MORPHINE SULFATE 50 MG/1
2 CAPSULE, EXTENDED RELEASE ORAL ONCE
Refills: 0 | Status: DISCONTINUED | OUTPATIENT
Start: 2021-05-30 | End: 2021-05-30

## 2021-05-30 RX ORDER — ONDANSETRON 8 MG/1
4 TABLET, FILM COATED ORAL ONCE
Refills: 0 | Status: COMPLETED | OUTPATIENT
Start: 2021-05-30 | End: 2021-05-30

## 2021-05-30 RX ORDER — ACETAMINOPHEN 500 MG
500 TABLET ORAL EVERY 6 HOURS
Refills: 0 | Status: DISCONTINUED | OUTPATIENT
Start: 2021-05-30 | End: 2021-06-03

## 2021-05-30 RX ORDER — SODIUM CHLORIDE 9 MG/ML
1000 INJECTION INTRAMUSCULAR; INTRAVENOUS; SUBCUTANEOUS ONCE
Refills: 0 | Status: COMPLETED | OUTPATIENT
Start: 2021-05-30 | End: 2021-05-30

## 2021-05-30 RX ORDER — PANTOPRAZOLE SODIUM 20 MG/1
40 TABLET, DELAYED RELEASE ORAL
Refills: 0 | Status: DISCONTINUED | OUTPATIENT
Start: 2021-05-30 | End: 2021-06-03

## 2021-05-30 RX ORDER — METOCLOPRAMIDE HCL 10 MG
10 TABLET ORAL ONCE
Refills: 0 | Status: COMPLETED | OUTPATIENT
Start: 2021-05-30 | End: 2021-05-30

## 2021-05-30 RX ORDER — ONDANSETRON 8 MG/1
4 TABLET, FILM COATED ORAL EVERY 6 HOURS
Refills: 0 | Status: DISCONTINUED | OUTPATIENT
Start: 2021-05-30 | End: 2021-06-03

## 2021-05-30 RX ORDER — MORPHINE SULFATE 50 MG/1
4 CAPSULE, EXTENDED RELEASE ORAL ONCE
Refills: 0 | Status: DISCONTINUED | OUTPATIENT
Start: 2021-05-30 | End: 2021-05-30

## 2021-05-30 RX ORDER — HALOPERIDOL DECANOATE 100 MG/ML
5 INJECTION INTRAMUSCULAR ONCE
Refills: 0 | Status: COMPLETED | OUTPATIENT
Start: 2021-05-30 | End: 2021-05-30

## 2021-05-30 RX ORDER — SODIUM CHLORIDE 9 MG/ML
1000 INJECTION, SOLUTION INTRAVENOUS
Refills: 0 | Status: DISCONTINUED | OUTPATIENT
Start: 2021-05-30 | End: 2021-06-03

## 2021-05-30 RX ORDER — MORPHINE SULFATE 50 MG/1
0.5 CAPSULE, EXTENDED RELEASE ORAL EVERY 4 HOURS
Refills: 0 | Status: DISCONTINUED | OUTPATIENT
Start: 2021-05-30 | End: 2021-05-31

## 2021-05-30 RX ORDER — DIPHENHYDRAMINE HCL 50 MG
25 CAPSULE ORAL ONCE
Refills: 0 | Status: COMPLETED | OUTPATIENT
Start: 2021-05-30 | End: 2021-05-30

## 2021-05-30 RX ADMIN — SODIUM CHLORIDE 1000 MILLILITER(S): 9 INJECTION INTRAMUSCULAR; INTRAVENOUS; SUBCUTANEOUS at 11:09

## 2021-05-30 RX ADMIN — ONDANSETRON 4 MILLIGRAM(S): 8 TABLET, FILM COATED ORAL at 20:16

## 2021-05-30 RX ADMIN — HALOPERIDOL DECANOATE 5 MILLIGRAM(S): 100 INJECTION INTRAMUSCULAR at 11:12

## 2021-05-30 RX ADMIN — Medication 30 MILLILITER(S): at 12:37

## 2021-05-30 RX ADMIN — ONDANSETRON 4 MILLIGRAM(S): 8 TABLET, FILM COATED ORAL at 11:11

## 2021-05-30 RX ADMIN — ONDANSETRON 4 MILLIGRAM(S): 8 TABLET, FILM COATED ORAL at 12:36

## 2021-05-30 RX ADMIN — SODIUM CHLORIDE 100 MILLILITER(S): 9 INJECTION, SOLUTION INTRAVENOUS at 18:56

## 2021-05-30 RX ADMIN — MORPHINE SULFATE 0.5 MILLIGRAM(S): 50 CAPSULE, EXTENDED RELEASE ORAL at 21:59

## 2021-05-30 RX ADMIN — Medication 25 MILLIGRAM(S): at 11:10

## 2021-05-30 RX ADMIN — MORPHINE SULFATE 4 MILLIGRAM(S): 50 CAPSULE, EXTENDED RELEASE ORAL at 11:13

## 2021-05-30 RX ADMIN — MORPHINE SULFATE 0.5 MILLIGRAM(S): 50 CAPSULE, EXTENDED RELEASE ORAL at 21:42

## 2021-05-30 RX ADMIN — MORPHINE SULFATE 2 MILLIGRAM(S): 50 CAPSULE, EXTENDED RELEASE ORAL at 12:36

## 2021-05-30 RX ADMIN — Medication 10 MILLIGRAM(S): at 11:11

## 2021-05-30 NOTE — H&P ADULT - HISTORY OF PRESENT ILLNESS
25 year old female PMH GERD, cyclic vomiting syndrome, cannabinoid hyperemesis syndrome presenting for 1 day of abdominal pain and vomting.  Denies  fevers. No diarrhea or constipation.      Was seen in ED 2 days ago for same presentation, normal workup. CT abdomen at that time was entirely normal. Patient reports she has been smoking marijuana since that eval.

## 2021-05-30 NOTE — H&P ADULT - NSHPLABSRESULTS_GEN_ALL_CORE
LABS:                        12.3   13.84 )-----------( 298      ( 30 May 2021 11:22 )             36.7     05-30    141  |  107  |  19  ----------------------------<  125<H>  3.5   |  23  |  0.89    Ca    9.2      30 May 2021 11:22    TPro  7.7  /  Alb  4.1  /  TBili  0.6  /  DBili  x   /  AST  28  /  ALT  47  /  AlkPhos  61  05-30            RADIOLOGY & ADDITIONAL TESTS:

## 2021-05-30 NOTE — H&P ADULT - NSHPPHYSICALEXAM_GEN_ALL_CORE
PHYSICAL EXAMINATION:  Vital Signs Last 24 Hrs  T(C): 36.6 (30 May 2021 15:19), Max: 36.8 (30 May 2021 10:18)  T(F): 97.9 (30 May 2021 15:19), Max: 98.2 (30 May 2021 10:18)  HR: 64 (30 May 2021 15:19) (64 - 79)  BP: 127/83 (30 May 2021 15:19) (127/83 - 152/105)  BP(mean): --  RR: 20 (30 May 2021 15:19) (20 - 25)  SpO2: 96% (30 May 2021 15:19) (96% - 96%)  CAPILLARY BLOOD GLUCOSE          GENERAL: NAD, well-groomed, well-developed  HEAD:  atraumatic, normocephalic  EYES: sclera anicteric  ENMT: mucous membranes moist  NECK: supple, No JVD  CHEST/LUNG: clear to auscultation bilaterally; no rales, rhonchi, or wheezing b/l  HEART: normal S1, S2  ABDOMEN: BS+, soft, ND, NT   EXTREMITIES:  pulses palpable; no clubbing, cyanosis, or edema b/l LEs  NEURO: awake, alert, interactive; moves all extremities  SKIN: no rashes or lesions PHYSICAL EXAMINATION:  Vital Signs Last 24 Hrs  T(C): 36.6 (30 May 2021 15:19), Max: 36.8 (30 May 2021 10:18)  T(F): 97.9 (30 May 2021 15:19), Max: 98.2 (30 May 2021 10:18)  HR: 64 (30 May 2021 15:19) (64 - 79)  BP: 127/83 (30 May 2021 15:19) (127/83 - 152/105)  BP(mean): --  RR: 20 (30 May 2021 15:19) (20 - 25)  SpO2: 96% (30 May 2021 15:19) (96% - 96%)  CAPILLARY BLOOD GLUCOSE          GENERAL: NAD, pleasant, mild abdominal pain, no fevers or vomiting  HEAD:  atraumatic, normocephalic  EYES: sclera anicteric  ENMT: mucous membranes moist  NECK: supple, No JVD  CHEST/LUNG: clear to auscultation bilaterally; no rales, rhonchi, or wheezing b/l  HEART: normal S1, S2  ABDOMEN: BS+, soft, ND, NT   EXTREMITIES:  pulses palpable; no clubbing, cyanosis, or edema b/l LEs  NEURO: awake, alert, interactive; moves all extremities  SKIN: no rashes or lesions

## 2021-05-30 NOTE — ED ADULT NURSE NOTE - OBJECTIVE STATEMENT
A&Ox4, ambulating. c/o generalized abd pain x2 days, aching/no radiation with vomiting and coughing up blood as per pt. pt was seen in [facility] two days ago, pt states diagnosis with hernia. pt denies fevers/chills/sob/cp.

## 2021-05-30 NOTE — H&P ADULT - ASSESSMENT
25 year old female PMH GERD, cyclic vomiting syndrome, cannabinoid hyperemesis syndrome presenting for 1 day of abdominal pain and vomting.  Denies  fevers. No diarrhea or constipation.   Was seen in ED 2 days ago for same presentation, normal workup. CT abdomen at that time was entirely normal. Patient reports she has been smoking marijuana since that eval.     GI:  25 year old female PMH GERD, cyclic vomiting syndrome, cannabinoid hyperemesis syndrome presenting for 1 day of abdominal pain and vomting.  Denies  fevers. No diarrhea or constipation.   Was seen in ED 2 days ago for same presentation, normal workup. CT abdomen at that time was entirely normal. Patient reports she has been smoking marijuana since that eval.     GI: Likely marijuana induced vomiting. Urine tox ordered for confirmation. Gradually advance diet. IVF LR at 100/h. Continue protonix 40 mg po daily at home   dose for GERD. Will not repeat CT of abdomen. as normal study on 5/28.     If tolerates po diet can be discharge home Monday afternoon.

## 2021-05-30 NOTE — ED PROVIDER NOTE - OBJECTIVE STATEMENT
25f pmhx gerd, cyclic vomiting syndrome, cannabinoid hyperemesis syndrome presenting for 1 day of abdo pain and vomting. no fevers. no diarrhea or constipation. was seen in ED 2 days ago for same presentation, normal workup. patient reports she has been smoking marijuana

## 2021-05-30 NOTE — ED PROVIDER NOTE - CLINICAL SUMMARY MEDICAL DECISION MAKING FREE TEXT BOX
patient well known to me. she is presenting with symptoms of her cyclic vomiting 2/2 cannabinoid hyperemesis syndrome and GERD. multimodal nausea and pain control attempted in ED. patient with normal CT 2 days ago. however patinet persistently nauseated and iwht abdo pain, unable to toelrate PO in ED. will require admission for hydration and symptom control. although normal CT 2 days ago, will obtain CT to evaluate for intrabdominal pathology as symptoms persistnetly not controlled in ED

## 2021-05-30 NOTE — ED PROVIDER NOTE - CARE PLAN
Principal Discharge DX:	Intractable cyclical vomiting  Secondary Diagnosis:	Intractable abdominal pain

## 2021-05-31 LAB
COVID-19 SPIKE DOMAIN AB INTERP: POSITIVE
COVID-19 SPIKE DOMAIN ANTIBODY RESULT: 128 U/ML — HIGH
SARS-COV-2 IGG+IGM SERPL QL IA: 128 U/ML — HIGH
SARS-COV-2 IGG+IGM SERPL QL IA: POSITIVE

## 2021-05-31 PROCEDURE — 99232 SBSQ HOSP IP/OBS MODERATE 35: CPT

## 2021-05-31 RX ORDER — ACETAMINOPHEN 500 MG
1000 TABLET ORAL ONCE
Refills: 0 | Status: COMPLETED | OUTPATIENT
Start: 2021-05-31 | End: 2021-05-31

## 2021-05-31 RX ADMIN — ONDANSETRON 4 MILLIGRAM(S): 8 TABLET, FILM COATED ORAL at 05:27

## 2021-05-31 RX ADMIN — ONDANSETRON 4 MILLIGRAM(S): 8 TABLET, FILM COATED ORAL at 17:33

## 2021-05-31 RX ADMIN — MORPHINE SULFATE 0.5 MILLIGRAM(S): 50 CAPSULE, EXTENDED RELEASE ORAL at 02:10

## 2021-05-31 RX ADMIN — MORPHINE SULFATE 0.5 MILLIGRAM(S): 50 CAPSULE, EXTENDED RELEASE ORAL at 17:33

## 2021-05-31 RX ADMIN — Medication 1000 MILLIGRAM(S): at 22:13

## 2021-05-31 RX ADMIN — MORPHINE SULFATE 0.5 MILLIGRAM(S): 50 CAPSULE, EXTENDED RELEASE ORAL at 17:50

## 2021-05-31 RX ADMIN — MORPHINE SULFATE 0.5 MILLIGRAM(S): 50 CAPSULE, EXTENDED RELEASE ORAL at 05:28

## 2021-05-31 RX ADMIN — MORPHINE SULFATE 0.5 MILLIGRAM(S): 50 CAPSULE, EXTENDED RELEASE ORAL at 01:44

## 2021-05-31 RX ADMIN — MORPHINE SULFATE 0.5 MILLIGRAM(S): 50 CAPSULE, EXTENDED RELEASE ORAL at 05:56

## 2021-05-31 RX ADMIN — Medication 400 MILLIGRAM(S): at 21:39

## 2021-05-31 RX ADMIN — PANTOPRAZOLE SODIUM 40 MILLIGRAM(S): 20 TABLET, DELAYED RELEASE ORAL at 05:28

## 2021-05-31 RX ADMIN — SODIUM CHLORIDE 100 MILLILITER(S): 9 INJECTION, SOLUTION INTRAVENOUS at 21:39

## 2021-06-01 LAB
ANION GAP SERPL CALC-SCNC: 11 MMOL/L — SIGNIFICANT CHANGE UP (ref 5–17)
BUN SERPL-MCNC: 14 MG/DL — SIGNIFICANT CHANGE UP (ref 7–23)
CALCIUM SERPL-MCNC: 8.5 MG/DL — SIGNIFICANT CHANGE UP (ref 8.5–10.1)
CHLORIDE SERPL-SCNC: 101 MMOL/L — SIGNIFICANT CHANGE UP (ref 96–108)
CO2 SERPL-SCNC: 25 MMOL/L — SIGNIFICANT CHANGE UP (ref 22–31)
CREAT SERPL-MCNC: 0.75 MG/DL — SIGNIFICANT CHANGE UP (ref 0.5–1.3)
GLUCOSE SERPL-MCNC: 89 MG/DL — SIGNIFICANT CHANGE UP (ref 70–99)
HCT VFR BLD CALC: 35.2 % — SIGNIFICANT CHANGE UP (ref 34.5–45)
HGB BLD-MCNC: 11.6 G/DL — SIGNIFICANT CHANGE UP (ref 11.5–15.5)
MAGNESIUM SERPL-MCNC: 2 MG/DL — SIGNIFICANT CHANGE UP (ref 1.6–2.6)
MCHC RBC-ENTMCNC: 26.7 PG — LOW (ref 27–34)
MCHC RBC-ENTMCNC: 33 GM/DL — SIGNIFICANT CHANGE UP (ref 32–36)
MCV RBC AUTO: 81.1 FL — SIGNIFICANT CHANGE UP (ref 80–100)
NRBC # BLD: 0 /100 WBCS — SIGNIFICANT CHANGE UP (ref 0–0)
PLATELET # BLD AUTO: 250 K/UL — SIGNIFICANT CHANGE UP (ref 150–400)
POTASSIUM SERPL-MCNC: 3.5 MMOL/L — SIGNIFICANT CHANGE UP (ref 3.5–5.3)
POTASSIUM SERPL-SCNC: 3.5 MMOL/L — SIGNIFICANT CHANGE UP (ref 3.5–5.3)
RBC # BLD: 4.34 M/UL — SIGNIFICANT CHANGE UP (ref 3.8–5.2)
RBC # FLD: 14.4 % — SIGNIFICANT CHANGE UP (ref 10.3–14.5)
SODIUM SERPL-SCNC: 137 MMOL/L — SIGNIFICANT CHANGE UP (ref 135–145)
WBC # BLD: 7.86 K/UL — SIGNIFICANT CHANGE UP (ref 3.8–10.5)
WBC # FLD AUTO: 7.86 K/UL — SIGNIFICANT CHANGE UP (ref 3.8–10.5)

## 2021-06-01 PROCEDURE — 99232 SBSQ HOSP IP/OBS MODERATE 35: CPT

## 2021-06-01 RX ORDER — DIPHENHYDRAMINE HYDROCHLORIDE AND LIDOCAINE HYDROCHLORIDE AND ALUMINUM HYDROXIDE AND MAGNESIUM HYDRO
15 KIT EVERY 6 HOURS
Refills: 0 | Status: DISCONTINUED | OUTPATIENT
Start: 2021-06-01 | End: 2021-06-03

## 2021-06-01 RX ORDER — DIPHENHYDRAMINE HYDROCHLORIDE AND LIDOCAINE HYDROCHLORIDE AND ALUMINUM HYDROXIDE AND MAGNESIUM HYDRO
30 KIT EVERY 6 HOURS
Refills: 0 | Status: DISCONTINUED | OUTPATIENT
Start: 2021-06-01 | End: 2021-06-01

## 2021-06-01 RX ORDER — KETOROLAC TROMETHAMINE 30 MG/ML
15 SYRINGE (ML) INJECTION EVERY 8 HOURS
Refills: 0 | Status: DISCONTINUED | OUTPATIENT
Start: 2021-06-01 | End: 2021-06-03

## 2021-06-01 RX ORDER — KETOROLAC TROMETHAMINE 30 MG/ML
15 SYRINGE (ML) INJECTION ONCE
Refills: 0 | Status: DISCONTINUED | OUTPATIENT
Start: 2021-06-01 | End: 2021-06-01

## 2021-06-01 RX ADMIN — DIPHENHYDRAMINE HYDROCHLORIDE AND LIDOCAINE HYDROCHLORIDE AND ALUMINUM HYDROXIDE AND MAGNESIUM HYDRO 15 MILLILITER(S): KIT at 17:41

## 2021-06-01 RX ADMIN — Medication 15 MILLIGRAM(S): at 20:37

## 2021-06-01 RX ADMIN — Medication 15 MILLIGRAM(S): at 09:54

## 2021-06-01 RX ADMIN — Medication 15 MILLIGRAM(S): at 10:24

## 2021-06-01 RX ADMIN — Medication 15 MILLIGRAM(S): at 15:02

## 2021-06-01 RX ADMIN — Medication 15 MILLIGRAM(S): at 14:48

## 2021-06-01 RX ADMIN — Medication 15 MILLIGRAM(S): at 21:12

## 2021-06-01 RX ADMIN — ONDANSETRON 4 MILLIGRAM(S): 8 TABLET, FILM COATED ORAL at 05:49

## 2021-06-01 RX ADMIN — SODIUM CHLORIDE 100 MILLILITER(S): 9 INJECTION, SOLUTION INTRAVENOUS at 09:54

## 2021-06-01 RX ADMIN — SODIUM CHLORIDE 100 MILLILITER(S): 9 INJECTION, SOLUTION INTRAVENOUS at 20:36

## 2021-06-01 RX ADMIN — DIPHENHYDRAMINE HYDROCHLORIDE AND LIDOCAINE HYDROCHLORIDE AND ALUMINUM HYDROXIDE AND MAGNESIUM HYDRO 15 MILLILITER(S): KIT at 23:49

## 2021-06-02 PROCEDURE — 99232 SBSQ HOSP IP/OBS MODERATE 35: CPT

## 2021-06-02 RX ORDER — PANTOPRAZOLE SODIUM 20 MG/1
40 TABLET, DELAYED RELEASE ORAL ONCE
Refills: 0 | Status: COMPLETED | OUTPATIENT
Start: 2021-06-02 | End: 2021-06-02

## 2021-06-02 RX ORDER — KETOROLAC TROMETHAMINE 30 MG/ML
15 SYRINGE (ML) INJECTION ONCE
Refills: 0 | Status: DISCONTINUED | OUTPATIENT
Start: 2021-06-02 | End: 2021-06-02

## 2021-06-02 RX ORDER — LIDOCAINE 4 G/100G
2 CREAM TOPICAL DAILY
Refills: 0 | Status: DISCONTINUED | OUTPATIENT
Start: 2021-06-02 | End: 2021-06-03

## 2021-06-02 RX ADMIN — Medication 15 MILLIGRAM(S): at 21:39

## 2021-06-02 RX ADMIN — DIPHENHYDRAMINE HYDROCHLORIDE AND LIDOCAINE HYDROCHLORIDE AND ALUMINUM HYDROXIDE AND MAGNESIUM HYDRO 15 MILLILITER(S): KIT at 08:56

## 2021-06-02 RX ADMIN — Medication 15 MILLIGRAM(S): at 18:58

## 2021-06-02 RX ADMIN — DIPHENHYDRAMINE HYDROCHLORIDE AND LIDOCAINE HYDROCHLORIDE AND ALUMINUM HYDROXIDE AND MAGNESIUM HYDRO 15 MILLILITER(S): KIT at 17:02

## 2021-06-02 RX ADMIN — Medication 15 MILLIGRAM(S): at 21:59

## 2021-06-02 RX ADMIN — LIDOCAINE 2 PATCH: 4 CREAM TOPICAL at 22:19

## 2021-06-02 RX ADMIN — PANTOPRAZOLE SODIUM 40 MILLIGRAM(S): 20 TABLET, DELAYED RELEASE ORAL at 22:19

## 2021-06-02 RX ADMIN — ONDANSETRON 4 MILLIGRAM(S): 8 TABLET, FILM COATED ORAL at 19:29

## 2021-06-02 RX ADMIN — Medication 15 MILLIGRAM(S): at 11:23

## 2021-06-02 RX ADMIN — Medication 15 MILLIGRAM(S): at 18:35

## 2021-06-02 RX ADMIN — Medication 15 MILLIGRAM(S): at 11:50

## 2021-06-02 NOTE — PROGRESS NOTE ADULT - ASSESSMENT
25 year old female, frequent visitor for cannabinoid hyperemesis, GERD, presenting for 1 day of abdominal pain and vomiting. Denies fevers. No diarrhea or constipation. Was seen in ED 2 days ago for same presentation, normal workup. CT abdomen at that time was entirely normal. Patient reports she has been smoking marijuana since that eval.     Recurring vomiting   Numerous admissions for same, but pt continues to smoke marijuana   Urine tox positive for THC; also positive for opiates   Will not repeat CT of abdomen. as normal study on 5/28.    IVF, antiemetics, protonix until able to tolerate po diet   Continue protonix post discharge    
25 year old female, frequent visitor for cannabinoid hyperemesis, GERD, presenting for 1 day of abdominal pain and vomiting. Denies fevers. No diarrhea or constipation. Was seen in ED 2 days ago for same presentation, normal workup. CT abdomen at that time was entirely normal. Patient reports she has been smoking marijuana since that eval.     Recurring vomiting   Numerous admissions for same, but pt continues to smoke marijuana   Urine tox positive for THC; also positive for opiates   Will not repeat CT of abdomen. as normal study on 5/28.    IVF, antiemetics, protonix until able to tolerate po diet   Continue protonix post discharge   Pt thinks this is due to her menses, not due to marijuana   Pt should follow up w/ her gynecologist (Dr. Sri Tavares) to look into other suppression options    GERD and possibly a Sheree-Arora tear    Continue Protonix    Trial of Magic mouthwash (did help, so will continue)    THC abuse and possible opiate abuse    Discussed; pt not convinced there's a problem    Disp: home soon, onc able to tolerate po diet; probably  6/3
25 year old female, frequent visitor for cannabinoid hyperemesis, GERD, presenting for 1 day of abdominal pain and vomiting. Denies fevers. No diarrhea or constipation. Was seen in ED 2 days ago for same presentation, normal workup. CT abdomen at that time was entirely normal. Patient reports she has been smoking marijuana since that eval.     Recurring vomiting   Numerous admissions for same, but pt continues to smoke marijuana   Urine tox positive for THC; also positive for opiates   Will not repeat CT of abdomen. as normal study on 5/28.    IVF, antiemetics, protonix until able to tolerate po diet   Continue protonix post discharge   Pt thinks this is due to her menses, not due to marijuana   Pt should follow up w/ her gynecologist (Dr. Sri Tavares) to look into other suppression options    GERD    Continue Protonix    Trial of Magic mouthwash    THC abuse and possible opiate abuse    Discussed; pt not convinced there's a problem    Disp: home soon, onc able to tolerate po diet; probably 6/2 or 6/3

## 2021-06-02 NOTE — PROGRESS NOTE ADULT - SUBJECTIVE AND OBJECTIVE BOX
Patient is a 25y old  Female who presents with a chief complaint of Cyclic nausea and vomiting. (31 May 2021 14:35)      INTERVAL HPI/OVERNIGHT EVENTS:    Feels about the same. had one episode of vomiting this AM; c/o burning pain in mid upper abdomen; "feels like reflux". Notes that she thinks her symptoms are gynecologic in origin, rather than due to Marijuana; sxs often occur just before and for the first day or two of her menses. Has been tried on OCP's but they didn't help.     REVIEW OF SYSTEMS:   Remaining ROS negative    MEDICATIONS  (STANDING):  lactated ringers. 1000 milliLiter(s) (100 mL/Hr) IV Continuous <Continuous>  pantoprazole    Tablet 40 milliGRAM(s) Oral before breakfast    MEDICATIONS  (PRN):  acetaminophen   Tablet .. 500 milliGRAM(s) Oral every 6 hours PRN Moderate Pain (4 - 6)  FIRST- Mouthwash  BLM 15 milliLiter(s) Swish and Swallow every 6 hours PRN abdominal pain  ketorolac   Injectable 15 milliGRAM(s) IV Push every 8 hours PRN Severe Pain (7 - 10)  ondansetron Injectable 4 milliGRAM(s) IV Push every 6 hours PRN Nausea and/or Vomiting      Allergies    penicillin (Hives)    Intolerances        Vital Signs Last 24 Hrs  T(C): 36.7 (01 Jun 2021 05:12), Max: 36.7 (31 May 2021 18:04)  T(F): 98.1 (01 Jun 2021 05:12), Max: 98.1 (31 May 2021 23:37)  HR: 70 (01 Jun 2021 05:12) (64 - 79)  BP: 141/95 (01 Jun 2021 05:12) (141/95 - 164/95)  BP(mean): --  RR: 18 (01 Jun 2021 05:12) (18 - 18)  SpO2: 100% (01 Jun 2021 05:12) (95% - 100%)    PHYSICAL EXAM:  GENERAL: NAD  HEAD:  Atraumatic, Normocephalic  EYES: EOMI, PERRLA, conjunctiva and sclera clear  ENT: O/P Clear  NECK: Supple, No JVD  NERVOUS SYSTEM:  No focal deficits  CHEST/LUNG: Clear to percussion bilaterally; No rales, rhonchi, wheezing  HEART: Regular rate and rhythm; No murmurs, rubs, or gallops  ABDOMEN: Soft, Nontender, Nondistended; Bowel sounds present  EXTREMITIES:  2+ Peripheral Pulses, No clubbing, cyanosis, or edema  SKIN: No rashes or lesions    LABS:                        11.6   7.86  )-----------( 250      ( 01 Jun 2021 11:50 )             35.2     06-01    137  |  101  |  14  ----------------------------<  89  3.5   |  25  |  0.75    Ca    8.5      01 Jun 2021 11:50  Mg     2.0     06-01          CAPILLARY BLOOD GLUCOSE          RADIOLOGY & ADDITIONAL TESTS:    Imaging Personally Reviewed:  [ ] YES  [ ] NO    Consultant(s) Notes Reviewed:  [ ] YES  [ ] NO    Care Discussed with Consultants/Other Providers [ ] YES  [ ] NO
Patient is a 25y old  Female who presents with a chief complaint of Cyclic nausea and vomiting. (01 Jun 2021 16:51)      INTERVAL HPI/OVERNIGHT EVENTS:    Still getting intermittent vomiting; not taking any po fluids or solids; still has burning pain in epigastrium discussed possibilties of GERD vs. Sheree-Arora tear.    REVIEW OF SYSTEMS:   Remaining ROS negative    MEDICATIONS  (STANDING):  lactated ringers. 1000 milliLiter(s) (100 mL/Hr) IV Continuous <Continuous>  pantoprazole    Tablet 40 milliGRAM(s) Oral before breakfast    MEDICATIONS  (PRN):  acetaminophen   Tablet .. 500 milliGRAM(s) Oral every 6 hours PRN Moderate Pain (4 - 6)  FIRST- Mouthwash  BLM 15 milliLiter(s) Swish and Swallow every 6 hours PRN abdominal pain  ketorolac   Injectable 15 milliGRAM(s) IV Push every 8 hours PRN Severe Pain (7 - 10)  ondansetron Injectable 4 milliGRAM(s) IV Push every 6 hours PRN Nausea and/or Vomiting      Allergies    penicillin (Hives)    Intolerances        Vital Signs Last 24 Hrs  T(C): 36.8 (02 Jun 2021 10:53), Max: 36.8 (02 Jun 2021 00:15)  T(F): 98.2 (02 Jun 2021 10:53), Max: 98.2 (02 Jun 2021 00:15)  HR: 70 (02 Jun 2021 10:53) (70 - 81)  BP: 142/89 (02 Jun 2021 10:53) (142/89 - 148/100)  BP(mean): --  RR: 18 (02 Jun 2021 10:53) (18 - 18)  SpO2: 93% (02 Jun 2021 10:53) (93% - 97%)    PHYSICAL EXAM:  GENERAL: NAD  HEAD:  Atraumatic, Normocephalic  EYES: EOMI, PERRLA, conjunctiva and sclera clear  ENT: O/P Clear  NECK: Supple, No JVD  NERVOUS SYSTEM:  No focal deficits  CHEST/LUNG: Clear to percussion bilaterally; No rales, rhonchi, wheezing  HEART: Regular rate and rhythm; No murmurs, rubs, or gallops  ABDOMEN: Soft, Nontender, Nondistended; Bowel sounds present  EXTREMITIES:  2+ Peripheral Pulses, No clubbing, cyanosis, or edema  SKIN: No rashes or lesions    LABS:                        11.6   7.86  )-----------( 250      ( 01 Jun 2021 11:50 )             35.2     06-01    137  |  101  |  14  ----------------------------<  89  3.5   |  25  |  0.75    Ca    8.5      01 Jun 2021 11:50  Mg     2.0     06-01          CAPILLARY BLOOD GLUCOSE          RADIOLOGY & ADDITIONAL TESTS:    Imaging Personally Reviewed:  [ ] YES  [ ] NO    Consultant(s) Notes Reviewed:  [ ] YES  [ ] NO    Care Discussed with Consultants/Other Providers [ ] YES  [ ] NO
Patient is a 25y old  Female who presents with a chief complaint of Cyclic nausea and vomiting. (30 May 2021 17:32)      INTERVAL HPI/OVERNIGHT EVENTS:    Not able to eat much.     REVIEW OF SYSTEMS:   Remaining ROS negative    MEDICATIONS  (STANDING):  lactated ringers. 1000 milliLiter(s) (100 mL/Hr) IV Continuous <Continuous>  pantoprazole    Tablet 40 milliGRAM(s) Oral before breakfast    MEDICATIONS  (PRN):  acetaminophen   Tablet .. 500 milliGRAM(s) Oral every 6 hours PRN Moderate Pain (4 - 6)  morphine  - Injectable 0.5 milliGRAM(s) IV Push every 4 hours PRN Severe Pain (7 - 10)  ondansetron Injectable 4 milliGRAM(s) IV Push every 6 hours PRN Nausea and/or Vomiting      Allergies    penicillin (Hives)    Intolerances        Vital Signs Last 24 Hrs  T(C): 36.6 (31 May 2021 11:14), Max: 36.8 (30 May 2021 19:23)  T(F): 97.8 (31 May 2021 11:14), Max: 98.3 (30 May 2021 19:23)  HR: 72 (31 May 2021 11:14) (64 - 72)  BP: 124/80 (31 May 2021 11:14) (124/80 - 163/92)  BP(mean): --  RR: 18 (31 May 2021 11:14) (16 - 20)  SpO2: 98% (31 May 2021 11:14) (96% - 100%)    PHYSICAL EXAM:  GENERAL: NAD  HEAD:  Atraumatic, Normocephalic  EYES: EOMI, PERRLA, conjunctiva and sclera clear  ENT: O/P Clear  NECK: Supple, No JVD  NERVOUS SYSTEM:  No focal deficits  CHEST/LUNG: Clear to percussion bilaterally; No rales, rhonchi, wheezing  HEART: Regular rate and rhythm; No murmurs, rubs, or gallops  ABDOMEN: Soft, Nontender, Nondistended; Bowel sounds present  EXTREMITIES:  2+ Peripheral Pulses, No clubbing, cyanosis, or edema  SKIN: No rashes or lesions    LABS:                        12.3   13.84 )-----------( 298      ( 30 May 2021 11:22 )             36.7     05-30    141  |  107  |  19  ----------------------------<  125<H>  3.5   |  23  |  0.89    Ca    9.2      30 May 2021 11:22    TPro  7.7  /  Alb  4.1  /  TBili  0.6  /  DBili  x   /  AST  28  /  ALT  47  /  AlkPhos  61  05-30        CAPILLARY BLOOD GLUCOSE          RADIOLOGY & ADDITIONAL TESTS:    Imaging Personally Reviewed:  [ ] YES  [ ] NO    Consultant(s) Notes Reviewed:  [ ] YES  [ ] NO    Care Discussed with Consultants/Other Providers [ ] YES  [ ] NO

## 2021-06-03 ENCOUNTER — TRANSCRIPTION ENCOUNTER (OUTPATIENT)
Age: 26
End: 2021-06-03

## 2021-06-03 VITALS
HEART RATE: 72 BPM | TEMPERATURE: 98 F | SYSTOLIC BLOOD PRESSURE: 152 MMHG | DIASTOLIC BLOOD PRESSURE: 100 MMHG | OXYGEN SATURATION: 100 % | RESPIRATION RATE: 18 BRPM

## 2021-06-03 LAB
ANION GAP SERPL CALC-SCNC: 11 MMOL/L — SIGNIFICANT CHANGE UP (ref 5–17)
BUN SERPL-MCNC: 10 MG/DL — SIGNIFICANT CHANGE UP (ref 7–23)
CALCIUM SERPL-MCNC: 8.6 MG/DL — SIGNIFICANT CHANGE UP (ref 8.5–10.1)
CHLORIDE SERPL-SCNC: 95 MMOL/L — LOW (ref 96–108)
CO2 SERPL-SCNC: 25 MMOL/L — SIGNIFICANT CHANGE UP (ref 22–31)
CREAT SERPL-MCNC: 0.68 MG/DL — SIGNIFICANT CHANGE UP (ref 0.5–1.3)
GLUCOSE SERPL-MCNC: 73 MG/DL — SIGNIFICANT CHANGE UP (ref 70–99)
MAGNESIUM SERPL-MCNC: 2.1 MG/DL — SIGNIFICANT CHANGE UP (ref 1.6–2.6)
PHOSPHATE SERPL-MCNC: 3.1 MG/DL — SIGNIFICANT CHANGE UP (ref 2.5–4.5)
POTASSIUM SERPL-MCNC: 3.4 MMOL/L — LOW (ref 3.5–5.3)
POTASSIUM SERPL-SCNC: 3.4 MMOL/L — LOW (ref 3.5–5.3)
SODIUM SERPL-SCNC: 131 MMOL/L — LOW (ref 135–145)

## 2021-06-03 PROCEDURE — 99238 HOSP IP/OBS DSCHRG MGMT 30/<: CPT

## 2021-06-03 RX ORDER — POTASSIUM CHLORIDE 20 MEQ
20 PACKET (EA) ORAL ONCE
Refills: 0 | Status: COMPLETED | OUTPATIENT
Start: 2021-06-03 | End: 2021-06-03

## 2021-06-03 RX ORDER — PANTOPRAZOLE SODIUM 20 MG/1
40 TABLET, DELAYED RELEASE ORAL ONCE
Refills: 0 | Status: COMPLETED | OUTPATIENT
Start: 2021-06-03 | End: 2021-06-03

## 2021-06-03 RX ORDER — ONDANSETRON 8 MG/1
1 TABLET, FILM COATED ORAL
Qty: 20 | Refills: 0
Start: 2021-06-03

## 2021-06-03 RX ADMIN — LIDOCAINE 2 PATCH: 4 CREAM TOPICAL at 07:35

## 2021-06-03 RX ADMIN — DIPHENHYDRAMINE HYDROCHLORIDE AND LIDOCAINE HYDROCHLORIDE AND ALUMINUM HYDROXIDE AND MAGNESIUM HYDRO 15 MILLILITER(S): KIT at 07:33

## 2021-06-03 RX ADMIN — Medication 15 MILLIGRAM(S): at 07:35

## 2021-06-03 RX ADMIN — Medication 15 MILLIGRAM(S): at 15:27

## 2021-06-03 RX ADMIN — Medication 15 MILLIGRAM(S): at 14:03

## 2021-06-03 RX ADMIN — PANTOPRAZOLE SODIUM 40 MILLIGRAM(S): 20 TABLET, DELAYED RELEASE ORAL at 05:45

## 2021-06-03 RX ADMIN — Medication 50 MILLIEQUIVALENT(S): at 09:31

## 2021-06-03 RX ADMIN — Medication 15 MILLIGRAM(S): at 05:40

## 2021-06-03 NOTE — DISCHARGE NOTE PROVIDER - NSDCCPCAREPLAN_GEN_ALL_CORE_FT
PRINCIPAL DISCHARGE DIAGNOSIS  Diagnosis: Intractable cyclical vomiting  Assessment and Plan of Treatment:       SECONDARY DISCHARGE DIAGNOSES  Diagnosis: Intractable abdominal pain  Assessment and Plan of Treatment:

## 2021-06-03 NOTE — DISCHARGE NOTE PROVIDER - NSDCMRMEDTOKEN_GEN_ALL_CORE_FT
Medical Note: Ms. Hilliard was hospitalized from 5/30 - 6/3/21; please excuse the absence from work; she is cleared to return to work without restrictions.  omeprazole 40 mg oral delayed release capsule: 1 cap(s) orally once a day  ondansetron 4 mg oral disintegrating strip: 1 each orally 3 times a day, As Needed -for nausea

## 2021-06-03 NOTE — DISCHARGE NOTE PROVIDER - CARE PROVIDER_API CALL
Sri Tavares)  Obstetrics and Gynecology  [address] [place] [address]  Phone: [phone]  Fax: [phone]  Follow Up Time: 1 week

## 2021-06-03 NOTE — DISCHARGE NOTE PROVIDER - HOSPITAL COURSE
25 year old female, frequent visitor for cannabinoid hyperemesis, GERD, presenting for 1 day of abdominal pain and vomiting. Denies fevers. No diarrhea or constipation. Was seen in ED 2 days ago for same presentation, normal workup. CT abdomen at that time was entirely normal. Patient reports she has been smoking marijuana since that eval.     Recurring vomiting   Numerous admissions for same, but pt continues to smoke marijuana   Urine tox positive for THC; also positive for opiates   Will not repeat CT of abdomen. as normal study on 5/28.    IVF, antiemetics, protonix until able to tolerate po diet   Currently doing much better; no vomiting today; did well with lunch; has vigorous appetite   Continue protonix post discharge; will also provide prescription for prn Zofran ODT   Pt thinks this is due to her menses, not due to marijuana   Pt should follow up w/ her gynecologist (Dr. Sri Tavares) to look into other suppression options    GERD and possibly a Sheree-Arora tear    Continue Protonix - revert to Omeprazole at discharge    Trial of Magic mouthwash (did help, so will continue while in-house)    THC abuse and possible opiate abuse    Discussed; pt not convinced there's a problem

## 2021-06-03 NOTE — DISCHARGE NOTE PROVIDER - REASON FOR ADMISSION
Telephone Encounter by Diana Cooley at 07/18/18 01:50 PM     Author:  Diana Cooley Service:  (none) Author Type:  Patient      Filed:  07/18/18 01:50 PM Encounter Date:  7/16/2018 Status:  Signed     :  Diana Cooley (Patient )            Patient returned call.   Call transferred to clinical staff member.[SW1.1T]         Revision History        User Key Date/Time User Provider Type Action    > SW1.1 07/18/18 01:50 PM Diana Cooley Patient  Sign    T - Template             Cyclic nausea and vomiting.

## 2021-06-03 NOTE — DISCHARGE NOTE NURSING/CASE MANAGEMENT/SOCIAL WORK - PATIENT PORTAL LINK FT
You can access the FollowMyHealth Patient Portal offered by [facility] by registering at the following website: [email or web address]. By joining [employer]’s FollowMyHealth portal, you will also be able to view your health information using other applications (apps) compatible with our system.

## 2021-06-15 DIAGNOSIS — K22.6 GASTRO-ESOPHAGEAL LACERATION-HEMORRHAGE SYNDROME: ICD-10-CM

## 2021-06-15 DIAGNOSIS — R11.15 CYCLICAL VOMITING SYNDROME UNRELATED TO MIGRAINE: ICD-10-CM

## 2021-06-15 DIAGNOSIS — K21.9 GASTRO-ESOPHAGEAL REFLUX DISEASE WITHOUT ESOPHAGITIS: ICD-10-CM

## 2021-06-15 DIAGNOSIS — R10.9 UNSPECIFIED ABDOMINAL PAIN: ICD-10-CM

## 2021-06-15 DIAGNOSIS — F12.10 CANNABIS ABUSE, UNCOMPLICATED: ICD-10-CM

## 2021-06-15 DIAGNOSIS — F11.10 OPIOID ABUSE, UNCOMPLICATED: ICD-10-CM

## 2021-07-29 NOTE — DISCHARGE NOTE ADULT - NSTOBACCOREFERRAL_GEN_A_CS
Patient declined information Glycopyrrolate Pregnancy And Lactation Text: This medication is Pregnancy Category B and is considered safe during pregnancy. It is unknown if it is excreted breast milk.

## 2021-08-02 NOTE — ED ADULT NURSE REASSESSMENT NOTE - NSIMPLEMENTINTERV_GEN_ALL_ED
Implemented All Universal Safety Interventions:  [place] to call system. Call bell, personal items and telephone within reach. Instruct patient to call for assistance. Room bathroom lighting operational. Non-slip footwear when patient is off stretcher. Physically safe environment: no spills, clutter or unnecessary equipment. Stretcher in lowest position, wheels locked, appropriate side rails in place. [Initial Evaluation] : an initial evaluation [Patient] : patient [Mother] : mother [Medical Records] : medical records

## 2021-08-22 NOTE — ED PROVIDER NOTE - NS ED ATTENDING STATEMENT MOD
I have personally performed a face to face diagnostic evaluation on this patient. I have reviewed the ACP note and agree with the history, exam and plan of care, except as noted. this is a 49 y.o with a PMHx of DM not on any medication, presented to the ED complaining of having bilateral hand and foot pain for the past several months, Patient states that the pain is intermittent, she went to her PCP whom gave her gabapentin ibuprofen which helps with her pain. Patient states that she hasn't had any recent falls or trauma denies haivng any numbness or tingling. She denies having any N/V/D CP, SOB, FOSTER, headaches, or dizziness. Pt has been eating and drinking without difficulties.

## 2021-12-22 NOTE — ED ADULT NURSE NOTE - NSIMPLEMENTINTERV_GEN_ALL_ED
SHIFT SUMMARY
72 YR F ADMITTED ON 12/20/21 FOR ACUTE ONSET OF CHEST PAIN. FULL CODE.
tROPONIN LEVEL WAS ELEVATED BUT IS NOW TRENDING DOWNWARD. NPO AT MIDNIGHT FOR
STRESS TEST TODAY. TELE: UGO AT 49. HOPEFUL TO DC HOME FOLLOWING STRESS
TEST. Implemented All Universal Safety Interventions:  [place] to call system. Call bell, personal items and telephone within reach. Instruct patient to call for assistance. Room bathroom lighting operational. Non-slip footwear when patient is off stretcher. Physically safe environment: no spills, clutter or unnecessary equipment. Stretcher in lowest position, wheels locked, appropriate side rails in place.

## 2022-02-23 NOTE — PROGRESS NOTE ADULT - PROBLEM SELECTOR PLAN 5
LUNG TRANSPLANT RECIPIENT FOLLOW-UP    Reason for Visit: Follow-up after lung transplantation.                                                                                                         Date of Transplant: 3/18/21                                                                               Reason for Transplant: IPF                                                                               Type of Transplant: bilateral sequential lung                                                                               CMV Status: D- / R+     Hepatitis C Status:  Donor Ab:(+)  Donor EFRAIN:(+)  Recipient serostatus:(+)  Treatment status: Awaiting Treatment                                                                              Major Complications:     1. Hemothorax with return to OR  2. Prolonged vent weaning requiring trach/PEG  3. Afib  4. Gastric fistula with partial gastrectomy  5. Expected HCV infection  6. Severe gastroparesis s/p J-tube placement and tube feedings  7. Fungemia  8. THUY requiring HD  9. 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 year old male who is on 0L of oxygen. He is on no assisted ventilation.  His New York Heart Association Class is I and a Karnofsky score of 100% - Normal, No Complaints, no evidence of disease. He is not diabetic.     Patient presents today for routine follow up. He states he is doing well and denies respiratory complaints today. Reports no issues with medications.  Follows closely with GI for his gastroparesis. Compliant with his medications and tolerating them well. Still has J-tube is conducting night time tube feeds, but does not feel as if he still needs them.  Has good oral intake.      Review of Systems   Constitutional: Negative for chills, diaphoresis, fever, malaise/fatigue and weight loss.   HENT:  "Negative for congestion, ear discharge, ear pain, hearing loss, nosebleeds, sinus pain, sore throat and tinnitus.    Eyes: Negative for blurred vision, double vision, photophobia, pain, discharge and redness.   Respiratory: Negative for cough, hemoptysis, sputum production, shortness of breath, wheezing and stridor.    Cardiovascular: Negative for chest pain, palpitations, orthopnea, claudication, leg swelling and PND.   Gastrointestinal: Negative for abdominal pain, blood in stool, constipation, diarrhea, heartburn, melena, nausea and vomiting.   Genitourinary: Negative for dysuria, flank pain, frequency, hematuria and urgency.   Musculoskeletal: Negative for back pain, falls, joint pain, myalgias and neck pain.   Skin: Negative for itching and rash.   Neurological: Negative for dizziness, tingling, tremors, sensory change, speech change, focal weakness, seizures, loss of consciousness, weakness and headaches.   Endo/Heme/Allergies: Negative for environmental allergies and polydipsia. Does not bruise/bleed easily.   Psychiatric/Behavioral: Negative for depression, hallucinations, memory loss, substance abuse and suicidal ideas. The patient is not nervous/anxious and does not have insomnia.      /85   Pulse (!) 111   Temp 97.7 °F (36.5 °C) (Oral)   Resp 20   Ht 5' 8" (1.727 m)   Wt 81 kg (178 lb 9.2 oz)   SpO2 100%   BMI 27.15 kg/m²       Physical Exam  Vitals reviewed.   Constitutional:       General: He is not in acute distress.     Appearance: Normal appearance. He is normal weight. He is not ill-appearing, toxic-appearing or diaphoretic.   HENT:      Head: Normocephalic and atraumatic.      Nose: No congestion.   Eyes:      Extraocular Movements: Extraocular movements intact.      Conjunctiva/sclera: Conjunctivae normal.   Cardiovascular:      Rate and Rhythm: Normal rate and regular rhythm.      Pulses: Normal pulses.      Heart sounds: Normal heart sounds. No murmur heard.    No friction rub. No " gallop.   Pulmonary:      Effort: Pulmonary effort is normal. No respiratory distress.      Breath sounds: Normal breath sounds. No stridor. No wheezing, rhonchi or rales.   Abdominal:      General: Abdomen is flat.      Palpations: Abdomen is soft.      Comments: J tube in place- dressing c/d/i.  No redness.     Musculoskeletal:         General: No deformity. Normal range of motion.      Cervical back: Normal range of motion and neck supple.   Skin:     General: Skin is warm and dry.      Coloration: Skin is not jaundiced or pale.   Neurological:      General: No focal deficit present.      Mental Status: He is alert. Mental status is at baseline.   Psychiatric:         Mood and Affect: Mood normal.         Behavior: Behavior normal.         Thought Content: Thought content normal.         Judgment: Judgment normal.       Labs:  cbc, cmp, tacrolimus Latest Ref Rng & Units 1/24/2022 2/7/2022 2/23/2022   TACROLIMUS LVL 5.0 - 15.0 ng/mL 6.3 - -   WHITE BLOOD CELL COUNT 3.90 - 12.70 K/uL 4.54 - 4.51   RBC 4.60 - 6.20 M/uL 3.57(L) - 3.76(L)   HEMOGLOBIN 14.0 - 18.0 g/dL 10.2(L) - 11.0(L)   HEMATOCRIT 40.0 - 54.0 % 31.2(L) - 34.3(L)   MCV 82 - 98 fL 87 - 91   MCH 27.0 - 31.0 pg 28.6 - 29.3   MCHC 32.0 - 36.0 g/dL 32.7 - 32.1   RDW 11.5 - 14.5 % 14.1 - 14.6(H)   PLATELETS 150 - 450 K/uL 210 - 207   MPV 9.2 - 12.9 fL 10.8 - 11.8   GRAN # 1.8 - 7.7 K/uL 2.2 - -   LYMPH # 1.0 - 4.8 K/uL 1.6 - -   MONO # 0.3 - 1.0 K/uL 0.5 - -   EOSINOPHIL% 0.0 - 8.0 % 0.7 - -   BASOPHIL% 0.0 - 1.9 % 0.4 - -   DIFFERENTIAL METHOD - Automated - -   SODIUM 136 - 145 mmol/L 138 - 138   POTASSIUM 3.5 - 5.1 mmol/L 4.6 - 4.5   CHLORIDE 95 - 110 mmol/L 111(H) - 110   CO2 23 - 29 mmol/L 18(L) - 21(L)   GLUCOSE 70 - 110 mg/dL 100 - 98   BUN BLD 6 - 20 mg/dL 30(H) - 28(H)   CREATININE 0.5 - 1.4 mg/dL 2.7(H) - 2.4(H)   CALCIUM 8.7 - 10.5 mg/dL 9.5 - 9.8   PROTEIN TOTAL 6.0 - 8.4 g/dL 6.7 6.5 7.1   ALBUMIN 3.5 - 5.2 g/dL 3.9 3.5 3.6   BILIRUBIN TOTAL 0.1  - 1.0 mg/dL 0.3 0.1 0.2   ALK PHOS 55 - 135 U/L 71 67 74   AST 10 - 40 U/L 20 20 21   ALT 10 - 44 U/L 14 15 13   ANION GAP 8 - 16 mmol/L 9 - 7(L)   EGFR IF AFRICAN AMERICAN >60 mL/min/1.73 m:2 29.8(A) - 34.3(A)   EGFR IF NON-AFRICAN AMERICAN >60 mL/min/1.73 m:2 25.7(A) - 29.7(A)     Pulmonary Function Tests 2/23/2022 1/24/2022 12/13/2021 11/15/2021 10/13/2021 9/8/2021 8/4/2021   FVC 3.09 3.04 2.95 2.86 2.79 2.71 2.43   FEV1 2.65 2.55 2.48 2.46 2.34 2.28 2.06   FVC% 80.6 79.2 76.9 74.5 72.5 70.4 63.1   FEV1% 86.9 83.7 81.4 80.4 76.6 74.6 67.4   FEF 25-75 3.17 2.95 2.88 3.17 2.75 2.82 2.53   FEF 25-75% 113.5 105.5 103 113 97.8 100.4 89.8       Imaging:  Results for orders placed during the hospital encounter of 02/23/22    X-Ray Chest PA And Lateral    Narrative  EXAMINATION:  XR CHEST PA AND LATERAL    CLINICAL HISTORY:  BSLT 3/18/2021;  Lung transplant status    FINDINGS:  Chest two views: Heart size is normal.  Left lung is clear.  There is right diaphragm elevation and right perihilar and right basal linear scar.  There is no worrisome change.  Bones showed DJD.    Impression  No acute process or complication seen.      Electronically signed by: Valentino Coker MD  Date:    02/23/2022  Time:    08:10          Assessment:  1. Encounter for aftercare following lung transplant    2. Lung transplant status, bilateral    3. Immunosuppression    4. Prophylactic antibiotic    5. CKD (chronic kidney disease), stage IV    6. Gastroparesis    7. Heparin induced thrombocytopenia    8. Status post insertion of percutaneous endoscopic gastrostomy (PEG) tube    9. Antibody mediated rejection of lung transplant         Plan:     1. FEV1 continues to improve s/p AMR treatment. CXR without acute changes. Doing well from respiratory standpoint and no concerns for new graft dysfunction. Continue to monitor imaging and spirometry at routine visits. Continues to have persistent DSAs, but clinically doing well. Will review dd-cfDNA and  DSAs per protocol.  Continue AMA outpatient infusions per protocol.    2. Continue envarsus 6 mg daily, myfortic 720mg BID, and prednisone 5 mg daily. Adjust dose per trough. Azithromycin for JOSE JUAN/CLAD prophylaxis.     3. Continue bactrim SS.  Will stop Valcyte as he has completed 11 months of therapy.  Will continue to monitor with routine CMV PCRs.     4. Creatinine 2.4 today.  Renally dose medications and continue to monitor. Encouraged oral hydration.    5. Continue apixaban for history of recurrent DVT and history of HIT.     6. Continue IVIG outpatient. Continue to monitor DSAs.     7. Reflux well controlled with PPI. Follow up with GI as previously scheduled.     8. Have j-tube removed by GI    9. RTC in 3 month or sooner if needed.       Demarcus Bustos NP  Lung Transplant  [id number]       agan follow up as outpatient

## 2022-05-15 ENCOUNTER — INPATIENT (INPATIENT)
Facility: HOSPITAL | Age: 27
LOS: 2 days | Discharge: HOME | End: 2022-05-18
Attending: HOSPITALIST | Admitting: HOSPITALIST
Payer: MEDICAID

## 2022-05-15 VITALS
HEIGHT: 64 IN | OXYGEN SATURATION: 100 % | HEART RATE: 92 BPM | TEMPERATURE: 98 F | DIASTOLIC BLOOD PRESSURE: 77 MMHG | RESPIRATION RATE: 18 BRPM | SYSTOLIC BLOOD PRESSURE: 137 MMHG

## 2022-05-15 LAB
ALBUMIN SERPL ELPH-MCNC: 5.1 G/DL — HIGH (ref 3.3–5)
ALP SERPL-CCNC: 73 U/L — SIGNIFICANT CHANGE UP (ref 40–120)
ALT FLD-CCNC: 67 U/L — HIGH (ref 4–33)
ANION GAP SERPL CALC-SCNC: 20 MMOL/L — HIGH (ref 7–14)
AST SERPL-CCNC: 149 U/L — HIGH (ref 4–32)
BASE EXCESS BLDV CALC-SCNC: 1.6 MMOL/L — SIGNIFICANT CHANGE UP (ref -2–3)
BASOPHILS # BLD AUTO: 0.03 K/UL — SIGNIFICANT CHANGE UP (ref 0–0.2)
BASOPHILS NFR BLD AUTO: 0.2 % — SIGNIFICANT CHANGE UP (ref 0–2)
BILIRUB SERPL-MCNC: 0.8 MG/DL — SIGNIFICANT CHANGE UP (ref 0.2–1.2)
BLOOD GAS VENOUS COMPREHENSIVE RESULT: SIGNIFICANT CHANGE UP
BUN SERPL-MCNC: 24 MG/DL — HIGH (ref 7–23)
CALCIUM SERPL-MCNC: 9.9 MG/DL — SIGNIFICANT CHANGE UP (ref 8.4–10.5)
CHLORIDE BLDV-SCNC: 103 MMOL/L — SIGNIFICANT CHANGE UP (ref 96–108)
CHLORIDE SERPL-SCNC: 99 MMOL/L — SIGNIFICANT CHANGE UP (ref 98–107)
CO2 BLDV-SCNC: 23.5 MMOL/L — SIGNIFICANT CHANGE UP (ref 22–26)
CO2 SERPL-SCNC: 21 MMOL/L — LOW (ref 22–31)
CREAT SERPL-MCNC: 1.2 MG/DL — SIGNIFICANT CHANGE UP (ref 0.5–1.3)
EGFR: 64 ML/MIN/1.73M2 — SIGNIFICANT CHANGE UP
EOSINOPHIL # BLD AUTO: 0 K/UL — SIGNIFICANT CHANGE UP (ref 0–0.5)
EOSINOPHIL NFR BLD AUTO: 0 % — SIGNIFICANT CHANGE UP (ref 0–6)
FLUAV AG NPH QL: SIGNIFICANT CHANGE UP
FLUBV AG NPH QL: SIGNIFICANT CHANGE UP
GAS PNL BLDV: 137 MMOL/L — SIGNIFICANT CHANGE UP (ref 136–145)
GLUCOSE BLDV-MCNC: 112 MG/DL — HIGH (ref 70–99)
GLUCOSE SERPL-MCNC: 109 MG/DL — HIGH (ref 70–99)
HCO3 BLDV-SCNC: 23 MMOL/L — SIGNIFICANT CHANGE UP (ref 22–29)
HCT VFR BLD CALC: 37.7 % — SIGNIFICANT CHANGE UP (ref 34.5–45)
HCT VFR BLDA CALC: 41 % — SIGNIFICANT CHANGE UP (ref 34.5–46.5)
HGB BLD CALC-MCNC: 13.5 G/DL — SIGNIFICANT CHANGE UP (ref 11.5–15.5)
HGB BLD-MCNC: 13 G/DL — SIGNIFICANT CHANGE UP (ref 11.5–15.5)
IANC: 14.63 K/UL — HIGH (ref 1.8–7.4)
IMM GRANULOCYTES NFR BLD AUTO: 0.6 % — SIGNIFICANT CHANGE UP (ref 0–1.5)
LACTATE BLDV-MCNC: 2.3 MMOL/L — HIGH (ref 0.5–2)
LIDOCAIN IGE QN: 19 U/L — SIGNIFICANT CHANGE UP (ref 7–60)
LYMPHOCYTES # BLD AUTO: 14.5 % — SIGNIFICANT CHANGE UP (ref 13–44)
LYMPHOCYTES # BLD AUTO: 2.74 K/UL — SIGNIFICANT CHANGE UP (ref 1–3.3)
MCHC RBC-ENTMCNC: 27 PG — SIGNIFICANT CHANGE UP (ref 27–34)
MCHC RBC-ENTMCNC: 34.5 GM/DL — SIGNIFICANT CHANGE UP (ref 32–36)
MCV RBC AUTO: 78.2 FL — LOW (ref 80–100)
MONOCYTES # BLD AUTO: 1.45 K/UL — HIGH (ref 0–0.9)
MONOCYTES NFR BLD AUTO: 7.6 % — SIGNIFICANT CHANGE UP (ref 2–14)
NEUTROPHILS # BLD AUTO: 14.63 K/UL — HIGH (ref 1.8–7.4)
NEUTROPHILS NFR BLD AUTO: 77.1 % — HIGH (ref 43–77)
NRBC # BLD: 0 /100 WBCS — SIGNIFICANT CHANGE UP
NRBC # FLD: 0 K/UL — SIGNIFICANT CHANGE UP
PCO2 BLDV: 26 MMHG — LOW (ref 39–42)
PH BLDV: 7.55 — HIGH (ref 7.32–7.43)
PLATELET # BLD AUTO: 354 K/UL — SIGNIFICANT CHANGE UP (ref 150–400)
PO2 BLDV: 22 MMHG — SIGNIFICANT CHANGE UP
POTASSIUM BLDV-SCNC: 3.1 MMOL/L — LOW (ref 3.5–5.1)
POTASSIUM SERPL-MCNC: 3.1 MMOL/L — LOW (ref 3.5–5.3)
POTASSIUM SERPL-SCNC: 3.1 MMOL/L — LOW (ref 3.5–5.3)
PROT SERPL-MCNC: 8.9 G/DL — HIGH (ref 6–8.3)
RBC # BLD: 4.82 M/UL — SIGNIFICANT CHANGE UP (ref 3.8–5.2)
RBC # FLD: 14.3 % — SIGNIFICANT CHANGE UP (ref 10.3–14.5)
RSV RNA NPH QL NAA+NON-PROBE: SIGNIFICANT CHANGE UP
SAO2 % BLDV: 42.8 % — SIGNIFICANT CHANGE UP
SARS-COV-2 RNA SPEC QL NAA+PROBE: SIGNIFICANT CHANGE UP
SODIUM SERPL-SCNC: 140 MMOL/L — SIGNIFICANT CHANGE UP (ref 135–145)
WBC # BLD: 18.96 K/UL — HIGH (ref 3.8–10.5)
WBC # FLD AUTO: 18.96 K/UL — HIGH (ref 3.8–10.5)

## 2022-05-15 PROCEDURE — 99285 EMERGENCY DEPT VISIT HI MDM: CPT

## 2022-05-15 PROCEDURE — 76830 TRANSVAGINAL US NON-OB: CPT | Mod: 26

## 2022-05-15 RX ORDER — ACETAMINOPHEN 500 MG
650 TABLET ORAL ONCE
Refills: 0 | Status: COMPLETED | OUTPATIENT
Start: 2022-05-15 | End: 2022-05-15

## 2022-05-15 RX ORDER — SODIUM CHLORIDE 9 MG/ML
1000 INJECTION INTRAMUSCULAR; INTRAVENOUS; SUBCUTANEOUS ONCE
Refills: 0 | Status: COMPLETED | OUTPATIENT
Start: 2022-05-15 | End: 2022-05-15

## 2022-05-15 RX ORDER — DIPHENHYDRAMINE HCL 50 MG
50 CAPSULE ORAL ONCE
Refills: 0 | Status: COMPLETED | OUTPATIENT
Start: 2022-05-15 | End: 2022-05-15

## 2022-05-15 RX ORDER — MORPHINE SULFATE 50 MG/1
4 CAPSULE, EXTENDED RELEASE ORAL ONCE
Refills: 0 | Status: DISCONTINUED | OUTPATIENT
Start: 2022-05-15 | End: 2022-05-15

## 2022-05-15 RX ORDER — FAMOTIDINE 10 MG/ML
20 INJECTION INTRAVENOUS ONCE
Refills: 0 | Status: COMPLETED | OUTPATIENT
Start: 2022-05-15 | End: 2022-05-15

## 2022-05-15 RX ORDER — ONDANSETRON 8 MG/1
4 TABLET, FILM COATED ORAL ONCE
Refills: 0 | Status: COMPLETED | OUTPATIENT
Start: 2022-05-15 | End: 2022-05-15

## 2022-05-15 RX ADMIN — ONDANSETRON 4 MILLIGRAM(S): 8 TABLET, FILM COATED ORAL at 18:33

## 2022-05-15 RX ADMIN — SODIUM CHLORIDE 1000 MILLILITER(S): 9 INJECTION INTRAMUSCULAR; INTRAVENOUS; SUBCUTANEOUS at 18:47

## 2022-05-15 RX ADMIN — Medication 30 MILLILITER(S): at 18:34

## 2022-05-15 RX ADMIN — FAMOTIDINE 20 MILLIGRAM(S): 10 INJECTION INTRAVENOUS at 18:33

## 2022-05-15 RX ADMIN — MORPHINE SULFATE 4 MILLIGRAM(S): 50 CAPSULE, EXTENDED RELEASE ORAL at 18:34

## 2022-05-15 RX ADMIN — Medication 50 MILLIGRAM(S): at 18:33

## 2022-05-15 RX ADMIN — MORPHINE SULFATE 4 MILLIGRAM(S): 50 CAPSULE, EXTENDED RELEASE ORAL at 23:12

## 2022-05-15 NOTE — ED ADULT TRIAGE NOTE - SOURCE OF INFORMATION
Patient called stating she cancelled her Iron infusion today out of fear of having a reaction. She states she had a bad reaction the last infusion. She is requesting a change in the type of iron. MD notified.
Patient

## 2022-05-15 NOTE — ED PROVIDER NOTE - NS ED ROS FT
Constitutional:  (-) fever, (-) chills, (-) lethargy  Eyes:  (-) eye pain (-) visual changes  ENMT: (-) nasal discharge, (-) sore throat. (-) neck pain or stiffness  Cardiac: (-) chest pain (-) palpitations  Respiratory:  (-) cough (-) respiratory distress  GI:  (+) nausea (+) vomiting (+) diarrhea (+) abdominal pain  :  (-) dysuria (-) frequency (-) burning.  MS:  (-) back pain (-) joint pain.  Neuro:  (-) headache (-) numbness (-) tingling (-) focal weakness  Skin:  (-) rash

## 2022-05-15 NOTE — ED ADULT NURSE NOTE - OBJECTIVE STATEMENT
patient Alert & ox3. pt c/o back. leg, abd pain with n/v/d x 1.5 weeks.pt . evaluated by MD.Placed 20g angiocath right wrist,labs drawn and sent. IVF NS started and due meds given as per order.pt keep on asking morphine and benadryl.Refused u/s at this time. Dr. Limon notified. patient will be waiting for results, further evaluation and disposition.  made comfortable.will continue to monitor.

## 2022-05-15 NOTE — ED PROVIDER NOTE - PROGRESS NOTE DETAILS
Limon: discussed pelvic exam with patient, discussed tvus findings, states she is on her menses, informed patient of concern for other complex collection in tvus, declines, states she wants to wait

## 2022-05-15 NOTE — ED PROVIDER NOTE - PHYSICAL EXAMINATION
CONSTITUTIONAL: NAD, awake, alert  HEAD: Normocephalic; atraumatic  ENMT: External appears normal, MMM  NECK: no tenderness, FROM  CARD: Normal Sl, S2; no audible murmurs  RESP: normal wob, lungs ctab  ABD: soft, non-distended; questionable mild abdominal tenderness throughout  MSK: no edema, normal ROM in all four extremities  SKIN: Warm, dry, no rashes  NEURO: aaox3, moving all extremities spontaneously

## 2022-05-15 NOTE — ED PROVIDER NOTE - CLINICAL SUMMARY MEDICAL DECISION MAKING FREE TEXT BOX
26F w h/o gerd p/w nbnb vomiting, non bloody diarrhea, body aches, chart review reveals Multiple similar presentations resulting in admission for cannabis hyperemesis. Defies f/c, chest pain, sob, dysuria. States she feels the same as she has in the past, plan for labs, ua, imaging, meds, trial PO challenge and reassess

## 2022-05-15 NOTE — ED PROVIDER NOTE - OBJECTIVE STATEMENT
26F w h/o gerd on ppi, presents w/ 2 weeks of nbnb vomiting, non bloody diarrhea, generalized body aches. States she has had similar symptoms multiple times before with presentation to this ED in the past. Chart review reveals patient has been admitted multiple times in the past thought to be 2/2 cannabis hyperemesis. States she used 1 week ago. Denies f/c, chest pain, sob, dysuria, discharge, vaginal bleeding.

## 2022-05-16 DIAGNOSIS — Z29.9 ENCOUNTER FOR PROPHYLACTIC MEASURES, UNSPECIFIED: ICD-10-CM

## 2022-05-16 DIAGNOSIS — K21.9 GASTRO-ESOPHAGEAL REFLUX DISEASE WITHOUT ESOPHAGITIS: ICD-10-CM

## 2022-05-16 DIAGNOSIS — N17.9 ACUTE KIDNEY FAILURE, UNSPECIFIED: ICD-10-CM

## 2022-05-16 DIAGNOSIS — R11.15 CYCLICAL VOMITING SYNDROME UNRELATED TO MIGRAINE: ICD-10-CM

## 2022-05-16 DIAGNOSIS — D72.829 ELEVATED WHITE BLOOD CELL COUNT, UNSPECIFIED: ICD-10-CM

## 2022-05-16 DIAGNOSIS — R94.31 ABNORMAL ELECTROCARDIOGRAM [ECG] [EKG]: ICD-10-CM

## 2022-05-16 DIAGNOSIS — E87.6 HYPOKALEMIA: ICD-10-CM

## 2022-05-16 DIAGNOSIS — R11.10 VOMITING, UNSPECIFIED: ICD-10-CM

## 2022-05-16 LAB
ALBUMIN SERPL ELPH-MCNC: 3.9 G/DL — SIGNIFICANT CHANGE UP (ref 3.3–5)
ALP SERPL-CCNC: 59 U/L — SIGNIFICANT CHANGE UP (ref 40–120)
ALT FLD-CCNC: 59 U/L — HIGH (ref 4–33)
ANION GAP SERPL CALC-SCNC: 16 MMOL/L — HIGH (ref 7–14)
AST SERPL-CCNC: 91 U/L — HIGH (ref 4–32)
BILIRUB SERPL-MCNC: 0.7 MG/DL — SIGNIFICANT CHANGE UP (ref 0.2–1.2)
BUN SERPL-MCNC: 17 MG/DL — SIGNIFICANT CHANGE UP (ref 7–23)
CALCIUM SERPL-MCNC: 8.1 MG/DL — LOW (ref 8.4–10.5)
CHLORIDE SERPL-SCNC: 106 MMOL/L — SIGNIFICANT CHANGE UP (ref 98–107)
CO2 SERPL-SCNC: 16 MMOL/L — LOW (ref 22–31)
CREAT SERPL-MCNC: 0.93 MG/DL — SIGNIFICANT CHANGE UP (ref 0.5–1.3)
EGFR: 87 ML/MIN/1.73M2 — SIGNIFICANT CHANGE UP
GLUCOSE SERPL-MCNC: 89 MG/DL — SIGNIFICANT CHANGE UP (ref 70–99)
POTASSIUM SERPL-MCNC: 3.5 MMOL/L — SIGNIFICANT CHANGE UP (ref 3.5–5.3)
POTASSIUM SERPL-SCNC: 3.5 MMOL/L — SIGNIFICANT CHANGE UP (ref 3.5–5.3)
PROT SERPL-MCNC: 6.8 G/DL — SIGNIFICANT CHANGE UP (ref 6–8.3)
SODIUM SERPL-SCNC: 138 MMOL/L — SIGNIFICANT CHANGE UP (ref 135–145)
TROPONIN T, HIGH SENSITIVITY RESULT: <6 NG/L — SIGNIFICANT CHANGE UP

## 2022-05-16 PROCEDURE — 99223 1ST HOSP IP/OBS HIGH 75: CPT

## 2022-05-16 PROCEDURE — 74177 CT ABD & PELVIS W/CONTRAST: CPT | Mod: 26,MA

## 2022-05-16 RX ORDER — METOCLOPRAMIDE HCL 10 MG
10 TABLET ORAL EVERY 8 HOURS
Refills: 0 | Status: DISCONTINUED | OUTPATIENT
Start: 2022-05-16 | End: 2022-05-16

## 2022-05-16 RX ORDER — DIPHENHYDRAMINE HCL 50 MG
25 CAPSULE ORAL ONCE
Refills: 0 | Status: COMPLETED | OUTPATIENT
Start: 2022-05-16 | End: 2022-05-16

## 2022-05-16 RX ORDER — ACETAMINOPHEN 500 MG
650 TABLET ORAL EVERY 6 HOURS
Refills: 0 | Status: DISCONTINUED | OUTPATIENT
Start: 2022-05-16 | End: 2022-05-18

## 2022-05-16 RX ORDER — METOCLOPRAMIDE HCL 10 MG
10 TABLET ORAL EVERY 8 HOURS
Refills: 0 | Status: DISCONTINUED | OUTPATIENT
Start: 2022-05-16 | End: 2022-05-18

## 2022-05-16 RX ORDER — MORPHINE SULFATE 50 MG/1
4 CAPSULE, EXTENDED RELEASE ORAL ONCE
Refills: 0 | Status: DISCONTINUED | OUTPATIENT
Start: 2022-05-16 | End: 2022-05-16

## 2022-05-16 RX ORDER — SODIUM CHLORIDE 9 MG/ML
1000 INJECTION INTRAMUSCULAR; INTRAVENOUS; SUBCUTANEOUS
Refills: 0 | Status: COMPLETED | OUTPATIENT
Start: 2022-05-16 | End: 2022-05-17

## 2022-05-16 RX ORDER — ONDANSETRON 8 MG/1
4 TABLET, FILM COATED ORAL ONCE
Refills: 0 | Status: COMPLETED | OUTPATIENT
Start: 2022-05-16 | End: 2022-05-16

## 2022-05-16 RX ORDER — POTASSIUM CHLORIDE 20 MEQ
10 PACKET (EA) ORAL
Refills: 0 | Status: COMPLETED | OUTPATIENT
Start: 2022-05-16 | End: 2022-05-16

## 2022-05-16 RX ORDER — DIPHENHYDRAMINE HCL 50 MG
50 CAPSULE ORAL ONCE
Refills: 0 | Status: COMPLETED | OUTPATIENT
Start: 2022-05-16 | End: 2022-05-16

## 2022-05-16 RX ORDER — PANTOPRAZOLE SODIUM 20 MG/1
40 TABLET, DELAYED RELEASE ORAL
Refills: 0 | Status: DISCONTINUED | OUTPATIENT
Start: 2022-05-16 | End: 2022-05-18

## 2022-05-16 RX ORDER — ACETAMINOPHEN 500 MG
1000 TABLET ORAL ONCE
Refills: 0 | Status: COMPLETED | OUTPATIENT
Start: 2022-05-16 | End: 2022-05-16

## 2022-05-16 RX ORDER — ONDANSETRON 8 MG/1
4 TABLET, FILM COATED ORAL EVERY 8 HOURS
Refills: 0 | Status: DISCONTINUED | OUTPATIENT
Start: 2022-05-16 | End: 2022-05-16

## 2022-05-16 RX ORDER — ENOXAPARIN SODIUM 100 MG/ML
40 INJECTION SUBCUTANEOUS EVERY 24 HOURS
Refills: 0 | Status: DISCONTINUED | OUTPATIENT
Start: 2022-05-16 | End: 2022-05-18

## 2022-05-16 RX ADMIN — Medication 10 MILLIGRAM(S): at 15:16

## 2022-05-16 RX ADMIN — ONDANSETRON 4 MILLIGRAM(S): 8 TABLET, FILM COATED ORAL at 02:25

## 2022-05-16 RX ADMIN — Medication 400 MILLIGRAM(S): at 21:28

## 2022-05-16 RX ADMIN — MORPHINE SULFATE 4 MILLIGRAM(S): 50 CAPSULE, EXTENDED RELEASE ORAL at 06:51

## 2022-05-16 RX ADMIN — ENOXAPARIN SODIUM 40 MILLIGRAM(S): 100 INJECTION SUBCUTANEOUS at 21:23

## 2022-05-16 RX ADMIN — SODIUM CHLORIDE 100 MILLILITER(S): 9 INJECTION INTRAMUSCULAR; INTRAVENOUS; SUBCUTANEOUS at 11:11

## 2022-05-16 RX ADMIN — Medication 100 MILLIEQUIVALENT(S): at 09:24

## 2022-05-16 RX ADMIN — ONDANSETRON 4 MILLIGRAM(S): 8 TABLET, FILM COATED ORAL at 11:25

## 2022-05-16 RX ADMIN — MORPHINE SULFATE 4 MILLIGRAM(S): 50 CAPSULE, EXTENDED RELEASE ORAL at 00:37

## 2022-05-16 RX ADMIN — Medication 50 MILLIGRAM(S): at 02:25

## 2022-05-16 RX ADMIN — MORPHINE SULFATE 4 MILLIGRAM(S): 50 CAPSULE, EXTENDED RELEASE ORAL at 10:28

## 2022-05-16 RX ADMIN — SODIUM CHLORIDE 100 MILLILITER(S): 9 INJECTION INTRAMUSCULAR; INTRAVENOUS; SUBCUTANEOUS at 18:57

## 2022-05-16 RX ADMIN — Medication 100 MILLIEQUIVALENT(S): at 10:27

## 2022-05-16 RX ADMIN — Medication 25 MILLIGRAM(S): at 11:25

## 2022-05-16 RX ADMIN — MORPHINE SULFATE 4 MILLIGRAM(S): 50 CAPSULE, EXTENDED RELEASE ORAL at 09:17

## 2022-05-16 RX ADMIN — Medication 25 MILLIGRAM(S): at 23:35

## 2022-05-16 RX ADMIN — Medication 1000 MILLIGRAM(S): at 22:15

## 2022-05-16 RX ADMIN — PANTOPRAZOLE SODIUM 40 MILLIGRAM(S): 20 TABLET, DELAYED RELEASE ORAL at 23:35

## 2022-05-16 RX ADMIN — PANTOPRAZOLE SODIUM 40 MILLIGRAM(S): 20 TABLET, DELAYED RELEASE ORAL at 09:17

## 2022-05-16 RX ADMIN — Medication 100 MILLIEQUIVALENT(S): at 11:27

## 2022-05-16 RX ADMIN — Medication 25 MILLIGRAM(S): at 15:16

## 2022-05-16 NOTE — H&P ADULT - GIT GEN HX ROS MEA POS PC
nausea/vomiting/diarrhea/change in bowel habits/abdominal pain See HPI/nausea/vomiting/diarrhea/change in bowel habits/abdominal pain

## 2022-05-16 NOTE — H&P ADULT - HEIGHT IN FEET
Detail Level: Detailed Validate Note Data (See Information Below): Yes Hemostasis: None Validate That Anesthesia Is Not 0: No Additional Anesthesia Volume In Cc (Will Not Render If 0): 0 Biopsy Type: H and E 5 Notification Instructions: Patient will be notified of biopsy results. However, patient instructed to call the office if not contacted within 2 weeks. Anesthesia Type: 1% lidocaine with epinephrine and a 1:10 solution of 8.4% sodium bicarbonate Anesthesia Volume In Cc (Will Not Render If 0): 1 Home Suture Removal Text: Patient was provided a home suture removal kit and will remove their sutures at home.  If they have any questions or difficulties they will call the office. Wound Care: Petrolatum Punch Size In Mm: 4 Information: Selecting Yes will display possible errors in your note based on the variables you have selected. This validation is only offered as a suggestion for you. PLEASE NOTE THAT THE VALIDATION TEXT WILL BE REMOVED WHEN YOU FINALIZE YOUR NOTE. IF YOU WANT TO FAX A PRELIMINARY NOTE YOU WILL NEED TO TOGGLE THIS TO 'NO' IF YOU DO NOT WANT IT IN YOUR FAXED NOTE. Billing Type: Third-Party Bill Dressing: pressure dressing Consent: Written consent was obtained and risks were reviewed including but not limited to scarring, infection, bleeding, scabbing, incomplete removal, nerve damage and allergy to anesthesia. Post-Care Instructions: I reviewed with the patient in detail post-care instructions. Patient is to keep the biopsy site dry overnight, and then apply bacitracin twice daily until healed. Patient may apply hydrogen peroxide soaks to remove any crusting. Epidermal Sutures: gel foam

## 2022-05-16 NOTE — H&P ADULT - HISTORY OF PRESENT ILLNESS
Patient is a 26 year old female with a PMHx of cyclic vomiting syndrome, GERD, and ovarian cysts presenting to the emergency department complaining of NBNB vomiting x 2 weeks. Patient states that she has been unable to keep any food down except chicken broth, water, and ice chips. She has been vomiting 5-7 times per day. In addition, patient has been having watery, non-bloody diarrhea x 2 days, starting with the onset of her period (5/14/22). She takes OCPs and frequently skips her period, the last one being approximately 2 months ago. Patient thinks that the last time she had a normal bowel movement was 1 week ago. In addition, she has been having generalized abdominal pain, mostly in her epigastric area that she believes is secondary to tightening her muscles to vomit. In addition, patient states she is having heartburn. She has had generalized muscle aches in her arms and legs as well for two weeks. At home, patient did not take her temperature but was having chills and sweating. Patient smokes marijuana frequently, the last time being 1 week ago, and does not notice any change in her nausea/vomiting with use, only an increase in her appetite. The last similar episode was 1 year ago when she was admitted to [facility] for 7 days, and got better with treatment. Patient has not had any similar episodes since. She states that her nausea is relieved with Zofran, but needs to be pretreated with benedryl due to a reaction that gives her "tingling" in her arms and legs. Patient denies any cough, SOB, fever, chills, headache, hearing or vision changes, dizziness, rhinorrhea, congestion, dysuria, constipation, skin changes, syncope/LOC.    Patient has seen a GI (Dr. Bautista Ortiz), who performed an endoscopy and found "a teddy sized hole" in the esophagus as per patient. She has been taking 40mg omeprazole daily and has not followed up with him.    Upon admission patient is s/p 4 doses of 4mg morphine, 4mg IV zofran with 50mg benedryl. Patient has only vomited "once or twice" since the administration of these medications.  Patient is a 26 year old female with a PMHx of cyclic vomiting syndrome, GERD, and ovarian cysts presenting to the emergency department complaining of NBNB vomiting x 2 weeks. Patient states that she has been unable to keep any food down except chicken broth, water, and ice chips. She has been vomiting 5-7 times per day. In addition, patient has been having watery, non-bloody "diarrhea" described as loose BM once per day x 2 days, starting with the onset of her period (5/14/22). She takes OCPs and frequently skips her period, the last one being approximately 2 months ago. Patient thinks that the last time she had a normal bowel movement was 1 week ago. In addition, she has been having generalized abdominal pain, mostly in her epigastric area that she believes is secondary to tightening her muscles to vomit. In addition, patient states she is having chest discomfort described as heartburn, also c/o mid back pain. She has had generalized muscle aches in her arms and legs as well for two weeks. At home, patient did not take her temperature but was having chills and sweating. Patient smokes marijuana frequently, the last time being 1 week ago, and does not notice any change in her nausea/vomiting with use, only an increase in her appetite. The last similar episode was 1 year ago when she was admitted to [facility] for 7 days, and got better with treatment. Patient has not had any similar episodes since. She states that her nausea is relieved with Zofran, but needs to be pretreated with benadryl due to a reaction that gives her "tingling" in her arms and legs. Patient denies any cough, SOB, fever, chills, headache, hearing or vision changes, dizziness, rhinorrhea, congestion, dysuria, constipation, skin changes, syncope/LOC.    Patient has seen a GI (Dr. Bautista Ortiz), who performed an endoscopy and found "a teddy sized hole" in the esophagus as per patient. She has been taking 40mg omeprazole daily and has not followed up with him.    IN ED patient received 4 doses of 4mg morphine, 4mg IV zofran with 50mg benadryl since yesterday evening. Patient has only vomited "once or twice" since the administration of these medications.  Patient is a 26 year old female with a PMHx of cyclic vomiting syndrome, GERD, and ovarian cysts presenting to the emergency department complaining of NBNB vomiting x 2 weeks. Patient states that she has been unable to keep any food down except chicken broth, water, and ice chips. She has been vomiting 5-7 times per day. In addition, patient has been having watery, non-bloody "diarrhea" described as loose BM once per day x 2 days, starting with the onset of her period (5/14/22). She takes OCPs and frequently skips her period, the last one being approximately 2 months ago. Patient thinks that the last time she had a normal bowel movement was 1 week ago. In addition, she has been having generalized abdominal pain, mostly in her epigastric area that she believes is secondary to tightening her muscles to vomit x 2 weeks. In addition, patient states she is having intermittent chest discomfort described as heartburn x 1 week with eating, also c/o constant mid back pain since she started vomiting 2 weeks ago. She has had generalized muscle aches in her arms and legs as well for two weeks. At home, patient did not take her temperature but was having chills and sweating. Patient smokes marijuana frequently, the last time being 1 week ago, and does not notice any change in her nausea/vomiting with use, only an increase in her appetite. The last similar episode was 1 year ago when she was admitted to [facility] for 7 days, and got better with treatment. Patient has not had any similar episodes since. She states that her nausea is relieved with Zofran, but needs to be pretreated with benadryl due to a reaction that gives her "tingling" in her arms and legs. Patient denies any cough, SOB, fever, chills, headache, hearing or vision changes, dizziness, rhinorrhea, congestion, dysuria, constipation, skin changes, syncope/LOC.    Patient has seen a GI (Dr. Bautista Ortiz), who performed an endoscopy and found "a teddy sized hole" in the esophagus as per patient. She has been taking 40mg omeprazole daily and has not followed up with him.    IN ED patient received 4 doses of 4mg morphine, 4mg IV zofran with 50mg benadryl since yesterday evening. Patient has only vomited "once or twice" since the administration of these medications.

## 2022-05-16 NOTE — PATIENT PROFILE ADULT - FALL HARM RISK - UNIVERSAL INTERVENTIONS
Bed in lowest position, wheels locked, appropriate side rails in place/Call bell, personal items and telephone in reach/Instruct patient to call for assistance before getting out of bed or chair/Non-slip footwear when patient is out of bed/[place] to call system/Physically safe environment - no spills, clutter or unnecessary equipment/Purposeful Proactive Rounding/Room/bathroom lighting operational, light cord in reach

## 2022-05-16 NOTE — H&P ADULT - NSHPSOCIALHISTORY_GEN_ALL_CORE
Patient lives with her grandmother. 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 Patient reports frequent marijuana use, occasional alcohol use (1-2 drinks/week), no cigarette use. She does not use any other illicit drugs. She received her COVID-19 vaccinations.

## 2022-05-16 NOTE — H&P ADULT - ATTENDING COMMENTS
26 year old female with a PMHx of cyclic vomiting syndrome, GERD, and ovarian cysts presenting to the emergency department complaining of NBNB vomiting x 2 weeks, unable to hold solids x 1 week, abd pain x 2 wks, admitted to medicine for further management. Pt still actively smoking marijuana. CT A/P neg for acute pathology. Reporting diffuse cramping pain as well as epigastric burning. Not able to keep anything down. On exam, dry mucous membranes. Pt reporting severe abdominal pain and refused abdominal exam. Lumbar paraspinal tenderness on palpation.     #Abdominal pain with intractable n/v: No clear etiology for abdominal pain based on imaging. N/V likely related to cyclical vomiting as pt reports still smoking, last 1 week ago. May also be acute viral gastroenteritis given associated leukocytosis although no fevers. C/w anti-emetics. Pt reports having to take benadryl prior to zofran due to a reaction (numbness/tingling). Will trial reglan instead or can txt with haldol if no relief. Would avoid opioids as they are not indicated and concern for pain seeking behavior. C/w IVF, PPI. Clear liquid diet, advance as tolerated  #Leukocytosis: no localizing infectious symptoms. Suspect likely reactive. C/w IVF, trend   #KIMBERLY: likely pre-renal from N/V. C/w IVF. Trend Cr, avoid nsaids for now but can consider if Cr improves to baseline  #Elevated BP: likely from N/V. Will monitor    Rest of care as above 26 year old female with a PMHx of cyclic vomiting syndrome, GERD, and ovarian cysts presenting to the emergency department complaining of NBNB vomiting x 2 weeks, unable to hold solids x 1 week, abd pain x 2 wks, admitted to medicine for further management. Pt still actively smoking marijuana. CT A/P neg for acute pathology. HCG neg. Reporting diffuse cramping pain as well as epigastric burning. Not able to keep anything down. On exam, dry mucous membranes. Pt reporting severe abdominal pain and refused abdominal exam. Lumbar paraspinal tenderness on palpation.     #Abdominal pain with intractable n/v: No clear etiology for abdominal pain based on imaging. N/V likely related to cyclical vomiting as pt reports still smoking, last 1 week ago. May also be acute viral gastroenteritis given associated leukocytosis although no fevers. C/w anti-emetics. Pt reports having to take benadryl prior to zofran due to a reaction (numbness/tingling). Will trial reglan instead or can txt with haldol if no relief. Would avoid opioids as they are not indicated and concern for pain seeking behavior. C/w IVF, PPI. Clear liquid diet, advance as tolerated  #Leukocytosis: no localizing infectious symptoms. Suspect likely reactive. C/w IVF, trend   #KIMBERLY: likely pre-renal from N/V. C/w IVF. Trend Cr, avoid nsaids for now but can consider if Cr improves to baseline  #Elevated BP: likely from N/V. Will monitor  EKG with t wave inversions compared to baseline as well as mild ST depressions in leads v4-v6 , unclear if real or artifact. Trop 6, pt without chest pain. Repeat EKG    Rest of care as above 26 year old female with a PMHx of cyclic vomiting syndrome, GERD, and ovarian cysts presenting to the emergency department complaining of NBNB vomiting x 2 weeks, unable to hold solids x 1 week, abd pain x 2 wks, admitted to medicine for further management. Pt still actively smoking marijuana. CT A/P neg for acute pathology. HCG neg. Reporting diffuse cramping pain as well as epigastric burning. Not able to keep anything down. On exam, dry mucous membranes. Pt reporting severe abdominal pain and refused abdominal exam. Lumbar paraspinal tenderness on palpation.     #Abdominal pain with intractable n/v: No clear etiology for abdominal pain based on imaging. N/V likely related to cyclical vomiting as pt reports still smoking, last 1 week ago. May also be acute viral gastroenteritis given associated leukocytosis although no fevers. C/w anti-emetics. Pt reports having to take benadryl prior to zofran due to a reaction (numbness/tingling). Will trial reglan instead or can txt with ativan or haldol if no relief. Would avoid opioids as they are not indicated and concern for pain seeking behavior. C/w IVF, PPI. Clear liquid diet, advance as tolerated  #Leukocytosis: no localizing infectious symptoms. Suspect likely reactive. C/w IVF, trend   #KIMBERLY: likely pre-renal from N/V. C/w IVF. Trend Cr, avoid nsaids for now but can consider if Cr improves to baseline  #Elevated BP: likely from N/V. Will monitor  EKG with t wave inversions compared to baseline as well as mild ST depressions in leads v4-v6 , unclear if real or artifact. Trop 6, pt without chest pain. Repeat EKG  Transaminitis likely related to dehydration, vomiting. Check viral hep panel   Rest of care as above

## 2022-05-16 NOTE — H&P ADULT - BACK EXAM
thoracolumbar tenderness/normal shape/ROM intact/strength intact/paraspinal spasm L/paraspinal spasm R normal shape/ROM intact/strength intact/paraspinal spasm L/paraspinal spasm R

## 2022-05-16 NOTE — H&P ADULT - MUSCULOSKELETAL
detailed exam normal/ROM intact/no joint swelling/no joint erythema/no joint warmth/no calf tenderness/normal strength details…

## 2022-05-16 NOTE — H&P ADULT - TENDERNESS
epigastric pain as well/LUQ/RUQ diffuse abdominal tenderness tom localized to epigastric region/diffuse

## 2022-05-16 NOTE — H&P ADULT - ASSESSMENT
Patient is a 26 year old female with a PMHx of cyclic vomiting syndrome, GERD, and ovarian cysts presenting to the emergency department complaining of NBNB vomiting x 2 weeks, admitted to medicine for hyperemesis treatment.  Patient is a 26 year old female with a PMHx of cyclic vomiting syndrome, GERD, and ovarian cysts presenting to the emergency department complaining of NBNB vomiting x 2 weeks, unable to hold solids x 1 week, abd pain x , admitted to medicine for further management  EKG- NSR @ 79 , TWI v1, v3, 1mm ST depression v4-5 Patient is a 26 year old female with a PMHx of cyclic vomiting syndrome, GERD, and ovarian cysts presenting to the emergency department complaining of NBNB vomiting x 2 weeks, unable to hold solids x 1 week, abd pain x 2 wks, admitted to medicine for further management  EKG- NSR @ 79 , TWI v1, v3, 1mm ST depression v4-5

## 2022-05-16 NOTE — H&P ADULT - PROBLEM SELECTOR PLAN 1
- Patient has improvement with morphine and zofran in emergency department, CT abdomen/pelvis and transvaginal ultrasound without acute pathology  - WBC 18.96, AST//67 likely in setting of profuse vomiting  - Continue with zofran and morphine PRN for abdominal pain and nausea  - IV fluids for rehydration, frequent CMP  - Strict I&Os - Patient has improvement with morphine and zofran in emergency department, CT abdomen/pelvis and transvaginal ultrasound without acute pathology  - Continue with zofran and morphine PRN for abdominal pain and nausea  - IV fluids for rehydration, IV PPI, frequent CMP  Consider GI evaluation - Patient has improvement with morphine and zofran in emergency department, CT abdomen/pelvis and transvaginal ultrasound without acute pathology  - Continue with zofran & tylenol PRN for abdominal pain and nausea  - IV fluids for rehydration, IV PPI, famotidine  F/U MD note

## 2022-05-17 DIAGNOSIS — E87.2 ACIDOSIS: ICD-10-CM

## 2022-05-17 LAB
A1C WITH ESTIMATED AVERAGE GLUCOSE RESULT: 4.7 % — SIGNIFICANT CHANGE UP (ref 4–5.6)
ALBUMIN SERPL ELPH-MCNC: 3.9 G/DL — SIGNIFICANT CHANGE UP (ref 3.3–5)
ALP SERPL-CCNC: 58 U/L — SIGNIFICANT CHANGE UP (ref 40–120)
ALT FLD-CCNC: 54 U/L — HIGH (ref 4–33)
ANION GAP SERPL CALC-SCNC: 15 MMOL/L — HIGH (ref 7–14)
AST SERPL-CCNC: 69 U/L — HIGH (ref 4–32)
BASOPHILS # BLD AUTO: 0.02 K/UL — SIGNIFICANT CHANGE UP (ref 0–0.2)
BASOPHILS NFR BLD AUTO: 0.2 % — SIGNIFICANT CHANGE UP (ref 0–2)
BILIRUB SERPL-MCNC: 0.8 MG/DL — SIGNIFICANT CHANGE UP (ref 0.2–1.2)
BUN SERPL-MCNC: 17 MG/DL — SIGNIFICANT CHANGE UP (ref 7–23)
CALCIUM SERPL-MCNC: 8.3 MG/DL — LOW (ref 8.4–10.5)
CHLORIDE SERPL-SCNC: 107 MMOL/L — SIGNIFICANT CHANGE UP (ref 98–107)
CHOLEST SERPL-MCNC: 217 MG/DL — HIGH
CO2 SERPL-SCNC: 19 MMOL/L — LOW (ref 22–31)
CREAT SERPL-MCNC: 0.89 MG/DL — SIGNIFICANT CHANGE UP (ref 0.5–1.3)
EGFR: 92 ML/MIN/1.73M2 — SIGNIFICANT CHANGE UP
EOSINOPHIL # BLD AUTO: 0.03 K/UL — SIGNIFICANT CHANGE UP (ref 0–0.5)
EOSINOPHIL NFR BLD AUTO: 0.3 % — SIGNIFICANT CHANGE UP (ref 0–6)
ESTIMATED AVERAGE GLUCOSE: 88 — SIGNIFICANT CHANGE UP
GLUCOSE SERPL-MCNC: 76 MG/DL — SIGNIFICANT CHANGE UP (ref 70–99)
HAV IGM SER-ACNC: SIGNIFICANT CHANGE UP
HBV CORE IGM SER-ACNC: SIGNIFICANT CHANGE UP
HBV SURFACE AG SER-ACNC: SIGNIFICANT CHANGE UP
HCT VFR BLD CALC: 33.8 % — LOW (ref 34.5–45)
HCV AB S/CO SERPL IA: 0.13 S/CO — SIGNIFICANT CHANGE UP (ref 0–0.99)
HCV AB SERPL-IMP: SIGNIFICANT CHANGE UP
HDLC SERPL-MCNC: 50 MG/DL — LOW
HGB BLD-MCNC: 11.2 G/DL — LOW (ref 11.5–15.5)
IANC: 6.34 K/UL — SIGNIFICANT CHANGE UP (ref 1.8–7.4)
IMM GRANULOCYTES NFR BLD AUTO: 0.5 % — SIGNIFICANT CHANGE UP (ref 0–1.5)
LIPID PNL WITH DIRECT LDL SERPL: 144 MG/DL — HIGH
LYMPHOCYTES # BLD AUTO: 3.78 K/UL — HIGH (ref 1–3.3)
LYMPHOCYTES # BLD AUTO: 34.2 % — SIGNIFICANT CHANGE UP (ref 13–44)
MCHC RBC-ENTMCNC: 27.1 PG — SIGNIFICANT CHANGE UP (ref 27–34)
MCHC RBC-ENTMCNC: 33.1 GM/DL — SIGNIFICANT CHANGE UP (ref 32–36)
MCV RBC AUTO: 81.6 FL — SIGNIFICANT CHANGE UP (ref 80–100)
MONOCYTES # BLD AUTO: 0.81 K/UL — SIGNIFICANT CHANGE UP (ref 0–0.9)
MONOCYTES NFR BLD AUTO: 7.3 % — SIGNIFICANT CHANGE UP (ref 2–14)
NEUTROPHILS # BLD AUTO: 6.34 K/UL — SIGNIFICANT CHANGE UP (ref 1.8–7.4)
NEUTROPHILS NFR BLD AUTO: 57.5 % — SIGNIFICANT CHANGE UP (ref 43–77)
NON HDL CHOLESTEROL: 167 MG/DL — HIGH
NRBC # BLD: 0 /100 WBCS — SIGNIFICANT CHANGE UP
NRBC # FLD: 0 K/UL — SIGNIFICANT CHANGE UP
PLATELET # BLD AUTO: 285 K/UL — SIGNIFICANT CHANGE UP (ref 150–400)
POTASSIUM SERPL-MCNC: 3.3 MMOL/L — LOW (ref 3.5–5.3)
POTASSIUM SERPL-SCNC: 3.3 MMOL/L — LOW (ref 3.5–5.3)
PROT SERPL-MCNC: 6.5 G/DL — SIGNIFICANT CHANGE UP (ref 6–8.3)
RBC # BLD: 4.14 M/UL — SIGNIFICANT CHANGE UP (ref 3.8–5.2)
RBC # FLD: 14.3 % — SIGNIFICANT CHANGE UP (ref 10.3–14.5)
SODIUM SERPL-SCNC: 141 MMOL/L — SIGNIFICANT CHANGE UP (ref 135–145)
TRIGL SERPL-MCNC: 117 MG/DL — SIGNIFICANT CHANGE UP
TSH SERPL-MCNC: 1.03 UIU/ML — SIGNIFICANT CHANGE UP (ref 0.27–4.2)
WBC # BLD: 11.04 K/UL — HIGH (ref 3.8–10.5)
WBC # FLD AUTO: 11.04 K/UL — HIGH (ref 3.8–10.5)

## 2022-05-17 PROCEDURE — 99233 SBSQ HOSP IP/OBS HIGH 50: CPT

## 2022-05-17 PROCEDURE — 71045 X-RAY EXAM CHEST 1 VIEW: CPT | Mod: 26

## 2022-05-17 RX ORDER — POTASSIUM CHLORIDE 20 MEQ
10 PACKET (EA) ORAL
Refills: 0 | Status: COMPLETED | OUTPATIENT
Start: 2022-05-17 | End: 2022-05-17

## 2022-05-17 RX ORDER — DIPHENHYDRAMINE HCL 50 MG
25 CAPSULE ORAL ONCE
Refills: 0 | Status: COMPLETED | OUTPATIENT
Start: 2022-05-17 | End: 2022-05-17

## 2022-05-17 RX ADMIN — SODIUM CHLORIDE 100 MILLILITER(S): 9 INJECTION INTRAMUSCULAR; INTRAVENOUS; SUBCUTANEOUS at 13:02

## 2022-05-17 RX ADMIN — PANTOPRAZOLE SODIUM 40 MILLIGRAM(S): 20 TABLET, DELAYED RELEASE ORAL at 09:46

## 2022-05-17 RX ADMIN — Medication 100 MILLIEQUIVALENT(S): at 09:48

## 2022-05-17 RX ADMIN — SODIUM CHLORIDE 100 MILLILITER(S): 9 INJECTION INTRAMUSCULAR; INTRAVENOUS; SUBCUTANEOUS at 10:04

## 2022-05-17 RX ADMIN — Medication 25 MILLIGRAM(S): at 21:54

## 2022-05-17 RX ADMIN — Medication 25 MILLIGRAM(S): at 10:03

## 2022-05-17 RX ADMIN — SODIUM CHLORIDE 100 MILLILITER(S): 9 INJECTION INTRAMUSCULAR; INTRAVENOUS; SUBCUTANEOUS at 03:46

## 2022-05-17 RX ADMIN — PANTOPRAZOLE SODIUM 40 MILLIGRAM(S): 20 TABLET, DELAYED RELEASE ORAL at 21:54

## 2022-05-17 RX ADMIN — Medication 100 MILLIEQUIVALENT(S): at 12:07

## 2022-05-17 NOTE — DIETITIAN INITIAL EVALUATION ADULT - ORAL INTAKE PTA/DIET HISTORY
Minimally eating for the past 3 weeks. However, baseline appetite is very good. States she avoids dairy products and eggs, limits citrus foods for GERD.

## 2022-05-17 NOTE — DIETITIAN INITIAL EVALUATION ADULT - PERTINENT MEDS FT
MEDICATIONS  (STANDING):  enoxaparin Injectable 40 milliGRAM(s) SubCutaneous every 24 hours  pantoprazole  Injectable 40 milliGRAM(s) IV Push two times a day  potassium chloride  10 mEq/100 mL IVPB 10 milliEquivalent(s) IV Intermittent every 1 hour  sodium chloride 0.9%. 1000 milliLiter(s) (100 mL/Hr) IV Continuous <Continuous>    MEDICATIONS  (PRN):  acetaminophen     Tablet .. 650 milliGRAM(s) Oral every 6 hours PRN Temp greater or equal to 38C (100.4F), Mild Pain (1 - 3), Moderate Pain (4 - 6)  metoclopramide Injectable 10 milliGRAM(s) IV Push every 8 hours PRN Nausea/vomiting

## 2022-05-17 NOTE — PROGRESS NOTE ADULT - ASSESSMENT
Patient is a 26 year old female with a PMHx of cyclic vomiting syndrome, GERD, and ovarian cysts presenting to the emergency department complaining of NBNB vomiting x 2 weeks, unable to hold solids x 1 week, abd pain x 2 wks, admitted to medicine for further management

## 2022-05-17 NOTE — DIETITIAN INITIAL EVALUATION ADULT - EDUCATION DIETARY MODIFICATIONS
pt reported high fat foods in daily intake. Discussed with patient that processed meats can contribute to GERD symptoms and to limit these foods as able./(1) partially meets; needs review/practice/verbalization

## 2022-05-17 NOTE — PROGRESS NOTE ADULT - PROBLEM SELECTOR PLAN 8
anion gap improving  pt now eating    likely mixed acid base disturbance due to poor po and vomiting

## 2022-05-17 NOTE — PROGRESS NOTE ADULT - SUBJECTIVE AND OBJECTIVE BOX
[facility] Division of Hospital Medicine  Yarelis Calhoun MD  Pager [phone]    Patient is a 26y old  Female who presents with a chief complaint of Cyclical vomiting syndrome not associated with migraine  c/o vomiting x2 weeks, unable to hold solids x1 week, abd pain x2 weeks.       SUBJECTIVE / OVERNIGHT EVENTS: feeling better ans is now hungry, asking for reg food; no vomiting since arriving to ED; abd pain resolved      MEDICATIONS  (STANDING):  enoxaparin Injectable 40 milliGRAM(s) SubCutaneous every 24 hours  pantoprazole  Injectable 40 milliGRAM(s) IV Push two times a day    MEDICATIONS  (PRN):  acetaminophen     Tablet .. 650 milliGRAM(s) Oral every 6 hours PRN Temp greater or equal to 38C (100.4F), Mild Pain (1 - 3), Moderate Pain (4 - 6)  metoclopramide Injectable 10 milliGRAM(s) IV Push every 8 hours PRN Nausea/vomiting        PHYSICAL EXAM:  Vital Signs Last 24 Hrs  T(F): 98.2 (17 May 2022 12:10), Max: 98.6 (17 May 2022 06:08)  HR: 75 (17 May 2022 12:10) (65 - 75)  BP: 130/67 (17 May 2022 12:10) (122/82 - 163/90)  RR: 16 (17 May 2022 12:10) (16 - 18)  SpO2: 100% (17 May 2022 12:10) (99% - 100%)    CONSTITUTIONAL: NAD, appears comfortable  EYES: PERRLA; conjunctiva and sclera clear  ENMT: Moist oral mucosa; normal dentition  RESPIRATORY: Normal respiratory effort; lungs are clear to auscultation bilaterally  CARDIOVASCULAR: Regular rate and rhythm; No lower extremity edema;   ABDOMEN: Nontender to palpation, normoactive bowel sounds, soft  MUSCULOSKELETAL:  No clubbing or cyanosis of digits; no joint swelling or tenderness to palpation  PSYCH: A+O to person, place, and time; affect appropriate  NEUROLOGY: CN 2-12 are intact and symmetric; no gross sensory deficits   SKIN: No rashes; no palpable lesions    LABS:                        11.2   11.04 )-----------( 285      ( 17 May 2022 05:54 )             33.8     05-17    141  |  107  |  17  ----------------------------<  76  3.3<L>   |  19<L>  |  0.89    Ca    8.3<L>      17 May 2022 05:54  Phos  3.0     05-17  Mg     2.20     05-17    TPro  6.5  /  Alb  3.9  /  TBili  0.8  /  DBili  x   /  AST  69<H>  /  ALT  54<H>  /  AlkPhos  58  05-17

## 2022-05-17 NOTE — DIETITIAN INITIAL EVALUATION ADULT - OTHER INFO
A&Ox4. Pt states no nausea/emesis today. Tolerated clear liquids and wants to advance her diet. Spoke with Dr. Calhoun, diet advancement pending per MD discretion. Denies any GI issues (nausea/vomiting/diarrhea/constipation.) Denies any chewing or swallowing difficulties at this time. NKFA.

## 2022-05-17 NOTE — DIETITIAN INITIAL EVALUATION ADULT - REASON FOR ADMISSION
Cyclical vomiting syndrome not associated with migraine  c/o vomiting x2 weeks, unable to hold solids x1 week, abd pain x2 weeks.

## 2022-05-17 NOTE — DIETITIAN INITIAL EVALUATION ADULT - PERTINENT LABORATORY DATA
05-17    141  |  107  |  17  ----------------------------<  76  3.3<L>   |  19<L>  |  0.89    Ca    8.3<L>      17 May 2022 05:54  Phos  3.0     05-17  Mg     2.20     05-17    TPro  6.5  /  Alb  3.9  /  TBili  0.8  /  DBili  x   /  AST  69<H>  /  ALT  54<H>  /  AlkPhos  58  05-17  A1C with Estimated Average Glucose Result: 4.7 % (05-17-22 @ 05:54)

## 2022-05-18 ENCOUNTER — TRANSCRIPTION ENCOUNTER (OUTPATIENT)
Age: 27
End: 2022-05-18

## 2022-05-18 VITALS
SYSTOLIC BLOOD PRESSURE: 139 MMHG | DIASTOLIC BLOOD PRESSURE: 97 MMHG | OXYGEN SATURATION: 100 % | TEMPERATURE: 98 F | RESPIRATION RATE: 16 BRPM | HEART RATE: 78 BPM

## 2022-05-18 LAB
ANION GAP SERPL CALC-SCNC: 13 MMOL/L — SIGNIFICANT CHANGE UP (ref 7–14)
BUN SERPL-MCNC: 9 MG/DL — SIGNIFICANT CHANGE UP (ref 7–23)
CALCIUM SERPL-MCNC: 8.8 MG/DL — SIGNIFICANT CHANGE UP (ref 8.4–10.5)
CHLORIDE SERPL-SCNC: 102 MMOL/L — SIGNIFICANT CHANGE UP (ref 98–107)
CO2 SERPL-SCNC: 23 MMOL/L — SIGNIFICANT CHANGE UP (ref 22–31)
CREAT SERPL-MCNC: 0.93 MG/DL — SIGNIFICANT CHANGE UP (ref 0.5–1.3)
EGFR: 87 ML/MIN/1.73M2 — SIGNIFICANT CHANGE UP
GLUCOSE SERPL-MCNC: 93 MG/DL — SIGNIFICANT CHANGE UP (ref 70–99)
HCT VFR BLD CALC: 39.7 % — SIGNIFICANT CHANGE UP (ref 34.5–45)
HGB BLD-MCNC: 12.9 G/DL — SIGNIFICANT CHANGE UP (ref 11.5–15.5)
MAGNESIUM SERPL-MCNC: 1.9 MG/DL — SIGNIFICANT CHANGE UP (ref 1.6–2.6)
MCHC RBC-ENTMCNC: 27.2 PG — SIGNIFICANT CHANGE UP (ref 27–34)
MCHC RBC-ENTMCNC: 32.5 GM/DL — SIGNIFICANT CHANGE UP (ref 32–36)
MCV RBC AUTO: 83.6 FL — SIGNIFICANT CHANGE UP (ref 80–100)
NRBC # BLD: 0 /100 WBCS — SIGNIFICANT CHANGE UP
NRBC # FLD: 0 K/UL — SIGNIFICANT CHANGE UP
PHOSPHATE SERPL-MCNC: 2.8 MG/DL — SIGNIFICANT CHANGE UP (ref 2.5–4.5)
PLATELET # BLD AUTO: 254 K/UL — SIGNIFICANT CHANGE UP (ref 150–400)
POTASSIUM SERPL-MCNC: 3.3 MMOL/L — LOW (ref 3.5–5.3)
POTASSIUM SERPL-SCNC: 3.3 MMOL/L — LOW (ref 3.5–5.3)
RBC # BLD: 4.75 M/UL — SIGNIFICANT CHANGE UP (ref 3.8–5.2)
RBC # FLD: 13.8 % — SIGNIFICANT CHANGE UP (ref 10.3–14.5)
SODIUM SERPL-SCNC: 138 MMOL/L — SIGNIFICANT CHANGE UP (ref 135–145)
WBC # BLD: 8.33 K/UL — SIGNIFICANT CHANGE UP (ref 3.8–10.5)
WBC # FLD AUTO: 8.33 K/UL — SIGNIFICANT CHANGE UP (ref 3.8–10.5)

## 2022-05-18 PROCEDURE — 99239 HOSP IP/OBS DSCHRG MGMT >30: CPT

## 2022-05-18 RX ORDER — ACETAMINOPHEN 500 MG
2 TABLET ORAL
Qty: 0 | Refills: 0 | DISCHARGE
Start: 2022-05-18

## 2022-05-18 RX ORDER — POTASSIUM CHLORIDE 20 MEQ
40 PACKET (EA) ORAL EVERY 4 HOURS
Refills: 0 | Status: COMPLETED | OUTPATIENT
Start: 2022-05-18 | End: 2022-05-18

## 2022-05-18 RX ADMIN — Medication 40 MILLIEQUIVALENT(S): at 09:01

## 2022-05-18 RX ADMIN — Medication 40 MILLIEQUIVALENT(S): at 12:06

## 2022-05-18 NOTE — CHART NOTE - NSCHARTNOTEFT_GEN_A_CORE
pt seen and examined by me  feeling well this AM  ruthie reg diet  + BM last night  wants to go home  d/w pt about limiting activities which lead to these admissions  32 min spent with dc planning

## 2022-05-18 NOTE — DISCHARGE NOTE PROVIDER - HOSPITAL COURSE
Patient is a 26 year old female with a PMHx of cyclic vomiting syndrome, GERD, and ovarian cysts presenting to the emergency department complaining of NBNB vomiting x 2 weeks, unable to hold solids x 1 week, abd pain x 2 wks, admitted to medicine for further management. Patient had previous admissions for cannabis hyperemesis with similar presentation. Patient with acute kidney injury, which was likely due to hyperemesis induced volume depletion, which has since resolved with volume repletion. Leukocytosis and metabolic acidosis were likely due to poor po and vomiting, which improved with hydration, improved diet and resolution fo vomiting. Additionally, the hypokalemia caused by her vomiting was supplemented. Patient with treated during this admission with supportive care and a slowly advanced diet. She was able to tolerate a regular diet on the evening of May 17th and has been cleared for discharge on May 18th. Patient will follow up with her primary care provider for continued monitoring and treatment.     On 5/18, discussed with Dr. Calhoun, patient is medically cleared and optimized for discharge today. All medications were reviewed with attending, and sent to mutually agreed upon pharmacy.

## 2022-05-18 NOTE — DISCHARGE NOTE PROVIDER - NSDCCPCAREPLAN_GEN_ALL_CORE_FT
PRINCIPAL DISCHARGE DIAGNOSIS  Diagnosis: Cyclical vomiting syndrome  Assessment and Plan of Treatment: - You presented with repeated vomiting adn inability to tolerate oral intake. This was treated with supportive care (hydration, medications to prevent nausea and vomiting, slowly advancing your diet, rest, etc.), which allowed this condition to resolve  -Please follow up with your primary care provider for continued monitoring and treatment      SECONDARY DISCHARGE DIAGNOSES  Diagnosis: KIMBERLY (acute kidney injury)  Assessment and Plan of Treatment: - Due to dehyrdation from vomiting, your kidneys were injured  - after hydration, this resolved  - Please follow up with your primary care provider for blood work to monitor this    Diagnosis: Metabolic acidosis  Assessment and Plan of Treatment: - Due to dehyrdation from vomiting and poor oral intake, you had increased acid levels in your blood  - after hydration and proper nutrition, this resolved  - Please follow up with your primary care provider for blood work to monitor this    Diagnosis: Hypokalemia  Assessment and Plan of Treatment: - Due to vomiting, your potassium levels were decreased  - We treated this by supplemeting you with potassium  - Please follow up with your primary care provider for blood work to monitor this    Diagnosis: GERD (gastroesophageal reflux disease)  Assessment and Plan of Treatment: - Please continue to take your acid reflux medication as directed and follow up with your primary care provider for continued monitoring and treatment

## 2022-05-18 NOTE — DISCHARGE NOTE NURSING/CASE MANAGEMENT/SOCIAL WORK - PATIENT PORTAL LINK FT
You can access the FollowMyHealth Patient Portal offered by [facility] by registering at the following website: [email or web address]. By joining [employer]’s FollowMyHealth portal, you will also be able to view your health information using other applications (apps) compatible with our system.

## 2022-05-18 NOTE — DISCHARGE NOTE PROVIDER - CARE PROVIDER_API CALL
Nurse, Bambi HERNÁNDEZ; ANEESH; FACP)  Internal Medicine  [address] [place] [address]  Phone: [phone]  Fax: [phone]  Follow Up Time:

## 2022-05-18 NOTE — DISCHARGE NOTE PROVIDER - NSDCMRMEDTOKEN_GEN_ALL_CORE_FT
acetaminophen 325 mg oral tablet: 2 tab(s) orally every 6 hours, As needed, Temp greater or equal to 38C (100.4F), Mild Pain (1 - 3), Moderate Pain (4 - 6)  Mylan birth control: 1 cap(s) orally once a day  omeprazole 40 mg oral delayed release capsule: 1 cap(s) orally once a day

## 2022-05-18 NOTE — DISCHARGE NOTE NURSING/CASE MANAGEMENT/SOCIAL WORK - NSDCPEFALRISK_GEN_ALL_CORE
For information on Fall & Injury Prevention, visit: [email or web address] OR  [email or web address] OR  https://www.cdc.gov/steadi/patient.html

## 2022-06-29 NOTE — H&P ADULT - NSHPOUTPATIENTPROVIDERS_GEN_ALL_CORE
Admission Status; Secondary Review Determination   Under the authority of the Utilization Management Committee, the utilization review process indicated a secondary review on Chris Bell. The review outcome is based on review of the medical records, discussions with staff, and applying clinical experience noted on the date of the review.   (x) Inpatient Status Appropriate - This patient's medical care is consistent with medical management for inpatient care and reasonable inpatient medical practice.     RATIONALE FOR DETERMINATION   86 yr old female with chronic anemia, polyclonal gammopathy and recent hospitalization for diarrhea now presented with constipation.  Was also found to have Na level 129 last stay and corrected with IVF.  This time Na lower at 123.  Patient endorses poor oral intake due to constipation and has other electrolytes off as well.  Reports of some confusion.  IVF started and trending Na levels.  Unfortunately, this climbed to 124 but then dropped again needing further evaluation and treatments.  Could be contributing to her confusion as well.      At the time of admission with the information available to the attending physician more than 2 nights Hospital complex care was anticipated, based on patient risk of adverse outcome if treated as outpatient and complex care required. Inpatient admission is appropriate based on the Medicare guidelines.   The information on this document is developed by the utilization review team in order for the business office to ensure compliance. This only denotes the appropriateness of proper admission status and does not reflect the quality of care rendered.   The definitions of Inpatient Status and Observation Status used in making the determination above are those provided in the CMS Coverage Manual, Chapter 1 and Chapter 6, section 70.4.   Sincerely,   Jessica Spain MD  Utilization Review  Physician Advisor  [facility]     Patient does not have a PCP   Gastroenterologist - Dr. Bautista Ortiz [facility]

## 2022-08-29 ENCOUNTER — APPOINTMENT (OUTPATIENT)
Dept: ORTHOPEDIC SURGERY | Facility: CLINIC | Age: 27
End: 2022-08-29

## 2022-11-02 NOTE — PATIENT PROFILE ADULT. - FUNCTIONAL SCREEN CURRENT LEVEL: DRESSING, MLM
we received a call from the primary physician's office requesting the latest note from our office  The patient PCP want to verify why the patient is on Coumadin 5 mg       We faxed this information to [facility] at [phone] independent

## 2022-11-18 NOTE — ED PROVIDER NOTE - ENMT NEGATIVE STATEMENT, MLM
show Ears: no ear pain and no hearing problems.Nose: no nasal congestion and no nasal drainage.Mouth/Throat: no dysphagia, no hoarseness and no throat pain.Neck: no lumps, no pain, no stiffness and no swollen glands.

## 2022-11-21 NOTE — ED PROVIDER NOTE - TOBACCO USE
89yFemale pmh HTN, bradycardia, pacemaker presents with maroon colored stools. Patient's home health aide not able to give much information, reports maroon colored stools    # GI bleed   HD stable   HGB with no significant drop  No further bloody bowel movements reported   s/p EGD 2 cm duodenal bulb ulcer along the anterior wall, ulcer is deep, cratered, with friable base and pigmentation, no active GI bleeding noted. 3 cc of epinephrine injected, 2 Endoclip were deployed to secure hemostasis.  2 clean based ulcer seen in the second part of duodenum, 5 mm each.  s/p colonoscopy with severe diverticulosis , procedure aborted secondary to poor prep( old blood through the examined part of colon) and diverticulosis     PLAN:  c/w MICU monitoring  trend CBC, notify GI with any signs of overt bleeding  PPI 40 mg IV bid   Transfuse to keep hemoglobin > 8   2 Large IVs   advance to regular diet   IR input appreciated   plan of care discussed extensively with son ernesto and ICU team   Discussed with Ernesto that the colonoscopy the need to repeat colonoscopy, as the previous one was aborted secondary to poor prep, and the need to repeat EGD in 8 weeks for follow up on gastric ulcers, given patient age and comorbidities he does not want her to undergo any further EGD or colon unless she is significantly bleeding , he understand the risks and benefits including missing GI malignancy and possible recurrent bleeding       # New 8 mm calculus within the proximal pancreatic duct in the region of the pancreatic head with new diffuse pancreatic parenchymal   atrophy and dilatation of the pancreatic duct to 1.1 cm   normal LFTs  Rec  - Further work up by MRI to rule out malignancy as outpatient if compatible with goals of care   - Follow up with our [facility] located at [address] [place] [address]. Phone Number: [phone] Tuesday session Unknown if ever smoked

## 2022-12-28 NOTE — ED ADULT TRIAGE NOTE - PAIN: PRESENCE, MLM
[No studies available for review at this time.] : No studies available for review at this time.
complains of pain/discomfort

## 2023-01-26 NOTE — PATIENT PROFILE ADULT - FALLEN IN THE PAST
2L/min via nasal canula, Contact isolation/droplet isolation, TLSO OOB/fall precautions/isolation precautions/oxygen therapy device and L/min
no

## 2023-01-30 NOTE — PATIENT PROFILE ADULT. - ALCOHOL USE HISTORY SINGLE SELECT
Date of service: 01-30-23 @ 16:54    Lying in bed in NAD  Has abdominal distension  Denies pain    ROS: no fever or chills; poorly verbal    MEDICATIONS  (STANDING):  aMIOdarone    Tablet 200 milliGRAM(s) Oral daily  atorvastatin 20 milliGRAM(s) Oral at bedtime  buDESOnide    Inhalation Suspension 0.5 milliGRAM(s) Inhalation every 12 hours  cefepime   IVPB 2000 milliGRAM(s) IV Intermittent every 12 hours  chlorhexidine 4% Liquid 1 Application(s) Topical <User Schedule>  coronavirus bivalent (EUA) Booster Vaccine (PFIZER) 0.3 milliLiter(s) IntraMuscular once  dextrose 5%. 1000 milliLiter(s) (50 mL/Hr) IV Continuous <Continuous>  dextrose 5%. 1000 milliLiter(s) (100 mL/Hr) IV Continuous <Continuous>  dextrose 50% Injectable 25 Gram(s) IV Push once  dextrose 50% Injectable 12.5 Gram(s) IV Push once  dextrose 50% Injectable 25 Gram(s) IV Push once  diltiazem    milliGRAM(s) Oral daily  DULoxetine 60 milliGRAM(s) Oral daily  glucagon  Injectable 1 milliGRAM(s) IntraMuscular once  insulin lispro (ADMELOG) corrective regimen sliding scale   SubCutaneous every 6 hours  levothyroxine Injectable 70 MICROGram(s) IV Push at bedtime  losartan 25 milliGRAM(s) Oral daily  metoprolol tartrate IVPB 2.5 milliGRAM(s) IV Intermittent every 6 hours  metroNIDAZOLE  IVPB 500 milliGRAM(s) IV Intermittent every 8 hours  montelukast 10 milliGRAM(s) Oral daily  nystatin Cream 1 Application(s) Topical two times a day  nystatin Powder 1 Application(s) Topical every 12 hours  pantoprazole  Injectable 40 milliGRAM(s) IV Push daily  pregabalin 100 milliGRAM(s) Oral every 8 hours  sodium chloride 0.9%. 1000 milliLiter(s) (50 mL/Hr) IV Continuous <Continuous>    Vital Signs Last 24 Hrs  T(C): 36.8 (30 Jan 2023 08:16), Max: 36.9 (29 Jan 2023 21:16)  T(F): 98.2 (30 Jan 2023 08:16), Max: 98.4 (29 Jan 2023 21:16)  HR: 84 (30 Jan 2023 09:46) (83 - 90)  BP: 150/77 (30 Jan 2023 08:16) (134/72 - 150/77)  BP(mean): --  RR: 18 (30 Jan 2023 08:16) (18 - 19)  SpO2: 99% (30 Jan 2023 09:46) (99% - 99%)    Parameters below as of 30 Jan 2023 09:46  Patient On (Oxygen Delivery Method): nasal cannula,2L     Physical exam:    Constitutional:  No acute distress  HEENT: NC/AT, EOMI, PERRLA, conjunctivae clear; ears and nose atraumatic  Neck: supple; thyroid not palpable  Back: no tenderness  Respiratory: respiratory effort normal; crackles at bases  Cardiovascular: S1S2 regular, no murmurs  Abdomen: soft, mid abdomen tender, less distended, positive BS  Genitourinary: no suprapubic tenderness  Lymphatic: no LN palpable  Musculoskeletal: no muscle tenderness, no joint swelling or tenderness  Extremities: no pedal edema  Neurological/ Psychiatric: confused, judgement and insight impaired; moving all extremities  Skin: no rashes; no palpable lesions    Labs: reviewed                        9.9    10.40 )-----------( 494      ( 30 Jan 2023 06:55 )             32.9     01-30    148<H>  |  115<H>  |  19  ----------------------------<  105<H>  3.7   |  29  |  0.59    Ca    7.7<L>      30 Jan 2023 06:55    TPro  4.9<L>  /  Alb  1.3<L>  /  TBili  0.3  /  DBili  0.1  /  AST  14<L>  /  ALT  12  /  AlkPhos  65  01-28                        8.8    12.52 )-----------( 446      ( 29 Jan 2023 06:57 )             29.6     01-29    148<H>  |  114<H>  |  20  ----------------------------<  127<H>  3.4<L>   |  29  |  0.76    Ca    7.4<L>      29 Jan 2023 06:57    TPro  4.9<L>  /  Alb  1.3<L>  /  TBili  0.3  /  DBili  0.1  /  AST  14<L>  /  ALT  12  /  AlkPhos  65  01-28                        10.9   19.72 )-----------( 510      ( 27 Jan 2023 06:16 )             36.7     01-27    142  |  111<H>  |  10  ----------------------------<  233<H>  3.8   |  26  |  0.78    Ca    8.0<L>      27 Jan 2023 06:16  Phos  2.7     01-27  Mg     1.8     01-27      Culture - Blood (collected 27 Jan 2023 06:16)  Source: .Blood None  Preliminary Report (28 Jan 2023 09:01):    No growth to date.    Culture - Blood (collected 27 Jan 2023 06:16)  Source: .Blood None  Preliminary Report (28 Jan 2023 09:01):    No growth to date.    Culture - Urine (collected 20 Jan 2023 20:03)  Source: Clean Catch Clean Catch (Midstream)  Final Report (25 Jan 2023 08:05):    >100,000 CFU/ml Klebsiella pneumoniae    Multiple Morphological Strains  Organism: Klebsiella pneumoniae  Klebsiella pneumoniae (25 Jan 2023 08:05)  Organism: Klebsiella pneumoniae (25 Jan 2023 08:05)      -  Amikacin: S <=16      -  Amoxicillin/Clavulanic Acid: S <=8/4      -  Ampicillin: R >16 These ampicillin results predict results for amoxicillin      -  Ampicillin/Sulbactam: S <=4/2 Enterobacter, Klebsiella aerogenes, Citrobacter, and Serratia may develop resistance during prolonged therapy (3-4 days)      -  Aztreonam: S <=4      -  Cefazolin: S <=2 For uncomplicated UTI with K. pneumoniae, E. coli, or P. mirablis: ARABELLA <=16 is sensitive and ARABELLA >=32 is resistant. This also predicts results for oral agents cefaclor, cefdinir, cefpodoxime, cefprozil, cefuroxime axetil, cephalexin and locarbef for uncomplicated UTI. Note that some isolates may be susceptible to these agents while testing resistant to cefazolin.      -  Cefepime: S <=2      -  Cefoxitin: S <=8      -  Ceftriaxone: S <=1 Enterobacter, Klebsiella aerogenes, Citrobacter, and Serratia may develop resistance during prolonged therapy      -  Cefuroxime: S <=4      -  Ciprofloxacin: S <=0.25      -  Ertapenem: S <=0.5      -  Gentamicin: S <=2      -  Imipenem: S <=1      -  Levofloxacin: S <=0.5      -  Meropenem: S <=1      -  Nitrofurantoin: S <=32 Should not be used to treat pyelonephritis      -  Piperacillin/Tazobactam: S <=8      -  Tobramycin: S <=2      -  Trimethoprim/Sulfamethoxazole: S <=0.5/9.5      Method Type: ARABELLA  Organism: Klebsiella pneumoniae (25 Jan 2023 08:05)      -  Amikacin: S <=16      -  Amoxicillin/Clavulanic Acid: S <=8/4 Consider reserving for cystitis when ampicillin/sulbactam is resistant      -  Ampicillin: R >16 These ampicillin results predict results for amoxicillin      -  Ampicillin/Sulbactam: R >16/8 Enterobacter, Klebsiella aerogenes, Citrobacter, and Serratia may develop resistance during prolonged therapy (3-4 days)      -  Aztreonam: S <=4      -  Cefazolin: R >16 For uncomplicated UTI with K. pneumoniae, E. coli, or P. mirablis: ARABELLA <=16 is sensitive and ARABELLA >=32 is resistant. This also predicts results for oral agents cefaclor, cefdinir, cefpodoxime, cefprozil, cefuroxime axetil, cephalexin and locarbef for uncomplicated UTI. Note that some isolates may be susceptible to these agents while testing resistant to cefazolin.      -  Cefepime: S <=2      -  Cefoxitin: S <=8      -  Ceftriaxone: S <=1 Enterobacter, Klebsiella aerogenes, Citrobacter, and Serratia may develop resistance during prolonged therapy      -  Cefuroxime: R >16      -  Ciprofloxacin: S <=0.25      -  Ertapenem: S <=0.5      -  Gentamicin: S <=2      -  Imipenem: S <=1      -  Levofloxacin: S <=0.5      -  Meropenem: S <=1      -  Nitrofurantoin: S <=32 Should not be used to treat pyelonephritis      -  Piperacillin/Tazobactam: R 32      -  Tobramycin: S <=2      -  Trimethoprim/Sulfamethoxazole: S <=0.5/9.5      Method Type: ARABELLA    Culture - Blood (collected 20 Jan 2023 18:02)  Source: .Blood None  Final Report (26 Jan 2023 01:00):    No Growth Final    Culture - Blood (collected 20 Jan 2023 18:01)  Source: .Blood None  Final Report (26 Jan 2023 01:00):    No Growth Final    Radiology: all available radiological tests reviewed    < from: CT Chest No Cont (01.27.23 @ 09:53) >  Small bowel obstruction with transition point at the neck of a left lower   quadrant abdominal wall hernia. It is uncertain whether the obstruction   is due to the hernia itself or to adhesions.    Additional massive ventral hernia containing small and large bowel and   epigastric hernia containing stomach.    Right lower lobe nodules new since December 04, 2021 and could be   infectious or neoplastic. Follow-up chest CT in 3 months is recommended   to reevaluate.    < end of copied text >      Advanced directives addressed: full resuscitation never

## 2023-04-16 NOTE — ED ADULT NURSE NOTE - CHIEF COMPLAINT QUOTE
Pt. c/o abdominal pain, nausea and vomiting x 2hrs. As per mom pain started on Thursday but got worst tonight.
Stable

## 2023-04-27 NOTE — ED ADULT NURSE REASSESSMENT NOTE - NS ED NURSE REASSESS COMMENT FT1
patient care assumed at 0710, patient remains a/o x3, sleeping on and off, admits to 6/10 abdominal pains, no nausea or vomiting at this time. She is admitted to medicine and pending room assignment, she is aware. Glycopyrrolate Pregnancy And Lactation Text: This medication is Pregnancy Category B and is considered safe during pregnancy. It is unknown if it is excreted breast milk.

## 2023-05-10 ENCOUNTER — TRANSCRIPTION ENCOUNTER (OUTPATIENT)
Age: 28
End: 2023-05-10

## 2023-05-30 NOTE — ED ADULT TRIAGE NOTE - WEIGHT METHOD
stated Cimzia Pregnancy And Lactation Text: This medication crosses the placenta but can be considered safe in certain situations. Cimzia may be excreted in breast milk.

## 2023-08-10 NOTE — ED ADULT NURSE NOTE - NSSISCREENINGQ2_ED_A_ED
"Chief Complaint  Well Child (3 year old)    Subjective    Noel Wagner III is a 3 y.o. male.     Noel Wagner III presents to [facility] for       History of Present Illness    The patient is accompanied by his father and sister.     1. Rhinorrhea. - The patient's father reports that the patient has been experiencing rhinorrhea. He is unsure if it is due to allergies. He has been giving the patient children's Allegra.    The following portions of the patient's history were reviewed and updated as appropriate: allergies, current medications, past family history, past medical history, past social history, past surgical history, and problem list.    Well Child Assessment:  History was provided by the father.   Nutrition  Types of intake include cereals, juices, meats and fruits (normal).   Elimination  Elimination problems do not include constipation, diarrhea, gas or urinary symptoms. Toilet training is complete.        Review of Systems   Gastrointestinal:  Negative for constipation and diarrhea.     Objective   Vital Signs:   Pulse 130   Temp 98.6 øF (37 øC) (Temporal)   Resp 20   Ht 109.2 cm (43\")   Wt (!) 21.3 kg (47 lb)   HC 52.1 cm (20.5\")   BMI 17.87 kg/mý     Body mass index is 17.87 kg/mý.  BMI is below normal parameters (malnutrition). Recommendations: none (medical contraindication)     Physical Exam  Constitutional:       General: He is not in acute distress.  HENT:      Head: Normocephalic and atraumatic.      Mouth/Throat:      Mouth: Mucous membranes are moist.      Pharynx: Oropharynx is clear.   Eyes:      Extraocular Movements: Extraocular movements intact.      Pupils: Pupils are equal, round, and reactive to light.   Neck:      Comments: No goiter.   Cardiovascular:      Rate and Rhythm: Normal rate and regular rhythm.      Pulses: Normal pulses.           Radial pulses are 2+ on the right side and 2+ on the left side.        " Brachial pulses are 2+ on the right side and 2+ on the left side.       Femoral pulses are 2+ on the right side and 2+ on the left side.       Dorsalis pedis pulses are 2+ on the right side and 2+ on the left side.        Posterior tibial pulses are 2+ on the right side and 2+ on the left side.      Heart sounds: Normal heart sounds, S1 normal and S2 normal. No murmur heard.    No friction rub. No gallop.      Comments: Good perfusion.   Pulmonary:      Effort: Pulmonary effort is normal.      Breath sounds: Normal breath sounds. No wheezing or rhonchi.   Abdominal:      General: Bowel sounds are normal.      Palpations: Abdomen is soft. There is no mass.      Tenderness: There is no abdominal tenderness.   Musculoskeletal:      Cervical back: Neck supple.      Comments: +5/5 both upper and lower proximal distributions.   Lymphadenopathy:      Cervical: No cervical adenopathy.   Neurological:      Mental Status: He is alert and oriented for age.      Cranial Nerves: Cranial nerves 2-12 are intact.      Deep Tendon Reflexes: Reflexes are normal and symmetric.             Assessment and Plan  Diagnoses and all orders for this visit:    1. This is a 3-year-old well child visit.  - Growth and development,   - Doing well.    2. Nutrition   - Appropriate with some concerns of low vegetable intake.   - Did discuss with father different approaches to help incorporate a more advanced diet.      Diagnoses and all orders for this visit:    1. Encounter for routine child health examination without abnormal findings (Primary)    2. Seasonal allergies      Follow Up   No follow-ups on file.  Patient was given instructions and counseling regarding his condition or for health maintenance advice. Please see specific information pulled into the AVS if appropriate.      Transcribed from ambient dictation for Zander Irizarry MD by Oliva Duek.  08/10/23   16:54 EDT    Patient or patient representative verbalized consent to the visit  recording.  I have personally performed the services described in this document as transcribed by the above individual, and it is both accurate and complete.     Primary Care... No

## 2023-09-01 NOTE — H&P ADULT - MUSCULOSKELETAL
Detail Level: Simple Render Risk Assessment In Note?: no Additional Notes: The patient is present due to concerns of acne scarring and PIH from HS in between thighs. Upon evaluation of his concerns I informed recommended the following treatment plan:\\n\\nAcne scarring: I informed him that it would be in his best interest to pursue a series of 3-4 PRP + Microneedling treatments. I informed the patient that platelet rich plasma (PRP) is an excellent addition to our Microneedling services. PRP is a concentrate composed of plasma and platelets. The platelets play a vital role in aiding with healing and are rich in growth factors stimulating cell reproduction and tissue regeneration. When paired with microneedling it further promotes efficacy of treatment results and can aid in alleviated downtime. The Process of retrieving their PRP is performed and utilized at the time of service. I reassured the patient that I am a licensed Phlebotomist to perform safe and effective blood draws. With the enrichment of PRP to microneedling treatments it can be expected to have an elevated treatment outcome correcting acne scarring and enlarged pores. The patient was quoted $689/PRP + Microneedling treatment.\\n\\nPIH from HS: The patient was informed that it would be in his best interest to pursue a series of 4-5 VI Body peels for treatment of his inner thighs. We discussed chemical peel composition, realistic expectations, and downtime of treatment. I advised him to discuss this treatment option with our PA Nasim Jung to ensure that it would be safe for him to pursue at this time due to his HS. The patient was quoted $299/VI Body (small vial).\\n\\nI informed the patient that if he purchased a series of 3+ cosmetic treatments he will receive a 10% discount and the following products per treatment package:\\nPRP + Microneedling- Obagi Rebalance moisturizer\\nVI Body Peel: Glytone resurfacing body lotion\\n\\nI also informed the patient of our upcoming peel special 9/28-9/29 $50 off single VI Peel or 20% off packages of 3+. No joint pain, swelling or deformity; no limitation of movement

## 2023-11-02 NOTE — PATIENT PROFILE ADULT - NSPROPTRIGHTSUPPORTPERSON_GEN_A_NUR
Comment: Melanoma, L superior lateral midback, 0.2mm thickness,(7x3mm two toned brown papule with a thickened network, pink papule adjacent to it), margins frees, s/p excision by Dr. Pruitt 10/2022\\n\\n-Advised pt to have yearly eye exam, GYN exam, and lymph node checks with PCP declines Detail Level: Simple Render Risk Assessment In Note?: no Comment: Mixed Superficial and Nodular BCC, L upper back (deep margins involved), s/p ED&C by Dr. Pruitt 7/2023

## 2023-12-12 ENCOUNTER — INPATIENT (INPATIENT)
Facility: HOSPITAL | Age: 28
LOS: 3 days | Discharge: HOME | End: 2023-12-16
Attending: HOSPITALIST | Admitting: HOSPITALIST
Payer: COMMERCIAL

## 2023-12-12 VITALS
OXYGEN SATURATION: 99 % | DIASTOLIC BLOOD PRESSURE: 110 MMHG | SYSTOLIC BLOOD PRESSURE: 158 MMHG | TEMPERATURE: 99 F | RESPIRATION RATE: 22 BRPM | HEART RATE: 94 BPM | WEIGHT: 139.99 LBS | HEIGHT: 64 IN

## 2023-12-12 LAB
ALBUMIN SERPL ELPH-MCNC: 4.5 G/DL — SIGNIFICANT CHANGE UP (ref 3.3–5)
ALBUMIN SERPL ELPH-MCNC: 4.5 G/DL — SIGNIFICANT CHANGE UP (ref 3.3–5)
ALP SERPL-CCNC: 69 U/L — SIGNIFICANT CHANGE UP (ref 40–120)
ALP SERPL-CCNC: 69 U/L — SIGNIFICANT CHANGE UP (ref 40–120)
ALT FLD-CCNC: 58 U/L — SIGNIFICANT CHANGE UP (ref 12–78)
ALT FLD-CCNC: 58 U/L — SIGNIFICANT CHANGE UP (ref 12–78)
ANION GAP SERPL CALC-SCNC: 11 MMOL/L — SIGNIFICANT CHANGE UP (ref 5–17)
ANION GAP SERPL CALC-SCNC: 11 MMOL/L — SIGNIFICANT CHANGE UP (ref 5–17)
APPEARANCE UR: ABNORMAL
APPEARANCE UR: ABNORMAL
AST SERPL-CCNC: 67 U/L — HIGH (ref 15–37)
AST SERPL-CCNC: 67 U/L — HIGH (ref 15–37)
BACTERIA # UR AUTO: ABNORMAL /HPF
BACTERIA # UR AUTO: ABNORMAL /HPF
BASOPHILS # BLD AUTO: 0.02 K/UL — SIGNIFICANT CHANGE UP (ref 0–0.2)
BASOPHILS # BLD AUTO: 0.02 K/UL — SIGNIFICANT CHANGE UP (ref 0–0.2)
BASOPHILS NFR BLD AUTO: 0.1 % — SIGNIFICANT CHANGE UP (ref 0–2)
BASOPHILS NFR BLD AUTO: 0.1 % — SIGNIFICANT CHANGE UP (ref 0–2)
BILIRUB SERPL-MCNC: 1.4 MG/DL — HIGH (ref 0.2–1.2)
BILIRUB SERPL-MCNC: 1.4 MG/DL — HIGH (ref 0.2–1.2)
BILIRUB UR-MCNC: ABNORMAL
BILIRUB UR-MCNC: ABNORMAL
BUN SERPL-MCNC: 21 MG/DL — SIGNIFICANT CHANGE UP (ref 7–23)
BUN SERPL-MCNC: 21 MG/DL — SIGNIFICANT CHANGE UP (ref 7–23)
CALCIUM SERPL-MCNC: 10 MG/DL — SIGNIFICANT CHANGE UP (ref 8.5–10.1)
CALCIUM SERPL-MCNC: 10 MG/DL — SIGNIFICANT CHANGE UP (ref 8.5–10.1)
CHLORIDE SERPL-SCNC: 109 MMOL/L — HIGH (ref 96–108)
CHLORIDE SERPL-SCNC: 109 MMOL/L — HIGH (ref 96–108)
CO2 SERPL-SCNC: 21 MMOL/L — LOW (ref 22–31)
CO2 SERPL-SCNC: 21 MMOL/L — LOW (ref 22–31)
COLOR SPEC: SIGNIFICANT CHANGE UP
COLOR SPEC: SIGNIFICANT CHANGE UP
CREAT SERPL-MCNC: 1.29 MG/DL — SIGNIFICANT CHANGE UP (ref 0.5–1.3)
CREAT SERPL-MCNC: 1.29 MG/DL — SIGNIFICANT CHANGE UP (ref 0.5–1.3)
DIFF PNL FLD: ABNORMAL
DIFF PNL FLD: ABNORMAL
EGFR: 58 ML/MIN/1.73M2 — LOW
EGFR: 58 ML/MIN/1.73M2 — LOW
EOSINOPHIL # BLD AUTO: 0 K/UL — SIGNIFICANT CHANGE UP (ref 0–0.5)
EOSINOPHIL # BLD AUTO: 0 K/UL — SIGNIFICANT CHANGE UP (ref 0–0.5)
EOSINOPHIL NFR BLD AUTO: 0 % — SIGNIFICANT CHANGE UP (ref 0–6)
EOSINOPHIL NFR BLD AUTO: 0 % — SIGNIFICANT CHANGE UP (ref 0–6)
EPI CELLS # UR: PRESENT
EPI CELLS # UR: PRESENT
GLUCOSE SERPL-MCNC: 126 MG/DL — HIGH (ref 70–99)
GLUCOSE SERPL-MCNC: 126 MG/DL — HIGH (ref 70–99)
GLUCOSE UR QL: NEGATIVE MG/DL — SIGNIFICANT CHANGE UP
GLUCOSE UR QL: NEGATIVE MG/DL — SIGNIFICANT CHANGE UP
HCG SERPL-ACNC: <1 MIU/ML — SIGNIFICANT CHANGE UP
HCG SERPL-ACNC: <1 MIU/ML — SIGNIFICANT CHANGE UP
HCT VFR BLD CALC: 40 % — SIGNIFICANT CHANGE UP (ref 34.5–45)
HCT VFR BLD CALC: 40 % — SIGNIFICANT CHANGE UP (ref 34.5–45)
HGB BLD-MCNC: 13.7 G/DL — SIGNIFICANT CHANGE UP (ref 11.5–15.5)
HGB BLD-MCNC: 13.7 G/DL — SIGNIFICANT CHANGE UP (ref 11.5–15.5)
HYALINE CASTS # UR AUTO: PRESENT
HYALINE CASTS # UR AUTO: PRESENT
IMM GRANULOCYTES NFR BLD AUTO: 0.6 % — SIGNIFICANT CHANGE UP (ref 0–0.9)
IMM GRANULOCYTES NFR BLD AUTO: 0.6 % — SIGNIFICANT CHANGE UP (ref 0–0.9)
KETONES UR-MCNC: 80 MG/DL
KETONES UR-MCNC: 80 MG/DL
LEUKOCYTE ESTERASE UR-ACNC: ABNORMAL
LEUKOCYTE ESTERASE UR-ACNC: ABNORMAL
LIDOCAIN IGE QN: 16 U/L — SIGNIFICANT CHANGE UP (ref 13–75)
LIDOCAIN IGE QN: 16 U/L — SIGNIFICANT CHANGE UP (ref 13–75)
LYMPHOCYTES # BLD AUTO: 1.77 K/UL — SIGNIFICANT CHANGE UP (ref 1–3.3)
LYMPHOCYTES # BLD AUTO: 1.77 K/UL — SIGNIFICANT CHANGE UP (ref 1–3.3)
LYMPHOCYTES # BLD AUTO: 12.5 % — LOW (ref 13–44)
LYMPHOCYTES # BLD AUTO: 12.5 % — LOW (ref 13–44)
MCHC RBC-ENTMCNC: 27.7 PG — SIGNIFICANT CHANGE UP (ref 27–34)
MCHC RBC-ENTMCNC: 27.7 PG — SIGNIFICANT CHANGE UP (ref 27–34)
MCHC RBC-ENTMCNC: 34.3 G/DL — SIGNIFICANT CHANGE UP (ref 32–36)
MCHC RBC-ENTMCNC: 34.3 G/DL — SIGNIFICANT CHANGE UP (ref 32–36)
MCV RBC AUTO: 80.8 FL — SIGNIFICANT CHANGE UP (ref 80–100)
MCV RBC AUTO: 80.8 FL — SIGNIFICANT CHANGE UP (ref 80–100)
MONOCYTES # BLD AUTO: 1.27 K/UL — HIGH (ref 0–0.9)
MONOCYTES # BLD AUTO: 1.27 K/UL — HIGH (ref 0–0.9)
MONOCYTES NFR BLD AUTO: 9 % — SIGNIFICANT CHANGE UP (ref 2–14)
MONOCYTES NFR BLD AUTO: 9 % — SIGNIFICANT CHANGE UP (ref 2–14)
NEUTROPHILS # BLD AUTO: 11.04 K/UL — HIGH (ref 1.8–7.4)
NEUTROPHILS # BLD AUTO: 11.04 K/UL — HIGH (ref 1.8–7.4)
NEUTROPHILS NFR BLD AUTO: 77.8 % — HIGH (ref 43–77)
NEUTROPHILS NFR BLD AUTO: 77.8 % — HIGH (ref 43–77)
NITRITE UR-MCNC: NEGATIVE — SIGNIFICANT CHANGE UP
NITRITE UR-MCNC: NEGATIVE — SIGNIFICANT CHANGE UP
NRBC # BLD: 0 /100 WBCS — SIGNIFICANT CHANGE UP (ref 0–0)
NRBC # BLD: 0 /100 WBCS — SIGNIFICANT CHANGE UP (ref 0–0)
PH UR: 6 — SIGNIFICANT CHANGE UP (ref 5–8)
PH UR: 6 — SIGNIFICANT CHANGE UP (ref 5–8)
PLATELET # BLD AUTO: 329 K/UL — SIGNIFICANT CHANGE UP (ref 150–400)
PLATELET # BLD AUTO: 329 K/UL — SIGNIFICANT CHANGE UP (ref 150–400)
POTASSIUM SERPL-MCNC: 4 MMOL/L — SIGNIFICANT CHANGE UP (ref 3.5–5.3)
POTASSIUM SERPL-MCNC: 4 MMOL/L — SIGNIFICANT CHANGE UP (ref 3.5–5.3)
POTASSIUM SERPL-SCNC: 4 MMOL/L — SIGNIFICANT CHANGE UP (ref 3.5–5.3)
POTASSIUM SERPL-SCNC: 4 MMOL/L — SIGNIFICANT CHANGE UP (ref 3.5–5.3)
PROT SERPL-MCNC: 9.1 GM/DL — HIGH (ref 6–8.3)
PROT SERPL-MCNC: 9.1 GM/DL — HIGH (ref 6–8.3)
PROT UR-MCNC: 100 MG/DL
PROT UR-MCNC: 100 MG/DL
RBC # BLD: 4.95 M/UL — SIGNIFICANT CHANGE UP (ref 3.8–5.2)
RBC # BLD: 4.95 M/UL — SIGNIFICANT CHANGE UP (ref 3.8–5.2)
RBC # FLD: 14.3 % — SIGNIFICANT CHANGE UP (ref 10.3–14.5)
RBC # FLD: 14.3 % — SIGNIFICANT CHANGE UP (ref 10.3–14.5)
RBC CASTS # UR COMP ASSIST: SIGNIFICANT CHANGE UP /HPF (ref 0–4)
RBC CASTS # UR COMP ASSIST: SIGNIFICANT CHANGE UP /HPF (ref 0–4)
SODIUM SERPL-SCNC: 141 MMOL/L — SIGNIFICANT CHANGE UP (ref 135–145)
SODIUM SERPL-SCNC: 141 MMOL/L — SIGNIFICANT CHANGE UP (ref 135–145)
SP GR SPEC: >1.03 — HIGH (ref 1–1.03)
SP GR SPEC: >1.03 — HIGH (ref 1–1.03)
UROBILINOGEN FLD QL: 1 MG/DL — SIGNIFICANT CHANGE UP (ref 0.2–1)
UROBILINOGEN FLD QL: 1 MG/DL — SIGNIFICANT CHANGE UP (ref 0.2–1)
WBC # BLD: 14.18 K/UL — HIGH (ref 3.8–10.5)
WBC # BLD: 14.18 K/UL — HIGH (ref 3.8–10.5)
WBC # FLD AUTO: 14.18 K/UL — HIGH (ref 3.8–10.5)
WBC # FLD AUTO: 14.18 K/UL — HIGH (ref 3.8–10.5)
WBC UR QL: SIGNIFICANT CHANGE UP /HPF (ref 0–5)
WBC UR QL: SIGNIFICANT CHANGE UP /HPF (ref 0–5)

## 2023-12-12 PROCEDURE — 74177 CT ABD & PELVIS W/CONTRAST: CPT | Mod: 26,MA

## 2023-12-12 PROCEDURE — 99285 EMERGENCY DEPT VISIT HI MDM: CPT

## 2023-12-12 PROCEDURE — 99222 1ST HOSP IP/OBS MODERATE 55: CPT

## 2023-12-12 RX ORDER — FAMOTIDINE 10 MG/ML
20 INJECTION INTRAVENOUS ONCE
Refills: 0 | Status: COMPLETED | OUTPATIENT
Start: 2023-12-12 | End: 2023-12-12

## 2023-12-12 RX ORDER — DIPHENHYDRAMINE HYDROCHLORIDE AND LIDOCAINE HYDROCHLORIDE AND ALUMINUM HYDROXIDE AND MAGNESIUM HYDRO
10 KIT ONCE
Refills: 0 | Status: COMPLETED | OUTPATIENT
Start: 2023-12-12 | End: 2023-12-12

## 2023-12-12 RX ORDER — ONDANSETRON 8 MG/1
4 TABLET, FILM COATED ORAL EVERY 6 HOURS
Refills: 0 | Status: DISCONTINUED | OUTPATIENT
Start: 2023-12-12 | End: 2023-12-16

## 2023-12-12 RX ORDER — METOCLOPRAMIDE HCL 10 MG
10 TABLET ORAL EVERY 8 HOURS
Refills: 0 | Status: DISCONTINUED | OUTPATIENT
Start: 2023-12-12 | End: 2023-12-16

## 2023-12-12 RX ORDER — PANTOPRAZOLE SODIUM 20 MG/1
40 TABLET, DELAYED RELEASE ORAL
Refills: 0 | Status: DISCONTINUED | OUTPATIENT
Start: 2023-12-12 | End: 2023-12-14

## 2023-12-12 RX ORDER — KETOROLAC TROMETHAMINE 30 MG/ML
15 SYRINGE (ML) INJECTION ONCE
Refills: 0 | Status: DISCONTINUED | OUTPATIENT
Start: 2023-12-12 | End: 2023-12-12

## 2023-12-12 RX ORDER — SODIUM CHLORIDE 9 MG/ML
2000 INJECTION INTRAMUSCULAR; INTRAVENOUS; SUBCUTANEOUS ONCE
Refills: 0 | Status: COMPLETED | OUTPATIENT
Start: 2023-12-12 | End: 2023-12-12

## 2023-12-12 RX ORDER — DIPHENHYDRAMINE HCL 50 MG
25 CAPSULE ORAL ONCE
Refills: 0 | Status: COMPLETED | OUTPATIENT
Start: 2023-12-12 | End: 2023-12-12

## 2023-12-12 RX ORDER — CHLORHEXIDINE GLUCONATE 213 G/1000ML
1 SOLUTION TOPICAL
Refills: 0 | Status: DISCONTINUED | OUTPATIENT
Start: 2023-12-12 | End: 2023-12-16

## 2023-12-12 RX ORDER — METOCLOPRAMIDE HCL 10 MG
10 TABLET ORAL ONCE
Refills: 0 | Status: COMPLETED | OUTPATIENT
Start: 2023-12-12 | End: 2023-12-12

## 2023-12-12 RX ORDER — HEPARIN SODIUM 5000 [USP'U]/ML
5000 INJECTION INTRAVENOUS; SUBCUTANEOUS EVERY 8 HOURS
Refills: 0 | Status: DISCONTINUED | OUTPATIENT
Start: 2023-12-12 | End: 2023-12-16

## 2023-12-12 RX ORDER — DIPHENHYDRAMINE HCL 50 MG
25 CAPSULE ORAL EVERY 6 HOURS
Refills: 0 | Status: DISCONTINUED | OUTPATIENT
Start: 2023-12-12 | End: 2023-12-16

## 2023-12-12 RX ORDER — SODIUM CHLORIDE 9 MG/ML
1000 INJECTION, SOLUTION INTRAVENOUS
Refills: 0 | Status: DISCONTINUED | OUTPATIENT
Start: 2023-12-12 | End: 2023-12-16

## 2023-12-12 RX ORDER — SODIUM CHLORIDE 9 MG/ML
1000 INJECTION INTRAMUSCULAR; INTRAVENOUS; SUBCUTANEOUS ONCE
Refills: 0 | Status: COMPLETED | OUTPATIENT
Start: 2023-12-12 | End: 2023-12-12

## 2023-12-12 RX ORDER — ONDANSETRON 8 MG/1
4 TABLET, FILM COATED ORAL ONCE
Refills: 0 | Status: COMPLETED | OUTPATIENT
Start: 2023-12-12 | End: 2023-12-12

## 2023-12-12 RX ORDER — HALOPERIDOL DECANOATE 100 MG/ML
2.5 INJECTION INTRAMUSCULAR ONCE
Refills: 0 | Status: COMPLETED | OUTPATIENT
Start: 2023-12-12 | End: 2023-12-12

## 2023-12-12 RX ADMIN — SODIUM CHLORIDE 1000 MILLILITER(S): 9 INJECTION INTRAMUSCULAR; INTRAVENOUS; SUBCUTANEOUS at 16:20

## 2023-12-12 RX ADMIN — Medication 25 MILLIGRAM(S): at 11:07

## 2023-12-12 RX ADMIN — SODIUM CHLORIDE 75 MILLILITER(S): 9 INJECTION, SOLUTION INTRAVENOUS at 21:03

## 2023-12-12 RX ADMIN — Medication 10 MILLIGRAM(S): at 21:04

## 2023-12-12 RX ADMIN — Medication 15 MILLIGRAM(S): at 13:35

## 2023-12-12 RX ADMIN — HALOPERIDOL DECANOATE 2.5 MILLIGRAM(S): 100 INJECTION INTRAMUSCULAR at 14:25

## 2023-12-12 RX ADMIN — Medication 15 MILLIGRAM(S): at 22:38

## 2023-12-12 RX ADMIN — DIPHENHYDRAMINE HYDROCHLORIDE AND LIDOCAINE HYDROCHLORIDE AND ALUMINUM HYDROXIDE AND MAGNESIUM HYDRO 10 MILLILITER(S): KIT at 16:39

## 2023-12-12 RX ADMIN — HEPARIN SODIUM 5000 UNIT(S): 5000 INJECTION INTRAVENOUS; SUBCUTANEOUS at 22:38

## 2023-12-12 RX ADMIN — ONDANSETRON 4 MILLIGRAM(S): 8 TABLET, FILM COATED ORAL at 11:04

## 2023-12-12 RX ADMIN — Medication 10 MILLIGRAM(S): at 13:20

## 2023-12-12 RX ADMIN — Medication 25 MILLIGRAM(S): at 13:21

## 2023-12-12 RX ADMIN — Medication 15 MILLIGRAM(S): at 23:25

## 2023-12-12 RX ADMIN — SODIUM CHLORIDE 1000 MILLILITER(S): 9 INJECTION INTRAMUSCULAR; INTRAVENOUS; SUBCUTANEOUS at 13:21

## 2023-12-12 RX ADMIN — SODIUM CHLORIDE 2000 MILLILITER(S): 9 INJECTION INTRAMUSCULAR; INTRAVENOUS; SUBCUTANEOUS at 11:07

## 2023-12-12 RX ADMIN — FAMOTIDINE 20 MILLIGRAM(S): 10 INJECTION INTRAVENOUS at 11:07

## 2023-12-12 RX ADMIN — Medication 15 MILLIGRAM(S): at 11:03

## 2023-12-12 RX ADMIN — Medication 30 MILLILITER(S): at 21:03

## 2023-12-12 RX ADMIN — Medication 15 MILLIGRAM(S): at 13:18

## 2023-12-12 RX ADMIN — SODIUM CHLORIDE 2000 MILLILITER(S): 9 INJECTION INTRAMUSCULAR; INTRAVENOUS; SUBCUTANEOUS at 13:00

## 2023-12-12 RX ADMIN — Medication 15 MILLIGRAM(S): at 11:40

## 2023-12-12 RX ADMIN — Medication 25 MILLIGRAM(S): at 21:04

## 2023-12-12 NOTE — ED ADULT NURSE NOTE - OBJECTIVE STATEMENT
28yF A&Ox4 presenting to ED with gen abd pain X 2 days. pt reports she attempted to drink water yesterday morning and was unable to. pt reports she then smoked marijuana to try and calm herself down, but then she just felt more pain in the abd accompanied by N+V. pt report she has hx of gastritis which she takes omeprazole for. pt reports N+T in extremities. pt denies chest pain, sob, dizziness, h/a, back pain, blurred vision, cough/cold/fever. LMP 11/15/23 pt reports she takes BC.

## 2023-12-12 NOTE — PATIENT PROFILE ADULT - FUNCTIONAL ASSESSMENT - BASIC MOBILITY 5.
Consent (Marginal Mandibular)/Introductory Paragraph: The rationale for Mohs was explained to the patient and consent was obtained. The risks, benefits and alternatives to therapy were discussed in detail. Specifically, the risks of damage to the marginal mandibular branch of the facial nerve, infection, scarring, bleeding, prolonged wound healing, incomplete removal, allergy to anesthesia, and recurrence were addressed. Prior to the procedure, the treatment site was clearly identified and confirmed by the patient. All components of Universal Protocol/PAUSE Rule completed. 4 = No assist / stand by assistance

## 2023-12-12 NOTE — ED PROVIDER NOTE - ATTENDING APP SHARED VISIT CONTRIBUTION OF CARE
28F pmhx cyclic vomiting who present with nausea, vomiting and epigastric pain ongoing x 1 day, smoked marijuana this morning in hopes that it would relieve discomfort but no relief. Denies fevers, chills, chest pain. Notes heart burn sensation.   - abd soft but mod ttp b/l upper abd without rebound/ guarding, pt well appearing with normal respirations, heart rrr.   - plan - hx consistent with likely cannabinoid induced hyperemesis, trial ant-acids, analgesia, IV fluids, anti-emetics and reassess, check labs to rule out pregnancy, eval for electrolyte derangements, UA rule out UTI, noted 2 prior admissions per chart review for cannabinoid hyperemesis 2021 and 2022 28F pmhx cyclic vomiting who present with nausea, vomiting and epigastric pain ongoing x 1 day, smoked marijuana this morning in hopes that it would relieve discomfort but no relief. Denies fevers, chills, chest pain. Notes heart burn sensation.   - abd soft but mod ttp b/l upper abd without rebound/ guarding, pt well appearing with normal respirations, heart rrr.   - plan - hx consistent with likely cannabinoid induced hyperemesis, trial ant-acids, analgesia, IV fluids, anti-emetics and reassess, check labs to rule out pregnancy, eval for electrolyte derangements, UA rule out UTI, noted 2 prior admissions per chart review for cannabinoid hyperemesis 2021 and 2022, suspect similar cause of symptoms, will obtain ct a/p if not clinically improving to rule out obstruction, internal hernia, colitis, appendicitis

## 2023-12-12 NOTE — ED PROVIDER NOTE - CLINICAL SUMMARY MEDICAL DECISION MAKING FREE TEXT BOX
ddx: hyperemesis, gastritis, electrolyte abnormality,   +intractable vomiting.   will admit.   discussed with dr. smiley.

## 2023-12-12 NOTE — ED ADULT TRIAGE NOTE - HEART RATE (BEATS/MIN)
SW received a copy of patient's POLST this morning via email from [facility].  MARY provided patient's POLST to CCU RN Brandi, who filed the POLST in patient's 
chart. 94

## 2023-12-12 NOTE — H&P ADULT - ASSESSMENT
28F with daily marijuana use presented for evaluation of intractable vomiting, admitted for hydration and IV anti-emesis.     # Cannabis hyperemesis syndrome   - Monitor electrolytes and replace as indicated  - LR 75mL/hr   - Encouraged substance use cessation as this is the only way to avoid future symptoms   - Zofran 4mg IV q6 PRN and Reglan 10mg IV q8 PRN second line. Adjust as necessary    # Hepatic lesion   - New, 2cm hypodense liver lesion detected on CT A/P. MRI Abdomen w/wo contrast recommended, ordered for further evaluation  - Mild increase in total bili and LFTs    DVT ppx: Heparin  GI ppx: PPI  Diet: Regular   Activity: IAT   Code: Full  28F with daily marijuana use presented for evaluation of intractable vomiting, admitted for hydration and IV anti-emesis.     # Cannabis hyperemesis syndrome   - Monitor electrolytes and replace as indicated  - LR 75mL/hr   - Encouraged substance use cessation as this is the only way to avoid future symptoms   - Zofran 4mg IV q6 PRN and Reglan 10mg IV q8 PRN second line. Adjust as necessary    # Hepatic lesion   - New, 2cm hypodense liver lesion detected on CT A/P. MRI Abdomen w/wo contrast recommended, ordered for further evaluation  - Mild increase in total bili and LFTs    # KIMBERLY  - Baseline creatinine 0.9, 1.29 on admission - likely in the setting of prerenal volume depletion  - IVF  - Monitor, avoid nephrotoxins and hypotension    # Leukocytosis  - Likely reactive, monitor       DVT ppx: Heparin  GI ppx: PPI  Diet: Regular   Activity: IAT   Code: Full  28F with daily marijuana use presented for evaluation of intractable vomiting, admitted for hydration and IV anti-emesis.     # Cannabis hyperemesis syndrome   - Monitor electrolytes and replace as indicated  - LR 75mL/hr   - Encouraged substance use cessation as this is the only way to avoid future symptoms   - Zofran 4mg IV q6 PRN and Reglan 10mg IV q8 PRN second line. Adjust as necessary. Requested Benadryl as well with the Zofran as she reports it makes her itchy.     # Hepatic lesion   - New, 2cm hypodense liver lesion detected on CT A/P. MRI Abdomen w/wo contrast recommended, ordered for further evaluation  - Mild increase in total bili and LFTs    # KIMBERLY  - Baseline creatinine 0.9, 1.29 on admission - likely in the setting of prerenal volume depletion  - IVF  - Monitor, avoid nephrotoxins and hypotension    # Leukocytosis  - Likely reactive, monitor       DVT ppx: Heparin  GI ppx: PPI  Diet: Regular   Activity: IAT   Code: Full  28F with daily marijuana use presented for evaluation of intractable vomiting, admitted for hydration and IV anti-emesis.     # Cannabis hyperemesis syndrome   - Monitor electrolytes and replace as indicated  - LR 75mL/hr   - Encouraged substance use cessation as this is the only way to avoid future symptoms   - Zofran 4mg IV q6 PRN and Reglan 10mg IV q8 PRN second line. Adjust as necessary. Requested Benadryl as well with the Zofran as she reports it makes her itchy.     # Hepatic lesion   - New, 2cm hypodense liver lesion detected on CT A/P. MRI Abdomen w/wo contrast recommended, ordered for further evaluation  - Mild increase in total bili and LFTs    # KIMBERLY  - Baseline creatinine 0.9, 1.29 on admission - likely in the setting of prerenal volume depletion  - IVF  - Monitor, avoid nephrotoxins and hypotension    # Chronic GERD  - Daily PPI  - Maalox q4 PRN    # Leukocytosis  - Likely reactive, monitor       DVT ppx: Heparin  GI ppx: PPI  Diet: Regular   Activity: IAT   Code: Full

## 2023-12-12 NOTE — H&P ADULT - HISTORY OF PRESENT ILLNESS
Ms. Hilliard is a 28F with a history of GERD and cannabis hyperemesis syndrome who presented for evaluation of inability to tolerate oral nutrition and vomiting. Symptoms started the night of 12/11 and have been unremitting. She states she has not been able to keep down any food or fluids, and has painful reflux. She endorses regular cannabis use and has been hospitalized for hyperemesis in the past. Denies headache, SOB, fever, chills, night sweats, dysuria, constipation, and diarrhea. +burning CP and vomiting.   In the ED, she was given anti-emetic cocktails and IVF. Noted to have a mild KIMBERLY and LFT elevation. CT A/P with a new hypodense liver lesion, for which follow-up MRI was recommended. Will admit to medicinr for treatment and MRI to further evaluate liver lesion.

## 2023-12-12 NOTE — CHART NOTE - NSCHARTNOTEFT_GEN_A_CORE
EVENT: Received telephone call from RN that pt is c/o of severe abdominal pain which she rated as a 11/10    Brief HPI: 28F with daily marijuana use presented for evaluation of intractable vomiting, admitted for hydration and IV anti-emesis.     SUBJECTIVE: "MY abdominal pain is a 11/10. I need some strong meds"    OBJECTIVE:  Vital Signs Last 24 Hrs  T(C): 36.7 (12 Dec 2023 19:19), Max: 37.2 (12 Dec 2023 09:43)  T(F): 98.1 (12 Dec 2023 19:19), Max: 99 (12 Dec 2023 09:43)  HR: 74 (12 Dec 2023 19:19) (69 - 94)  BP: 161/97 (12 Dec 2023 19:19) (125/86 - 161/97)  BP(mean): --  RR: 12 (12 Dec 2023 19:19) (12 - 22)  SpO2: 98% (12 Dec 2023 19:19) (98% - 100%)    Parameters below as of 12 Dec 2023 19:19  Patient On (Oxygen Delivery Method): room air      FOCUSED PHYSICAL EXAM:  Neuro: awake, alert, oriented x 3. No neuro deficit  Cardiovascular: Pulses +2 B/L in lower and upper extremities, HR regular, BP stable, No edema.  Respiratory: Respirations regular, unlabored, breath sounds clear B/L.   GI: Abdomen soft, non-tender, positive bowel sounds.  : no bladder distention noted. No complaints at this time.  Skin: Dry, intact, no bruising, no diaphoresis.    LABS:                        13.7   14.18 )-----------( 329      ( 12 Dec 2023 11:00 )             40.0     12-12    141  |  109<H>  |  21  ----------------------------<  126<H>  4.0   |  21<L>  |  1.29    Ca    10.0      12 Dec 2023 11:00    TPro  9.1<H>  /  Alb  4.5  /  TBili  1.4<H>  /  DBili  x   /  AST  67<H>  /  ALT  58  /  AlkPhos  69  12-12      EKG:   IMAGING:    ASSESSMENT/PROBLEM: Morgan lower abdominal pain 2/2 to cannabis hyperemesis      PLAN:   1. Toradol 15 mg, IVP x 1 dose ordered  2. Monitor response to treatment  3. Discuss excessive smoking of cannabis with pt  4. Cont present care/treatment  5. Supportive care

## 2023-12-12 NOTE — ED PROVIDER NOTE - OBJECTIVE STATEMENT
28F pmhx cyclic vomiting who present with nausea, vomiting and epigastric pain ongoing x 1 day, smoked marijuana this morning in hopes that it would relieve discomfort but no relief. Denies fevers, chills, chest pain. Notes heart burn sensation.

## 2023-12-12 NOTE — ED ADULT TRIAGE NOTE - CHIEF COMPLAINT QUOTE
Started vomiting yesterday and now abd pain  LMP last month  HX gastritis Started vomiting yesterday and now abd pain  LMP last month  HX gastritis, prominent marijuana smell noted

## 2023-12-12 NOTE — ED ADULT NURSE REASSESSMENT NOTE - NS ED NURSE REASSESS COMMENT FT1
patient began hyperventilating in WR chair. States can't move. Patient witnessed standing up then sitting into WR provided. Explained wait time and ER triage. Verbalized understanding. Awaiting to be seen

## 2023-12-12 NOTE — ED ADULT NURSE NOTE - CHIEF COMPLAINT QUOTE
Started vomiting yesterday and now abd pain  LMP last month  HX gastritis, prominent marijuana smell noted

## 2023-12-12 NOTE — ED ADULT NURSE REASSESSMENT NOTE - NS ED NURSE REASSESS COMMENT FT1
Pt offered magic mouthwash but refused stating that she cannot take anything by mouth it all comes back up. Will endorse to primary nurse to try again.

## 2023-12-12 NOTE — ED PROVIDER NOTE - CARE PLAN
Principal Discharge DX:	Hyperemesis  Secondary Diagnosis:	Liver lesion  Secondary Diagnosis:	KIMBERLY (acute kidney injury)   1

## 2023-12-12 NOTE — PATIENT PROFILE ADULT - FALL HARM RISK - UNIVERSAL INTERVENTIONS
Bed in lowest position, wheels locked, appropriate side rails in place/Call bell, personal items and telephone in reach/Instruct patient to call for assistance before getting out of bed or chair/Non-slip footwear when patient is out of bed/[place] to call system/Physically safe environment - no spills, clutter or unnecessary equipment/Purposeful Proactive Rounding/Room/bathroom lighting operational, light cord in reach Bed in lowest position, wheels locked, appropriate side rails in place/Call bell, personal items and telephone in reach/Instruct patient to call for assistance before getting out of bed or chair/Non-slip footwear when patient is out of bed/[place] to call system/Physically safe environment - no spills, clutter or unnecessary equipment/Purposeful Proactive Rounding/Room/bathroom lighting operational, light cord in reach

## 2023-12-12 NOTE — H&P ADULT - NSHPLABSRESULTS_GEN_ALL_CORE
(12-12 @ 11:00)                      13.7  14.18 )-----------( 329                 40.0    Neutrophils = 11.04 (77.8%)  Lymphocytes = 1.77 (12.5%)  Eosinophils = 0.00 (0.0%)  Basophils = 0.02 (0.1%)  Monocytes = 1.27 (9.0%)  Bands = --%    12-12    141  |  109<H>  |  21  ----------------------------<  126<H>  4.0   |  21<L>  |  1.29    Ca    10.0      12 Dec 2023 11:00    TPro  9.1<H>  /  Alb  4.5  /  TBili  1.4<H>  /  DBili  x   /  AST  67<H>  /  ALT  58  /  AlkPhos  69  12-12          RVP:          Tox:         Urinalysis Basic - ( 12 Dec 2023 11:00 )    Color: Dark Yellow / Appearance: Cloudy / SG: >1.030 / pH: x  Gluc: 126 mg/dL / Ketone: 80 mg/dL  / Bili: Small / Urobili: 1.0 mg/dL   Blood: x / Protein: 100 mg/dL / Nitrite: Negative   Leuk Esterase: Trace / RBC: 3-5 /HPF / WBC 3-5 /HPF   Sq Epi: x / Non Sq Epi: x / Bacteria: Occasional /HPF

## 2023-12-12 NOTE — ED PROVIDER NOTE - PROGRESS NOTE DETAILS
JENAE MCCRARY: pt still unable to tolerate po. will admit. JENAE MCCRARY: I was directly involved in the management of this case. Discussed case with PA fellow.

## 2023-12-12 NOTE — ED PROVIDER NOTE - PHYSICAL EXAMINATION
Gen: aox3, NAD   Head: NCAT  ENT: Airway patent, dry mucous membranes, nasal passageways clear   Cardiac: Normal rate, normal rhythm   Respiratory: Lungs CTA B/L  Gastrointestinal: Abdomen soft, diffuse ttp but predominantly b/l upper abd , nondistended, no rebound, no guarding  MSK: No gross abnormalities, FROM of all four extremities, no edema  HEME: Extremities warm and well perfused   Skin: No rashes, no lesions  Neuro: No gross neurologic deficits

## 2023-12-12 NOTE — H&P ADULT - NSHPPHYSICALEXAM_GEN_ALL_CORE
PHYSICAL EXAM:  GENERAL: NAD, lying in bed comfortably  HEAD:  Atraumatic, Normocephalic  EYES: EOMI, PERRLA, conjunctiva and sclera clear  ENT: Moist mucous membranes  NECK: Supple, No JVD  CHEST/LUNG: Clear to auscultation bilaterally; No rales, rhonchi, wheezing, or rubs. Unlabored respirations  HEART: Regular rate and rhythm; No murmurs, rubs, or gallops  ABDOMEN: Bowel sounds present; Soft, Nontender, Nondistended. No hepatomegaly  EXTREMITIES:  2+ Peripheral Pulses, brisk capillary refill. No clubbing, cyanosis, or edema  NERVOUS SYSTEM:  Alert & Oriented X3, speech clear. No deficits   MSK: FROM all 4 extremities, full and equal strength  SKIN: No rashes or lesions PHYSICAL EXAM:  GENERAL: NAD, appears uncomfortable in bed   HEAD:  Atraumatic, Normocephalic  EYES: EOMI, PERRLA, conjunctiva and sclera clear  ENT: Moist mucous membranes  NECK: Supple, No JVD  CHEST/LUNG: Clear to auscultation bilaterally; No rales, rhonchi, wheezing, or rubs. Unlabored respirations  HEART: Regular rate and rhythm; No murmurs, rubs, or gallops  ABDOMEN: Bowel sounds present; Soft, Nontender, Nondistended. No hepatomegaly  EXTREMITIES:  2+ Peripheral Pulses, brisk capillary refill. No clubbing, cyanosis, or edema  NERVOUS SYSTEM:  Alert & Oriented X3, speech clear. No deficits   MSK: FROM all 4 extremities, full and equal strength  SKIN: No rashes or lesions

## 2023-12-13 LAB
ALBUMIN SERPL ELPH-MCNC: 3.4 G/DL — SIGNIFICANT CHANGE UP (ref 3.3–5)
ALBUMIN SERPL ELPH-MCNC: 3.4 G/DL — SIGNIFICANT CHANGE UP (ref 3.3–5)
ALP SERPL-CCNC: 53 U/L — SIGNIFICANT CHANGE UP (ref 40–120)
ALP SERPL-CCNC: 53 U/L — SIGNIFICANT CHANGE UP (ref 40–120)
ALT FLD-CCNC: 53 U/L — SIGNIFICANT CHANGE UP (ref 12–78)
ALT FLD-CCNC: 53 U/L — SIGNIFICANT CHANGE UP (ref 12–78)
ANION GAP SERPL CALC-SCNC: 5 MMOL/L — SIGNIFICANT CHANGE UP (ref 5–17)
ANION GAP SERPL CALC-SCNC: 5 MMOL/L — SIGNIFICANT CHANGE UP (ref 5–17)
AST SERPL-CCNC: 52 U/L — HIGH (ref 15–37)
AST SERPL-CCNC: 52 U/L — HIGH (ref 15–37)
BASOPHILS # BLD AUTO: 0.02 K/UL — SIGNIFICANT CHANGE UP (ref 0–0.2)
BASOPHILS # BLD AUTO: 0.02 K/UL — SIGNIFICANT CHANGE UP (ref 0–0.2)
BASOPHILS NFR BLD AUTO: 0.2 % — SIGNIFICANT CHANGE UP (ref 0–2)
BASOPHILS NFR BLD AUTO: 0.2 % — SIGNIFICANT CHANGE UP (ref 0–2)
BILIRUB SERPL-MCNC: 1.1 MG/DL — SIGNIFICANT CHANGE UP (ref 0.2–1.2)
BILIRUB SERPL-MCNC: 1.1 MG/DL — SIGNIFICANT CHANGE UP (ref 0.2–1.2)
BUN SERPL-MCNC: 11 MG/DL — SIGNIFICANT CHANGE UP (ref 7–23)
BUN SERPL-MCNC: 11 MG/DL — SIGNIFICANT CHANGE UP (ref 7–23)
CALCIUM SERPL-MCNC: 8.2 MG/DL — LOW (ref 8.5–10.1)
CALCIUM SERPL-MCNC: 8.2 MG/DL — LOW (ref 8.5–10.1)
CHLORIDE SERPL-SCNC: 112 MMOL/L — HIGH (ref 96–108)
CHLORIDE SERPL-SCNC: 112 MMOL/L — HIGH (ref 96–108)
CO2 SERPL-SCNC: 23 MMOL/L — SIGNIFICANT CHANGE UP (ref 22–31)
CO2 SERPL-SCNC: 23 MMOL/L — SIGNIFICANT CHANGE UP (ref 22–31)
CREAT SERPL-MCNC: 0.82 MG/DL — SIGNIFICANT CHANGE UP (ref 0.5–1.3)
CREAT SERPL-MCNC: 0.82 MG/DL — SIGNIFICANT CHANGE UP (ref 0.5–1.3)
EGFR: 100 ML/MIN/1.73M2 — SIGNIFICANT CHANGE UP
EGFR: 100 ML/MIN/1.73M2 — SIGNIFICANT CHANGE UP
EOSINOPHIL # BLD AUTO: 0.01 K/UL — SIGNIFICANT CHANGE UP (ref 0–0.5)
EOSINOPHIL # BLD AUTO: 0.01 K/UL — SIGNIFICANT CHANGE UP (ref 0–0.5)
EOSINOPHIL NFR BLD AUTO: 0.1 % — SIGNIFICANT CHANGE UP (ref 0–6)
EOSINOPHIL NFR BLD AUTO: 0.1 % — SIGNIFICANT CHANGE UP (ref 0–6)
GLUCOSE SERPL-MCNC: 95 MG/DL — SIGNIFICANT CHANGE UP (ref 70–99)
GLUCOSE SERPL-MCNC: 95 MG/DL — SIGNIFICANT CHANGE UP (ref 70–99)
HCT VFR BLD CALC: 34.4 % — LOW (ref 34.5–45)
HCT VFR BLD CALC: 34.4 % — LOW (ref 34.5–45)
HGB BLD-MCNC: 11.2 G/DL — LOW (ref 11.5–15.5)
HGB BLD-MCNC: 11.2 G/DL — LOW (ref 11.5–15.5)
IMM GRANULOCYTES NFR BLD AUTO: 0.4 % — SIGNIFICANT CHANGE UP (ref 0–0.9)
IMM GRANULOCYTES NFR BLD AUTO: 0.4 % — SIGNIFICANT CHANGE UP (ref 0–0.9)
LYMPHOCYTES # BLD AUTO: 2.33 K/UL — SIGNIFICANT CHANGE UP (ref 1–3.3)
LYMPHOCYTES # BLD AUTO: 2.33 K/UL — SIGNIFICANT CHANGE UP (ref 1–3.3)
LYMPHOCYTES # BLD AUTO: 22.8 % — SIGNIFICANT CHANGE UP (ref 13–44)
LYMPHOCYTES # BLD AUTO: 22.8 % — SIGNIFICANT CHANGE UP (ref 13–44)
MAGNESIUM SERPL-MCNC: 2.4 MG/DL — SIGNIFICANT CHANGE UP (ref 1.6–2.6)
MAGNESIUM SERPL-MCNC: 2.4 MG/DL — SIGNIFICANT CHANGE UP (ref 1.6–2.6)
MCHC RBC-ENTMCNC: 27.3 PG — SIGNIFICANT CHANGE UP (ref 27–34)
MCHC RBC-ENTMCNC: 27.3 PG — SIGNIFICANT CHANGE UP (ref 27–34)
MCHC RBC-ENTMCNC: 32.6 G/DL — SIGNIFICANT CHANGE UP (ref 32–36)
MCHC RBC-ENTMCNC: 32.6 G/DL — SIGNIFICANT CHANGE UP (ref 32–36)
MCV RBC AUTO: 83.9 FL — SIGNIFICANT CHANGE UP (ref 80–100)
MCV RBC AUTO: 83.9 FL — SIGNIFICANT CHANGE UP (ref 80–100)
MONOCYTES # BLD AUTO: 0.89 K/UL — SIGNIFICANT CHANGE UP (ref 0–0.9)
MONOCYTES # BLD AUTO: 0.89 K/UL — SIGNIFICANT CHANGE UP (ref 0–0.9)
MONOCYTES NFR BLD AUTO: 8.7 % — SIGNIFICANT CHANGE UP (ref 2–14)
MONOCYTES NFR BLD AUTO: 8.7 % — SIGNIFICANT CHANGE UP (ref 2–14)
NEUTROPHILS # BLD AUTO: 6.95 K/UL — SIGNIFICANT CHANGE UP (ref 1.8–7.4)
NEUTROPHILS # BLD AUTO: 6.95 K/UL — SIGNIFICANT CHANGE UP (ref 1.8–7.4)
NEUTROPHILS NFR BLD AUTO: 67.8 % — SIGNIFICANT CHANGE UP (ref 43–77)
NEUTROPHILS NFR BLD AUTO: 67.8 % — SIGNIFICANT CHANGE UP (ref 43–77)
NRBC # BLD: 0 /100 WBCS — SIGNIFICANT CHANGE UP (ref 0–0)
NRBC # BLD: 0 /100 WBCS — SIGNIFICANT CHANGE UP (ref 0–0)
PHOSPHATE SERPL-MCNC: 2.5 MG/DL — SIGNIFICANT CHANGE UP (ref 2.5–4.5)
PHOSPHATE SERPL-MCNC: 2.5 MG/DL — SIGNIFICANT CHANGE UP (ref 2.5–4.5)
PLATELET # BLD AUTO: 271 K/UL — SIGNIFICANT CHANGE UP (ref 150–400)
PLATELET # BLD AUTO: 271 K/UL — SIGNIFICANT CHANGE UP (ref 150–400)
POTASSIUM SERPL-MCNC: 3.6 MMOL/L — SIGNIFICANT CHANGE UP (ref 3.5–5.3)
POTASSIUM SERPL-MCNC: 3.6 MMOL/L — SIGNIFICANT CHANGE UP (ref 3.5–5.3)
POTASSIUM SERPL-SCNC: 3.6 MMOL/L — SIGNIFICANT CHANGE UP (ref 3.5–5.3)
POTASSIUM SERPL-SCNC: 3.6 MMOL/L — SIGNIFICANT CHANGE UP (ref 3.5–5.3)
PROT SERPL-MCNC: 6.9 GM/DL — SIGNIFICANT CHANGE UP (ref 6–8.3)
PROT SERPL-MCNC: 6.9 GM/DL — SIGNIFICANT CHANGE UP (ref 6–8.3)
RBC # BLD: 4.1 M/UL — SIGNIFICANT CHANGE UP (ref 3.8–5.2)
RBC # BLD: 4.1 M/UL — SIGNIFICANT CHANGE UP (ref 3.8–5.2)
RBC # FLD: 14.4 % — SIGNIFICANT CHANGE UP (ref 10.3–14.5)
RBC # FLD: 14.4 % — SIGNIFICANT CHANGE UP (ref 10.3–14.5)
SODIUM SERPL-SCNC: 140 MMOL/L — SIGNIFICANT CHANGE UP (ref 135–145)
SODIUM SERPL-SCNC: 140 MMOL/L — SIGNIFICANT CHANGE UP (ref 135–145)
WBC # BLD: 10.24 K/UL — SIGNIFICANT CHANGE UP (ref 3.8–10.5)
WBC # BLD: 10.24 K/UL — SIGNIFICANT CHANGE UP (ref 3.8–10.5)
WBC # FLD AUTO: 10.24 K/UL — SIGNIFICANT CHANGE UP (ref 3.8–10.5)
WBC # FLD AUTO: 10.24 K/UL — SIGNIFICANT CHANGE UP (ref 3.8–10.5)

## 2023-12-13 PROCEDURE — 93010 ELECTROCARDIOGRAM REPORT: CPT

## 2023-12-13 PROCEDURE — 99232 SBSQ HOSP IP/OBS MODERATE 35: CPT

## 2023-12-13 PROCEDURE — 74183 MRI ABD W/O CNTR FLWD CNTR: CPT | Mod: 26

## 2023-12-13 RX ORDER — DIPHENHYDRAMINE HCL 50 MG
25 CAPSULE ORAL ONCE
Refills: 0 | Status: COMPLETED | OUTPATIENT
Start: 2023-12-13 | End: 2023-12-13

## 2023-12-13 RX ORDER — HYDRALAZINE HCL 50 MG
5 TABLET ORAL ONCE
Refills: 0 | Status: COMPLETED | OUTPATIENT
Start: 2023-12-13 | End: 2023-12-13

## 2023-12-13 RX ORDER — METOCLOPRAMIDE HCL 10 MG
10 TABLET ORAL ONCE
Refills: 0 | Status: COMPLETED | OUTPATIENT
Start: 2023-12-13 | End: 2023-12-13

## 2023-12-13 RX ORDER — SODIUM CHLORIDE 9 MG/ML
1000 INJECTION, SOLUTION INTRAVENOUS
Refills: 0 | Status: DISCONTINUED | OUTPATIENT
Start: 2023-12-13 | End: 2023-12-16

## 2023-12-13 RX ORDER — SUCRALFATE 1 G
1 TABLET ORAL ONCE
Refills: 0 | Status: COMPLETED | OUTPATIENT
Start: 2023-12-13 | End: 2023-12-13

## 2023-12-13 RX ORDER — KETOROLAC TROMETHAMINE 30 MG/ML
15 SYRINGE (ML) INJECTION ONCE
Refills: 0 | Status: DISCONTINUED | OUTPATIENT
Start: 2023-12-13 | End: 2023-12-13

## 2023-12-13 RX ORDER — MORPHINE SULFATE 50 MG/1
2 CAPSULE, EXTENDED RELEASE ORAL EVERY 8 HOURS
Refills: 0 | Status: DISCONTINUED | OUTPATIENT
Start: 2023-12-13 | End: 2023-12-14

## 2023-12-13 RX ADMIN — HEPARIN SODIUM 5000 UNIT(S): 5000 INJECTION INTRAVENOUS; SUBCUTANEOUS at 05:34

## 2023-12-13 RX ADMIN — CHLORHEXIDINE GLUCONATE 1 APPLICATION(S): 213 SOLUTION TOPICAL at 05:35

## 2023-12-13 RX ADMIN — Medication 10 MILLIGRAM(S): at 12:51

## 2023-12-13 RX ADMIN — ONDANSETRON 4 MILLIGRAM(S): 8 TABLET, FILM COATED ORAL at 20:35

## 2023-12-13 RX ADMIN — ONDANSETRON 4 MILLIGRAM(S): 8 TABLET, FILM COATED ORAL at 03:00

## 2023-12-13 RX ADMIN — SODIUM CHLORIDE 75 MILLILITER(S): 9 INJECTION, SOLUTION INTRAVENOUS at 09:16

## 2023-12-13 RX ADMIN — Medication 30 MILLILITER(S): at 17:31

## 2023-12-13 RX ADMIN — SODIUM CHLORIDE 75 MILLILITER(S): 9 INJECTION, SOLUTION INTRAVENOUS at 20:35

## 2023-12-13 RX ADMIN — Medication 25 MILLIGRAM(S): at 20:35

## 2023-12-13 RX ADMIN — HEPARIN SODIUM 5000 UNIT(S): 5000 INJECTION INTRAVENOUS; SUBCUTANEOUS at 21:37

## 2023-12-13 RX ADMIN — Medication 15 MILLIGRAM(S): at 06:05

## 2023-12-13 RX ADMIN — Medication 1 GRAM(S): at 13:55

## 2023-12-13 RX ADMIN — Medication 25 MILLIGRAM(S): at 09:01

## 2023-12-13 RX ADMIN — MORPHINE SULFATE 2 MILLIGRAM(S): 50 CAPSULE, EXTENDED RELEASE ORAL at 18:34

## 2023-12-13 RX ADMIN — Medication 15 MILLIGRAM(S): at 06:57

## 2023-12-13 RX ADMIN — Medication 5 MILLIGRAM(S): at 23:37

## 2023-12-13 RX ADMIN — ONDANSETRON 4 MILLIGRAM(S): 8 TABLET, FILM COATED ORAL at 09:01

## 2023-12-13 RX ADMIN — Medication 25 MILLIGRAM(S): at 12:59

## 2023-12-13 RX ADMIN — Medication 25 MILLIGRAM(S): at 03:01

## 2023-12-13 RX ADMIN — Medication 30 MILLILITER(S): at 21:36

## 2023-12-13 RX ADMIN — Medication 30 MILLILITER(S): at 11:36

## 2023-12-13 NOTE — PROGRESS NOTE ADULT - ASSESSMENT
28F with daily marijuana use presented for evaluation of intractable vomiting, admitted for hydration and IV anti-emesis.     # vomiting.   - usually occurs when she gets her period and resolves after a few days   - Monitor electrolytes and replace as indicated  - LR 75mL/hr   - Zofran 4mg IV q6 PRN and Reglan 10mg IV q8 PRN second line. Adjust as necessary. Requested Benadryl as well with the Zofran as she reports it makes her itchy.     # Hepatic lesion   - New, 2cm hypodense liver lesion detected on CT A/P. MRI Abdomen w/wo contrast recommended, ordered for further evaluation  - Mild increase in total bili and LFTs    # KIMBERLY  - Baseline creatinine 0.9, 1.29 on admission - likely in the setting of prerenal volume depletion  - IVF  - Monitor, avoid nephrotoxins and hypotension    # Chronic GERD  - Daily PPI  - Maalox q4 PRN    # Leukocytosis  - Likely reactive, monitor       DVT ppx: Heparin  GI ppx: PPI  Diet: Regular   Activity: IAT   Code: Full

## 2023-12-13 NOTE — CHART NOTE - NSCHARTNOTEFT_GEN_A_CORE
EVENT: Received telephone call from RN that pt is c/o of upper abdominal pain which she rated as a 11/10    HPI: 28F with daily marijuana use presented for evaluation of intractable vomiting, admitted for hydration and IV anti-emesis.   Admitted for Cannabis hyperemesis syndrome     SUBJECTIVE: "I need something for this pain now, please"    OBJECTIVE:  Vital Signs Last 24 Hrs  T(C): 37.2 (13 Dec 2023 05:20), Max: 37.2 (12 Dec 2023 09:43)  T(F): 98.9 (13 Dec 2023 05:20), Max: 99 (12 Dec 2023 09:43)  HR: 91 (13 Dec 2023 05:20) (69 - 94)  BP: 164/93 (13 Dec 2023 05:20) (125/86 - 164/93)  BP(mean): --  RR: 18 (13 Dec 2023 05:20) (12 - 22)  SpO2: 98% (13 Dec 2023 05:20) (97% - 100%)    Parameters below as of 13 Dec 2023 05:20  Patient On (Oxygen Delivery Method): room air        FOCUSED PHYSICAL EXAM:  Neuro: awake, alert, oriented x 3. No neuro deficit  Cardiovascular: Pulses +2 B/L in lower and upper extremities, HR regular, BP stable, No edema.  Respiratory: Respirations regular, unlabored, breath sounds clear B/L.   GI: Abdomen soft, non-tender, positive bowel sounds.  : no bladder distention noted. No complaints at this time.  Skin: Dry, intact, no bruising, no diaphoresis.    LABS:                        13.7   14.18 )-----------( 329      ( 12 Dec 2023 11:00 )             40.0     12-12    141  |  109<H>  |  21  ----------------------------<  126<H>  4.0   |  21<L>  |  1.29    Ca    10.0      12 Dec 2023 11:00    TPro  9.1<H>  /  Alb  4.5  /  TBili  1.4<H>  /  DBili  x   /  AST  67<H>  /  ALT  58  /  AlkPhos  69  12-12      EKG:   IMAGING:      ASSESSMENT/PROBLEM: Upper abdominal pain most likely 2/2 to  Cannabis hyperemesis syndrome/uses       PLAN:   1. Toradol 15 mg, IVP x 1 dose ordered  2. Monitor response to treatment  3. Cont present care/treatment  4. Supportive care

## 2023-12-14 LAB
AFP-TM SERPL-MCNC: 3 NG/ML — SIGNIFICANT CHANGE UP
AFP-TM SERPL-MCNC: 3 NG/ML — SIGNIFICANT CHANGE UP
ALBUMIN SERPL ELPH-MCNC: 3.4 G/DL — SIGNIFICANT CHANGE UP (ref 3.3–5)
ALBUMIN SERPL ELPH-MCNC: 3.4 G/DL — SIGNIFICANT CHANGE UP (ref 3.3–5)
ALP SERPL-CCNC: 56 U/L — SIGNIFICANT CHANGE UP (ref 40–120)
ALP SERPL-CCNC: 56 U/L — SIGNIFICANT CHANGE UP (ref 40–120)
ALT FLD-CCNC: 63 U/L — SIGNIFICANT CHANGE UP (ref 12–78)
ALT FLD-CCNC: 63 U/L — SIGNIFICANT CHANGE UP (ref 12–78)
ANION GAP SERPL CALC-SCNC: 7 MMOL/L — SIGNIFICANT CHANGE UP (ref 5–17)
ANION GAP SERPL CALC-SCNC: 7 MMOL/L — SIGNIFICANT CHANGE UP (ref 5–17)
AST SERPL-CCNC: 46 U/L — HIGH (ref 15–37)
AST SERPL-CCNC: 46 U/L — HIGH (ref 15–37)
BILIRUB SERPL-MCNC: 1.1 MG/DL — SIGNIFICANT CHANGE UP (ref 0.2–1.2)
BILIRUB SERPL-MCNC: 1.1 MG/DL — SIGNIFICANT CHANGE UP (ref 0.2–1.2)
BUN SERPL-MCNC: 10 MG/DL — SIGNIFICANT CHANGE UP (ref 7–23)
BUN SERPL-MCNC: 10 MG/DL — SIGNIFICANT CHANGE UP (ref 7–23)
CALCIUM SERPL-MCNC: 8.5 MG/DL — SIGNIFICANT CHANGE UP (ref 8.5–10.1)
CALCIUM SERPL-MCNC: 8.5 MG/DL — SIGNIFICANT CHANGE UP (ref 8.5–10.1)
CHLORIDE SERPL-SCNC: 106 MMOL/L — SIGNIFICANT CHANGE UP (ref 96–108)
CHLORIDE SERPL-SCNC: 106 MMOL/L — SIGNIFICANT CHANGE UP (ref 96–108)
CO2 SERPL-SCNC: 24 MMOL/L — SIGNIFICANT CHANGE UP (ref 22–31)
CO2 SERPL-SCNC: 24 MMOL/L — SIGNIFICANT CHANGE UP (ref 22–31)
CREAT SERPL-MCNC: 0.71 MG/DL — SIGNIFICANT CHANGE UP (ref 0.5–1.3)
CREAT SERPL-MCNC: 0.71 MG/DL — SIGNIFICANT CHANGE UP (ref 0.5–1.3)
CULTURE RESULTS: SIGNIFICANT CHANGE UP
EGFR: 119 ML/MIN/1.73M2 — SIGNIFICANT CHANGE UP
EGFR: 119 ML/MIN/1.73M2 — SIGNIFICANT CHANGE UP
GLUCOSE SERPL-MCNC: 88 MG/DL — SIGNIFICANT CHANGE UP (ref 70–99)
GLUCOSE SERPL-MCNC: 88 MG/DL — SIGNIFICANT CHANGE UP (ref 70–99)
HAV IGM SER-ACNC: SIGNIFICANT CHANGE UP
HAV IGM SER-ACNC: SIGNIFICANT CHANGE UP
HBV CORE IGM SER-ACNC: SIGNIFICANT CHANGE UP
HBV CORE IGM SER-ACNC: SIGNIFICANT CHANGE UP
HBV SURFACE AG SER-ACNC: SIGNIFICANT CHANGE UP
HBV SURFACE AG SER-ACNC: SIGNIFICANT CHANGE UP
HCT VFR BLD CALC: 37.7 % — SIGNIFICANT CHANGE UP (ref 34.5–45)
HCT VFR BLD CALC: 37.7 % — SIGNIFICANT CHANGE UP (ref 34.5–45)
HCV AB S/CO SERPL IA: 0.1 S/CO — SIGNIFICANT CHANGE UP (ref 0–0.99)
HCV AB S/CO SERPL IA: 0.1 S/CO — SIGNIFICANT CHANGE UP (ref 0–0.99)
HCV AB SERPL-IMP: SIGNIFICANT CHANGE UP
HCV AB SERPL-IMP: SIGNIFICANT CHANGE UP
HGB BLD-MCNC: 12.8 G/DL — SIGNIFICANT CHANGE UP (ref 11.5–15.5)
HGB BLD-MCNC: 12.8 G/DL — SIGNIFICANT CHANGE UP (ref 11.5–15.5)
INR BLD: 1.03 RATIO — SIGNIFICANT CHANGE UP (ref 0.85–1.18)
INR BLD: 1.03 RATIO — SIGNIFICANT CHANGE UP (ref 0.85–1.18)
MCHC RBC-ENTMCNC: 27.7 PG — SIGNIFICANT CHANGE UP (ref 27–34)
MCHC RBC-ENTMCNC: 27.7 PG — SIGNIFICANT CHANGE UP (ref 27–34)
MCHC RBC-ENTMCNC: 34 G/DL — SIGNIFICANT CHANGE UP (ref 32–36)
MCHC RBC-ENTMCNC: 34 G/DL — SIGNIFICANT CHANGE UP (ref 32–36)
MCV RBC AUTO: 81.6 FL — SIGNIFICANT CHANGE UP (ref 80–100)
MCV RBC AUTO: 81.6 FL — SIGNIFICANT CHANGE UP (ref 80–100)
MELD SCORE WITH DIALYSIS: 20 POINTS — SIGNIFICANT CHANGE UP
MELD SCORE WITH DIALYSIS: 20 POINTS — SIGNIFICANT CHANGE UP
MELD SCORE WITHOUT DIALYSIS: 7 POINTS — SIGNIFICANT CHANGE UP
MELD SCORE WITHOUT DIALYSIS: 7 POINTS — SIGNIFICANT CHANGE UP
NRBC # BLD: 0 /100 WBCS — SIGNIFICANT CHANGE UP (ref 0–0)
NRBC # BLD: 0 /100 WBCS — SIGNIFICANT CHANGE UP (ref 0–0)
PLATELET # BLD AUTO: 285 K/UL — SIGNIFICANT CHANGE UP (ref 150–400)
PLATELET # BLD AUTO: 285 K/UL — SIGNIFICANT CHANGE UP (ref 150–400)
POTASSIUM SERPL-MCNC: 3.1 MMOL/L — LOW (ref 3.5–5.3)
POTASSIUM SERPL-MCNC: 3.1 MMOL/L — LOW (ref 3.5–5.3)
POTASSIUM SERPL-SCNC: 3.1 MMOL/L — LOW (ref 3.5–5.3)
POTASSIUM SERPL-SCNC: 3.1 MMOL/L — LOW (ref 3.5–5.3)
PROT SERPL-MCNC: 7 GM/DL — SIGNIFICANT CHANGE UP (ref 6–8.3)
PROT SERPL-MCNC: 7 GM/DL — SIGNIFICANT CHANGE UP (ref 6–8.3)
PROTHROM AB SERPL-ACNC: 12.3 SEC — SIGNIFICANT CHANGE UP (ref 9.5–13)
PROTHROM AB SERPL-ACNC: 12.3 SEC — SIGNIFICANT CHANGE UP (ref 9.5–13)
RBC # BLD: 4.62 M/UL — SIGNIFICANT CHANGE UP (ref 3.8–5.2)
RBC # BLD: 4.62 M/UL — SIGNIFICANT CHANGE UP (ref 3.8–5.2)
RBC # FLD: 14.1 % — SIGNIFICANT CHANGE UP (ref 10.3–14.5)
RBC # FLD: 14.1 % — SIGNIFICANT CHANGE UP (ref 10.3–14.5)
SODIUM SERPL-SCNC: 137 MMOL/L — SIGNIFICANT CHANGE UP (ref 135–145)
SODIUM SERPL-SCNC: 137 MMOL/L — SIGNIFICANT CHANGE UP (ref 135–145)
SPECIMEN SOURCE: SIGNIFICANT CHANGE UP
SPECIMEN SOURCE: SIGNIFICANT CHANGE UP
WBC # BLD: 9.56 K/UL — SIGNIFICANT CHANGE UP (ref 3.8–10.5)
WBC # BLD: 9.56 K/UL — SIGNIFICANT CHANGE UP (ref 3.8–10.5)
WBC # FLD AUTO: 9.56 K/UL — SIGNIFICANT CHANGE UP (ref 3.8–10.5)
WBC # FLD AUTO: 9.56 K/UL — SIGNIFICANT CHANGE UP (ref 3.8–10.5)

## 2023-12-14 PROCEDURE — 99232 SBSQ HOSP IP/OBS MODERATE 35: CPT

## 2023-12-14 RX ORDER — HYDRALAZINE HCL 50 MG
5 TABLET ORAL ONCE
Refills: 0 | Status: COMPLETED | OUTPATIENT
Start: 2023-12-14 | End: 2023-12-14

## 2023-12-14 RX ORDER — POTASSIUM CHLORIDE 20 MEQ
10 PACKET (EA) ORAL
Refills: 0 | Status: COMPLETED | OUTPATIENT
Start: 2023-12-14 | End: 2023-12-14

## 2023-12-14 RX ORDER — PANTOPRAZOLE SODIUM 20 MG/1
40 TABLET, DELAYED RELEASE ORAL DAILY
Refills: 0 | Status: DISCONTINUED | OUTPATIENT
Start: 2023-12-14 | End: 2023-12-16

## 2023-12-14 RX ORDER — MORPHINE SULFATE 50 MG/1
2 CAPSULE, EXTENDED RELEASE ORAL EVERY 6 HOURS
Refills: 0 | Status: DISCONTINUED | OUTPATIENT
Start: 2023-12-14 | End: 2023-12-16

## 2023-12-14 RX ADMIN — ONDANSETRON 4 MILLIGRAM(S): 8 TABLET, FILM COATED ORAL at 02:40

## 2023-12-14 RX ADMIN — Medication 30 MILLILITER(S): at 01:36

## 2023-12-14 RX ADMIN — Medication 30 MILLILITER(S): at 05:46

## 2023-12-14 RX ADMIN — PANTOPRAZOLE SODIUM 40 MILLIGRAM(S): 20 TABLET, DELAYED RELEASE ORAL at 09:08

## 2023-12-14 RX ADMIN — MORPHINE SULFATE 2 MILLIGRAM(S): 50 CAPSULE, EXTENDED RELEASE ORAL at 02:40

## 2023-12-14 RX ADMIN — HEPARIN SODIUM 5000 UNIT(S): 5000 INJECTION INTRAVENOUS; SUBCUTANEOUS at 14:51

## 2023-12-14 RX ADMIN — Medication 100 MILLIEQUIVALENT(S): at 09:09

## 2023-12-14 RX ADMIN — ONDANSETRON 4 MILLIGRAM(S): 8 TABLET, FILM COATED ORAL at 09:15

## 2023-12-14 RX ADMIN — CHLORHEXIDINE GLUCONATE 1 APPLICATION(S): 213 SOLUTION TOPICAL at 05:51

## 2023-12-14 RX ADMIN — PANTOPRAZOLE SODIUM 40 MILLIGRAM(S): 20 TABLET, DELAYED RELEASE ORAL at 14:51

## 2023-12-14 RX ADMIN — Medication 30 MILLILITER(S): at 20:24

## 2023-12-14 RX ADMIN — Medication 25 MILLIGRAM(S): at 21:55

## 2023-12-14 RX ADMIN — ONDANSETRON 4 MILLIGRAM(S): 8 TABLET, FILM COATED ORAL at 21:55

## 2023-12-14 RX ADMIN — Medication 25 MILLIGRAM(S): at 02:40

## 2023-12-14 RX ADMIN — MORPHINE SULFATE 2 MILLIGRAM(S): 50 CAPSULE, EXTENDED RELEASE ORAL at 02:55

## 2023-12-14 RX ADMIN — Medication 30 MILLILITER(S): at 14:51

## 2023-12-14 RX ADMIN — MORPHINE SULFATE 2 MILLIGRAM(S): 50 CAPSULE, EXTENDED RELEASE ORAL at 15:52

## 2023-12-14 RX ADMIN — Medication 30 MILLILITER(S): at 09:08

## 2023-12-14 RX ADMIN — MORPHINE SULFATE 2 MILLIGRAM(S): 50 CAPSULE, EXTENDED RELEASE ORAL at 22:10

## 2023-12-14 RX ADMIN — MORPHINE SULFATE 2 MILLIGRAM(S): 50 CAPSULE, EXTENDED RELEASE ORAL at 10:46

## 2023-12-14 RX ADMIN — Medication 100 MILLIEQUIVALENT(S): at 11:13

## 2023-12-14 RX ADMIN — SODIUM CHLORIDE 75 MILLILITER(S): 9 INJECTION, SOLUTION INTRAVENOUS at 05:47

## 2023-12-14 RX ADMIN — ONDANSETRON 4 MILLIGRAM(S): 8 TABLET, FILM COATED ORAL at 15:51

## 2023-12-14 RX ADMIN — Medication 25 MILLIGRAM(S): at 15:51

## 2023-12-14 RX ADMIN — Medication 25 MILLIGRAM(S): at 09:15

## 2023-12-14 RX ADMIN — Medication 5 MILLIGRAM(S): at 01:36

## 2023-12-14 RX ADMIN — MORPHINE SULFATE 2 MILLIGRAM(S): 50 CAPSULE, EXTENDED RELEASE ORAL at 21:55

## 2023-12-14 NOTE — PROGRESS NOTE ADULT - ASSESSMENT
28F with daily marijuana use presented for evaluation of intractable vomiting, admitted for hydration and IV anti-emesis.     # vomiting.   - usually occurs when she gets her period and resolves after a few days   - Monitor electrolytes and replace as indicated  - LR 75mL/hr   - Zofran 4mg IV q6 PRN and Reglan 10mg IV q8 PRN second line. Adjust as necessary. Requested Benadryl as well with the Zofran as she reports it makes her itchy  12/14 slowly improving and keeping some liquids down .     # Hepatic lesion   - New, 2cm hypodense liver lesion detected on CT A/P. MRI Abdomen w/wo contrast recommended, ordered for further evaluation  - Mild increase in total bili and LFTs    # KIMBERLY  - Baseline creatinine 0.9, 1.29 on admission - likely in the setting of prerenal volume depletion  - IVF  - Monitor, avoid nephrotoxins and hypotension    # Chronic GERD  - Daily PPI  - Maalox q4 PRN    # Leukocytosis  - Likely reactive, monitor       DVT ppx: Heparin  GI ppx: PPI  Diet: Regular   Activity: IAT   Code: Full

## 2023-12-15 LAB
ANION GAP SERPL CALC-SCNC: 7 MMOL/L — SIGNIFICANT CHANGE UP (ref 5–17)
ANION GAP SERPL CALC-SCNC: 7 MMOL/L — SIGNIFICANT CHANGE UP (ref 5–17)
BUN SERPL-MCNC: 14 MG/DL — SIGNIFICANT CHANGE UP (ref 7–23)
BUN SERPL-MCNC: 14 MG/DL — SIGNIFICANT CHANGE UP (ref 7–23)
CALCIUM SERPL-MCNC: 8.4 MG/DL — LOW (ref 8.5–10.1)
CALCIUM SERPL-MCNC: 8.4 MG/DL — LOW (ref 8.5–10.1)
CHLORIDE SERPL-SCNC: 102 MMOL/L — SIGNIFICANT CHANGE UP (ref 96–108)
CHLORIDE SERPL-SCNC: 102 MMOL/L — SIGNIFICANT CHANGE UP (ref 96–108)
CO2 SERPL-SCNC: 27 MMOL/L — SIGNIFICANT CHANGE UP (ref 22–31)
CO2 SERPL-SCNC: 27 MMOL/L — SIGNIFICANT CHANGE UP (ref 22–31)
CORTIS AM PEAK SERPL-MCNC: 35.6 UG/DL — HIGH (ref 6–18.4)
CORTIS AM PEAK SERPL-MCNC: 35.6 UG/DL — HIGH (ref 6–18.4)
CREAT SERPL-MCNC: 0.86 MG/DL — SIGNIFICANT CHANGE UP (ref 0.5–1.3)
CREAT SERPL-MCNC: 0.86 MG/DL — SIGNIFICANT CHANGE UP (ref 0.5–1.3)
EGFR: 94 ML/MIN/1.73M2 — SIGNIFICANT CHANGE UP
EGFR: 94 ML/MIN/1.73M2 — SIGNIFICANT CHANGE UP
GLUCOSE SERPL-MCNC: 80 MG/DL — SIGNIFICANT CHANGE UP (ref 70–99)
GLUCOSE SERPL-MCNC: 80 MG/DL — SIGNIFICANT CHANGE UP (ref 70–99)
HCT VFR BLD CALC: 40.1 % — SIGNIFICANT CHANGE UP (ref 34.5–45)
HCT VFR BLD CALC: 40.1 % — SIGNIFICANT CHANGE UP (ref 34.5–45)
HGB BLD-MCNC: 13.4 G/DL — SIGNIFICANT CHANGE UP (ref 11.5–15.5)
HGB BLD-MCNC: 13.4 G/DL — SIGNIFICANT CHANGE UP (ref 11.5–15.5)
MCHC RBC-ENTMCNC: 27.6 PG — SIGNIFICANT CHANGE UP (ref 27–34)
MCHC RBC-ENTMCNC: 27.6 PG — SIGNIFICANT CHANGE UP (ref 27–34)
MCHC RBC-ENTMCNC: 33.4 G/DL — SIGNIFICANT CHANGE UP (ref 32–36)
MCHC RBC-ENTMCNC: 33.4 G/DL — SIGNIFICANT CHANGE UP (ref 32–36)
MCV RBC AUTO: 82.7 FL — SIGNIFICANT CHANGE UP (ref 80–100)
MCV RBC AUTO: 82.7 FL — SIGNIFICANT CHANGE UP (ref 80–100)
NRBC # BLD: 0 /100 WBCS — SIGNIFICANT CHANGE UP (ref 0–0)
NRBC # BLD: 0 /100 WBCS — SIGNIFICANT CHANGE UP (ref 0–0)
PLATELET # BLD AUTO: 276 K/UL — SIGNIFICANT CHANGE UP (ref 150–400)
PLATELET # BLD AUTO: 276 K/UL — SIGNIFICANT CHANGE UP (ref 150–400)
POTASSIUM SERPL-MCNC: 3.3 MMOL/L — LOW (ref 3.5–5.3)
POTASSIUM SERPL-MCNC: 3.3 MMOL/L — LOW (ref 3.5–5.3)
POTASSIUM SERPL-SCNC: 3.3 MMOL/L — LOW (ref 3.5–5.3)
POTASSIUM SERPL-SCNC: 3.3 MMOL/L — LOW (ref 3.5–5.3)
RBC # BLD: 4.85 M/UL — SIGNIFICANT CHANGE UP (ref 3.8–5.2)
RBC # BLD: 4.85 M/UL — SIGNIFICANT CHANGE UP (ref 3.8–5.2)
RBC # FLD: 13.7 % — SIGNIFICANT CHANGE UP (ref 10.3–14.5)
RBC # FLD: 13.7 % — SIGNIFICANT CHANGE UP (ref 10.3–14.5)
SODIUM SERPL-SCNC: 136 MMOL/L — SIGNIFICANT CHANGE UP (ref 135–145)
SODIUM SERPL-SCNC: 136 MMOL/L — SIGNIFICANT CHANGE UP (ref 135–145)
TSH SERPL-MCNC: 3.14 UIU/ML — SIGNIFICANT CHANGE UP (ref 0.36–3.74)
TSH SERPL-MCNC: 3.14 UIU/ML — SIGNIFICANT CHANGE UP (ref 0.36–3.74)
WBC # BLD: 8.37 K/UL — SIGNIFICANT CHANGE UP (ref 3.8–10.5)
WBC # BLD: 8.37 K/UL — SIGNIFICANT CHANGE UP (ref 3.8–10.5)
WBC # FLD AUTO: 8.37 K/UL — SIGNIFICANT CHANGE UP (ref 3.8–10.5)
WBC # FLD AUTO: 8.37 K/UL — SIGNIFICANT CHANGE UP (ref 3.8–10.5)

## 2023-12-15 PROCEDURE — 99232 SBSQ HOSP IP/OBS MODERATE 35: CPT

## 2023-12-15 RX ORDER — AMLODIPINE BESYLATE 2.5 MG/1
5 TABLET ORAL DAILY
Refills: 0 | Status: DISCONTINUED | OUTPATIENT
Start: 2023-12-15 | End: 2023-12-16

## 2023-12-15 RX ORDER — HYDRALAZINE HCL 50 MG
5 TABLET ORAL EVERY 8 HOURS
Refills: 0 | Status: DISCONTINUED | OUTPATIENT
Start: 2023-12-15 | End: 2023-12-16

## 2023-12-15 RX ORDER — HYDRALAZINE HCL 50 MG
25 TABLET ORAL ONCE
Refills: 0 | Status: COMPLETED | OUTPATIENT
Start: 2023-12-15 | End: 2023-12-15

## 2023-12-15 RX ADMIN — ONDANSETRON 4 MILLIGRAM(S): 8 TABLET, FILM COATED ORAL at 03:57

## 2023-12-15 RX ADMIN — Medication 30 MILLILITER(S): at 04:46

## 2023-12-15 RX ADMIN — Medication 25 MILLIGRAM(S): at 03:57

## 2023-12-15 RX ADMIN — MORPHINE SULFATE 2 MILLIGRAM(S): 50 CAPSULE, EXTENDED RELEASE ORAL at 04:12

## 2023-12-15 RX ADMIN — PANTOPRAZOLE SODIUM 40 MILLIGRAM(S): 20 TABLET, DELAYED RELEASE ORAL at 11:47

## 2023-12-15 RX ADMIN — AMLODIPINE BESYLATE 5 MILLIGRAM(S): 2.5 TABLET ORAL at 17:39

## 2023-12-15 RX ADMIN — HEPARIN SODIUM 5000 UNIT(S): 5000 INJECTION INTRAVENOUS; SUBCUTANEOUS at 06:00

## 2023-12-15 RX ADMIN — Medication 30 MILLILITER(S): at 00:28

## 2023-12-15 RX ADMIN — CHLORHEXIDINE GLUCONATE 1 APPLICATION(S): 213 SOLUTION TOPICAL at 06:00

## 2023-12-15 RX ADMIN — Medication 25 MILLIGRAM(S): at 00:51

## 2023-12-15 RX ADMIN — MORPHINE SULFATE 2 MILLIGRAM(S): 50 CAPSULE, EXTENDED RELEASE ORAL at 03:57

## 2023-12-15 RX ADMIN — Medication 5 MILLIGRAM(S): at 11:47

## 2023-12-15 NOTE — PROGRESS NOTE ADULT - ASSESSMENT
28F with daily marijuana use presented for evaluation of intractable vomiting, admitted for hydration and IV anti-emesis.     # vomiting.   - usually occurs when she gets her period and resolves after a few days   - Monitor electrolytes and replace as indicated  - LR 75mL/hr   - Zofran 4mg IV q6 PRN and Reglan 10mg IV q8 PRN second line. Adjust as necessary. Requested Benadryl as well with the Zofran as she reports it makes her itchy  12/14 slowly improving and keeping some liquids down .   12/15 resolved and eating well.     # Hepatic lesion   - New, 2cm hypodense liver lesion detected on CT A/P. MRI Abdomen w/wo contrast ordered but was a poor study d/t motion artifact   - will order triple phase ct     # KIMBERLY  - Baseline creatinine 0.9, 1.29 on admission - likely in the setting of prerenal volume depletion  -resolved     # Chronic GERD  - Daily PPI  - Maalox q4 PRN    # Leukocytosis  - Likely reactive, monitor       DVT ppx: Heparin  GI ppx: PPI  Diet: Regular   Activity: IAT   Code: Full     dc home pending ct abd  28F with daily marijuana use presented for evaluation of intractable vomiting, admitted for hydration and IV anti-emesis.     # vomiting.   - usually occurs when she gets her period and resolves after a few days   - Monitor electrolytes and replace as indicated  - LR 75mL/hr   - Zofran 4mg IV q6 PRN and Reglan 10mg IV q8 PRN second line. Adjust as necessary. Requested Benadryl as well with the Zofran as she reports it makes her itchy  12/14 slowly improving and keeping some liquids down .   12/15 resolved and eating well.     # Hepatic lesion   - New, 2cm hypodense liver lesion detected on CT A/P. MRI Abdomen w/wo contrast ordered but was a poor study d/t motion artifact   - will order triple phase ct     # KIMBERLY  - Baseline creatinine 0.9, 1.29 on admission - likely in the setting of prerenal volume depletion  -resolved     # Chronic GERD  - Daily PPI  - Maalox q4 PRN    #HTN  - pt reports htn for many years, denies any family memebers with htn at a young age   - pt is on contraceptives   - will check secondary causes. tsh, cortisol, urine metanephrines, renin/aldosterone levels  - start amlodipine   - f/u with Dr. Sullivan outaptient     # Leukocytosis  - Likely reactive, monitor       DVT ppx: Heparin  GI ppx: PPI  Diet: Regular   Activity: IAT   Code: Full     dc home pending ct abd

## 2023-12-15 NOTE — PROGRESS NOTE ADULT - SUBJECTIVE AND OBJECTIVE BOX
Patient is a 28y old  Female who presents with a chief complaint of Hyperemesis Syndrome (12 Dec 2023 19:52)      INTERVAL HPI/OVERNIGHT EVENTS: resolved vomiting and eating well     MEDICATIONS  (STANDING):  chlorhexidine 2% Cloths 1 Application(s) Topical <User Schedule>  heparin   Injectable 5000 Unit(s) SubCutaneous every 8 hours  lactated ringers. 1000 milliLiter(s) (75 mL/Hr) IV Continuous <Continuous>  lactated ringers. 1000 milliLiter(s) (75 mL/Hr) IV Continuous <Continuous>  pantoprazole  Injectable 40 milliGRAM(s) IV Push daily    MEDICATIONS  (PRN):  aluminum hydroxide/magnesium hydroxide/simethicone Suspension 30 milliLiter(s) Oral every 4 hours PRN Dyspepsia  diphenhydrAMINE Injectable 25 milliGRAM(s) IV Push every 6 hours PRN Itching  hydrALAZINE Injectable 5 milliGRAM(s) IV Push every 8 hours PRN sbp>170 dbp>105  metoclopramide Injectable 10 milliGRAM(s) IV Push every 8 hours PRN nausea, vomiting  morphine  - Injectable 2 milliGRAM(s) IV Push every 6 hours PRN Severe Pain (7 - 10)  ondansetron Injectable 4 milliGRAM(s) IV Push every 6 hours PRN Nausea and/or Vomiting        Allergies    penicillin (Hives)    Intolerances        REVIEW OF SYSTEMS:  CONSTITUTIONAL: No fever, weight loss  EYES: No eye pain, visual disturbances, or discharge  ENMT:  No difficulty hearing, tinnitus, vertigo; No sinus or throat pain  RESPIRATORY: No cough, wheezing, chills or hemoptysis; No shortness of breath  CARDIOVASCULAR: No chest pain, palpitations, dizziness, or leg swelling  GASTROINTESTINAL:; No diarrhea or constipation. No melena or hematochezia.  GENITOURINARY: No dysuria, frequency, hematuria, or incontinence  NEUROLOGICAL: No headaches, memory loss, loss of strength, numbness, or tremors  SKIN: No itching, burning, rashes, or lesions   MUSCULOSKELETAL: No joint pain or swelling; No muscle, back, or extremity pain  PSYCHIATRIC: No depression, anxiety, mood swings, or difficulty sleeping  HEME/LYMPH: No easy bruising, or bleeding gums      vss     PHYSICAL EXAM:  GENERAL: NAD  HEAD:  Atraumatic, Normocephalic  EYES: EOMI, PERRLA, conjunctiva and sclera clear  ENMT: No tonsillar erythema, exudates, or enlargement;   NECK: Supple, Normal thyroid  NERVOUS SYSTEM:  Alert & Oriented X3, Good concentration; Motor Strength 5/5 B/L upper and lower extremities; DTRs 2+ intact and symmetric  CHEST/LUNG: CTABL; No rales, rhonchi, wheezing, or rubs  HEART: Regular rate and rhythm; No murmurs, rubs, or gallops  ABDOMEN: Soft, Nontender, Nondistended; Bowel sounds present  EXTREMITIES:  2+ Peripheral Pulses, No clubbing, cyanosis, or edema  LYMPH: No lymphadenopathy noted  SKIN: No rashes or lesions    LABS:                                       13.4   8.37  )-----------( 276      ( 15 Dec 2023 10:10 )             40.1     12-15    136  |  102  |  14  ----------------------------<  80  3.3<L>   |  27  |  0.86    Ca    8.4<L>      15 Dec 2023 10:10    TPro  7.0  /  Alb  3.4  /  TBili  1.1  /  DBili  x   /  AST  46<H>  /  ALT  63  /  AlkPhos  56  12-14    PT/INR - ( 14 Dec 2023 07:10 )   PT: 12.3 sec;   INR: 1.03 ratio           Urinalysis Basic - ( 15 Dec 2023 10:10 )    Color: x / Appearance: x / SG: x / pH: x  Gluc: 80 mg/dL / Ketone: x  / Bili: x / Urobili: x   Blood: x / Protein: x / Nitrite: x   Leuk Esterase: x / RBC: x / WBC x   Sq Epi: x / Non Sq Epi: x / Bacteria: x            CAPILLARY BLOOD GLUCOSE          RADIOLOGY & ADDITIONAL TESTS:    Imaging Personally Reviewed:  [ ] YES  [ ] NO    Consultant(s) Notes Reviewed:  [ ] YES  [ ] NO    Care Discussed with Consultants/Other Providers [ ] YES  [ ] NO
Patient is a 28y old  Female who presents with a chief complaint of Hyperemesis Syndrome (12 Dec 2023 19:52)      INTERVAL HPI/OVERNIGHT EVENTS: still vomiting     MEDICATIONS  (STANDING):  chlorhexidine 2% Cloths 1 Application(s) Topical <User Schedule>  heparin   Injectable 5000 Unit(s) SubCutaneous every 8 hours  lactated ringers. 1000 milliLiter(s) (75 mL/Hr) IV Continuous <Continuous>  lactated ringers. 1000 milliLiter(s) (75 mL/Hr) IV Continuous <Continuous>  pantoprazole    Tablet 40 milliGRAM(s) Oral before breakfast    MEDICATIONS  (PRN):  aluminum hydroxide/magnesium hydroxide/simethicone Suspension 30 milliLiter(s) Oral every 4 hours PRN Dyspepsia  diphenhydrAMINE Injectable 25 milliGRAM(s) IV Push every 6 hours PRN Itching  metoclopramide Injectable 10 milliGRAM(s) IV Push every 8 hours PRN nausea, vomiting  morphine  - Injectable 2 milliGRAM(s) IV Push every 8 hours PRN Severe Pain (7 - 10)  ondansetron Injectable 4 milliGRAM(s) IV Push every 6 hours PRN Nausea and/or Vomiting      Allergies    penicillin (Hives)    Intolerances        REVIEW OF SYSTEMS:  CONSTITUTIONAL: No fever, weight loss  EYES: No eye pain, visual disturbances, or discharge  ENMT:  No difficulty hearing, tinnitus, vertigo; No sinus or throat pain  RESPIRATORY: No cough, wheezing, chills or hemoptysis; No shortness of breath  CARDIOVASCULAR: No chest pain, palpitations, dizziness, or leg swelling  GASTROINTESTINAL:; No diarrhea or constipation. No melena or hematochezia.  GENITOURINARY: No dysuria, frequency, hematuria, or incontinence  NEUROLOGICAL: No headaches, memory loss, loss of strength, numbness, or tremors  SKIN: No itching, burning, rashes, or lesions   MUSCULOSKELETAL: No joint pain or swelling; No muscle, back, or extremity pain  PSYCHIATRIC: No depression, anxiety, mood swings, or difficulty sleeping  HEME/LYMPH: No easy bruising, or bleeding gums      Vital Signs Last 24 Hrs  T(C): 37.2 (13 Dec 2023 11:46), Max: 37.2 (12 Dec 2023 22:00)  T(F): 99 (13 Dec 2023 11:46), Max: 99 (13 Dec 2023 11:46)  HR: 84 (13 Dec 2023 11:46) (69 - 91)  BP: 174/115 (13 Dec 2023 11:46) (125/86 - 174/115)  BP(mean): --  RR: 18 (13 Dec 2023 11:46) (12 - 18)  SpO2: 100% (13 Dec 2023 11:46) (97% - 100%)    Parameters below as of 13 Dec 2023 11:46  Patient On (Oxygen Delivery Method): room air        PHYSICAL EXAM:  GENERAL: NAD  HEAD:  Atraumatic, Normocephalic  EYES: EOMI, PERRLA, conjunctiva and sclera clear  ENMT: No tonsillar erythema, exudates, or enlargement;   NECK: Supple, Normal thyroid  NERVOUS SYSTEM:  Alert & Oriented X3, Good concentration; Motor Strength 5/5 B/L upper and lower extremities; DTRs 2+ intact and symmetric  CHEST/LUNG: CTABL; No rales, rhonchi, wheezing, or rubs  HEART: Regular rate and rhythm; No murmurs, rubs, or gallops  ABDOMEN: Soft, Nontender, Nondistended; Bowel sounds present  EXTREMITIES:  2+ Peripheral Pulses, No clubbing, cyanosis, or edema  LYMPH: No lymphadenopathy noted  SKIN: No rashes or lesions    LABS:                        11.2   10.24 )-----------( 271      ( 13 Dec 2023 06:50 )             34.4     12-13    140  |  112<H>  |  11  ----------------------------<  95  3.6   |  23  |  0.82    Ca    8.2<L>      13 Dec 2023 06:50  Phos  2.5     12-13  Mg     2.4     12-13    TPro  6.9  /  Alb  3.4  /  TBili  1.1  /  DBili  x   /  AST  52<H>  /  ALT  53  /  AlkPhos  53  12-13      Urinalysis Basic - ( 13 Dec 2023 06:50 )    Color: x / Appearance: x / SG: x / pH: x  Gluc: 95 mg/dL / Ketone: x  / Bili: x / Urobili: x   Blood: x / Protein: x / Nitrite: x   Leuk Esterase: x / RBC: x / WBC x   Sq Epi: x / Non Sq Epi: x / Bacteria: x      CAPILLARY BLOOD GLUCOSE          RADIOLOGY & ADDITIONAL TESTS:    Imaging Personally Reviewed:  [ ] YES  [ ] NO    Consultant(s) Notes Reviewed:  [ ] YES  [ ] NO    Care Discussed with Consultants/Other Providers [ ] YES  [ ] NO
Patient is a 28y old  Female who presents with a chief complaint of Hyperemesis Syndrome (12 Dec 2023 19:52)      INTERVAL HPI/OVERNIGHT EVENTS: still vomiting but improving     MEDICATIONS  (STANDING):  chlorhexidine 2% Cloths 1 Application(s) Topical <User Schedule>  heparin   Injectable 5000 Unit(s) SubCutaneous every 8 hours  lactated ringers. 1000 milliLiter(s) (75 mL/Hr) IV Continuous <Continuous>  lactated ringers. 1000 milliLiter(s) (75 mL/Hr) IV Continuous <Continuous>  pantoprazole    Tablet 40 milliGRAM(s) Oral before breakfast    MEDICATIONS  (PRN):  aluminum hydroxide/magnesium hydroxide/simethicone Suspension 30 milliLiter(s) Oral every 4 hours PRN Dyspepsia  diphenhydrAMINE Injectable 25 milliGRAM(s) IV Push every 6 hours PRN Itching  metoclopramide Injectable 10 milliGRAM(s) IV Push every 8 hours PRN nausea, vomiting  morphine  - Injectable 2 milliGRAM(s) IV Push every 8 hours PRN Severe Pain (7 - 10)  ondansetron Injectable 4 milliGRAM(s) IV Push every 6 hours PRN Nausea and/or Vomiting      Allergies    penicillin (Hives)    Intolerances        REVIEW OF SYSTEMS:  CONSTITUTIONAL: No fever, weight loss  EYES: No eye pain, visual disturbances, or discharge  ENMT:  No difficulty hearing, tinnitus, vertigo; No sinus or throat pain  RESPIRATORY: No cough, wheezing, chills or hemoptysis; No shortness of breath  CARDIOVASCULAR: No chest pain, palpitations, dizziness, or leg swelling  GASTROINTESTINAL:; No diarrhea or constipation. No melena or hematochezia.  GENITOURINARY: No dysuria, frequency, hematuria, or incontinence  NEUROLOGICAL: No headaches, memory loss, loss of strength, numbness, or tremors  SKIN: No itching, burning, rashes, or lesions   MUSCULOSKELETAL: No joint pain or swelling; No muscle, back, or extremity pain  PSYCHIATRIC: No depression, anxiety, mood swings, or difficulty sleeping  HEME/LYMPH: No easy bruising, or bleeding gums      vss     PHYSICAL EXAM:  GENERAL: NAD  HEAD:  Atraumatic, Normocephalic  EYES: EOMI, PERRLA, conjunctiva and sclera clear  ENMT: No tonsillar erythema, exudates, or enlargement;   NECK: Supple, Normal thyroid  NERVOUS SYSTEM:  Alert & Oriented X3, Good concentration; Motor Strength 5/5 B/L upper and lower extremities; DTRs 2+ intact and symmetric  CHEST/LUNG: CTABL; No rales, rhonchi, wheezing, or rubs  HEART: Regular rate and rhythm; No murmurs, rubs, or gallops  ABDOMEN: Soft, Nontender, Nondistended; Bowel sounds present  EXTREMITIES:  2+ Peripheral Pulses, No clubbing, cyanosis, or edema  LYMPH: No lymphadenopathy noted  SKIN: No rashes or lesions    LABS:                        11.2   10.24 )-----------( 271      ( 13 Dec 2023 06:50 )             34.4     12-13    140  |  112<H>  |  11  ----------------------------<  95  3.6   |  23  |  0.82    Ca    8.2<L>      13 Dec 2023 06:50  Phos  2.5     12-13  Mg     2.4     12-13    TPro  6.9  /  Alb  3.4  /  TBili  1.1  /  DBili  x   /  AST  52<H>  /  ALT  53  /  AlkPhos  53  12-13      Urinalysis Basic - ( 13 Dec 2023 06:50 )    Color: x / Appearance: x / SG: x / pH: x  Gluc: 95 mg/dL / Ketone: x  / Bili: x / Urobili: x   Blood: x / Protein: x / Nitrite: x   Leuk Esterase: x / RBC: x / WBC x   Sq Epi: x / Non Sq Epi: x / Bacteria: x      CAPILLARY BLOOD GLUCOSE          RADIOLOGY & ADDITIONAL TESTS:    Imaging Personally Reviewed:  [ ] YES  [ ] NO    Consultant(s) Notes Reviewed:  [ ] YES  [ ] NO    Care Discussed with Consultants/Other Providers [ ] YES  [ ] NO

## 2023-12-16 ENCOUNTER — TRANSCRIPTION ENCOUNTER (OUTPATIENT)
Age: 28
End: 2023-12-16

## 2023-12-16 VITALS
RESPIRATION RATE: 17 BRPM | DIASTOLIC BLOOD PRESSURE: 88 MMHG | OXYGEN SATURATION: 98 % | SYSTOLIC BLOOD PRESSURE: 136 MMHG | HEART RATE: 80 BPM | TEMPERATURE: 98 F

## 2023-12-16 PROCEDURE — 99239 HOSP IP/OBS DSCHRG MGMT >30: CPT

## 2023-12-16 PROCEDURE — 74177 CT ABD & PELVIS W/CONTRAST: CPT | Mod: 26

## 2023-12-16 RX ORDER — OMEPRAZOLE 10 MG/1
1 CAPSULE, DELAYED RELEASE ORAL
Qty: 0 | Refills: 0 | DISCHARGE

## 2023-12-16 RX ORDER — ONDANSETRON 8 MG/1
1 TABLET, FILM COATED ORAL
Qty: 1 | Refills: 0
Start: 2023-12-16 | End: 2023-12-20

## 2023-12-16 RX ORDER — OMEPRAZOLE 10 MG/1
1 CAPSULE, DELAYED RELEASE ORAL
Qty: 30 | Refills: 0
Start: 2023-12-16 | End: 2024-01-14

## 2023-12-16 RX ORDER — AMLODIPINE BESYLATE 2.5 MG/1
1 TABLET ORAL
Qty: 30 | Refills: 0
Start: 2023-12-16 | End: 2024-01-14

## 2023-12-16 RX ADMIN — AMLODIPINE BESYLATE 5 MILLIGRAM(S): 2.5 TABLET ORAL at 05:43

## 2023-12-16 RX ADMIN — PANTOPRAZOLE SODIUM 40 MILLIGRAM(S): 20 TABLET, DELAYED RELEASE ORAL at 11:31

## 2023-12-16 RX ADMIN — CHLORHEXIDINE GLUCONATE 1 APPLICATION(S): 213 SOLUTION TOPICAL at 05:43

## 2023-12-16 NOTE — DISCHARGE NOTE PROVIDER - CARE PROVIDER_API CALL
Lesly Garrett  Gastroenterology  [address] [place] [address]  Phone: [phone]  Fax: [phone]  Follow Up Time:     Frank Sullivan  Nephrology  [address], [address] [place] [address]  Phone: [phone]  Fax: [phone]  Follow Up Time:    Lesly Garrett  Gastroenterology  [address] [place] [address]  Phone: [phone]  Fax: [phone]  Follow Up Time:     Frank Sullivan  Nephrology  [address], [address] [place] [address]  Phone: [phone]  Fax: [phone]  Follow Up Time:

## 2023-12-16 NOTE — DISCHARGE NOTE PROVIDER - CARE PROVIDERS DIRECT ADDRESSES
,DirectAddress_Unknown,[email or web address] ,DirectAddress_Unknown,[email or web address]

## 2023-12-16 NOTE — CHART NOTE - NSCHARTNOTEFT_GEN_A_CORE
To Whom It May Concern:                                                                                                                                                               Marla Hilliard was admitted on 12/12/23, treated and discharged on 12/16/23 from [facility] without any restriction in activity. If any questions or concerns please call.     Sincerely,         Marisa Mondragon PA-C To Whom It May Concern:                                                                                                                                                               Marla Hilliard was admitted on 12/12/23, treated and discharged on 12/16/23 from [facility] without any restriction in activity. If any questions or concerns please call.     Sincerely,         Marisa Mondragon PA-C

## 2023-12-16 NOTE — DISCHARGE NOTE NURSING/CASE MANAGEMENT/SOCIAL WORK - PATIENT PORTAL LINK FT
You can access the FollowMyHealth Patient Portal offered by [facility] by registering at the following website: [email or web address]. By joining [employer]’s FollowMyHealth portal, you will also be able to view your health information using other applications (apps) compatible with our system. You can access the FollowMyHealth Patient Portal offered by [facility] by registering at the following website: [email or web address]. By joining [employer]’s FollowMyHealth portal, you will also be able to view your health information using other applications (apps) compatible with our system.

## 2023-12-16 NOTE — DISCHARGE NOTE NURSING/CASE MANAGEMENT/SOCIAL WORK - NSDCPEFALRISK_GEN_ALL_CORE
For information on Fall & Injury Prevention, visit: [email or web address] OR  [email or web address] OR  https://www.cdc.gov/steadi/patient.html For information on Fall & Injury Prevention, visit: [email or web address] OR  [email or web address]-injury OR  https://www.cdc.gov/steadi/patient.html

## 2023-12-16 NOTE — DISCHARGE NOTE PROVIDER - ATTENDING DISCHARGE PHYSICAL EXAMINATION:
GENERAL: NAD, well-groomed, well-developed  HEAD:  Atraumatic, Normocephalic  EYES: EOMI, PERRLA, conjunctiva and sclera clear  ENMT: No tonsillar erythema, exudates, or enlargement; Moist mucous membranes, No lesions  NECK: Supple, No JVD, Normal thyroid  NERVOUS SYSTEM:  Alert & Oriented X3, Good concentration DTRs 2+ intact and symmetric  CHEST/LUNG: Clear to percussion bilaterally; No rales, rhonchi, wheezing, or rubs  HEART: Regular rate and rhythm; No murmurs, rubs, or gallops  ABDOMEN: Soft, Nontender, Nondistended; Bowel sounds present  EXTREMITIES: No edema   LYMPH: No lymphadenopathy noted

## 2023-12-16 NOTE — DISCHARGE NOTE PROVIDER - NSDCMRMEDTOKEN_GEN_ALL_CORE_FT
amLODIPine 5 mg oral tablet: 1 tab(s) orally once a day  Mylan birth control: 1 cap(s) orally once a day  omeprazole 40 mg oral delayed release capsule: 1 cap(s) orally once a day  ondansetron 4 mg oral tablet, disintegratin tab(s) orally every 8 hours as needed for  nausea

## 2023-12-16 NOTE — DISCHARGE NOTE PROVIDER - HOSPITAL COURSE
28F with daily marijuana use presented for evaluation of intractable vomiting, admitted for hydration and IV anti-emesis.     # vomiting.   - usually occurs when she gets her period and resolves after a few days    resolved and eating well.   - needs op GI follow up     # Hepatic lesion   - New, 2cm hypodense liver lesion detected on CT A/P. MRI Abdomen w/wo contrast ordered but was a poor study d/t motion artifact   -  triple phase ct done and but results pending and patient not will to wait. Patient  is aferbile and eating and drinking well. Discussed follow up with gastroenterology     # KIMBERLY  - Baseline creatinine 0.9, 1.29 on admission - likely in the setting of prerenal volume depletion  -resolved     # Chronic GERD  - Daily PPI  - Maalox q4 PRN    #HTN  - pt reports htn for many years, denies any family members with htn at a young age   - pt is on contraceptives   - will check secondary causes. tsh, cortisol, urine metanephrines, renin/aldosterone levels  - start amlodipine   - f/u with Dr. Sullivan outaptient     # Leukocytosis  - Likely reactive, monitor

## 2023-12-16 NOTE — DISCHARGE NOTE PROVIDER - PROVIDER TOKENS
PROVIDER:[TOKEN:[[id number]]],PROVIDER:[TOKEN:[[id number]]] PROVIDER:[TOKEN:[[id number]]],PROVIDER:[TOKEN:[[id number]]]

## 2023-12-17 ENCOUNTER — TRANSCRIPTION ENCOUNTER (OUTPATIENT)
Age: 28
End: 2023-12-17

## 2023-12-18 LAB
ALDOST SERPL-MCNC: 8.7 NG/DL — SIGNIFICANT CHANGE UP
ALDOST SERPL-MCNC: 8.7 NG/DL — SIGNIFICANT CHANGE UP
ALDOSTERONE / DIRECT RENIN RATIO RESULT: SIGNIFICANT CHANGE UP RATIO
ALDOSTERONE / DIRECT RENIN RATIO RESULT: SIGNIFICANT CHANGE UP RATIO
RENIN DIRECT, PLASMA: <2.1 PG/ML — SIGNIFICANT CHANGE UP
RENIN DIRECT, PLASMA: <2.1 PG/ML — SIGNIFICANT CHANGE UP

## 2023-12-19 DIAGNOSIS — K76.9 LIVER DISEASE, UNSPECIFIED: ICD-10-CM

## 2023-12-19 DIAGNOSIS — K21.9 GASTRO-ESOPHAGEAL REFLUX DISEASE WITHOUT ESOPHAGITIS: ICD-10-CM

## 2023-12-19 DIAGNOSIS — R11.10 VOMITING, UNSPECIFIED: ICD-10-CM

## 2023-12-19 DIAGNOSIS — N17.9 ACUTE KIDNEY FAILURE, UNSPECIFIED: ICD-10-CM

## 2023-12-19 DIAGNOSIS — D72.829 ELEVATED WHITE BLOOD CELL COUNT, UNSPECIFIED: ICD-10-CM

## 2023-12-19 DIAGNOSIS — I10 ESSENTIAL (PRIMARY) HYPERTENSION: ICD-10-CM

## 2023-12-19 DIAGNOSIS — R11.2 NAUSEA WITH VOMITING, UNSPECIFIED: ICD-10-CM

## 2023-12-19 DIAGNOSIS — F12.90 CANNABIS USE, UNSPECIFIED, UNCOMPLICATED: ICD-10-CM

## 2023-12-19 DIAGNOSIS — Z88.0 ALLERGY STATUS TO PENICILLIN: ICD-10-CM

## 2023-12-20 ENCOUNTER — TRANSCRIPTION ENCOUNTER (OUTPATIENT)
Age: 28
End: 2023-12-20

## 2024-01-17 ENCOUNTER — TRANSCRIPTION ENCOUNTER (OUTPATIENT)
Age: 29
End: 2024-01-17

## 2024-04-26 NOTE — PATIENT PROFILE ADULT - MONEY FOR FOOD
Discharge Instructions Following Surgery for Transposed AV Fistula          Keep incision and dressing dry until first office visit. Keep present dressing in place if possible. Replace dressing if needed. No lifting over ten pounds with operated arm for two weeks. Empty LIZZ drain 1-2 times per day and record output volume on paper (date, time, and amount). -Bring this record to office visit.             -After emptying drain, squeeze bulb and replace cap so that drain bulb has  suction.                -Bulb should look \"collapsed\" to indicate that it has suction. See Dr Kevin Lowe in the office on Wednesday morning 5/1/2019  -  Call for appointment  - [phone]. Use pain medicines as needed as prescribed or use Tylenol. Do not drive while taking narcotic pain medication. Do not restart Aspirin or Plavix (clopidogrel) until Friday 4/26/2019. If you have any problems, call Dr. Kevin Lowe at [phone] or [phone] (after hours)      VERONIKA Izquierdo MD no no

## 2024-07-17 NOTE — ED PROVIDER NOTE - NS HIV RISK FACTOR YES
Unable to consent due to medical condition
How Severe Is Your Skin Discoloration?: mild
Additional History: Patchy white areas on her back

## 2024-08-04 ENCOUNTER — INPATIENT (INPATIENT)
Facility: HOSPITAL | Age: 29
LOS: 3 days | Discharge: HOME | End: 2024-08-08
Attending: STUDENT IN AN ORGANIZED HEALTH CARE EDUCATION/TRAINING PROGRAM | Admitting: STUDENT IN AN ORGANIZED HEALTH CARE EDUCATION/TRAINING PROGRAM
Payer: COMMERCIAL

## 2024-08-04 VITALS
TEMPERATURE: 99 F | WEIGHT: 160.06 LBS | HEIGHT: 64 IN | OXYGEN SATURATION: 100 % | HEART RATE: 80 BPM | SYSTOLIC BLOOD PRESSURE: 166 MMHG | RESPIRATION RATE: 17 BRPM | DIASTOLIC BLOOD PRESSURE: 98 MMHG

## 2024-08-04 LAB
ALBUMIN SERPL ELPH-MCNC: 4.1 G/DL — SIGNIFICANT CHANGE UP (ref 3.3–5)
ALP SERPL-CCNC: 57 U/L — SIGNIFICANT CHANGE UP (ref 40–120)
ALT FLD-CCNC: 109 U/L — HIGH (ref 12–78)
ANION GAP SERPL CALC-SCNC: 11 MMOL/L — SIGNIFICANT CHANGE UP (ref 5–17)
AST SERPL-CCNC: 93 U/L — HIGH (ref 15–37)
BASOPHILS # BLD AUTO: 0.02 K/UL — SIGNIFICANT CHANGE UP (ref 0–0.2)
BASOPHILS NFR BLD AUTO: 0.1 % — SIGNIFICANT CHANGE UP (ref 0–2)
BILIRUB SERPL-MCNC: 1.2 MG/DL — SIGNIFICANT CHANGE UP (ref 0.2–1.2)
BUN SERPL-MCNC: 14 MG/DL — SIGNIFICANT CHANGE UP (ref 7–23)
CALCIUM SERPL-MCNC: 9.6 MG/DL — SIGNIFICANT CHANGE UP (ref 8.5–10.1)
CHLORIDE SERPL-SCNC: 110 MMOL/L — HIGH (ref 96–108)
CO2 SERPL-SCNC: 18 MMOL/L — LOW (ref 22–31)
CREAT SERPL-MCNC: 1.08 MG/DL — SIGNIFICANT CHANGE UP (ref 0.5–1.3)
EGFR: 72 ML/MIN/1.73M2 — SIGNIFICANT CHANGE UP
EOSINOPHIL # BLD AUTO: 0 K/UL — SIGNIFICANT CHANGE UP (ref 0–0.5)
EOSINOPHIL NFR BLD AUTO: 0 % — SIGNIFICANT CHANGE UP (ref 0–6)
GLUCOSE SERPL-MCNC: 164 MG/DL — HIGH (ref 70–99)
HCG SERPL-ACNC: <1 MIU/ML — SIGNIFICANT CHANGE UP
HCT VFR BLD CALC: 36.2 % — SIGNIFICANT CHANGE UP (ref 34.5–45)
HGB BLD-MCNC: 12.3 G/DL — SIGNIFICANT CHANGE UP (ref 11.5–15.5)
IMM GRANULOCYTES NFR BLD AUTO: 0.3 % — SIGNIFICANT CHANGE UP (ref 0–0.9)
LIDOCAIN IGE QN: 19 U/L — SIGNIFICANT CHANGE UP (ref 13–75)
LYMPHOCYTES # BLD AUTO: 1.38 K/UL — SIGNIFICANT CHANGE UP (ref 1–3.3)
LYMPHOCYTES # BLD AUTO: 8.7 % — LOW (ref 13–44)
MCHC RBC-ENTMCNC: 28 PG — SIGNIFICANT CHANGE UP (ref 27–34)
MCHC RBC-ENTMCNC: 34 G/DL — SIGNIFICANT CHANGE UP (ref 32–36)
MCV RBC AUTO: 82.3 FL — SIGNIFICANT CHANGE UP (ref 80–100)
MONOCYTES # BLD AUTO: 0.67 K/UL — SIGNIFICANT CHANGE UP (ref 0–0.9)
MONOCYTES NFR BLD AUTO: 4.2 % — SIGNIFICANT CHANGE UP (ref 2–14)
NEUTROPHILS # BLD AUTO: 13.75 K/UL — HIGH (ref 1.8–7.4)
NEUTROPHILS NFR BLD AUTO: 86.7 % — HIGH (ref 43–77)
NRBC # BLD: 0 /100 WBCS — SIGNIFICANT CHANGE UP (ref 0–0)
PLATELET # BLD AUTO: 273 K/UL — SIGNIFICANT CHANGE UP (ref 150–400)
POTASSIUM SERPL-MCNC: 4.2 MMOL/L — SIGNIFICANT CHANGE UP (ref 3.5–5.3)
POTASSIUM SERPL-SCNC: 4.2 MMOL/L — SIGNIFICANT CHANGE UP (ref 3.5–5.3)
PROT SERPL-MCNC: 8 GM/DL — SIGNIFICANT CHANGE UP (ref 6–8.3)
RBC # BLD: 4.4 M/UL — SIGNIFICANT CHANGE UP (ref 3.8–5.2)
RBC # FLD: 13.9 % — SIGNIFICANT CHANGE UP (ref 10.3–14.5)
SODIUM SERPL-SCNC: 139 MMOL/L — SIGNIFICANT CHANGE UP (ref 135–145)
WBC # BLD: 15.86 K/UL — HIGH (ref 3.8–10.5)
WBC # FLD AUTO: 15.86 K/UL — HIGH (ref 3.8–10.5)

## 2024-08-04 PROCEDURE — 99285 EMERGENCY DEPT VISIT HI MDM: CPT

## 2024-08-04 PROCEDURE — 99223 1ST HOSP IP/OBS HIGH 75: CPT

## 2024-08-04 RX ORDER — ACETAMINOPHEN 500 MG
1000 TABLET ORAL ONCE
Refills: 0 | Status: COMPLETED | OUTPATIENT
Start: 2024-08-04 | End: 2024-08-04

## 2024-08-04 RX ORDER — DIPHENHYDRAMINE HCL 25 MG
50 CAPSULE ORAL ONCE
Refills: 0 | Status: COMPLETED | OUTPATIENT
Start: 2024-08-04 | End: 2024-08-04

## 2024-08-04 RX ORDER — KETOROLAC TROMETHAMINE 10 MG
15 TABLET ORAL ONCE
Refills: 0 | Status: DISCONTINUED | OUTPATIENT
Start: 2024-08-04 | End: 2024-08-04

## 2024-08-04 RX ORDER — PRAMIPEXOLE DIHYDROCHLORIDE 0.5 MG/1
5 TABLET ORAL ONCE
Refills: 0 | Status: COMPLETED | OUTPATIENT
Start: 2024-08-04 | End: 2024-08-04

## 2024-08-04 RX ORDER — FAMOTIDINE 40 MG/1
20 TABLET, FILM COATED ORAL ONCE
Refills: 0 | Status: COMPLETED | OUTPATIENT
Start: 2024-08-04 | End: 2024-08-04

## 2024-08-04 RX ORDER — METOCLOPRAMIDE HCL 5 MG/ML
10 VIAL (ML) INJECTION ONCE
Refills: 0 | Status: COMPLETED | OUTPATIENT
Start: 2024-08-04 | End: 2024-08-04

## 2024-08-04 RX ORDER — BACTERIOSTATIC SODIUM CHLORIDE 0.9 %
1000 VIAL (ML) INJECTION ONCE
Refills: 0 | Status: COMPLETED | OUTPATIENT
Start: 2024-08-04 | End: 2024-08-04

## 2024-08-04 RX ADMIN — Medication 10 MILLIGRAM(S): at 13:27

## 2024-08-04 RX ADMIN — Medication 1000 MILLIGRAM(S): at 13:45

## 2024-08-04 RX ADMIN — Medication 15 MILLIGRAM(S): at 21:22

## 2024-08-04 RX ADMIN — Medication 400 MILLIGRAM(S): at 23:58

## 2024-08-04 RX ADMIN — PRAMIPEXOLE DIHYDROCHLORIDE 5 MILLIGRAM(S): 0.5 TABLET ORAL at 14:37

## 2024-08-04 RX ADMIN — FAMOTIDINE 20 MILLIGRAM(S): 40 TABLET, FILM COATED ORAL at 13:27

## 2024-08-04 RX ADMIN — Medication 50 MILLIGRAM(S): at 13:27

## 2024-08-04 RX ADMIN — Medication 400 MILLIGRAM(S): at 13:27

## 2024-08-04 RX ADMIN — Medication 1000 MILLILITER(S): at 15:00

## 2024-08-04 RX ADMIN — Medication 1000 MILLILITER(S): at 13:27

## 2024-08-04 NOTE — ED PROVIDER NOTE - CLINICAL SUMMARY MEDICAL DECISION MAKING FREE TEXT BOX
28F PMH cyclic vomiting syndrome / gastritis, + marijuana use, pw diffuse abd pain w/ radiation into back a/w N/V, chills since last night. Afebrile, VSS. Pt appears uncomfortable / in pain, w/ active emesis in the ED. Benign abd exam, no CVAT. Chart review shows pt w/ hx similar in Dec 2023, 2022 and 2021 - frequently requiring admission for intractable N/V. Plan for CBC, CMP, lipase, HCG, CT AP w/ IV contrast. Give IVF, Pepcid, Tylenol, Reglan, Benadryl +/- Haldol. Re-eval. 28F PMH cyclic vomiting syndrome / gastritis, + marijuana use, pw diffuse abd pain w/ radiation into back a/w N/V, chills since last night. Afebrile, VSS. Pt appears uncomfortable / in pain, w/ active emesis in the ED. Benign abd exam, no CVAT. Chart review shows pt w/ hx similar in Dec 2023, 2022 and 2021 - frequently requiring admission for intractable N/V. Plan for CBC, CMP, lipase, HCG, CT AP w/ IV contrast. Give IVF, Pepcid, Tylenol, Reglan, Benadryl +/- Haldol. Re-eval.  W/u significant for: + leukocytosis to 15.8 (no bandemia), BUN/Cr WNL, HCG negative. Pt refused CT imaging. On re-eval, pt remains unable to tolerate PO intake despite multiple rounds of medication. Will admit to medicine (d/w Dr Waldron). Pt updated to results, admission. She understands / agrees w/ this plan.

## 2024-08-04 NOTE — ED PROVIDER NOTE - PHYSICAL EXAMINATION
GEN: Awake, alert, interactive, pt appears uncomfortable / in pain, w/ active emesis in the ED.   HEAD AND NECK: NC/AT. Airway patent. Neck supple.   EYES:  Clear b/l. EOMI. PERRL.   ENT: Moist mucus membranes.   CARDIAC: Regular rate, regular rhythm. No evident pedal edema.    RESP/CHEST: Normal respiratory effort with no use of accessory muscles or retractions. Clear throughout on auscultation.  ABD: Soft, non-distended, non-tender. No rebound, no guarding.   BACK: No midline spinal TTP. No CVAT.   EXTREMITIES: Moving all extremities with no apparent deformities.   SKIN: Warm, dry, intact normal color. No rash.   NEURO: AOx3, CN II-XII grossly intact, no focal deficits.   PSYCH: Appropriate mood and affect.

## 2024-08-04 NOTE — ED ADULT NURSE NOTE - OBJECTIVE STATEMENT
Patient presents to ED c/o dizziness nausea and vomiting since yesterday. Patient unable to tolerate any foods for liquids. LMP patient unable to recall.  no pmh  A&Ox4 presenting to ED with gen abd pain X 2 days. pt reports she attempted to drink water yesterday morning and was unable to. pt reports she then smoked marijuana to try and calm herself down, but then she just felt more pain in the abd accompanied by N+V. pt report she has hx of gastritis which she takes omeprazole for. pt reports N+T in extremities. pt denies chest pain, sob, dizziness, h/a, back pain, blurred vision, cough/cold/fever.

## 2024-08-04 NOTE — ED ADULT NURSE REASSESSMENT NOTE - NS ED NURSE REASSESS COMMENT FT1
Dinner provided at this time. Awaiting ruthie po challenge. Pt already stating won't ruthie and requesting meds

## 2024-08-04 NOTE — ED ADULT TRIAGE NOTE - LOCATION:
Pt called stating she is needing refills on her zyrtec and her benazepril to be sent to [facility]. She states she has about 5 days supply left on each.    Right arm;

## 2024-08-04 NOTE — ED ADULT TRIAGE NOTE - CHIEF COMPLAINT QUOTE
Patient presents to ED c/o dizziness nausea and vomiting since yesterday. Patient unable to tolerate any foods for liquids. LMP patient unable to recall.  no pmh

## 2024-08-04 NOTE — ED ADULT NURSE REASSESSMENT NOTE - NS ED NURSE REASSESS COMMENT FT1
Pt provided with food for PO trial, unable to tolerate intake. VS updated, Dr. Watson made aware of PO failure and high BP. Pending further orders, pt updated regarding plan of care.

## 2024-08-04 NOTE — ED PROVIDER NOTE - OBJECTIVE STATEMENT
28F PMH cyclic vomiting syndrome / gastritis pw diffuse abd pain w/ radiation into back a/w N/V, chills since last night. Pt reports unable to tolerate PO intake, unsure of her last meal. + dizziness. Denies fever, h/a, CP, SOB, cough, diarrhea / constipation, dysuria, hematuria, LE pain / swelling, recent travel, sick contacts, new / spoiled foods. Pt admits to smoking marijuana last night.     PMH as above, PSH none, Allergy PCN, Meds as listed, unknown LMP / on OCP.

## 2024-08-04 NOTE — ED ADULT NURSE NOTE - CAS TRG GEN SKIN COLOR
70F with PMH significant for breast cancer s/p L mastectomy, TBI (s/p a fall ~2 years ago) c/b aphasia, meningioma, hypothyroidism (hx Grave's dz s/p radioactive iodine), CVA, and dysphagia who presents to [facility] ED on 12/20/2023 with no PO intake and increased lethargy in the last 2-3 days, admitted for hypernatremia 166 and UTI. Hospital course complicated by AHRF likely 2/2 to aspiration pneumonitis.    Normal for race 70F with PMH significant for breast cancer s/p L mastectomy, TBI (s/p a fall ~2 years ago) c/b aphasia, meningioma, hypothyroidism (hx Grave's dz s/p radioactive iodine), CVA, and dysphagia who presents to [facility] ED on 12/20/2023 with no PO intake and increased lethargy in the last 2-3 days, admitted for hypernatremia 166 and UTI. Hospital course complicated by AHRF likely 2/2 to aspiration pneumonitis.    70F with PMH significant for breast cancer s/p L mastectomy, TBI (s/p a fall ~2 years ago) c/b aphasia, meningioma, hypothyroidism (hx Grave's dz s/p radioactive iodine), CVA, and dysphagia who presents to [facility] ED on 12/20/2023 with no PO intake and increased lethargy in the last 2-3 days, admitted for hypernatremia 166 and UTI. Hospital course complicated by AHRF likely 2/2 to aspiration pneumonitis, pending further GOC conversation for PEG tube placement.    70F with PMH significant for breast cancer s/p L mastectomy, TBI (s/p a fall ~2 years ago) c/b aphasia, meningioma, hypothyroidism (hx Grave's dz s/p radioactive iodine), CVA, and dysphagia who presents to [facility] ED on 12/20/2023 with no PO intake and increased lethargy in the last 2-3 days, admitted for hypernatremia 166 and UTI. Hospital course complicated by AHRF likely 2/2 to aspiration pneumonitis, pending further GOC conversation for PEG tube placement.

## 2024-08-05 DIAGNOSIS — R03.0 ELEVATED BLOOD-PRESSURE READING, WITHOUT DIAGNOSIS OF HYPERTENSION: ICD-10-CM

## 2024-08-05 DIAGNOSIS — R10.9 UNSPECIFIED ABDOMINAL PAIN: ICD-10-CM

## 2024-08-05 PROCEDURE — 99232 SBSQ HOSP IP/OBS MODERATE 35: CPT

## 2024-08-05 RX ORDER — BACTERIOSTATIC SODIUM CHLORIDE 0.9 %
1000 VIAL (ML) INJECTION
Refills: 0 | Status: DISCONTINUED | OUTPATIENT
Start: 2024-08-05 | End: 2024-08-07

## 2024-08-05 RX ORDER — KETOROLAC TROMETHAMINE 10 MG
15 TABLET ORAL ONCE
Refills: 0 | Status: DISCONTINUED | OUTPATIENT
Start: 2024-08-05 | End: 2024-08-05

## 2024-08-05 RX ORDER — MAGNESIUM, ALUMINUM HYDROXIDE 200-225/5
30 SUSPENSION, ORAL (FINAL DOSE FORM) ORAL EVERY 4 HOURS
Refills: 0 | Status: DISCONTINUED | OUTPATIENT
Start: 2024-08-05 | End: 2024-08-08

## 2024-08-05 RX ORDER — ONDANSETRON HCL/PF 4 MG/2 ML
4 VIAL (ML) INJECTION EVERY 8 HOURS
Refills: 0 | Status: DISCONTINUED | OUTPATIENT
Start: 2024-08-05 | End: 2024-08-08

## 2024-08-05 RX ORDER — PANTOPRAZOLE SODIUM 20 MG/1
40 TABLET, DELAYED RELEASE ORAL
Refills: 0 | Status: DISCONTINUED | OUTPATIENT
Start: 2024-08-05 | End: 2024-08-08

## 2024-08-05 RX ORDER — METOCLOPRAMIDE HCL 5 MG/ML
10 VIAL (ML) INJECTION ONCE
Refills: 0 | Status: COMPLETED | OUTPATIENT
Start: 2024-08-05 | End: 2024-08-05

## 2024-08-05 RX ORDER — DIPHENHYDRAMINE HCL 25 MG
25 CAPSULE ORAL EVERY 6 HOURS
Refills: 0 | Status: DISCONTINUED | OUTPATIENT
Start: 2024-08-05 | End: 2024-08-06

## 2024-08-05 RX ORDER — MELATONIN 3 MG
3 TABLET ORAL AT BEDTIME
Refills: 0 | Status: DISCONTINUED | OUTPATIENT
Start: 2024-08-05 | End: 2024-08-08

## 2024-08-05 RX ORDER — ACETAMINOPHEN 500 MG
650 TABLET ORAL EVERY 6 HOURS
Refills: 0 | Status: DISCONTINUED | OUTPATIENT
Start: 2024-08-05 | End: 2024-08-07

## 2024-08-05 RX ADMIN — Medication 1000 MILLIGRAM(S): at 01:28

## 2024-08-05 RX ADMIN — Medication 25 MILLIGRAM(S): at 09:08

## 2024-08-05 RX ADMIN — PANTOPRAZOLE SODIUM 40 MILLIGRAM(S): 20 TABLET, DELAYED RELEASE ORAL at 05:40

## 2024-08-05 RX ADMIN — Medication 75 MILLILITER(S): at 04:29

## 2024-08-05 RX ADMIN — PANTOPRAZOLE SODIUM 40 MILLIGRAM(S): 20 TABLET, DELAYED RELEASE ORAL at 17:24

## 2024-08-05 RX ADMIN — Medication 75 MILLILITER(S): at 18:42

## 2024-08-05 RX ADMIN — Medication 25 MILLIGRAM(S): at 21:18

## 2024-08-05 RX ADMIN — Medication 15 MILLIGRAM(S): at 17:00

## 2024-08-05 RX ADMIN — Medication 15 MILLIGRAM(S): at 16:11

## 2024-08-05 RX ADMIN — Medication 4 MILLIGRAM(S): at 18:42

## 2024-08-05 RX ADMIN — Medication 4 MILLIGRAM(S): at 11:04

## 2024-08-05 RX ADMIN — Medication 10 MILLIGRAM(S): at 21:18

## 2024-08-05 NOTE — H&P ADULT - PROBLEM SELECTOR PLAN 2
BP: 166/98  No history of hypertension, most likely 2/2 pain   - Monitor BP   - Consider treated if remain elevated

## 2024-08-05 NOTE — H&P ADULT - HISTORY OF PRESENT ILLNESS
28-year-old female with a PMH of Gastritis, cyclic vomiting syndrome presents to the ED for abdominal pain. Endorses 5 days history of intractable light associated with Nausea, vomiting inability to tolerate PO intake worsen last night. Las use of marijuana was yesterday, endorses she smoked to help alleviate symptoms. Denies fever, chills, diarrhea, chest pain, palpitation, headache, dizziness, recent travel or any other acute symptoms. Labs remarkable for WBC:15.86, glucose:164, AST/ALT:93/109. BP:166/98. HR:80, Temp:98.6. Received Ofirmev, famotidine, Ketorolac, Reglan.

## 2024-08-05 NOTE — H&P ADULT - PROBLEM SELECTOR PLAN 1
5 day history of abdominal pain associated with nausea, vomiting inability to tolerate PO intake   - IV-NS   - Liquid diet: Advance as tolerated   - Zofran   - PPI   - Pain management

## 2024-08-05 NOTE — H&P ADULT - NSHPLABSRESULTS_GEN_ALL_CORE
12.3   15.86 )-----------( 273      ( 04 Aug 2024 13:20 )             36.2     139  |  110<H>  |  14  ----------------------------<  164<H>     08-04  4.2   |  18<L>  |  1.08    Ca    9.6      04 Aug 2024 13:20    TPro  8.0  /  Alb  4.1  /  TBili  1.2  /  DBili  x   /  AST  93<H>  /  ALT  109<H>  /  AlkPhos  57  08-04

## 2024-08-05 NOTE — H&P ADULT - NSHPPHYSICALEXAM_GEN_ALL_CORE
GENERAL: NAD, comfortable at bedside   LUNG: Clear to auscultation bilaterally; No wheezes, rales or rhonchi  HEART: Regular rate and rhythm; No murmurs, rubs, or gallops  ABDOMEN: +BS, Soft,  Diffused tenderness, worse at the epigastric,  Nondistended  EXTREMITIES:  2+ Peripheral Pulses, No clubbing, cyanosis, or edema  PSYCH: normal mood and affect  NEUROLOGY: AAOx3, non-focal  SKIN: No rashes or lesions

## 2024-08-06 LAB
A1C WITH ESTIMATED AVERAGE GLUCOSE RESULT: 4.9 % — SIGNIFICANT CHANGE UP (ref 4–5.6)
ALBUMIN SERPL ELPH-MCNC: 3.3 G/DL — SIGNIFICANT CHANGE UP (ref 3.3–5)
ALP SERPL-CCNC: 51 U/L — SIGNIFICANT CHANGE UP (ref 40–120)
ALT FLD-CCNC: 83 U/L — HIGH (ref 12–78)
ANION GAP SERPL CALC-SCNC: 9 MMOL/L — SIGNIFICANT CHANGE UP (ref 5–17)
AST SERPL-CCNC: 45 U/L — HIGH (ref 15–37)
BILIRUB SERPL-MCNC: 0.8 MG/DL — SIGNIFICANT CHANGE UP (ref 0.2–1.2)
BUN SERPL-MCNC: 13 MG/DL — SIGNIFICANT CHANGE UP (ref 7–23)
CALCIUM SERPL-MCNC: 8.4 MG/DL — LOW (ref 8.5–10.1)
CHLORIDE SERPL-SCNC: 109 MMOL/L — HIGH (ref 96–108)
CHOLEST SERPL-MCNC: 219 MG/DL — HIGH
CO2 SERPL-SCNC: 23 MMOL/L — SIGNIFICANT CHANGE UP (ref 22–31)
CREAT SERPL-MCNC: 0.82 MG/DL — SIGNIFICANT CHANGE UP (ref 0.5–1.3)
EGFR: 100 ML/MIN/1.73M2 — SIGNIFICANT CHANGE UP
ESTIMATED AVERAGE GLUCOSE: 94 MG/DL — SIGNIFICANT CHANGE UP (ref 68–114)
GLUCOSE SERPL-MCNC: 85 MG/DL — SIGNIFICANT CHANGE UP (ref 70–99)
HCT VFR BLD CALC: 34.3 % — LOW (ref 34.5–45)
HDLC SERPL-MCNC: 49 MG/DL — LOW
HGB BLD-MCNC: 11.5 G/DL — SIGNIFICANT CHANGE UP (ref 11.5–15.5)
LIPID PNL WITH DIRECT LDL SERPL: 152 MG/DL — HIGH
MAGNESIUM SERPL-MCNC: 2.4 MG/DL — SIGNIFICANT CHANGE UP (ref 1.6–2.6)
MCHC RBC-ENTMCNC: 28.3 PG — SIGNIFICANT CHANGE UP (ref 27–34)
MCHC RBC-ENTMCNC: 33.5 G/DL — SIGNIFICANT CHANGE UP (ref 32–36)
MCV RBC AUTO: 84.3 FL — SIGNIFICANT CHANGE UP (ref 80–100)
NON HDL CHOLESTEROL: 170 MG/DL — HIGH
NRBC # BLD: 0 /100 WBCS — SIGNIFICANT CHANGE UP (ref 0–0)
PHOSPHATE SERPL-MCNC: 2.5 MG/DL — SIGNIFICANT CHANGE UP (ref 2.5–4.5)
PLATELET # BLD AUTO: 241 K/UL — SIGNIFICANT CHANGE UP (ref 150–400)
POTASSIUM SERPL-MCNC: 3.3 MMOL/L — LOW (ref 3.5–5.3)
POTASSIUM SERPL-SCNC: 3.3 MMOL/L — LOW (ref 3.5–5.3)
PROT SERPL-MCNC: 6.7 GM/DL — SIGNIFICANT CHANGE UP (ref 6–8.3)
RBC # BLD: 4.07 M/UL — SIGNIFICANT CHANGE UP (ref 3.8–5.2)
RBC # FLD: 13.9 % — SIGNIFICANT CHANGE UP (ref 10.3–14.5)
SODIUM SERPL-SCNC: 141 MMOL/L — SIGNIFICANT CHANGE UP (ref 135–145)
TRIGL SERPL-MCNC: 104 MG/DL — SIGNIFICANT CHANGE UP
WBC # BLD: 9.71 K/UL — SIGNIFICANT CHANGE UP (ref 3.8–10.5)
WBC # FLD AUTO: 9.71 K/UL — SIGNIFICANT CHANGE UP (ref 3.8–10.5)

## 2024-08-06 PROCEDURE — 99232 SBSQ HOSP IP/OBS MODERATE 35: CPT

## 2024-08-06 RX ORDER — POTASSIUM CHLORIDE 1500 MG/1
40 TABLET, EXTENDED RELEASE ORAL ONCE
Refills: 0 | Status: DISCONTINUED | OUTPATIENT
Start: 2024-08-06 | End: 2024-08-06

## 2024-08-06 RX ORDER — HYDRALAZINE HYDROCHLORIDE 100 MG/1
10 TABLET ORAL EVERY 6 HOURS
Refills: 0 | Status: DISCONTINUED | OUTPATIENT
Start: 2024-08-06 | End: 2024-08-08

## 2024-08-06 RX ORDER — KETOROLAC TROMETHAMINE 10 MG
15 TABLET ORAL ONCE
Refills: 0 | Status: DISCONTINUED | OUTPATIENT
Start: 2024-08-06 | End: 2024-08-06

## 2024-08-06 RX ORDER — KETOROLAC TROMETHAMINE 10 MG
15 TABLET ORAL EVERY 8 HOURS
Refills: 0 | Status: DISCONTINUED | OUTPATIENT
Start: 2024-08-06 | End: 2024-08-07

## 2024-08-06 RX ORDER — DIPHENHYDRAMINE HCL 25 MG
25 CAPSULE ORAL EVERY 8 HOURS
Refills: 0 | Status: DISCONTINUED | OUTPATIENT
Start: 2024-08-06 | End: 2024-08-08

## 2024-08-06 RX ORDER — POTASSIUM CHLORIDE 1500 MG/1
10 TABLET, EXTENDED RELEASE ORAL
Refills: 0 | Status: COMPLETED | OUTPATIENT
Start: 2024-08-06 | End: 2024-08-06

## 2024-08-06 RX ADMIN — PANTOPRAZOLE SODIUM 40 MILLIGRAM(S): 20 TABLET, DELAYED RELEASE ORAL at 18:08

## 2024-08-06 RX ADMIN — Medication 30 MILLILITER(S): at 01:04

## 2024-08-06 RX ADMIN — Medication 25 MILLIGRAM(S): at 14:06

## 2024-08-06 RX ADMIN — Medication 15 MILLIGRAM(S): at 11:45

## 2024-08-06 RX ADMIN — Medication 15 MILLIGRAM(S): at 10:48

## 2024-08-06 RX ADMIN — Medication 25 MILLIGRAM(S): at 22:18

## 2024-08-06 RX ADMIN — Medication 15 MILLIGRAM(S): at 23:35

## 2024-08-06 RX ADMIN — Medication 15 MILLIGRAM(S): at 06:54

## 2024-08-06 RX ADMIN — POTASSIUM CHLORIDE 100 MILLIEQUIVALENT(S): 1500 TABLET, EXTENDED RELEASE ORAL at 11:20

## 2024-08-06 RX ADMIN — Medication 15 MILLIGRAM(S): at 22:18

## 2024-08-06 RX ADMIN — HYDRALAZINE HYDROCHLORIDE 10 MILLIGRAM(S): 100 TABLET ORAL at 14:50

## 2024-08-06 RX ADMIN — Medication 15 MILLIGRAM(S): at 07:05

## 2024-08-06 RX ADMIN — Medication 25 MILLIGRAM(S): at 06:54

## 2024-08-06 RX ADMIN — POTASSIUM CHLORIDE 100 MILLIEQUIVALENT(S): 1500 TABLET, EXTENDED RELEASE ORAL at 12:32

## 2024-08-06 RX ADMIN — POTASSIUM CHLORIDE 100 MILLIEQUIVALENT(S): 1500 TABLET, EXTENDED RELEASE ORAL at 13:58

## 2024-08-06 RX ADMIN — Medication 3 MILLIGRAM(S): at 22:19

## 2024-08-06 RX ADMIN — Medication 15 MILLIGRAM(S): at 07:35

## 2024-08-06 RX ADMIN — Medication 4 MILLIGRAM(S): at 10:48

## 2024-08-06 RX ADMIN — PANTOPRAZOLE SODIUM 40 MILLIGRAM(S): 20 TABLET, DELAYED RELEASE ORAL at 06:54

## 2024-08-07 ENCOUNTER — TRANSCRIPTION ENCOUNTER (OUTPATIENT)
Age: 29
End: 2024-08-07

## 2024-08-07 LAB
ANION GAP SERPL CALC-SCNC: 9 MMOL/L — SIGNIFICANT CHANGE UP (ref 5–17)
BUN SERPL-MCNC: 14 MG/DL — SIGNIFICANT CHANGE UP (ref 7–23)
CALCIUM SERPL-MCNC: 8.5 MG/DL — SIGNIFICANT CHANGE UP (ref 8.5–10.1)
CHLORIDE SERPL-SCNC: 107 MMOL/L — SIGNIFICANT CHANGE UP (ref 96–108)
CO2 SERPL-SCNC: 23 MMOL/L — SIGNIFICANT CHANGE UP (ref 22–31)
CREAT SERPL-MCNC: 0.87 MG/DL — SIGNIFICANT CHANGE UP (ref 0.5–1.3)
EGFR: 93 ML/MIN/1.73M2 — SIGNIFICANT CHANGE UP
GLUCOSE BLDC GLUCOMTR-MCNC: 95 MG/DL — SIGNIFICANT CHANGE UP (ref 70–99)
GLUCOSE SERPL-MCNC: 80 MG/DL — SIGNIFICANT CHANGE UP (ref 70–99)
POTASSIUM SERPL-MCNC: 3.3 MMOL/L — LOW (ref 3.5–5.3)
POTASSIUM SERPL-SCNC: 3.3 MMOL/L — LOW (ref 3.5–5.3)
SODIUM SERPL-SCNC: 139 MMOL/L — SIGNIFICANT CHANGE UP (ref 135–145)

## 2024-08-07 PROCEDURE — 99232 SBSQ HOSP IP/OBS MODERATE 35: CPT

## 2024-08-07 RX ORDER — ACETAMINOPHEN 500 MG
1000 TABLET ORAL ONCE
Refills: 0 | Status: COMPLETED | OUTPATIENT
Start: 2024-08-07 | End: 2024-08-07

## 2024-08-07 RX ORDER — KETOROLAC TROMETHAMINE 10 MG
15 TABLET ORAL ONCE
Refills: 0 | Status: DISCONTINUED | OUTPATIENT
Start: 2024-08-07 | End: 2024-08-07

## 2024-08-07 RX ORDER — METOCLOPRAMIDE HCL 5 MG/ML
10 VIAL (ML) INJECTION ONCE
Refills: 0 | Status: COMPLETED | OUTPATIENT
Start: 2024-08-07 | End: 2024-08-07

## 2024-08-07 RX ADMIN — HYDRALAZINE HYDROCHLORIDE 10 MILLIGRAM(S): 100 TABLET ORAL at 11:31

## 2024-08-07 RX ADMIN — Medication 400 MILLIGRAM(S): at 18:00

## 2024-08-07 RX ADMIN — Medication 4 MILLIGRAM(S): at 13:29

## 2024-08-07 RX ADMIN — HYDRALAZINE HYDROCHLORIDE 10 MILLIGRAM(S): 100 TABLET ORAL at 20:00

## 2024-08-07 RX ADMIN — Medication 25 MILLIGRAM(S): at 05:59

## 2024-08-07 RX ADMIN — Medication 25 MILLIGRAM(S): at 13:30

## 2024-08-07 RX ADMIN — Medication 30 MILLILITER(S): at 18:00

## 2024-08-07 RX ADMIN — Medication 25 MILLIGRAM(S): at 21:50

## 2024-08-07 RX ADMIN — PANTOPRAZOLE SODIUM 40 MILLIGRAM(S): 20 TABLET, DELAYED RELEASE ORAL at 18:00

## 2024-08-07 RX ADMIN — Medication 15 MILLIGRAM(S): at 05:50

## 2024-08-07 RX ADMIN — Medication 3 MILLIGRAM(S): at 21:50

## 2024-08-07 RX ADMIN — Medication 10 MILLIGRAM(S): at 21:50

## 2024-08-07 RX ADMIN — PANTOPRAZOLE SODIUM 40 MILLIGRAM(S): 20 TABLET, DELAYED RELEASE ORAL at 05:50

## 2024-08-07 RX ADMIN — Medication 15 MILLIGRAM(S): at 13:55

## 2024-08-07 RX ADMIN — Medication 1000 MILLIGRAM(S): at 18:50

## 2024-08-07 RX ADMIN — Medication 15 MILLIGRAM(S): at 13:40

## 2024-08-07 NOTE — DISCHARGE NOTE PROVIDER - NSDCCPCAREPLAN_GEN_ALL_CORE_FT
PRINCIPAL DISCHARGE DIAGNOSIS  Diagnosis: Intractable nausea and vomiting  Assessment and Plan of Treatment: Due to marijuana abuse. Now resolved. Advancing diet.

## 2024-08-07 NOTE — DIETITIAN INITIAL EVALUATION ADULT - REASON
Pt declined exam and reports weight trend has been stable for years. No overt signs of malnutrition were observed at this time.

## 2024-08-07 NOTE — DISCHARGE NOTE PROVIDER - HOSPITAL COURSE
28-year-old female with a PMH of Gastritis, cyclic vomiting syndrome presents to the ED for abdominal pain. Endorses 5 days history of intractable light associated with Nausea, vomiting inability to tolerate PO intake worsen last night. Las use of marijuana was yesterday, endorses she smoked to help alleviate symptoms. Denies fever, chills, diarrhea, chest pain, palpitation, headache, dizziness, recent travel or any other acute symptoms. Labs remarkable for WBC:15.86, glucose:164, AST/ALT:93/109. BP:166/98. HR:80, Temp:98.6. Received Ofirmev, famotidine, Ketorolac, Reglan.       Problem/Plan - 1:  ·  Problem: Intractable abdominal pain with N/V. could be from marijuana abuse.     IVF.  iv anti-emetics and PPI. Leukocytosis resolved.   Kept patient NPO given ongoing vomiting. Pt requesting to advance diet.  given iv toradol and iv benadryl for pain and itchiness.     Problem/Plan - 2:  ·  Problem: Elevated blood pressure reading without diagnosis of hypertension.   No history of hypertension, most likely 2/2 pain   - Cont to monitor BP     3. Transaminitis. cont to trend.     4. Metabolic acidosis from vomiting. improved.      FC   DVT ppx: Low risk.

## 2024-08-07 NOTE — DIETITIAN INITIAL EVALUATION ADULT - PERTINENT MEDS FT
MEDICATIONS  (STANDING):  pantoprazole  Injectable 40 milliGRAM(s) IV Push two times a day  sodium chloride 0.9%. 1000 milliLiter(s) (75 mL/Hr) IV Continuous <Continuous>    MEDICATIONS  (PRN):  acetaminophen     Tablet .. 650 milliGRAM(s) Oral every 6 hours PRN Temp greater or equal to 38C (100.4F), Mild Pain (1 - 3)  aluminum hydroxide/magnesium hydroxide/simethicone Suspension 30 milliLiter(s) Oral every 4 hours PRN Dyspepsia  diphenhydrAMINE Injectable 25 milliGRAM(s) IV Push every 8 hours PRN Itching  hydrALAZINE Injectable 10 milliGRAM(s) IV Push every 6 hours PRN SBP >155  melatonin 3 milliGRAM(s) Oral at bedtime PRN Insomnia  ondansetron Injectable 4 milliGRAM(s) IV Push every 8 hours PRN Nausea and/or Vomiting

## 2024-08-07 NOTE — DIETITIAN INITIAL EVALUATION ADULT - OTHER INFO
PO currently NPO due to N/V x 5 days; vomited Clear Liquid diet on 08/06. At this time, pt states N/V, abdominal pain completely resolved, appetite has returned, she is "ready to eat two trays." Pt is being followed by medical team for diet advancement. Pt denies weight loss and UBW is 160 pounds. Pt reports that despite hx of cyclic vomiting syndrome, her weight trend has been stable for years and she normally consumes % of meals. Pt denies food allergies and consumed Regular diet PTA. Pt expressed she feels much better and is eager to eat and be discharged home.

## 2024-08-07 NOTE — DIETITIAN INITIAL EVALUATION ADULT - NUTRITIONGOAL OUTCOME1
Diet advancement when medically feasible; pt to tolerate PO diet and meet >75% of needs via meals during LOS

## 2024-08-07 NOTE — DIETITIAN INITIAL EVALUATION ADULT - PERTINENT LABORATORY DATA
08-07    139  |  107  |  14  ----------------------------<  80  3.3<L>   |  23  |  0.87    Ca    8.5      07 Aug 2024 08:47  Phos  2.5     08-06  Mg     2.4     08-06    TPro  6.7  /  Alb  3.3  /  TBili  0.8  /  DBili  x   /  AST  45<H>  /  ALT  83<H>  /  AlkPhos  51  08-06  A1C with Estimated Average Glucose Result: 4.9 % (08-06-24 @ 07:40)

## 2024-08-08 ENCOUNTER — TRANSCRIPTION ENCOUNTER (OUTPATIENT)
Age: 29
End: 2024-08-08

## 2024-08-08 VITALS
DIASTOLIC BLOOD PRESSURE: 92 MMHG | SYSTOLIC BLOOD PRESSURE: 141 MMHG | TEMPERATURE: 98 F | RESPIRATION RATE: 18 BRPM | HEART RATE: 81 BPM | OXYGEN SATURATION: 99 %

## 2024-08-08 PROCEDURE — 99239 HOSP IP/OBS DSCHRG MGMT >30: CPT

## 2024-08-08 RX ORDER — METOCLOPRAMIDE HCL 5 MG/ML
10 VIAL (ML) INJECTION EVERY 8 HOURS
Refills: 0 | Status: DISCONTINUED | OUTPATIENT
Start: 2024-08-08 | End: 2024-08-08

## 2024-08-08 RX ORDER — AMLODIPINE BESYLATE 2.5 MG/1
10 TABLET ORAL DAILY
Refills: 0 | Status: DISCONTINUED | OUTPATIENT
Start: 2024-08-08 | End: 2024-08-08

## 2024-08-08 RX ORDER — AMLODIPINE BESYLATE 2.5 MG/1
1 TABLET ORAL
Qty: 30 | Refills: 0
Start: 2024-08-08 | End: 2024-09-06

## 2024-08-08 RX ADMIN — PANTOPRAZOLE SODIUM 40 MILLIGRAM(S): 20 TABLET, DELAYED RELEASE ORAL at 05:55

## 2024-08-08 RX ADMIN — AMLODIPINE BESYLATE 10 MILLIGRAM(S): 2.5 TABLET ORAL at 09:28

## 2024-08-08 NOTE — CHART NOTE - NSCHARTNOTEFT_GEN_A_CORE
Work Letter     To Whom It May Concern,    Marla Hilliard was admitted on August 4, 2024, and treated in [facility] until August 8, 2024   Any further questions or concerns please use contact info below.    Kaylene Mota PA-C  Internal Medicine  [address] [place] [id number] [phone]
Alert and oriented, no focal deficits, no motor or sensory deficits.

## 2024-08-08 NOTE — PROGRESS NOTE ADULT - ASSESSMENT
28-year-old female with a PMH of Gastritis, cyclic vomiting syndrome presents to the ED for abdominal pain. Endorses 5 days history of intractable light associated with Nausea, vomiting inability to tolerate PO intake worsen last night. Las use of marijuana was yesterday, endorses she smoked to help alleviate symptoms. Denies fever, chills, diarrhea, chest pain, palpitation, headache, dizziness, recent travel or any other acute symptoms. Labs remarkable for WBC:15.86, glucose:164, AST/ALT:93/109. BP:166/98. HR:80, Temp:98.6. Received Ofirmev, famotidine, Ketorolac, Reglan.       Problem/Plan - 1:  ·  Problem: Intractable abdominal pain with N/V. could be from marijuana abuse.    ·cont IVF. cont iv anti-emetics and PPI. Leukocytosis resolved.   Keep patient NPO given ongoing vomiting.   give iv toradol and iv benadryl for pain and itchiness.     Problem/Plan - 2:  ·  Problem: Elevated blood pressure reading without diagnosis of hypertension.   No history of hypertension, most likely 2/2 pain   - Cont to monitor BP     3. Transaminitis. cont to trend.     4. Metabolic acidosis from vomiting. improved.      FC   DVT ppx: Low risk.     Time spent by me managing the patient including but not limited to reviewing the chart, discussion with the nurse, physical exam and assessment and plan is 36 mins   
28-year-old female with a PMH of Gastritis, cyclic vomiting syndrome presents to the ED for abdominal pain. Endorses 5 days history of intractable light associated with Nausea, vomiting inability to tolerate PO intake worsen last night. Las use of marijuana was yesterday, endorses she smoked to help alleviate symptoms. Denies fever, chills, diarrhea, chest pain, palpitation, headache, dizziness, recent travel or any other acute symptoms. Labs remarkable for WBC:15.86, glucose:164, AST/ALT:93/109. BP:166/98. HR:80, Temp:98.6. Received Ofirmev, famotidine, Ketorolac, Reglan.       Problem/Plan - 1:  ·  Problem: Intractable abdominal pain with N/V. could be from marijuana abuse.     IVF.  iv anti-emetics and PPI. Leukocytosis resolved.   Kept patient NPO given ongoing vomiting. Pt requesting to advance diet.  given iv toradol and iv benadryl for pain and itchiness.   Discharge home    Problem/Plan - 2:  ·  Problem: Elevated blood pressure reading without diagnosis of hypertension.   No history of hypertension, most likely 2/2 pain   - Cont to monitor BP     3. Transaminitis. cont to trend.     4. Metabolic acidosis from vomiting. improved.      FC   DVT ppx: Low risk.   
28-year-old female with a PMH of Gastritis, cyclic vomiting syndrome presents to the ED for abdominal pain. Endorses 5 days history of intractable light associated with Nausea, vomiting inability to tolerate PO intake worsen last night. Las use of marijuana was yesterday, endorses she smoked to help alleviate symptoms. Denies fever, chills, diarrhea, chest pain, palpitation, headache, dizziness, recent travel or any other acute symptoms. Labs remarkable for WBC:15.86, glucose:164, AST/ALT:93/109. BP:166/98. HR:80, Temp:98.6. Received Ofirmev, famotidine, Ketorolac, Reglan.       Problem/Plan - 1:  ·  Problem: Intractable abdominal pain with N/V. could be from marijuana abuse.    ·cont IVF. cont iv anti-emetics and PPI. Follow labs.   leukocytosis likely reactive. Trend.     Problem/Plan - 2:  ·  Problem: Elevated blood pressure reading without diagnosis of hypertension.   No history of hypertension, most likely 2/2 pain   - Cont to monitor BP     3. Transaminitis. Trend.     FC   DVT ppx: Low risk.     Time spent by me managing the patient including but not limited to reviewing the chart, discussion with the nurse, physical exam and assessment and plan is 39 mins

## 2024-08-08 NOTE — DISCHARGE NOTE NURSING/CASE MANAGEMENT/SOCIAL WORK - NSDCPEFALRISK_GEN_ALL_CORE
Occupational Therapy                            Occupational Therapy Treatment    Patient Name: Cadence Cui  MRN: [id number]  Today's Date: 12/13/2023           Assessment/Plan   Assessment:  OT Assessment  Motor and Neuromuscular Assessment: Delayed development, Visual motor concerns, Fine motor delays, Gross motor delays, Impaired balance  OT Evaluation Assessment  OT Evaluation Assessment Results: Impaired balance, Decreased coordination, Delayed developmen, Delayed motor skills  Evaluation/Treatment Tolerance: Patient engaged in treatment  Medical Staff Made Aware: Yes  Plan:  IP OT Plan  Treatment/Interventions: Therapeutic activity  OT Plan: Skilled OT  OT Frequency: 2 times per week    Subjective   General Visit Information:  General  Family/Caregiver Present: No  Caregiver Feedback: N/A  Co-Treatment: SLP (for part of session)  Co-Treatment Reason: targeting positioning and play skills  Prior to Session Communication: Bedside nurse  Patient Position Received: Crib, 2 rails up  General Comment: Pt smiling and interactive upon arrival, per PCNA with recent emesis, however, no signs/symptoms of distress since.  Previous Visit Info:  OT Last Visit  OT Received On: 12/13/23   Pain:  Pain Assessment  Pain Assessment: FLACC (Face, Legs, Activity, Cry, Consolability)  FLACC (Face, Legs, Activity, Crying, Consolability)  Pain Rating: FLACC (Activity) - Face: No particular expression or smile  Pain Rating: FLACC (Activity) - Legs: Normal position or relaxed  Pain Rating: FLACC (Activity): Lying quietly, normal position, moves easily  Pain Rating: FLACC (Activity) - Cry: No cry (Awake or asleep)  Pain Rating: FLACC (Activity) - Consolability: Content, relaxed  Score: FLACC (Activity): 0    Objective   Behavior:    Behavior  Behavior: Cooperative, Alert, Playful    Treatment:  Developmental Skills  Developmental Skills: supervision-CGA for sitting on mat, engaged in play with BUEs throughout session. Grasps all items  presented independently, banging items together at midline following demonstration. Removes rings from  independently with extended time; places on  with maxA. Good tolerance and engagement throughout session.      Education Documentation  No documentation found.  Education Comments  No comments found.        OP EDUCATION:  Education  Education Comment: no CG present    Encounter Problems       Encounter Problems (Active)       Fine Motor and Play        Patient to independently initiate cross-midline UE movement to interact with environment for >3 instances in single session. (Met)       Start:  10/05/23    Expected End:  11/05/23    Resolved:  10/25/23          Patient to bang item on hard surface using Minimal Assistance after initial demonstration 2 times.  (Progressing)       Start:  10/05/23    Expected End:  01/04/24               Gross Motor and Posture        Patient will maintain upright positioning with neutral spinal alignment seated in ring sit for 5 minutes on 2 occasions.  (Progressing)       Start:  10/05/23    Expected End:  01/04/24               IP Feeding        Patient will increase diet to meet nutritional needs demonstrating decreased reliance on supplementation across 2 month period.   (Progressing)       Start:  11/10/23    Expected End:  01/04/24                  For information on Fall & Injury Prevention, visit: [email or web address] OR  [email or web address] OR  https://www.cdc.gov/steadi/patient.html

## 2024-08-08 NOTE — DISCHARGE NOTE NURSING/CASE MANAGEMENT/SOCIAL WORK - PATIENT PORTAL LINK FT
You can access the FollowMyHealth Patient Portal offered by [facility] by registering at the following website: [email or web address]. By joining [employer]’s FollowMyHealth portal, you will also be able to view your health information using other applications (apps) compatible with our system.

## 2024-08-08 NOTE — PROGRESS NOTE ADULT - SUBJECTIVE AND OBJECTIVE BOX
Patient is a 28y old  Female who presents with a chief complaint of Intractable abdominal pain (07 Aug 2024 11:16)    INTERVAL HPI/OVERNIGHT EVENTS: Patients seen and examined at bedside this morning. No acute events overnight. Pt over ate yesterday and then vomiting.     MEDICATIONS  (STANDING):  amLODIPine   Tablet 10 milliGRAM(s) Oral daily  pantoprazole  Injectable 40 milliGRAM(s) IV Push two times a day    MEDICATIONS  (PRN):  aluminum hydroxide/magnesium hydroxide/simethicone Suspension 30 milliLiter(s) Oral every 4 hours PRN Dyspepsia  diphenhydrAMINE Injectable 25 milliGRAM(s) IV Push every 8 hours PRN Itching  hydrALAZINE Injectable 10 milliGRAM(s) IV Push every 6 hours PRN SBP >155  melatonin 3 milliGRAM(s) Oral at bedtime PRN Insomnia  metoclopramide Injectable 10 milliGRAM(s) IV Push every 8 hours PRN nausea  ondansetron Injectable 4 milliGRAM(s) IV Push every 8 hours PRN Nausea and/or Vomiting    Allergies    penicillin (Hives)    Intolerances      REVIEW OF SYSTEMS:  All other systems reviewed and are negative    Vital Signs Last 24 Hrs  T(C): 37.3 (08 Aug 2024 05:16), Max: 37.3 (08 Aug 2024 05:16)  T(F): 99.1 (08 Aug 2024 05:16), Max: 99.1 (08 Aug 2024 05:16)  HR: 83 (08 Aug 2024 05:16) (62 - 87)  BP: 152/91 (08 Aug 2024 05:16) (152/91 - 169/109)  BP(mean): 129 (07 Aug 2024 19:15) (129 - 129)  RR: 18 (08 Aug 2024 05:16) (17 - 19)  SpO2: 94% (08 Aug 2024 05:16) (94% - 100%)      Daily     Daily   I&O's Summary    CAPILLARY BLOOD GLUCOSE      POCT Blood Glucose.: 95 mg/dL (07 Aug 2024 16:43)    PHYSICAL EXAM:  GENERAL: NAD, well-groomed, well-developed  HEAD:  Atraumatic, Normocephalic  EYES: EOMI, PERRLA, conjunctiva and sclera clear  ENMT: No tonsillar erythema, exudates, or enlargement; Moist mucous membranes, Good dentition, No lesions  NECK: Supple, No JVD, Normal thyroid  NERVOUS SYSTEM:  Alert & Oriented X3, Good concentration; Motor Strength 5/5 B/L upper and lower extremities; DTRs 2+ intact and symmetric  CHEST/LUNG: Clear to percussion bilaterally; No rales, rhonchi, wheezing, or rubs  HEART: Regular rate and rhythm; No murmurs, rubs, or gallops  ABDOMEN: Soft, Nontender, Nondistended; Bowel sounds present  EXTREMITIES:  2+ Peripheral Pulses, No clubbing, cyanosis, or edema  LYMPH: No lymphadenopathy noted  SKIN: No rashes or lesions    Labs      08-07    139  |  107  |  14  ----------------------------<  80  3.3<L>   |  23  |  0.87    Ca    8.5      07 Aug 2024 08:47            Urinalysis Basic - ( 07 Aug 2024 08:47 )    Color: x / Appearance: x / SG: x / pH: x  Gluc: 80 mg/dL / Ketone: x  / Bili: x / Urobili: x   Blood: x / Protein: x / Nitrite: x   Leuk Esterase: x / RBC: x / WBC x   Sq Epi: x / Non Sq Epi: x / Bacteria: x                      Radiology and Imaging reviewed.
CHIEF COMPLAINT: Intractable nausea and vomiting   reported having menses and also marijuana use.   no chest pain or sob  + BM  no active gross bleeding   no fever.       PHYSICAL EXAM:    GENERAL: Moderately built, no acute distress   CHEST/LUNG:  No wheezing, no crackles   HEART: Regular rate and rhythm; No murmurs  ABDOMEN: Soft, Nontender, Nondistended; Bowel sounds present  EXTREMITIES:  No clubbing, cyanosis, or edema  NERVOUS SYSTEM:  Grossly non focal.  Psychiatry: AAO x 3, mood is appropriate       OBJECTIVE DATA:   Vital Signs Last 24 Hrs  T(C): 36.8 (05 Aug 2024 11:02), Max: 37.3 (04 Aug 2024 17:35)  T(F): 98.3 (05 Aug 2024 11:02), Max: 99.1 (04 Aug 2024 17:35)  HR: 74 (05 Aug 2024 11:02) (70 - 91)  BP: 166/97 (05 Aug 2024 11:02) (150/98 - 178/119)  BP(mean): --  RR: 18 (05 Aug 2024 11:02) (16 - 19)  SpO2: 97% (05 Aug 2024 11:02) (97% - 100%)    Parameters below as of 05 Aug 2024 11:02  Patient On (Oxygen Delivery Method): room air    LABS:                        12.3   15.86 )-----------( 273      ( 04 Aug 2024 13:20 )             36.2             08-04    139  |  110<H>  |  14  ----------------------------<  164<H>  4.2   |  18<L>  |  1.08    Ca    9.6      04 Aug 2024 13:20    TPro  8.0  /  Alb  4.1  /  TBili  1.2  /  DBili  x   /  AST  93<H>  /  ALT  109<H>  /  AlkPhos  57  08-04                Urinalysis Basic - ( 04 Aug 2024 13:20 )    Color: x / Appearance: x / SG: x / pH: x  Gluc: 164 mg/dL / Ketone: x  / Bili: x / Urobili: x   Blood: x / Protein: x / Nitrite: x   Leuk Esterase: x / RBC: x / WBC x   Sq Epi: x / Non Sq Epi: x / Bacteria: x      MEDICATIONS  (STANDING):  pantoprazole  Injectable 40 milliGRAM(s) IV Push two times a day  sodium chloride 0.9%. 1000 milliLiter(s) (75 mL/Hr) IV Continuous <Continuous>    MEDICATIONS  (PRN):  acetaminophen     Tablet .. 650 milliGRAM(s) Oral every 6 hours PRN Temp greater or equal to 38C (100.4F), Mild Pain (1 - 3)  aluminum hydroxide/magnesium hydroxide/simethicone Suspension 30 milliLiter(s) Oral every 4 hours PRN Dyspepsia  diphenhydrAMINE Injectable 25 milliGRAM(s) IV Push every 6 hours PRN Itching  melatonin 3 milliGRAM(s) Oral at bedtime PRN Insomnia  ondansetron Injectable 4 milliGRAM(s) IV Push every 8 hours PRN Nausea and/or Vomiting      
CHIEF COMPLAINT: Intractable nausea and vomiting still. not able to tolerate liquid diet  no chest pain or sob  + BM  no active gross bleeding   no fever.   reported itchiness with toradol and requested iv benadryl.     PHYSICAL EXAM:    GENERAL: Moderately built, no acute distress   CHEST/LUNG:  No wheezing, no crackles   HEART: Regular rate and rhythm; No murmurs  ABDOMEN: Soft, Nontender, Nondistended; Bowel sounds present  EXTREMITIES:  No clubbing, cyanosis, or edema  Psychiatry: AAO x 3, mood is appropriate       OBJECTIVE DATA:     Vital Signs Last 24 Hrs  T(C): 37.3 (06 Aug 2024 11:11), Max: 37.6 (06 Aug 2024 05:04)  T(F): 99.2 (06 Aug 2024 11:11), Max: 99.6 (06 Aug 2024 05:04)  HR: 83 (06 Aug 2024 11:11) (69 - 89)  BP: 179/105 (06 Aug 2024 11:11) (149/84 - 179/124)  BP(mean): --  RR: 18 (06 Aug 2024 11:11) (17 - 20)  SpO2: 98% (06 Aug 2024 11:11) (97% - 99%)    Parameters below as of 06 Aug 2024 11:11  Patient On (Oxygen Delivery Method): room air               Daily     Daily   LABS:                        11.5   9.71  )-----------( 241      ( 06 Aug 2024 07:40 )             34.3             08-06    141  |  109<H>  |  13  ----------------------------<  85  3.3<L>   |  23  |  0.82    Ca    8.4<L>      06 Aug 2024 07:40  Phos  2.5     08-06  Mg     2.4     08-06    TPro  6.7  /  Alb  3.3  /  TBili  0.8  /  DBili  x   /  AST  45<H>  /  ALT  83<H>  /  AlkPhos  51  08-06                Urinalysis Basic - ( 06 Aug 2024 07:40 )    Color: x / Appearance: x / SG: x / pH: x  Gluc: 85 mg/dL / Ketone: x  / Bili: x / Urobili: x   Blood: x / Protein: x / Nitrite: x   Leuk Esterase: x / RBC: x / WBC x   Sq Epi: x / Non Sq Epi: x / Bacteria: x      MEDICATIONS  (STANDING):  ketorolac   Injectable 15 milliGRAM(s) IV Push every 8 hours  pantoprazole  Injectable 40 milliGRAM(s) IV Push two times a day  potassium chloride  10 mEq/100 mL IVPB 10 milliEquivalent(s) IV Intermittent every 1 hour  sodium chloride 0.9%. 1000 milliLiter(s) (75 mL/Hr) IV Continuous <Continuous>    MEDICATIONS  (PRN):  acetaminophen     Tablet .. 650 milliGRAM(s) Oral every 6 hours PRN Temp greater or equal to 38C (100.4F), Mild Pain (1 - 3)  aluminum hydroxide/magnesium hydroxide/simethicone Suspension 30 milliLiter(s) Oral every 4 hours PRN Dyspepsia  diphenhydrAMINE Injectable 25 milliGRAM(s) IV Push every 8 hours PRN Itching  melatonin 3 milliGRAM(s) Oral at bedtime PRN Insomnia  ondansetron Injectable 4 milliGRAM(s) IV Push every 8 hours PRN Nausea and/or Vomiting

## 2024-08-09 ENCOUNTER — TRANSCRIPTION ENCOUNTER (OUTPATIENT)
Age: 29
End: 2024-08-09

## 2024-08-12 ENCOUNTER — TRANSCRIPTION ENCOUNTER (OUTPATIENT)
Age: 29
End: 2024-08-12

## 2024-08-16 DIAGNOSIS — E87.20 ACIDOSIS, UNSPECIFIED: ICD-10-CM

## 2024-08-16 DIAGNOSIS — R11.15 CYCLICAL VOMITING SYNDROME UNRELATED TO MIGRAINE: ICD-10-CM

## 2024-08-16 DIAGNOSIS — R03.0 ELEVATED BLOOD-PRESSURE READING, WITHOUT DIAGNOSIS OF HYPERTENSION: ICD-10-CM

## 2024-08-16 DIAGNOSIS — K21.9 GASTRO-ESOPHAGEAL REFLUX DISEASE WITHOUT ESOPHAGITIS: ICD-10-CM

## 2024-08-16 DIAGNOSIS — R11.2 NAUSEA WITH VOMITING, UNSPECIFIED: ICD-10-CM

## 2024-08-16 DIAGNOSIS — Z88.0 ALLERGY STATUS TO PENICILLIN: ICD-10-CM

## 2024-08-16 DIAGNOSIS — F12.10 CANNABIS ABUSE, UNCOMPLICATED: ICD-10-CM

## 2024-08-19 ENCOUNTER — TRANSCRIPTION ENCOUNTER (OUTPATIENT)
Age: 29
End: 2024-08-19

## 2024-08-23 NOTE — ED PROVIDER NOTE - CCCP TRG CHIEF CMPLNT
Subjective   Patient ID: Bethany Cuenca is a 67 y.o. female who presents for No chief complaint on file..  HPI    67-year-old female with history of Sjogren's syndrome who was found to have a right upper lobe lung nodule.  She underwent a PET scan showing that this nodule had an SUV max of 3.7.  This nodule measures about 1.6 x 1.8 cm.  A biopsy was performed with a questionable diagnosis of amyloidosis.  Serum solid nodule has been growing.  She has no history of smoking.  She is here to discuss surgical resection.  I have reviewed the images and I think this nodule is in a favorable location for a wedge resection.  We discussed the mechanics of a robotic assisted lung resection including the length of stay and the potential complications.  She is going to visit with hematology and she is scheduled to have PFTs done.  Will plan to do this resection at [facility] on August 8.    Update 8/23/2024  She has recovered very well from her robotic lung resection.  Pathology showing an Amyloidoma.  Chest x-ray showing good lung expansion and no evidence of effusions or pneumothoraces.  Wounds are well-healed.  She still has some normal postoperative discomfort in the subcostal area.  Continue follow-up with hematology follow-up with me as needed.    Review of Systems   Constitutional:  Negative for appetite change, chills, diaphoresis, fatigue, fever and unexpected weight change.   HENT: Negative.     Eyes: Negative.    Respiratory:  Negative for cough, choking, chest tightness, shortness of breath, wheezing and stridor.    Cardiovascular:  Negative for chest pain, palpitations and leg swelling.   Gastrointestinal:  Negative for abdominal distention, abdominal pain, constipation, diarrhea, nausea and vomiting.   Endocrine: Negative.    Genitourinary: Negative.    Musculoskeletal: Negative.    Skin: Negative.    Allergic/Immunologic: Negative.    Neurological: Negative.    Hematological: Negative.    Psychiatric/Behavioral:  Negative.     All other systems reviewed and are negative.      Objective   Physical Exam  Constitutional:       Appearance: Normal appearance.   HENT:      Head: Normocephalic and atraumatic.      Nose: Nose normal.      Mouth/Throat:      Mouth: Mucous membranes are moist.      Pharynx: Oropharynx is clear.   Eyes:      Extraocular Movements: Extraocular movements intact.      Conjunctiva/sclera: Conjunctivae normal.      Pupils: Pupils are equal, round, and reactive to light.   Cardiovascular:      Rate and Rhythm: Normal rate and regular rhythm.      Pulses: Normal pulses.      Heart sounds: Normal heart sounds.   Pulmonary:      Effort: Pulmonary effort is normal. No respiratory distress.      Breath sounds: Normal breath sounds. No stridor. No wheezing, rhonchi or rales.   Chest:      Chest wall: No tenderness.   Abdominal:      General: Abdomen is flat. Bowel sounds are normal.      Palpations: Abdomen is soft.   Musculoskeletal:         General: Normal range of motion.      Cervical back: Normal range of motion and neck supple.   Skin:     General: Skin is warm and dry.      Capillary Refill: Capillary refill takes less than 2 seconds.   Neurological:      General: No focal deficit present.      Mental Status: She is alert and oriented to person, place, and time.         Assessment/Plan   Diagnoses and all orders for this visit:  Amyloidosis of lung (Multi)    -     Follow-up with me as needed.         Christiano Tubbs MD 08/23/24 2:19 PM    abdominal pain

## 2024-10-21 NOTE — ED ADULT NURSE NOTE - NSIMPLEMENTINTERV_GEN_ALL_ED
Implemented All Universal Safety Interventions:  [place] to call system. Call bell, personal items and telephone within reach. Instruct patient to call for assistance. Room bathroom lighting operational. Non-slip footwear when patient is off stretcher. Physically safe environment: no spills, clutter or unnecessary equipment. Stretcher in lowest position, wheels locked, appropriate side rails in place. Vaccine status unknown
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
## 2024-12-03 NOTE — ED PROVIDER NOTE - OBJECTIVE STATEMENT
Quality 130: Documentation Of Current Medications In The Medical Record: Current Medications Documented
Detail Level: Detailed
Quality 226: Preventive Care And Screening: Tobacco Use: Screening And Cessation Intervention: Patient screened for tobacco use and is an ex/non-smoker
23 years old female with mom c/o vomiting and diffused abd pain intermittently for two weeks. Mom sts pt has been having same symptoms since age of 4. Pt had multiple worked up normal in the past.

## 2024-12-26 NOTE — PROGRESS NOTE ADULT - PROBLEM SELECTOR PROBLEM 1
Alert-The patient is alert, awake and responds to voice. The patient is oriented to time, place, and person. The triage nurse is able to obtain subjective information. Cyclic vomiting syndrome

## 2025-03-03 NOTE — PATIENT PROFILE ADULT. - NS PRO PT REFERRAL QUES 2 YN
no Conversation had with patient regarding GOCs. Patient opting to defer to daughter, Miguelina, for GOC/MOLST-related decision-making. R/B/A to DNR/DNI presented and discussed w/ patient and daughter. Conversation had with Miguelina surrounding patient's wishes surrounding advanced care planning: daughter opting to keep patient FULL code.

## 2025-03-17 NOTE — ED PROVIDER NOTE - CROS ED GI ALL NEG
Recommendations relayed to patient. Pt stated that she is currently doing PT. Nothing further needed at this time.       Brock Garcia DO P csp Rn Msg Pool  Can you please tell the patient the MRI was declined by insurance.  She needs to undergo at least 6 weeks of conservative treatment including her physical therapy and have a follow-up visit with me 6 weeks after starting therapy in order to reassess the knee, and MRI can be reordered   - - -

## 2025-03-25 NOTE — H&P ADULT - REASON FOR ADMISSION
Adult Annual Physical  Name: Chele Meyer      : 2004      MRN: [id number]  Encounter Provider: Stalin Martin DO  Encounter Date: 3/25/2025   Encounter department: [facility]    Assessment & Plan  Annual physical exam         Screening for diabetes mellitus (DM)    Orders:    Comprehensive metabolic panel; Future    Encounter for lipid screening for cardiovascular disease    Orders:    Lipid Panel with Direct LDL reflex; Future    Tinea cruris    Orders:    clotrimazole-betamethasone (LOTRISONE) 1-0.05 % cream; Apply topically 2 (two) times a day      Preventive Screenings:  - Diabetes Screening: screening up-to-date and orders placed  - Cholesterol Screening: orders placed   - Hepatitis C screening: screening up-to-date   - HIV screening: screening up-to-date   - Colon cancer screening: screening not indicated   - Lung cancer screening: screening not indicated   - Prostate cancer screening: screening not indicated     Immunizations:  - Immunizations due: HPV (Gardasil 9)    Counseling/Anticipatory Guidance:  - Alcohol: discussed moderation in alcohol intake and recommendations for healthy alcohol use.   - Drug use: discussed harms of illicit drug use and how it can negatively impact mental/physical health.   - Tobacco use: discussed harms of tobacco use and management options for quitting.   - Dental health: discussed importance of regular tooth brushing, flossing, and dental visits.   - Sexual health: discussed sexually transmitted diseases, partner selection, use of condoms, avoidance of unintended pregnancy, and contraceptive alternatives.   - Diet: discussed recommendations for a healthy/well-balanced diet.   - Exercise: the importance of regular exercise/physical activity was discussed. Recommend exercise 3-5 times per week for at least 30 minutes.          History of Present Illness     Adult Annual Physical:  Patient presents for annual physical.     Diet and Physical  "Activity:  - Diet/Nutrition: consuming 3-5 servings of fruits/vegetables daily and well balanced diet.  - Exercise: walking.    Depression Screening:  - PHQ-2 Score: 0    General Health:  - Sleep: sleeps well and 7-8 hours of sleep on average.  - Hearing: normal hearing bilateral ears.  - Vision: goes for regular eye exams.  - Dental: brushes teeth twice daily.     Health:  - History of STDs: no.   - Urinary symptoms: none.     Advanced Care Planning:  - Has an advanced directive?: no    - Has a durable medical POA?: no    - ACP document given to patient?: no      Review of Systems   Constitutional:  Negative for chills and fever.   HENT:  Negative for ear pain and sore throat.    Eyes:  Negative for pain and visual disturbance.   Respiratory:  Negative for cough and shortness of breath.    Cardiovascular:  Negative for chest pain and palpitations.   Gastrointestinal:  Negative for abdominal pain and vomiting.   Genitourinary:  Negative for dysuria and hematuria.   Musculoskeletal:  Negative for arthralgias and back pain.   Skin:  Positive for rash. Negative for color change.   Neurological:  Negative for seizures and syncope.   Psychiatric/Behavioral:  Negative for confusion and sleep disturbance. The patient is not nervous/anxious.    All other systems reviewed and are negative.        Objective   /60 (BP Location: Left arm, Patient Position: Sitting, Cuff Size: Standard)   Pulse 102   Temp 97.6 °F (36.4 °C) (Tympanic)   Resp 16   Ht 5' 6.5\" (1.689 m)   Wt 60.8 kg (134 lb)   SpO2 98%   BMI 21.31 kg/m²     Physical Exam  Vitals and nursing note reviewed.   Constitutional:       General: He is not in acute distress.     Appearance: Normal appearance.   HENT:      Head: Normocephalic and atraumatic.      Right Ear: Tympanic membrane and external ear normal.      Left Ear: Tympanic membrane and external ear normal.      Nose: Nose normal.      Mouth/Throat:      Mouth: Mucous membranes are moist. "   Eyes:      Extraocular Movements: Extraocular movements intact.      Conjunctiva/sclera: Conjunctivae normal.      Pupils: Pupils are equal, round, and reactive to light.   Cardiovascular:      Rate and Rhythm: Normal rate and regular rhythm.      Pulses: Normal pulses.      Heart sounds: Normal heart sounds. No murmur heard.  Pulmonary:      Effort: Pulmonary effort is normal.      Breath sounds: Normal breath sounds. No wheezing, rhonchi or rales.   Abdominal:      General: Bowel sounds are normal.      Palpations: Abdomen is soft.      Tenderness: There is no abdominal tenderness. There is no guarding.   Musculoskeletal:         General: Normal range of motion.      Cervical back: Normal range of motion.      Right lower leg: No edema.      Left lower leg: No edema.   Lymphadenopathy:      Cervical: No cervical adenopathy.   Skin:     General: Skin is warm.      Capillary Refill: Capillary refill takes less than 2 seconds.      Findings: Rash (Macular rash in the groin region) present.   Neurological:      General: No focal deficit present.      Mental Status: He is alert and oriented to person, place, and time.   Psychiatric:         Mood and Affect: Mood normal.         Behavior: Behavior normal.          n/v

## 2025-03-28 NOTE — ED ADULT NURSE NOTE - HOW OFTEN DO YOU HAVE SIX OR MORE DRINKS ON ONE OCCASION?
Received request via: Pharmacy    Was the patient seen in the last year in this department? Yes    Does the patient have an active prescription (recently filled or refills available) for medication(s) requested? No    [facility]    Does the patient have MCFP Plus and need 100-day supply? (This applies to ALL medications) Patient does not have SCP   Less than Monthly

## 2025-04-10 NOTE — PATIENT PROFILE ADULT - FUNCTIONAL SCREEN CURRENT LEVEL: DRESSING, MLM
[facility]          Patient ID:  Name: Tacho Ramos  AGE/Gender: 55 y.o. male  MRN: [id number]  : 1969    Date of Consultation:  April 10, 2025          ALLERGIES: No Known Allergies     CC:  Left Knee Pain    History: The patient presents today as a new patient complaining of   Left knee pain.  This started  months ago when he was stretching to do exercises for his back in which he crossed his left knee underneath him and leaned forward.  He felt pain in the medial aspect of the left knee.  He has a history of left knee arthroscopy 30 years ago for what sounds like a meniscal tear.  At that time he was having catching.  He denies any catching or locking with this.  He describes this as a dull ache with occasional sharp pain.  He localizes this to the medial aspect of the left knee.  He notices this when walking and swinging his leg forward.  It does not bother him when he is cycling..  He has tried anti-inflammatories for this but does not take anything on a daily basis they have no other complaints or concerns.    Review of Systems:  Pertinent items are noted in HPI.    Past Medical History Includes:   Past Medical History:   Diagnosis Date    A-fib (HCC)     GERD (gastroesophageal reflux disease)     Hyperlipidemia     Hypertension     Sleep apnea     wears mouth guard   ,   Past Surgical History:   Procedure Laterality Date    CARDIAC ELECTROPHYSIOLOGY STUDY AND ABLATION      COLONOSCOPY      KNEE ARTHROSCOPY      LAMINECTOMY N/A 2024    left L4-L5 hemilaminectomy performed by Slade Thomas MD at [facility] MAIN OR       Family History: No family history on file.     Social History:   Social History     Tobacco Use    Smoking status: Never    Smokeless tobacco: Never   Substance Use Topics    Alcohol use: Yes     Alcohol/week: 13.0 standard drinks of alcohol     Types: 5 Glasses of wine, 8 Cans of beer per week         Physical Exam:     General:  On exam the patient is a 
0 = independent

## 2025-04-14 NOTE — DISCHARGE NOTE PROVIDER - [email or web address]_GEN_ALL_CORE
atorvastatin 40 mg daily   ramipril 2.5 mg   metoprolol 25 mg daily   aspirin 81 daily  plavix 75 mg daily   Torsemide 10 mg daily   Tamsulosin     Current Outpatient Medications   Medication Sig Dispense Refill    hydroCORTisone (CORTIZONE) 2.5 % cream Apply 1 Application topically 2 times daily. 453.6 g PRN    clopidogrel (PLAVIX) 75 MG tablet TAKE 1 TABLET BY MOUTH DAILY 90 tablet 0    ramipril (ALTACE) 2.5 MG capsule TAKE ONE CAPSULE BY MOUTH EVERY DAY 90 capsule 3    torsemide (DEMADEX) 10 MG tablet TAKE 1 TABLET BY MOUTH DAILY 90 tablet 3    metoPROLOL succinate (TOPROL-XL) 25 MG 24 hr tablet TAKE 1 TABLET BY MOUTH DAILY 90 tablet 3    tamsulosin (FLOMAX) 0.4 MG Cap TAKE 2 CAPSULES BY MOUTH DAILY AFTER A MEAL 180 capsule 3    aspirin 81 MG EC tablet Take 75 mg by mouth daily. Do not start before April 29, 2023.      atorvastatin (LIPITOR) 40 MG tablet Take 40 mg by mouth daily.          Admission Reconciliation is Completed  Discharge Reconciliation is Completed

## 2025-04-22 NOTE — DISCHARGE NOTE PROVIDER - REASON FOR ADMISSION
Chronic, improved with trazodone.     Orders:    traZODone (DESYREL) 50 MG tablet; Take 1 tablet by mouth Every Night.     Intractable abdominal pain

## 2025-08-22 ENCOUNTER — INPATIENT (INPATIENT)
Facility: HOSPITAL | Age: 30
LOS: 1 days | Discharge: HOME | End: 2025-08-24
Attending: INTERNAL MEDICINE | Admitting: INTERNAL MEDICINE
Payer: COMMERCIAL

## 2025-08-22 VITALS
DIASTOLIC BLOOD PRESSURE: 112 MMHG | HEART RATE: 108 BPM | TEMPERATURE: 99 F | RESPIRATION RATE: 20 BRPM | SYSTOLIC BLOOD PRESSURE: 150 MMHG | HEIGHT: 64 IN | WEIGHT: 149.91 LBS

## 2025-08-22 LAB
ALBUMIN SERPL ELPH-MCNC: 4.1 G/DL — SIGNIFICANT CHANGE UP (ref 3.3–5)
ALP SERPL-CCNC: 73 U/L — SIGNIFICANT CHANGE UP (ref 40–120)
ALT FLD-CCNC: 90 U/L — HIGH (ref 12–78)
ANION GAP SERPL CALC-SCNC: 14 MMOL/L — SIGNIFICANT CHANGE UP (ref 5–17)
APPEARANCE UR: CLEAR — SIGNIFICANT CHANGE UP
AST SERPL-CCNC: 125 U/L — HIGH (ref 15–37)
BASOPHILS # BLD AUTO: 0.04 K/UL — SIGNIFICANT CHANGE UP (ref 0–0.2)
BASOPHILS NFR BLD AUTO: 0.3 % — SIGNIFICANT CHANGE UP (ref 0–2)
BILIRUB SERPL-MCNC: 1.4 MG/DL — HIGH (ref 0.2–1.2)
BILIRUB UR-MCNC: NEGATIVE — SIGNIFICANT CHANGE UP
BUN SERPL-MCNC: 22 MG/DL — SIGNIFICANT CHANGE UP (ref 7–23)
CALCIUM SERPL-MCNC: 10.5 MG/DL — HIGH (ref 8.5–10.1)
CHLORIDE SERPL-SCNC: 89 MMOL/L — LOW (ref 96–108)
CO2 SERPL-SCNC: 27 MMOL/L — SIGNIFICANT CHANGE UP (ref 22–31)
COLOR SPEC: YELLOW — SIGNIFICANT CHANGE UP
CREAT SERPL-MCNC: 1.4 MG/DL — HIGH (ref 0.5–1.3)
DIFF PNL FLD: ABNORMAL
EGFR: 52 ML/MIN/1.73M2 — LOW
EGFR: 52 ML/MIN/1.73M2 — LOW
EOSINOPHIL # BLD AUTO: 0.01 K/UL — SIGNIFICANT CHANGE UP (ref 0–0.5)
EOSINOPHIL NFR BLD AUTO: 0.1 % — SIGNIFICANT CHANGE UP (ref 0–6)
GLUCOSE SERPL-MCNC: 116 MG/DL — HIGH (ref 70–99)
GLUCOSE UR QL: NEGATIVE MG/DL — SIGNIFICANT CHANGE UP
HCG SERPL-ACNC: <1 MIU/ML — SIGNIFICANT CHANGE UP
HCT VFR BLD CALC: 44 % — SIGNIFICANT CHANGE UP (ref 34.5–45)
HGB BLD-MCNC: 15.1 G/DL — SIGNIFICANT CHANGE UP (ref 11.5–15.5)
IMM GRANULOCYTES NFR BLD AUTO: 0.6 % — SIGNIFICANT CHANGE UP (ref 0–0.9)
KETONES UR QL: 15 MG/DL
LACTATE SERPL-SCNC: 3.6 MMOL/L — HIGH (ref 0.7–2)
LEUKOCYTE ESTERASE UR-ACNC: NEGATIVE — SIGNIFICANT CHANGE UP
LIDOCAIN IGE QN: 47 U/L — SIGNIFICANT CHANGE UP (ref 13–75)
LYMPHOCYTES # BLD AUTO: 2.98 K/UL — SIGNIFICANT CHANGE UP (ref 1–3.3)
LYMPHOCYTES # BLD AUTO: 23.7 % — SIGNIFICANT CHANGE UP (ref 13–44)
MCHC RBC-ENTMCNC: 27.5 PG — SIGNIFICANT CHANGE UP (ref 27–34)
MCHC RBC-ENTMCNC: 34.3 G/DL — SIGNIFICANT CHANGE UP (ref 32–36)
MCV RBC AUTO: 80.1 FL — SIGNIFICANT CHANGE UP (ref 80–100)
MONOCYTES # BLD AUTO: 1.39 K/UL — HIGH (ref 0–0.9)
MONOCYTES NFR BLD AUTO: 11 % — SIGNIFICANT CHANGE UP (ref 2–14)
NEUTROPHILS # BLD AUTO: 8.11 K/UL — HIGH (ref 1.8–7.4)
NEUTROPHILS NFR BLD AUTO: 64.3 % — SIGNIFICANT CHANGE UP (ref 43–77)
NITRITE UR-MCNC: NEGATIVE — SIGNIFICANT CHANGE UP
NRBC BLD AUTO-RTO: 0 /100 WBCS — SIGNIFICANT CHANGE UP (ref 0–0)
PH UR: 7.5 — SIGNIFICANT CHANGE UP (ref 5–8)
PLATELET # BLD AUTO: 321 K/UL — SIGNIFICANT CHANGE UP (ref 150–400)
POTASSIUM SERPL-MCNC: 3.6 MMOL/L — SIGNIFICANT CHANGE UP (ref 3.5–5.3)
POTASSIUM SERPL-SCNC: 3.6 MMOL/L — SIGNIFICANT CHANGE UP (ref 3.5–5.3)
PROT SERPL-MCNC: 9.1 GM/DL — HIGH (ref 6–8.3)
PROT UR-MCNC: 100 MG/DL
RBC # BLD: 5.49 M/UL — HIGH (ref 3.8–5.2)
RBC # FLD: 14.5 % — SIGNIFICANT CHANGE UP (ref 10.3–14.5)
SODIUM SERPL-SCNC: 130 MMOL/L — LOW (ref 135–145)
SP GR SPEC: >1.03 — HIGH (ref 1–1.03)
UROBILINOGEN FLD QL: 1 MG/DL — SIGNIFICANT CHANGE UP (ref 0.2–1)
WBC # BLD: 12.6 K/UL — HIGH (ref 3.8–10.5)
WBC # FLD AUTO: 12.6 K/UL — HIGH (ref 3.8–10.5)

## 2025-08-22 PROCEDURE — 76830 TRANSVAGINAL US NON-OB: CPT | Mod: 26

## 2025-08-22 PROCEDURE — 74177 CT ABD & PELVIS W/CONTRAST: CPT | Mod: 26

## 2025-08-22 PROCEDURE — 99285 EMERGENCY DEPT VISIT HI MDM: CPT

## 2025-08-22 PROCEDURE — 93010 ELECTROCARDIOGRAM REPORT: CPT

## 2025-08-22 RX ORDER — ACETAMINOPHEN 500 MG/5ML
1000 LIQUID (ML) ORAL ONCE
Refills: 0 | Status: COMPLETED | OUTPATIENT
Start: 2025-08-22 | End: 2025-08-22

## 2025-08-22 RX ORDER — ONDANSETRON HCL/PF 4 MG/2 ML
4 VIAL (ML) INJECTION ONCE
Refills: 0 | Status: COMPLETED | OUTPATIENT
Start: 2025-08-22 | End: 2025-08-22

## 2025-08-22 RX ORDER — AMLODIPINE BESYLATE 10 MG/1
5 TABLET ORAL ONCE
Refills: 0 | Status: DISCONTINUED | OUTPATIENT
Start: 2025-08-22 | End: 2025-08-23

## 2025-08-22 RX ORDER — METOCLOPRAMIDE HCL 10 MG
10 TABLET ORAL ONCE
Refills: 0 | Status: COMPLETED | OUTPATIENT
Start: 2025-08-22 | End: 2025-08-22

## 2025-08-22 RX ORDER — KETOROLAC TROMETHAMINE 30 MG/ML
15 INJECTION, SOLUTION INTRAMUSCULAR; INTRAVENOUS ONCE
Refills: 0 | Status: DISCONTINUED | OUTPATIENT
Start: 2025-08-22 | End: 2025-08-22

## 2025-08-22 RX ORDER — SODIUM CHLORIDE 9 G/1000ML
1000 INJECTION, SOLUTION INTRAVENOUS
Refills: 0 | Status: DISCONTINUED | OUTPATIENT
Start: 2025-08-22 | End: 2025-08-24

## 2025-08-22 RX ORDER — DIPHENHYDRAMINE HCL 12.5MG/5ML
25 ELIXIR ORAL ONCE
Refills: 0 | Status: COMPLETED | OUTPATIENT
Start: 2025-08-22 | End: 2025-08-22

## 2025-08-22 RX ORDER — MAGNESIUM, ALUMINUM HYDROXIDE 200-200 MG
30 TABLET,CHEWABLE ORAL ONCE
Refills: 0 | Status: COMPLETED | OUTPATIENT
Start: 2025-08-22 | End: 2025-08-22

## 2025-08-22 RX ADMIN — KETOROLAC TROMETHAMINE 15 MILLIGRAM(S): 30 INJECTION, SOLUTION INTRAMUSCULAR; INTRAVENOUS at 20:23

## 2025-08-22 RX ADMIN — Medication 25 MILLIGRAM(S): at 22:06

## 2025-08-22 RX ADMIN — Medication 400 MILLIGRAM(S): at 21:05

## 2025-08-22 RX ADMIN — Medication 4 MILLIGRAM(S): at 20:23

## 2025-08-22 RX ADMIN — Medication 30 MILLILITER(S): at 22:54

## 2025-08-22 RX ADMIN — Medication 10 MILLIGRAM(S): at 22:06

## 2025-08-22 RX ADMIN — Medication 1000 MILLILITER(S): at 21:05

## 2025-08-22 RX ADMIN — Medication 4 MILLIGRAM(S): at 22:06

## 2025-08-22 RX ADMIN — Medication 1000 MILLILITER(S): at 20:25

## 2025-08-23 DIAGNOSIS — R74.01 ELEVATION OF LEVELS OF LIVER TRANSAMINASE LEVELS: ICD-10-CM

## 2025-08-23 DIAGNOSIS — D72.829 ELEVATED WHITE BLOOD CELL COUNT, UNSPECIFIED: ICD-10-CM

## 2025-08-23 DIAGNOSIS — R11.2 NAUSEA WITH VOMITING, UNSPECIFIED: ICD-10-CM

## 2025-08-23 DIAGNOSIS — R10.9 UNSPECIFIED ABDOMINAL PAIN: ICD-10-CM

## 2025-08-23 DIAGNOSIS — K21.9 GASTRO-ESOPHAGEAL REFLUX DISEASE WITHOUT ESOPHAGITIS: ICD-10-CM

## 2025-08-23 DIAGNOSIS — N17.9 ACUTE KIDNEY FAILURE, UNSPECIFIED: ICD-10-CM

## 2025-08-23 DIAGNOSIS — N94.6 DYSMENORRHEA, UNSPECIFIED: ICD-10-CM

## 2025-08-23 LAB
ANION GAP SERPL CALC-SCNC: 8 MMOL/L — SIGNIFICANT CHANGE UP (ref 5–17)
BUN SERPL-MCNC: 17 MG/DL — SIGNIFICANT CHANGE UP (ref 7–23)
CALCIUM SERPL-MCNC: 8.9 MG/DL — SIGNIFICANT CHANGE UP (ref 8.5–10.1)
CHLORIDE SERPL-SCNC: 97 MMOL/L — SIGNIFICANT CHANGE UP (ref 96–108)
CO2 SERPL-SCNC: 33 MMOL/L — HIGH (ref 22–31)
CREAT SERPL-MCNC: 1.01 MG/DL — SIGNIFICANT CHANGE UP (ref 0.5–1.3)
EGFR: 77 ML/MIN/1.73M2 — SIGNIFICANT CHANGE UP
EGFR: 77 ML/MIN/1.73M2 — SIGNIFICANT CHANGE UP
GLUCOSE BLDC GLUCOMTR-MCNC: 91 MG/DL — SIGNIFICANT CHANGE UP (ref 70–99)
GLUCOSE SERPL-MCNC: 108 MG/DL — HIGH (ref 70–99)
HCT VFR BLD CALC: 38 % — SIGNIFICANT CHANGE UP (ref 34.5–45)
HGB BLD-MCNC: 13 G/DL — SIGNIFICANT CHANGE UP (ref 11.5–15.5)
LACTATE SERPL-SCNC: 1.3 MMOL/L — SIGNIFICANT CHANGE UP (ref 0.7–2)
MCHC RBC-ENTMCNC: 27.9 PG — SIGNIFICANT CHANGE UP (ref 27–34)
MCHC RBC-ENTMCNC: 34.2 G/DL — SIGNIFICANT CHANGE UP (ref 32–36)
MCV RBC AUTO: 81.5 FL — SIGNIFICANT CHANGE UP (ref 80–100)
NRBC BLD AUTO-RTO: 0 /100 WBCS — SIGNIFICANT CHANGE UP (ref 0–0)
PLATELET # BLD AUTO: 263 K/UL — SIGNIFICANT CHANGE UP (ref 150–400)
POTASSIUM SERPL-MCNC: 3.1 MMOL/L — LOW (ref 3.5–5.3)
POTASSIUM SERPL-SCNC: 3.1 MMOL/L — LOW (ref 3.5–5.3)
RBC # BLD: 4.66 M/UL — SIGNIFICANT CHANGE UP (ref 3.8–5.2)
RBC # FLD: 14.2 % — SIGNIFICANT CHANGE UP (ref 10.3–14.5)
SODIUM SERPL-SCNC: 138 MMOL/L — SIGNIFICANT CHANGE UP (ref 135–145)
SODIUM UR-SCNC: 47 MMOL/L — SIGNIFICANT CHANGE UP
UUN UR-MCNC: 1307 MG/DL — SIGNIFICANT CHANGE UP
WBC # BLD: 11.19 K/UL — HIGH (ref 3.8–10.5)
WBC # FLD AUTO: 11.19 K/UL — HIGH (ref 3.8–10.5)

## 2025-08-23 PROCEDURE — 99223 1ST HOSP IP/OBS HIGH 75: CPT

## 2025-08-23 RX ORDER — AMLODIPINE BESYLATE 10 MG/1
5 TABLET ORAL DAILY
Refills: 0 | Status: DISCONTINUED | OUTPATIENT
Start: 2025-08-23 | End: 2025-08-24

## 2025-08-23 RX ORDER — ONDANSETRON HCL/PF 4 MG/2 ML
4 VIAL (ML) INJECTION EVERY 8 HOURS
Refills: 0 | Status: DISCONTINUED | OUTPATIENT
Start: 2025-08-22 | End: 2025-08-24

## 2025-08-23 RX ORDER — ACETAMINOPHEN 500 MG/5ML
650 LIQUID (ML) ORAL EVERY 6 HOURS
Refills: 0 | Status: DISCONTINUED | OUTPATIENT
Start: 2025-08-22 | End: 2025-08-24

## 2025-08-23 RX ORDER — SENNA 187 MG
1 TABLET ORAL DAILY
Refills: 0 | Status: DISCONTINUED | OUTPATIENT
Start: 2025-08-23 | End: 2025-08-24

## 2025-08-23 RX ORDER — POLYETHYLENE GLYCOL 3350 17 G/17G
17 POWDER, FOR SOLUTION ORAL DAILY
Refills: 0 | Status: DISCONTINUED | OUTPATIENT
Start: 2025-08-23 | End: 2025-08-24

## 2025-08-23 RX ORDER — HEPARIN SODIUM 1000 [USP'U]/ML
5000 INJECTION INTRAVENOUS; SUBCUTANEOUS EVERY 12 HOURS
Refills: 0 | Status: DISCONTINUED | OUTPATIENT
Start: 2025-08-22 | End: 2025-08-24

## 2025-08-23 RX ORDER — MELATONIN 5 MG
3 TABLET ORAL AT BEDTIME
Refills: 0 | Status: DISCONTINUED | OUTPATIENT
Start: 2025-08-22 | End: 2025-08-24

## 2025-08-23 RX ORDER — MAGNESIUM, ALUMINUM HYDROXIDE 200-200 MG
30 TABLET,CHEWABLE ORAL EVERY 4 HOURS
Refills: 0 | Status: DISCONTINUED | OUTPATIENT
Start: 2025-08-22 | End: 2025-08-24

## 2025-08-23 RX ADMIN — Medication 4 MILLIGRAM(S): at 09:04

## 2025-08-23 RX ADMIN — SODIUM CHLORIDE 75 MILLILITER(S): 9 INJECTION, SOLUTION INTRAVENOUS at 13:05

## 2025-08-23 RX ADMIN — Medication 2 MILLIGRAM(S): at 05:15

## 2025-08-23 RX ADMIN — Medication 100 MILLIEQUIVALENT(S): at 15:42

## 2025-08-23 RX ADMIN — Medication 100 MILLIEQUIVALENT(S): at 13:05

## 2025-08-23 RX ADMIN — Medication 4 MILLIGRAM(S): at 14:04

## 2025-08-23 RX ADMIN — Medication 40 MILLIGRAM(S): at 09:03

## 2025-08-23 RX ADMIN — Medication 5 MILLIGRAM(S): at 23:28

## 2025-08-23 RX ADMIN — Medication 4 MILLIGRAM(S): at 09:03

## 2025-08-23 RX ADMIN — Medication 4 MILLIGRAM(S): at 13:04

## 2025-08-23 RX ADMIN — Medication 30 MILLILITER(S): at 12:38

## 2025-08-23 RX ADMIN — HEPARIN SODIUM 5000 UNIT(S): 1000 INJECTION INTRAVENOUS; SUBCUTANEOUS at 18:33

## 2025-08-23 RX ADMIN — SODIUM CHLORIDE 75 MILLILITER(S): 9 INJECTION, SOLUTION INTRAVENOUS at 02:40

## 2025-08-23 RX ADMIN — Medication 100 MILLIEQUIVALENT(S): at 14:03

## 2025-08-23 RX ADMIN — Medication 4 MILLIGRAM(S): at 18:33

## 2025-08-23 RX ADMIN — Medication 4 MILLIGRAM(S): at 23:48

## 2025-08-23 RX ADMIN — Medication 4 MILLIGRAM(S): at 19:33

## 2025-08-23 RX ADMIN — Medication 40 MILLIGRAM(S): at 18:33

## 2025-08-23 RX ADMIN — Medication 4 MILLIGRAM(S): at 23:28

## 2025-08-23 RX ADMIN — Medication 4 MILLIGRAM(S): at 10:04

## 2025-08-24 VITALS
HEART RATE: 84 BPM | SYSTOLIC BLOOD PRESSURE: 142 MMHG | OXYGEN SATURATION: 100 % | DIASTOLIC BLOOD PRESSURE: 99 MMHG | RESPIRATION RATE: 18 BRPM | TEMPERATURE: 98 F

## 2025-08-24 LAB
ANION GAP SERPL CALC-SCNC: 5 MMOL/L — SIGNIFICANT CHANGE UP (ref 5–17)
ANION GAP SERPL CALC-SCNC: 9 MMOL/L — SIGNIFICANT CHANGE UP (ref 5–17)
BUN SERPL-MCNC: 11 MG/DL — SIGNIFICANT CHANGE UP (ref 7–23)
BUN SERPL-MCNC: 13 MG/DL — SIGNIFICANT CHANGE UP (ref 7–23)
CALCIUM SERPL-MCNC: 8.3 MG/DL — LOW (ref 8.5–10.1)
CALCIUM SERPL-MCNC: 8.5 MG/DL — SIGNIFICANT CHANGE UP (ref 8.5–10.1)
CHLORIDE SERPL-SCNC: 104 MMOL/L — SIGNIFICANT CHANGE UP (ref 96–108)
CHLORIDE SERPL-SCNC: 99 MMOL/L — SIGNIFICANT CHANGE UP (ref 96–108)
CO2 SERPL-SCNC: 26 MMOL/L — SIGNIFICANT CHANGE UP (ref 22–31)
CO2 SERPL-SCNC: 27 MMOL/L — SIGNIFICANT CHANGE UP (ref 22–31)
CREAT ?TM UR-MCNC: 308 MG/DL — SIGNIFICANT CHANGE UP
CREAT SERPL-MCNC: 0.74 MG/DL — SIGNIFICANT CHANGE UP (ref 0.5–1.3)
CREAT SERPL-MCNC: 0.77 MG/DL — SIGNIFICANT CHANGE UP (ref 0.5–1.3)
EGFR: 107 ML/MIN/1.73M2 — SIGNIFICANT CHANGE UP
EGFR: 107 ML/MIN/1.73M2 — SIGNIFICANT CHANGE UP
EGFR: 112 ML/MIN/1.73M2 — SIGNIFICANT CHANGE UP
EGFR: 112 ML/MIN/1.73M2 — SIGNIFICANT CHANGE UP
GLUCOSE SERPL-MCNC: 85 MG/DL — SIGNIFICANT CHANGE UP (ref 70–99)
GLUCOSE SERPL-MCNC: 98 MG/DL — SIGNIFICANT CHANGE UP (ref 70–99)
HCT VFR BLD CALC: 38.5 % — SIGNIFICANT CHANGE UP (ref 34.5–45)
HGB BLD-MCNC: 12.8 G/DL — SIGNIFICANT CHANGE UP (ref 11.5–15.5)
MCHC RBC-ENTMCNC: 27.7 PG — SIGNIFICANT CHANGE UP (ref 27–34)
MCHC RBC-ENTMCNC: 33.2 G/DL — SIGNIFICANT CHANGE UP (ref 32–36)
MCV RBC AUTO: 83.3 FL — SIGNIFICANT CHANGE UP (ref 80–100)
NRBC BLD AUTO-RTO: 0 /100 WBCS — SIGNIFICANT CHANGE UP (ref 0–0)
PLATELET # BLD AUTO: 255 K/UL — SIGNIFICANT CHANGE UP (ref 150–400)
POTASSIUM SERPL-MCNC: 2.7 MMOL/L — CRITICAL LOW (ref 3.5–5.3)
POTASSIUM SERPL-MCNC: 4.1 MMOL/L — SIGNIFICANT CHANGE UP (ref 3.5–5.3)
POTASSIUM SERPL-SCNC: 2.7 MMOL/L — CRITICAL LOW (ref 3.5–5.3)
POTASSIUM SERPL-SCNC: 4.1 MMOL/L — SIGNIFICANT CHANGE UP (ref 3.5–5.3)
PROT ?TM UR-MCNC: 84 MG/DL — HIGH (ref 0–12)
PROT/CREAT UR-RTO: 0.3 RATIO — HIGH (ref 0–0.2)
RBC # BLD: 4.62 M/UL — SIGNIFICANT CHANGE UP (ref 3.8–5.2)
RBC # FLD: 13.6 % — SIGNIFICANT CHANGE UP (ref 10.3–14.5)
SODIUM SERPL-SCNC: 134 MMOL/L — LOW (ref 135–145)
SODIUM SERPL-SCNC: 136 MMOL/L — SIGNIFICANT CHANGE UP (ref 135–145)
WBC # BLD: 11.73 K/UL — HIGH (ref 3.8–10.5)
WBC # FLD AUTO: 11.73 K/UL — HIGH (ref 3.8–10.5)

## 2025-08-24 PROCEDURE — 99239 HOSP IP/OBS DSCHRG MGMT >30: CPT | Mod: GC

## 2025-08-24 RX ORDER — AMLODIPINE BESYLATE 10 MG/1
5 TABLET ORAL ONCE
Refills: 0 | Status: DISCONTINUED | OUTPATIENT
Start: 2025-08-24 | End: 2025-08-24

## 2025-08-24 RX ORDER — AMLODIPINE BESYLATE 10 MG/1
1 TABLET ORAL
Qty: 30 | Refills: 0
Start: 2025-08-24 | End: 2025-09-22

## 2025-08-24 RX ORDER — AMLODIPINE BESYLATE 10 MG/1
5 TABLET ORAL ONCE
Refills: 0 | Status: COMPLETED | OUTPATIENT
Start: 2025-08-24 | End: 2025-08-24

## 2025-08-24 RX ORDER — AMLODIPINE BESYLATE 10 MG/1
10 TABLET ORAL DAILY
Refills: 0 | Status: DISCONTINUED | OUTPATIENT
Start: 2025-08-25 | End: 2025-08-24

## 2025-08-24 RX ADMIN — Medication 100 MILLIEQUIVALENT(S): at 10:59

## 2025-08-24 RX ADMIN — Medication 40 MILLIEQUIVALENT(S): at 08:18

## 2025-08-24 RX ADMIN — AMLODIPINE BESYLATE 5 MILLIGRAM(S): 10 TABLET ORAL at 05:17

## 2025-08-24 RX ADMIN — HEPARIN SODIUM 5000 UNIT(S): 1000 INJECTION INTRAVENOUS; SUBCUTANEOUS at 05:17

## 2025-08-24 RX ADMIN — Medication 40 MILLIEQUIVALENT(S): at 13:05

## 2025-08-24 RX ADMIN — Medication 4 MILLIGRAM(S): at 04:44

## 2025-08-24 RX ADMIN — Medication 100 MILLIEQUIVALENT(S): at 09:15

## 2025-08-24 RX ADMIN — Medication 100 MILLIEQUIVALENT(S): at 08:19

## 2025-08-24 RX ADMIN — AMLODIPINE BESYLATE 5 MILLIGRAM(S): 10 TABLET ORAL at 11:37

## 2025-08-24 RX ADMIN — Medication 4 MILLIGRAM(S): at 04:59

## 2025-08-24 RX ADMIN — Medication 40 MILLIGRAM(S): at 05:17

## 2025-08-30 DIAGNOSIS — Z79.899 OTHER LONG TERM (CURRENT) DRUG THERAPY: ICD-10-CM

## 2025-08-30 DIAGNOSIS — I10 ESSENTIAL (PRIMARY) HYPERTENSION: ICD-10-CM

## 2025-08-30 DIAGNOSIS — F12.10 CANNABIS ABUSE, UNCOMPLICATED: ICD-10-CM

## 2025-08-30 DIAGNOSIS — Z88.0 ALLERGY STATUS TO PENICILLIN: ICD-10-CM

## 2025-08-30 DIAGNOSIS — N17.9 ACUTE KIDNEY FAILURE, UNSPECIFIED: ICD-10-CM

## 2025-08-30 DIAGNOSIS — R11.2 NAUSEA WITH VOMITING, UNSPECIFIED: ICD-10-CM

## 2025-08-30 DIAGNOSIS — N94.6 DYSMENORRHEA, UNSPECIFIED: ICD-10-CM

## 2025-08-30 DIAGNOSIS — R74.01 ELEVATION OF LEVELS OF LIVER TRANSAMINASE LEVELS: ICD-10-CM

## 2025-08-30 DIAGNOSIS — Z88.8 ALLERGY STATUS TO OTHER DRUGS, MEDICAMENTS AND BIOLOGICAL SUBSTANCES: ICD-10-CM

## 2025-08-30 DIAGNOSIS — D72.829 ELEVATED WHITE BLOOD CELL COUNT, UNSPECIFIED: ICD-10-CM

## 2025-08-30 DIAGNOSIS — R10.9 UNSPECIFIED ABDOMINAL PAIN: ICD-10-CM

## 2025-08-30 DIAGNOSIS — K21.9 GASTRO-ESOPHAGEAL REFLUX DISEASE WITHOUT ESOPHAGITIS: ICD-10-CM
